# Patient Record
Sex: FEMALE | Race: WHITE | NOT HISPANIC OR LATINO | Employment: OTHER | URBAN - METROPOLITAN AREA
[De-identification: names, ages, dates, MRNs, and addresses within clinical notes are randomized per-mention and may not be internally consistent; named-entity substitution may affect disease eponyms.]

---

## 2017-01-10 ENCOUNTER — HOSPITAL ENCOUNTER (OUTPATIENT)
Dept: LAB | Facility: CLINIC | Age: 62
Discharge: HOME | End: 2017-01-10
Attending: INTERNAL MEDICINE | Admitting: INTERNAL MEDICINE
Payer: COMMERCIAL

## 2017-01-10 DIAGNOSIS — M25.559 PAIN IN JOINT, PELVIC REGION AND THIGH, UNSPECIFIED LATERALITY: ICD-10-CM

## 2017-01-10 DIAGNOSIS — L40.50 PSORIATIC ARTHRITIS (H): ICD-10-CM

## 2017-01-10 DIAGNOSIS — Z79.899 HIGH RISK MEDICATIONS (NOT ANTICOAGULANTS) LONG-TERM USE: ICD-10-CM

## 2017-01-10 DIAGNOSIS — Z79.899 HISTORY OF ONGOING TREATMENT WITH HIGH-RISK MEDICATION: ICD-10-CM

## 2017-01-10 DIAGNOSIS — M94.0 COSTOCHONDRITIS, ACUTE: ICD-10-CM

## 2017-01-10 LAB
ALBUMIN SERPL-MCNC: 3.6 G/DL (ref 3.4–5)
ALBUMIN UR-MCNC: NEGATIVE MG/DL
ALT SERPL W P-5'-P-CCNC: 28 U/L (ref 0–50)
APPEARANCE UR: CLEAR
AST SERPL W P-5'-P-CCNC: 27 U/L (ref 0–45)
BASOPHILS # BLD AUTO: 0 10E9/L (ref 0–0.2)
BASOPHILS NFR BLD AUTO: 0.7 %
BILIRUB UR QL STRIP: NEGATIVE
COLOR UR AUTO: ABNORMAL
CREAT SERPL-MCNC: 0.86 MG/DL (ref 0.52–1.04)
CRP SERPL-MCNC: 3.5 MG/L (ref 0–8)
DIFFERENTIAL METHOD BLD: NORMAL
EOSINOPHIL # BLD AUTO: 0.1 10E9/L (ref 0–0.7)
EOSINOPHIL NFR BLD AUTO: 1.3 %
ERYTHROCYTE [DISTWIDTH] IN BLOOD BY AUTOMATED COUNT: 14.5 % (ref 10–15)
ERYTHROCYTE [SEDIMENTATION RATE] IN BLOOD BY WESTERGREN METHOD: 8 MM/H (ref 0–30)
GFR SERPL CREATININE-BSD FRML MDRD: 68 ML/MIN/1.7M2
GLUCOSE UR STRIP-MCNC: NEGATIVE MG/DL
HCT VFR BLD AUTO: 40.4 % (ref 35–47)
HGB BLD-MCNC: 13.8 G/DL (ref 11.7–15.7)
HGB UR QL STRIP: NEGATIVE
IMM GRANULOCYTES # BLD: 0 10E9/L (ref 0–0.4)
IMM GRANULOCYTES NFR BLD: 0.2 %
KETONES UR STRIP-MCNC: NEGATIVE MG/DL
LEUKOCYTE ESTERASE UR QL STRIP: NEGATIVE
LYMPHOCYTES # BLD AUTO: 2.3 10E9/L (ref 0.8–5.3)
LYMPHOCYTES NFR BLD AUTO: 41 %
MCH RBC QN AUTO: 30.1 PG (ref 26.5–33)
MCHC RBC AUTO-ENTMCNC: 34.2 G/DL (ref 31.5–36.5)
MCV RBC AUTO: 88 FL (ref 78–100)
MONOCYTES # BLD AUTO: 0.5 10E9/L (ref 0–1.3)
MONOCYTES NFR BLD AUTO: 8.6 %
MUCOUS THREADS #/AREA URNS LPF: PRESENT /LPF
NEUTROPHILS # BLD AUTO: 2.7 10E9/L (ref 1.6–8.3)
NEUTROPHILS NFR BLD AUTO: 48.2 %
NITRATE UR QL: NEGATIVE
NRBC # BLD AUTO: 0 10*3/UL
NRBC BLD AUTO-RTO: 0 /100
PH UR STRIP: 6 PH (ref 5–7)
PLATELET # BLD AUTO: 221 10E9/L (ref 150–450)
RBC # BLD AUTO: 4.59 10E12/L (ref 3.8–5.2)
RBC #/AREA URNS AUTO: <1 /HPF (ref 0–2)
SP GR UR STRIP: 1.01 (ref 1–1.03)
URN SPEC COLLECT METH UR: ABNORMAL
UROBILINOGEN UR STRIP-MCNC: NORMAL MG/DL (ref 0–2)
WBC # BLD AUTO: 5.6 10E9/L (ref 4–11)
WBC #/AREA URNS AUTO: <1 /HPF (ref 0–2)

## 2017-01-10 PROCEDURE — 36415 COLL VENOUS BLD VENIPUNCTURE: CPT | Performed by: INTERNAL MEDICINE

## 2017-01-10 PROCEDURE — 85652 RBC SED RATE AUTOMATED: CPT | Performed by: INTERNAL MEDICINE

## 2017-01-10 PROCEDURE — 84450 TRANSFERASE (AST) (SGOT): CPT | Performed by: INTERNAL MEDICINE

## 2017-01-10 PROCEDURE — 86140 C-REACTIVE PROTEIN: CPT | Performed by: INTERNAL MEDICINE

## 2017-01-10 PROCEDURE — 82565 ASSAY OF CREATININE: CPT | Performed by: INTERNAL MEDICINE

## 2017-01-10 PROCEDURE — 81001 URINALYSIS AUTO W/SCOPE: CPT | Performed by: INTERNAL MEDICINE

## 2017-01-10 PROCEDURE — 84460 ALANINE AMINO (ALT) (SGPT): CPT | Performed by: INTERNAL MEDICINE

## 2017-01-10 PROCEDURE — 85025 COMPLETE CBC W/AUTO DIFF WBC: CPT | Performed by: INTERNAL MEDICINE

## 2017-01-10 PROCEDURE — 82040 ASSAY OF SERUM ALBUMIN: CPT | Performed by: INTERNAL MEDICINE

## 2017-01-11 ENCOUNTER — TELEPHONE (OUTPATIENT)
Dept: RHEUMATOLOGY | Facility: CLINIC | Age: 62
End: 2017-01-11

## 2017-01-11 NOTE — TELEPHONE ENCOUNTER
Prior Authorization Specialty Medication Request    Medication/Dose: certolizumab (Cimzia) - NEW INS  Frequency: Inject 200 mg Subcutaneous every 14 days    Route: sub-Q injection  Diagnosis and ICD: Pain in joint, pelvic region and thigh, unspecified laterality (M25.559); Psoriatic arthritis (H) (L40.50); Inflammatory polyarthropathy (H) (M06.4); Costochondritis, acute (M94.0); Inflammatory spondylopathy of multiple sites in spine (H) (M46.99)  New/Renewal/Insurance Change PA: Renewal - ins change    Important Lab Values: None    Previously Tried and Failed Therapies: Continuation of therapy    Rationale: Continuation of therapy    Pt MyChart Message:  Dear Dr Samayoa,   I changed health insurance to [employer]   I am sorry to say I need a prior authorization for the cimzia.    Your offic e will need to call [phone] to obtain this.    So sorry . Krissy Weldon    U care choice number is [phone]   RXPCN A4

## 2017-01-17 NOTE — TELEPHONE ENCOUNTER
Patient gave verbal consent to enroll in co-pay program for Cimzia and sharps mail back program through [employer] at tel [phone].    Co-pay card CIMZIA  ID: [id number]  PCN: [id number]  BIN: [id number]  GROUP: [id number]    Active 1-17-17 with $15,000

## 2017-01-17 NOTE — TELEPHONE ENCOUNTER
Prior Authorization Approval    Authorization Effective Date: 12/18/2016  Authorization Expiration Date: 1/17/2020  Medication: certolizumab (Cimzia) - APPROVED  Approved Dose/Quantity: 1 KIT PER 28 DAYS  Reference #: [id number]   Insurance Company:    Expected CoPay: N/A     CoPay Card Available: [email or web address]  Foundation Assistance Needed:    Which Pharmacy is filling the prescription (Not needed for infusion/clinic administered): [facility]  Pharmacy Notified: Yes   Patient Notified:  Yes, VM left on phone.

## 2017-01-17 NOTE — TELEPHONE ENCOUNTER
PA Initiation    Medication: certolizumab (Cimzia) - NEW INS  Insurance Company:  [employer] Pt id#[id number]  Pharmacy Filling the Rx: [facility] - [place], [place] - [facility] [address]  Filling Pharmacy Phone:    Filling Pharmacy Fax:    Start Date: 1/17/2017

## 2017-01-18 ENCOUNTER — TELEPHONE (OUTPATIENT)
Dept: RHEUMATOLOGY | Facility: CLINIC | Age: 62
End: 2017-01-18

## 2017-01-18 NOTE — TELEPHONE ENCOUNTER
Alejandra ([facility]) called re: Cimzia PA. Approved for 12/18/16 to 1/17/20. Documentation to be faxed to rheum clinic. Message sent to rheum [place].

## 2017-01-24 DIAGNOSIS — M25.559 PAIN IN JOINT, PELVIC REGION AND THIGH, UNSPECIFIED LATERALITY: Primary | ICD-10-CM

## 2017-01-25 DIAGNOSIS — M25.559 PAIN IN JOINT, PELVIC REGION AND THIGH, UNSPECIFIED LATERALITY: Primary | ICD-10-CM

## 2017-01-25 NOTE — TELEPHONE ENCOUNTER
Due to Medicare and [place] Board of Pharmacy Compliance guidelines Prescriptions must be E-Scribed we can not accept the Fax method from [facility].    [facility] Pharmacy Technician  [facility]  [phone] fax [phone]

## 2017-01-26 ENCOUNTER — TELEPHONE (OUTPATIENT)
Dept: RHEUMATOLOGY | Facility: CLINIC | Age: 62
End: 2017-01-26

## 2017-01-26 RX ORDER — NEEDLES, DISPOSABLE 25GX5/8"
NEEDLE, DISPOSABLE MISCELLANEOUS
Qty: 25 EACH | Refills: 0 | Status: SHIPPED
Start: 2017-01-26 | End: 2017-05-31

## 2017-01-26 RX ORDER — SYRINGE, DISPOSABLE, 1 ML
SYRINGE, EMPTY DISPOSABLE MISCELLANEOUS
Qty: 25 EACH | Refills: 0 | Status: SHIPPED
Start: 2017-01-26 | End: 2017-05-31

## 2017-01-26 NOTE — TELEPHONE ENCOUNTER
Please E-Prescribe, call in a verbal okay, or local print sign and manually fax the local print to us. Needles and Syringes - Due to Medicare and [place] Board of Pharmacy Compliance guidelines Prescriptions must be E-Scribed we can not accept the Fax method from [facility] Pharmacy Technician  [facility]  [phone] fax [phone]

## 2017-01-26 NOTE — TELEPHONE ENCOUNTER
Please E-Prescribe, call in a verbal okay, or local print sign and manually fax the local print to us - Due to [place] and [place] Board of Pharmacy Compliance guidelines Prescriptions must be E-Scribed we can not accept the Fax method from [facility] Pharmacy Technician  [facility]  [phone] fax [phone]

## 2017-01-26 NOTE — TELEPHONE ENCOUNTER
Please do not close this encounter until this has been addressed.  (prior auth approved/denied, prescriber refusal to complete prior auth or medication changed/discontinued)    Prior Authorization needed on: Methotrexate 50mg/2ml  Drug NDC: [address]-14     Insurance: [employer]  Member ID: [id number]   Insurance phone #: [phone]    Pharmacy NPI: [id number]  Pharmacy Phone #: [phone]  Pharmacy Fax #: [phone]    Please let us know if the PA gets approved or denied or if medication is changed  [facility] Pharmacy Technician  [facility]  [phone] fax [phone]

## 2017-01-31 NOTE — TELEPHONE ENCOUNTER
[facility] Prior Authorization Team   Phone: [phone]  Fax: [phone]    PA Initiation    Medication: Methotrexate 50mg/2ml  Insurance Company: [facility] - Phone [phone] Fax [phone]  Pharmacy Filling the Rx: [facility] [address]  Filling Pharmacy Phone: [phone]  Filling Pharmacy Fax:    Start Date: 1/31/2017

## 2017-02-05 ENCOUNTER — MYC MEDICAL ADVICE (OUTPATIENT)
Dept: RHEUMATOLOGY | Facility: CLINIC | Age: 62
End: 2017-02-05

## 2017-02-05 DIAGNOSIS — M46.99 INFLAMMATORY SPONDYLOPATHY OF MULTIPLE SITES IN SPINE (H): ICD-10-CM

## 2017-02-05 DIAGNOSIS — L40.50 PSORIATIC ARTHRITIS (H): ICD-10-CM

## 2017-02-05 DIAGNOSIS — M25.559 PAIN IN JOINT, PELVIC REGION AND THIGH, UNSPECIFIED LATERALITY: Primary | ICD-10-CM

## 2017-02-05 DIAGNOSIS — M94.0 COSTOCHONDRITIS, ACUTE: ICD-10-CM

## 2017-02-05 DIAGNOSIS — M06.4 INFLAMMATORY POLYARTHROPATHY (H): ICD-10-CM

## 2017-02-06 ENCOUNTER — TELEPHONE (OUTPATIENT)
Dept: RHEUMATOLOGY | Facility: CLINIC | Age: 62
End: 2017-02-06

## 2017-02-06 NOTE — TELEPHONE ENCOUNTER
Last seen: 11/1/17  Pending appt: 3/7/17  Medication: Cimzia   Last given: 9/2016  Qty: 3 kits  Lab:  WBC   Date Value Ref Range Status   01/10/2017 5.6 4.0 - 11.0 10e9/L Final     RBC COUNT   Date Value Ref Range Status   01/10/2017 4.59 3.8 - 5.2 10e12/L Final     HEMOGLOBIN   Date Value Ref Range Status   01/10/2017 13.8 11.7 - 15.7 g/dL Final     HEMATOCRIT   Date Value Ref Range Status   01/10/2017 40.4 35.0 - 47.0 % Final     MCV   Date Value Ref Range Status   01/10/2017 88 78 - 100 fl Final     MCH   Date Value Ref Range Status   01/10/2017 30.1 26.5 - 33.0 pg Final     PLATELET COUNT   Date Value Ref Range Status   01/10/2017 221 150 - 450 10e9/L Final     % LYMPHOCYTES   Date Value Ref Range Status   01/10/2017 41.0 % Final     AST   Date Value Ref Range Status   01/10/2017 27 0 - 45 U/L Final     ALT   Date Value Ref Range Status   01/10/2017 28 0 - 50 U/L Final     ALBUMIN   Date Value Ref Range Status   01/10/2017 3.6 3.4 - 5.0 g/dL Final     ALKALINE PHOSPHATASE   Date Value Ref Range Status   09/23/2015 91 40 - 150 U/L Final     CREATININE   Date Value Ref Range Status   01/10/2017 0.86 0.52 - 1.04 mg/dL Final     GFR ESTIMATE   Date Value Ref Range Status   01/10/2017 68 >60 mL/min/1.7m2 Final     Comment:     Non  GFR Calc     GFR ESTIMATE IF BLACK   Date Value Ref Range Status   01/10/2017 82 >60 mL/min/1.7m2 Final     Comment:      GFR Calc     CREATININE URINE   Date Value Ref Range Status   01/14/2013 117 mg/dL Final     SED RATE   Date Value Ref Range Status   01/10/2017 8 0 - 30 mm/h Final     CRP INFLAMMATION   Date Value Ref Range Status   01/10/2017 3.5 0.0 - 8.0 mg/L Final     Criteria met for refill renewal per Rheumatology Refill Protocol, approval faxed to pharmacy.

## 2017-02-06 NOTE — TELEPHONE ENCOUNTER
[facility] called [phone] with case number:  [id number] needing prior auth for injectable methotrexate.  Case expires on 2/8/17.

## 2017-02-06 NOTE — TELEPHONE ENCOUNTER
Shellie has been working with pt to clarify which pharmacy she needs to use. Per pt's insurance plan pt is not able to use [facility], but can use [facility].

## 2017-02-07 NOTE — TELEPHONE ENCOUNTER
Called Diaz miller is pending for return call from nurse. There was an outgoing call made today 2/6/2016 to [phone]. Per rep it is waiting for nurse to call back with criteria questions.

## 2017-02-07 NOTE — TELEPHONE ENCOUNTER
Prior Authorization Approval    Authorization Effective Date: 1/30/2017  Authorization Expiration Date: 2/8/2018  Medication: Methotrexate 50mg/2ml- APPROVED  Approved Dose/Quantity:   Reference #:   [id number]  Insurance Company: [employer] - Phone [phone] Fax [phone]  Expected CoPay: $16.00     CoPay Card Available:      Foundation Assistance Needed:    Which Pharmacy is filling the prescription (Not needed for infusion/clinic administered): [facility] [place] [address], [address]  Pharmacy Notified: Yes  Patient Notified: Yes      CALLED TO CHECK ON STATUS. PER REP THIS MEDICATION HAS BEEN APPROVED.

## 2017-02-07 NOTE — TELEPHONE ENCOUNTER
Insurance needing to know if pt is unable to try/fail Methotrexate tabs.  Pt is unable to tolerate.  That information and chart notes have been faxed to insurance.

## 2017-02-08 ENCOUNTER — TRANSFERRED RECORDS (OUTPATIENT)
Dept: HEALTH INFORMATION MANAGEMENT | Facility: CLINIC | Age: 62
End: 2017-02-08

## 2017-02-08 NOTE — TELEPHONE ENCOUNTER
Called pt with the approval update. She had spoke with Shellie also. Krissy is very appreciative.    CLEMENT HaasN RN  Rheumatology RN Coordinator  JIMMY Vanegas

## 2017-02-15 ENCOUNTER — OFFICE VISIT (OUTPATIENT)
Dept: RHEUMATOLOGY | Facility: CLINIC | Age: 62
End: 2017-02-15
Attending: NURSE PRACTITIONER
Payer: COMMERCIAL

## 2017-02-15 ENCOUNTER — TELEPHONE (OUTPATIENT)
Dept: RHEUMATOLOGY | Facility: CLINIC | Age: 62
End: 2017-02-15

## 2017-02-15 VITALS
WEIGHT: 150 LBS | SYSTOLIC BLOOD PRESSURE: 104 MMHG | DIASTOLIC BLOOD PRESSURE: 71 MMHG | BODY MASS INDEX: 24.58 KG/M2 | HEART RATE: 75 BPM

## 2017-02-15 DIAGNOSIS — Z79.899 HIGH RISK MEDICATIONS (NOT ANTICOAGULANTS) LONG-TERM USE: ICD-10-CM

## 2017-02-15 DIAGNOSIS — R06.02 SOB (SHORTNESS OF BREATH): ICD-10-CM

## 2017-02-15 DIAGNOSIS — M94.0 COSTOCHONDRITIS, ACUTE: ICD-10-CM

## 2017-02-15 DIAGNOSIS — M25.559 PAIN IN JOINT, PELVIC REGION AND THIGH, UNSPECIFIED LATERALITY: ICD-10-CM

## 2017-02-15 DIAGNOSIS — L40.50 PSORIATIC ARTHRITIS (H): ICD-10-CM

## 2017-02-15 DIAGNOSIS — L40.50 PSORIATIC ARTHRITIS (H): Primary | ICD-10-CM

## 2017-02-15 DIAGNOSIS — Z79.899 HISTORY OF ONGOING TREATMENT WITH HIGH-RISK MEDICATION: ICD-10-CM

## 2017-02-15 DIAGNOSIS — R63.4 LOSS OF WEIGHT: ICD-10-CM

## 2017-02-15 DIAGNOSIS — M94.0 COSTOCHONDRITIS: ICD-10-CM

## 2017-02-15 DIAGNOSIS — L40.9 PSORIASIS: ICD-10-CM

## 2017-02-15 DIAGNOSIS — R23.8 OTHER SKIN CHANGES: ICD-10-CM

## 2017-02-15 DIAGNOSIS — M06.4 INFLAMMATORY POLYARTHROPATHY (H): ICD-10-CM

## 2017-02-15 LAB
ALBUMIN SERPL-MCNC: 3.7 G/DL (ref 3.4–5)
ALBUMIN UR-MCNC: NEGATIVE MG/DL
ALT SERPL W P-5'-P-CCNC: 44 U/L (ref 0–50)
APPEARANCE UR: CLEAR
AST SERPL W P-5'-P-CCNC: 20 U/L (ref 0–45)
BASOPHILS # BLD AUTO: 0 10E9/L (ref 0–0.2)
BASOPHILS NFR BLD AUTO: 0.4 %
BILIRUB UR QL STRIP: NEGATIVE
COLOR UR AUTO: YELLOW
CREAT SERPL-MCNC: 0.82 MG/DL (ref 0.52–1.04)
CRP SERPL-MCNC: <2.9 MG/L (ref 0–8)
DIFFERENTIAL METHOD BLD: NORMAL
EOSINOPHIL # BLD AUTO: 0.1 10E9/L (ref 0–0.7)
EOSINOPHIL NFR BLD AUTO: 1.3 %
ERYTHROCYTE [DISTWIDTH] IN BLOOD BY AUTOMATED COUNT: 14.9 % (ref 10–15)
ERYTHROCYTE [SEDIMENTATION RATE] IN BLOOD BY WESTERGREN METHOD: 7 MM/H (ref 0–30)
GFR SERPL CREATININE-BSD FRML MDRD: 71 ML/MIN/1.7M2
GLUCOSE UR STRIP-MCNC: NEGATIVE MG/DL
HCT VFR BLD AUTO: 43.4 % (ref 35–47)
HGB BLD-MCNC: 14.4 G/DL (ref 11.7–15.7)
HGB UR QL STRIP: NEGATIVE
IMM GRANULOCYTES # BLD: 0 10E9/L (ref 0–0.4)
IMM GRANULOCYTES NFR BLD: 0.4 %
KETONES UR STRIP-MCNC: NEGATIVE MG/DL
LEUKOCYTE ESTERASE UR QL STRIP: NEGATIVE
LYMPHOCYTES # BLD AUTO: 3.2 10E9/L (ref 0.8–5.3)
LYMPHOCYTES NFR BLD AUTO: 35.1 %
MCH RBC QN AUTO: 30.1 PG (ref 26.5–33)
MCHC RBC AUTO-ENTMCNC: 33.2 G/DL (ref 31.5–36.5)
MCV RBC AUTO: 91 FL (ref 78–100)
MONOCYTES # BLD AUTO: 0.6 10E9/L (ref 0–1.3)
MONOCYTES NFR BLD AUTO: 6.4 %
MUCOUS THREADS #/AREA URNS LPF: PRESENT /LPF
NEUTROPHILS # BLD AUTO: 5.1 10E9/L (ref 1.6–8.3)
NEUTROPHILS NFR BLD AUTO: 56.4 %
NITRATE UR QL: NEGATIVE
NRBC # BLD AUTO: 0 10*3/UL
NRBC BLD AUTO-RTO: 0 /100
PH UR STRIP: 5 PH (ref 5–7)
PLATELET # BLD AUTO: 275 10E9/L (ref 150–450)
RBC # BLD AUTO: 4.78 10E12/L (ref 3.8–5.2)
RBC #/AREA URNS AUTO: 1 /HPF (ref 0–2)
SP GR UR STRIP: 1.01 (ref 1–1.03)
SQUAMOUS #/AREA URNS AUTO: <1 /HPF (ref 0–1)
URN SPEC COLLECT METH UR: ABNORMAL
UROBILINOGEN UR STRIP-MCNC: 0 MG/DL (ref 0–2)
WBC # BLD AUTO: 9.1 10E9/L (ref 4–11)
WBC #/AREA URNS AUTO: <1 /HPF (ref 0–2)

## 2017-02-15 PROCEDURE — 86140 C-REACTIVE PROTEIN: CPT | Performed by: INTERNAL MEDICINE

## 2017-02-15 PROCEDURE — 99212 OFFICE O/P EST SF 10 MIN: CPT | Mod: ZF

## 2017-02-15 PROCEDURE — 36415 COLL VENOUS BLD VENIPUNCTURE: CPT | Performed by: INTERNAL MEDICINE

## 2017-02-15 PROCEDURE — 82040 ASSAY OF SERUM ALBUMIN: CPT | Performed by: INTERNAL MEDICINE

## 2017-02-15 PROCEDURE — 81001 URINALYSIS AUTO W/SCOPE: CPT | Performed by: INTERNAL MEDICINE

## 2017-02-15 PROCEDURE — 84460 ALANINE AMINO (ALT) (SGPT): CPT | Performed by: INTERNAL MEDICINE

## 2017-02-15 PROCEDURE — 82565 ASSAY OF CREATININE: CPT | Performed by: INTERNAL MEDICINE

## 2017-02-15 PROCEDURE — 84450 TRANSFERASE (AST) (SGOT): CPT | Performed by: INTERNAL MEDICINE

## 2017-02-15 PROCEDURE — 85652 RBC SED RATE AUTOMATED: CPT | Performed by: INTERNAL MEDICINE

## 2017-02-15 PROCEDURE — 85025 COMPLETE CBC W/AUTO DIFF WBC: CPT | Performed by: INTERNAL MEDICINE

## 2017-02-15 RX ORDER — PREDNISONE 5 MG/1
5 TABLET ORAL DAILY
Qty: 1 TABLET | Refills: 0 | COMMUNITY
Start: 2017-02-15 | End: 2017-02-15

## 2017-02-15 RX ORDER — MORPHINE SULFATE 15 MG/1
15 TABLET, FILM COATED, EXTENDED RELEASE ORAL EVERY 12 HOURS
Qty: 1 TABLET | Refills: 0 | COMMUNITY
Start: 2017-02-15 | End: 2017-05-31

## 2017-02-15 RX ORDER — OMEPRAZOLE 40 MG/1
40 CAPSULE, DELAYED RELEASE ORAL DAILY
Qty: 1 CAPSULE | Refills: 0 | COMMUNITY
Start: 2017-02-15 | End: 2017-10-25

## 2017-02-15 RX ORDER — FAMOTIDINE 20 MG
1000 TABLET ORAL DAILY
Qty: 1 CAPSULE | Refills: 0 | COMMUNITY
Start: 2017-02-15 | End: 2018-03-01

## 2017-02-15 RX ORDER — PREDNISONE 5 MG/1
TABLET ORAL
Qty: 1 TABLET | Refills: 0 | COMMUNITY
Start: 2017-02-15 | End: 2017-08-22

## 2017-02-15 RX ORDER — FOLIC ACID 1 MG/1
3 TABLET ORAL DAILY
Qty: 270 TABLET | Refills: 3 | Status: SHIPPED | OUTPATIENT
Start: 2017-02-15 | End: 2017-10-25

## 2017-02-15 RX ORDER — SENNOSIDES 8.6 MG
1 TABLET ORAL DAILY
Qty: 1 TABLET | Refills: 0 | COMMUNITY
Start: 2017-02-15

## 2017-02-15 ASSESSMENT — PAIN SCALES - GENERAL: PAINLEVEL: MODERATE PAIN (5)

## 2017-02-15 NOTE — MR AVS SNAPSHOT
After Visit Summary   2/15/2017    Krissy Weldon    MRN: [id number]           Patient Information     Date Of Birth          1955        Visit Information        Provider Department      2/15/2017 1:00 PM Miguel Umana APRN [facility] Rheumatology        Today's Diagnoses     Psoriatic arthritis (HCC)    -  1    Pain in joint, pelvic region and thigh, unspecified laterality        Inflammatory polyarthropathy (H)        High risk medications (not anticoagulants) long-term use        Costochondritis        SOB (shortness of breath)        Loss of weight        Other skin changes        Psoriasis          Care Instructions    Cxr today  Annual physical to review cancer screenings, shortness of breath, heartburn         Follow-ups after your visit        Additional Services     DERMATOLOGY REFERRAL       Your provider has referred you to: [facility]: Dermatology [facility] - [place] [phone]   [email or web address]    Please be aware that coverage of these services is subject to the terms and limitations of your health insurance plan.  Call member services at your health plan with any benefit or coverage questions.      Please bring the following with you to your appointment:    (1) Any X-Rays, CTs or MRIs which have been performed.  Contact the facility where they were done to arrange for  prior to your scheduled appointment.    (2) List of current medications  (3) This referral request   (4) Any documents/labs given to you for this referral                  Follow-up notes from your care team     Return in about 3 months (around 5/15/2017) for Labs every 2 months, 3 Month Me & 6 Month Dr. Samayoa.      Your next 10 appointments already scheduled     Feb 15, 2017  3:45 PM CST   (Arrive by 3:30 PM)   XR CHEST 2 VIEWS with UCXR1   [facility] X[facility] ([facility])    [address]  [id number] [phone]           Please bring a list of your current medicines to your exam. (Include vitamins, minerals and over-thecounter medicines.) Leave your valuables at home.  Tell your doctor if there is a chance you may be pregnant.  You do not need to do anything special for this exam.            May 31, 2017  2:00 PM CDT   (Arrive by 1:45 PM)   Return Visit with MARIANNA Garcia CNP   [facility] Rheumatology ([facility])    [address] [place] [id number] [phone]            Aug 22, 2017  2:00 PM CDT   (Arrive by 1:45 PM)   Return Visit with Jensen Samayoa MD   [facility] Rheumatology ([facility])    [address] [place] [id number] [phone]              Future tests that were ordered for you today     Open Standing Orders        Priority Remaining Interval Expires Ordered    CBC with platelets differential Routine 6/6 Every 8 week 2/15/2018 2/15/2017    AST Routine 6/6 Every 8 week 2/15/2018 2/15/2017    ALT Routine 6/6 Every 8 week 2/15/2018 2/15/2017    Albumin level Routine 6/6 Every 8 week 2/15/2018 2/15/2017    Creatinine Routine 6/6 Every 8 week 2/15/2018 2/15/2017    CRP inflammation Routine 6/6 Every 8 week 2/15/2018 2/15/2017    Erythrocyte sedimentation rate auto Routine 6/6 Every 8 week 2/15/2018 2/15/2017            Who to contact     If you have questions or need follow up information about today's clinic visit or your schedule please contact [facility] RHEUMATOLOGY directly at [phone].  Normal or non-critical lab and imaging results will be communicated to you by MyChart, letter or phone within 4 business days after the clinic has received the results. If you do not hear from us within 7 days, please contact the clinic through MyChart or phone. If you have a critical or abnormal lab result, we will notify you by phone as soon as possible.  Submit refill requests  through [employer] or call your pharmacy and they will forward the refill request to us. Please allow 3 business days for your refill to be completed.          Additional Information About Your Visit        [employer] Information     [employer] gives you secure access to your electronic health record. If you see a primary care provider, you can also send messages to your care team and make appointments. If you have questions, please call your primary care clinic.  If you do not have a primary care provider, please call [phone] and they will assist you.        Care EveryWhere ID     This is your Care EveryWhere ID. This could be used by other organizations to access your [facility] medical records  [id number]        Your Vitals Were     Pulse Last Period BMI (Body Mass Index)             75 03/06/2012 24.58 kg/m2          Blood Pressure from Last 3 Encounters:   02/15/17 104/71   11/01/16 109/71   09/06/16 114/71    Weight from Last 3 Encounters:   02/15/17 68 kg (150 lb)   11/01/16 72.3 kg (159 lb 4.8 oz)   09/06/16 74.4 kg (164 lb)              We Performed the Following     DERMATOLOGY REFERRAL          Today's Medication Changes          These changes are accurate as of: 2/15/17  2:37 PM.  If you have any questions, ask your nurse or doctor.               These medicines have changed or have updated prescriptions.        Dose/Directions    certolizumab pegol 2 X 200 MG injection 2 vials/kit   Commonly known as:  CIMZIA   This may have changed:  Another medication with the same name was removed. Continue taking this medication, and follow the directions you see here.   Used for:  Pain in joint, pelvic region and thigh, unspecified laterality, High risk medications (not anticoagulants) long-term use   Changed by:  Miguel Umana APRN CNP        Inject 200 mg subcutaneously every other week (one syringe)   Quantity:  3 kit   Refills:  1       folic acid 1 MG tablet   Commonly known as:  FOLVITE   This may have  "changed:  Another medication with the same name was removed. Continue taking this medication, and follow the directions you see here.   Used for:  Pain in joint, pelvic region and thigh, unspecified laterality, High risk medications (not anticoagulants) long-term use   Changed by:  Miguel Umana APRN CNP        Dose:  3 mg   Take 3 tablets (3 mg) by mouth daily   Quantity:  270 tablet   Refills:  3       predniSONE 5 MG tablet   Commonly known as:  DELTASONE   This may have changed:    - how much to take  - how to take this  - when to take this  - additional instructions   Changed by:  Miguel Umana APRN CNP        Take 10 mg day before, day of and day after cimzia injection   Quantity:  1 tablet   Refills:  0            Where to get your medicines      These medications were sent to [facility] - [place], [place] - [address]   [address] [facility] [address]    Hours:  Mon-Fri 8:30am-5:00pm Toll Free [phone] Phone:  [phone]     certolizumab pegol 2 X 200 MG injection 2 vials/kit         These medications were sent to [facility] [address], [facility] [address]  [address], [facility] [address], [facility] [address]     Phone:  [phone]     folic acid 1 MG tablet    Needle (Disp) 25G X 5/8\" Misc    Syringe Luer Slip 3 ML Misc                Primary Care Provider Office Phone # Fax #    Deshawn Burns [phone]       [facility] [address]   [facility] [address]        Thank you!     Thank you for choosing [facility] RHEUMATOLOGY  for your care. Our goal is always to provide you with excellent care. Hearing back from our patients is one way we can continue to improve our services. Please take a few minutes to complete the written survey that you may receive in the mail after your visit with us. Thank you!             Your Updated Medication List - Protect others around you: Learn " "how to safely use, store and throw away your medicines at www.disposemymeds.org.          This list is accurate as of: 2/15/17  2:37 PM.  Always use your most recent med list.                   Brand Name Dispense Instructions for use    certolizumab pegol 2 X 200 MG injection 2 vials/kit    CIMZIA    3 kit    Inject 200 mg subcutaneously every other week (one syringe)       folic acid 1 MG tablet    FOLVITE    270 tablet    Take 3 tablets (3 mg) by mouth daily       methotrexate sodium (pres-free) 50 MG/2ML Soln injection CHEMO     4 mL    Inject 0.8 mLs (20 mg) Subcutaneous every 7 days       morphine 15 MG 12 hr tablet    MS CONTIN    1 tablet    Take 1 tablet (15 mg) by mouth every 12 hours       multivitamin, therapeutic Tabs tablet      Take 1 tablet by mouth daily       * Needle (Disp) 25G X 5/8\" Misc    BD DISP NEEDLES    25 each    USE WITH METHOTREXATE SUBCUTANEOUS EVERY 7 DAYS       * BD DISP NEEDLES 25G X 5/8\" Misc   Generic drug:  Needle (Disp)     25 each    USE WITH METHOTREXATE UNDER THE SKIN EVERY 7 DAYS       * Needle (Disp) 25G X 5/8\" Misc    BD DISP NEEDLES    25 each    USE WITH METHOTREXATE UNDER THE SKIN EVERY 7 DAYS       omeprazole 40 MG capsule    priLOSEC    1 capsule    Take 1 capsule (40 mg) by mouth daily Take 30-60 minutes before a meal.       oxyCODONE 5 MG IR tablet    ROXICODONE     Take 5-10 mg by mouth every 8 hours as needed for moderate to severe pain       predniSONE 5 MG tablet    DELTASONE    1 tablet    Take 10 mg day before, day of and day after cimzia injection       PROZAC 40 MG capsule   Generic drug:  FLUoxetine      Take 40 mg by mouth daily.       sennosides 8.6 MG tablet    SENOKOT    1 tablet    Take 1 tablet by mouth 2 times daily       SYNTHROID 100 MCG tablet   Generic drug:  levothyroxine      Take  by mouth daily.       * Syringe Luer Slip 3 ML Misc    B-D SYRINGE LUER-FILI    25 each    Inject 20 mg Subcutaneous every 7 days *USE FOR       * B-D SYRINGE " LUER-FILI 3 ML Misc   Generic drug:  Syringe Luer Slip     25 each    USE TO INJECT 20 MG OF METHOTREXATE EVERY 7 DAYS       * Syringe Luer Slip 3 ML Misc    B-D SYRINGE LUER-FILI    25 each    Inject 20 mg Subcutaneous every 7 days USE WITH METHOTREXATE       Vitamin D (Cholecalciferol) 1000 UNITS Caps     1 capsule    Take 1,000 Units by mouth daily       VOLTAREN 1 % Gel topical gel   Generic drug:  diclofenac     100 g    Apply 4 grams to knees or 2 grams to hands four times daily using enclosed dosing card.       * Notice:  This list has 6 medication(s) that are the same as other medications prescribed for you. Read the directions carefully, and ask your doctor or other care provider to review them with you.
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
## 2017-02-15 NOTE — TELEPHONE ENCOUNTER
Shellie and Matt    Krissy wishes me to say thank-you for much for all your care and helping her get her arthritis medications approved.    Her question is:   She goes on Medicare (with [employer] as secondary) starting April 1st and told me it would be $3000 month for her cimzia. She was told if she comes into the clinic to get the injection then Medicare would pay. Krissy is wishing to get a plan now before going on Medicare. I am not sure how to do this so copied Matt for her input     Krissy has severe psoriatic arthritis with inflammatory eye disease which had been very hard to control and Dr. Samayoa/I have her under control. She will get inflammatory eye disease flaring with missing arthritis medications needing prednisone or steroid eye drops.     Please help with this plan before April 1st.     Krissy asked for an update after.

## 2017-02-15 NOTE — TELEPHONE ENCOUNTER
We can write orders for the lyophilized Cimzia in a generic infusion orders. This form of the Cimzia can only be given in a medical facility as it needs to be reconstituted prior to giving. I'm not sure if Medicare covers it that way or not.  Matt Hogan, CLEMENTN RN  Rheumatology RN Coordinator  [facility]
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
## 2017-02-15 NOTE — NURSING NOTE
"Chief Complaint   Patient presents with     RECHECK     psoriatic arthritis       Initial /71 (BP Location: Left arm, Patient Position: Chair, Cuff Size: Adult Regular)  Pulse 75  Wt 68 kg (150 lb)  LMP 03/06/2012  BMI 24.58 kg/m2 Estimated body mass index is 24.58 kg/(m^2) as calculated from the following:    Height as of 11/1/16: 1.664 m (5' 5.5\").    Weight as of this encounter: 68 kg (150 lb).  Medication Reconciliation: complete  Susan Valle CMA    "

## 2017-02-16 NOTE — TELEPHONE ENCOUNTER
How often does she get cimzia refills and how many at a time? Will insurance pay for 3 months at a time now?     I called her and gave my best to give my interpretation of the responses. She gets her cimzia sent out now for 1 month. Shellie-is there a way to get get 3 months next refill to cover her until she sorts out the insurance? I asked her to call Medicare and [employer] herself but also check her "[employer] messages.

## 2017-02-16 NOTE — TELEPHONE ENCOUNTER
Liaison e-mailed infusion staff:    Per infusion staff who check benefits for infusions and injections given, Medicare would cover the Cimzia as long as Psoriatic Arthritis is the associated diagnosis on the orders.     Medicare covers 80% and her supplement would cover 20% but we ultimately don't know the exact cost but more than likely much cheaper than going through her pharmacy benefits.

## 2017-03-12 DIAGNOSIS — L40.50 PSORIATIC ARTHRITIS (H): ICD-10-CM

## 2017-03-12 DIAGNOSIS — M46.99 INFLAMMATORY SPONDYLOPATHY OF MULTIPLE SITES IN SPINE (H): ICD-10-CM

## 2017-03-12 DIAGNOSIS — M94.0 COSTOCHONDRITIS, ACUTE: ICD-10-CM

## 2017-03-12 DIAGNOSIS — M06.4 INFLAMMATORY POLYARTHROPATHY (H): ICD-10-CM

## 2017-03-12 DIAGNOSIS — M25.559 PAIN IN JOINT, PELVIC REGION AND THIGH, UNSPECIFIED LATERALITY: ICD-10-CM

## 2017-03-14 RX ORDER — METHOTREXATE 25 MG/ML
20 INJECTION INTRA-ARTERIAL; INTRAMUSCULAR; INTRATHECAL; INTRAVENOUS
Qty: 4 ML | Refills: 2 | Status: SHIPPED | OUTPATIENT
Start: 2017-03-14 | End: 2017-05-31

## 2017-03-14 NOTE — TELEPHONE ENCOUNTER
METHOTREXATE 50MG/2ML       Last Written Prescription Date:  9/24/16  Last Fill Quantity: 4ML,   # refills: 2  Last Office Visit: 2/7/17  Future Office visit:  NONE    CBC RESULTS:   Recent Labs   Lab Test  02/15/17   1223   WBC  9.1   RBC  4.78   HGB  14.4   HCT  43.4   MCV  91   MCH  30.1   MCHC  33.2   RDW  14.9   PLT  275       Creatinine   Date Value Ref Range Status   02/15/2017 0.82 0.52 - 1.04 mg/dL Final   ]    Liver Function Studies -   Recent Labs   Lab Test  02/15/17   1223   09/23/15   0510   PROTTOTAL   --    --   7.2   ALBUMIN  3.7   < >  3.3*   BILITOTAL   --    --   0.2   ALKPHOS   --    --   91   AST  20   < >  31   ALT  44   < >  37    < > = values in this interval not displayed.

## 2017-03-16 ENCOUNTER — TELEPHONE (OUTPATIENT)
Dept: OTHER | Facility: CLINIC | Age: 62
End: 2017-03-16

## 2017-04-13 NOTE — TELEPHONE ENCOUNTER
[facility] Prior Authorization Team   Phone: [phone]  Fax: [phone]    Prior Authorization Approval    Authorization Effective Date: 3/13/2017  Authorization Expiration Date: 4/11/2020  Medication: METHOTRATE  Approved Dose/Quantity:   Reference #:     Insurance Company: [employer] - Phone [phone] Fax [phone]  Expected CoPay:       CoPay Card Available:      Foundation Assistance Needed:    Which Pharmacy is filling the prescription (Not needed for infusion/clinic administered): [facility] [address] [facility]  Pharmacy Notified:  yes  Patient Notified:  no      Received phone call regarding another approval. I messaged a pharm tech. She stated this med is to be delete on 4/14.

## 2017-04-14 ENCOUNTER — MYC MEDICAL ADVICE (OUTPATIENT)
Dept: RHEUMATOLOGY | Facility: CLINIC | Age: 62
End: 2017-04-14

## 2017-04-14 ENCOUNTER — TELEPHONE (OUTPATIENT)
Dept: RHEUMATOLOGY | Facility: CLINIC | Age: 62
End: 2017-04-14

## 2017-04-14 NOTE — TELEPHONE ENCOUNTER
Prior Authorization Approval    Authorization Effective Date: 3/13/2017  Authorization Expiration Date: 4/11/2020  Medication: CIMZIA2 X 200 MG injection 2 vials/kit APPROVED  Approved Dose/Quantity: 2 per 28 daYS  Reference #: [id number]   Insurance Company: [employer] - Phone [phone] Fax [phone]  Expected CoPay: variable with Medicare     CoPay Card Available: No   Foundation Assistance Needed:    Which Pharmacy is filling the prescription (Not needed for infusion/clinic administered): [facility] - [place], [place] - [facility] [address]   Pharmacy Notified: Yes  Patient Notified: Yes    Patient alerted to approval and received a shipment of Cimzia on 4-1 4-17. Patient will check her out of pocket for her Medicare supplement and if less that the $6,000 for pharmacy, patient will my-chart our office to switch to having Cimzia completed at the infusion center.

## 2017-04-14 NOTE — TELEPHONE ENCOUNTER
Prior Authorization Specialty Medication Request    Medication/Dose: certolizumab pegol (CIMZIA) 2 X 200 MG injection 2 vials/kit  Diagnosis and ICD: Pain in joint, pelvic region and thigh, unspecified laterality (M25.559); High risk medications (not anticoagulants) long-term use (Z79.899)  New/Renewal/Insurance Change PA: Insurance Change    Important Lab Values: n/a    Previously Tried and Failed Therapies: Continuation of therapy.    Rationale: Patient stable on current medication.    Would you like to include any research articles?    If yes please include the hyperlink(s) below or fax @ [phone].    (Include Name and MRN)    If you received a fax notification from an outside Pharmacy;  Pharmacy Name:[facility]  Pharmacy #:  Pharmacy Fax:

## 2017-04-26 ENCOUNTER — APPOINTMENT (OUTPATIENT)
Dept: CT IMAGING | Facility: CLINIC | Age: 62
End: 2017-04-26
Attending: EMERGENCY MEDICINE
Payer: COMMERCIAL

## 2017-04-26 ENCOUNTER — HOSPITAL ENCOUNTER (EMERGENCY)
Facility: CLINIC | Age: 62
Discharge: HOME | End: 2017-04-26
Attending: EMERGENCY MEDICINE | Admitting: EMERGENCY MEDICINE
Payer: COMMERCIAL

## 2017-04-26 VITALS
BODY MASS INDEX: 24.91 KG/M2 | HEIGHT: 66 IN | DIASTOLIC BLOOD PRESSURE: 74 MMHG | WEIGHT: 155 LBS | TEMPERATURE: 97.8 F | SYSTOLIC BLOOD PRESSURE: 124 MMHG | HEART RATE: 59 BPM | RESPIRATION RATE: 16 BRPM | OXYGEN SATURATION: 99 %

## 2017-04-26 DIAGNOSIS — R10.84 ABDOMINAL PAIN, GENERALIZED: ICD-10-CM

## 2017-04-26 DIAGNOSIS — R11.2 NON-INTRACTABLE VOMITING WITH NAUSEA, UNSPECIFIED VOMITING TYPE: ICD-10-CM

## 2017-04-26 LAB
ALBUMIN SERPL-MCNC: 3.8 G/DL (ref 3.4–5)
ALP SERPL-CCNC: 72 U/L (ref 40–150)
ALT SERPL W P-5'-P-CCNC: 38 U/L (ref 0–50)
ANION GAP SERPL CALCULATED.3IONS-SCNC: 5 MMOL/L (ref 3–14)
AST SERPL W P-5'-P-CCNC: 24 U/L (ref 0–45)
BASOPHILS # BLD AUTO: 0 10E9/L (ref 0–0.2)
BASOPHILS NFR BLD AUTO: 0.6 %
BILIRUB SERPL-MCNC: 0.2 MG/DL (ref 0.2–1.3)
BUN SERPL-MCNC: 9 MG/DL (ref 7–30)
CALCIUM SERPL-MCNC: 8.3 MG/DL (ref 8.5–10.1)
CHLORIDE SERPL-SCNC: 104 MMOL/L (ref 94–109)
CO2 BLDCOV-SCNC: 31 MMOL/L (ref 21–28)
CO2 SERPL-SCNC: 31 MMOL/L (ref 20–32)
CREAT SERPL-MCNC: 0.71 MG/DL (ref 0.52–1.04)
DIFFERENTIAL METHOD BLD: NORMAL
EOSINOPHIL # BLD AUTO: 0 10E9/L (ref 0–0.7)
EOSINOPHIL NFR BLD AUTO: 0.6 %
ERYTHROCYTE [DISTWIDTH] IN BLOOD BY AUTOMATED COUNT: 14.3 % (ref 10–15)
GFR SERPL CREATININE-BSD FRML MDRD: 84 ML/MIN/1.7M2
GLUCOSE SERPL-MCNC: 88 MG/DL (ref 70–99)
HCT VFR BLD AUTO: 41.3 % (ref 35–47)
HGB BLD-MCNC: 14.3 G/DL (ref 11.7–15.7)
IMM GRANULOCYTES # BLD: 0 10E9/L (ref 0–0.4)
IMM GRANULOCYTES NFR BLD: 0.2 %
INTERPRETATION ECG - MUSE: NORMAL
LACTATE BLD-SCNC: 0.5 MMOL/L (ref 0.7–2.1)
LIPASE SERPL-CCNC: 134 U/L (ref 73–393)
LYMPHOCYTES # BLD AUTO: 1.8 10E9/L (ref 0.8–5.3)
LYMPHOCYTES NFR BLD AUTO: 36.5 %
MCH RBC QN AUTO: 30.8 PG (ref 26.5–33)
MCHC RBC AUTO-ENTMCNC: 34.6 G/DL (ref 31.5–36.5)
MCV RBC AUTO: 89 FL (ref 78–100)
MONOCYTES # BLD AUTO: 0.6 10E9/L (ref 0–1.3)
MONOCYTES NFR BLD AUTO: 12 %
NEUTROPHILS # BLD AUTO: 2.5 10E9/L (ref 1.6–8.3)
NEUTROPHILS NFR BLD AUTO: 50.1 %
NRBC # BLD AUTO: 0 10*3/UL
NRBC BLD AUTO-RTO: 0 /100
PCO2 BLDV: 58 MM HG (ref 40–50)
PH BLDV: 7.33 PH (ref 7.32–7.43)
PLATELET # BLD AUTO: 151 10E9/L (ref 150–450)
PO2 BLDV: 29 MM HG (ref 25–47)
POTASSIUM SERPL-SCNC: 3.6 MMOL/L (ref 3.4–5.3)
PROT SERPL-MCNC: 7.3 G/DL (ref 6.8–8.8)
RBC # BLD AUTO: 4.64 10E12/L (ref 3.8–5.2)
SAO2 % BLDV FROM PO2: 49 %
SODIUM SERPL-SCNC: 140 MMOL/L (ref 133–144)
WBC # BLD AUTO: 5 10E9/L (ref 4–11)

## 2017-04-26 PROCEDURE — 96376 TX/PRO/DX INJ SAME DRUG ADON: CPT

## 2017-04-26 PROCEDURE — 25000128 H RX IP 250 OP 636: Performed by: EMERGENCY MEDICINE

## 2017-04-26 PROCEDURE — 99285 EMERGENCY DEPT VISIT HI MDM: CPT | Mod: 25

## 2017-04-26 PROCEDURE — 85025 COMPLETE CBC W/AUTO DIFF WBC: CPT | Performed by: EMERGENCY MEDICINE

## 2017-04-26 PROCEDURE — 83605 ASSAY OF LACTIC ACID: CPT

## 2017-04-26 PROCEDURE — 25000125 ZZHC RX 250: Performed by: EMERGENCY MEDICINE

## 2017-04-26 PROCEDURE — 96375 TX/PRO/DX INJ NEW DRUG ADDON: CPT

## 2017-04-26 PROCEDURE — 83690 ASSAY OF LIPASE: CPT | Performed by: EMERGENCY MEDICINE

## 2017-04-26 PROCEDURE — 96374 THER/PROPH/DIAG INJ IV PUSH: CPT | Mod: 59

## 2017-04-26 PROCEDURE — 96361 HYDRATE IV INFUSION ADD-ON: CPT

## 2017-04-26 PROCEDURE — 82803 BLOOD GASES ANY COMBINATION: CPT

## 2017-04-26 PROCEDURE — 74177 CT ABD & PELVIS W/CONTRAST: CPT

## 2017-04-26 PROCEDURE — 25500064 ZZH RX 255 OP 636: Performed by: EMERGENCY MEDICINE

## 2017-04-26 PROCEDURE — 80053 COMPREHEN METABOLIC PANEL: CPT | Performed by: EMERGENCY MEDICINE

## 2017-04-26 RX ORDER — HYDROMORPHONE HYDROCHLORIDE 1 MG/ML
0.5 INJECTION, SOLUTION INTRAMUSCULAR; INTRAVENOUS; SUBCUTANEOUS
Status: DISCONTINUED | OUTPATIENT
Start: 2017-04-26 | End: 2017-04-26 | Stop reason: HOSPADM

## 2017-04-26 RX ORDER — METOCLOPRAMIDE 10 MG/1
10 TABLET ORAL 4 TIMES DAILY PRN
Qty: 20 TABLET | Refills: 0 | Status: SHIPPED | OUTPATIENT
Start: 2017-04-26 | End: 2017-05-31

## 2017-04-26 RX ORDER — ONDANSETRON 2 MG/ML
4 INJECTION INTRAMUSCULAR; INTRAVENOUS EVERY 30 MIN PRN
Status: DISCONTINUED | OUTPATIENT
Start: 2017-04-26 | End: 2017-04-26 | Stop reason: HOSPADM

## 2017-04-26 RX ORDER — SODIUM CHLORIDE 9 MG/ML
1000 INJECTION, SOLUTION INTRAVENOUS CONTINUOUS
Status: DISCONTINUED | OUTPATIENT
Start: 2017-04-26 | End: 2017-04-26 | Stop reason: HOSPADM

## 2017-04-26 RX ORDER — IOPAMIDOL 755 MG/ML
102 INJECTION, SOLUTION INTRAVASCULAR ONCE
Status: COMPLETED | OUTPATIENT
Start: 2017-04-26 | End: 2017-04-26

## 2017-04-26 RX ADMIN — SODIUM CHLORIDE 71 ML: 9 INJECTION, SOLUTION INTRAVENOUS at 19:46

## 2017-04-26 RX ADMIN — SODIUM CHLORIDE 1000 ML: 9 INJECTION, SOLUTION INTRAVENOUS at 18:32

## 2017-04-26 RX ADMIN — HYDROMORPHONE HYDROCHLORIDE 0.5 MG: 1 INJECTION, SOLUTION INTRAMUSCULAR; INTRAVENOUS; SUBCUTANEOUS at 18:33

## 2017-04-26 RX ADMIN — IOPAMIDOL 102 ML: 755 INJECTION, SOLUTION INTRAVENOUS at 19:46

## 2017-04-26 RX ADMIN — ONDANSETRON 4 MG: 2 SOLUTION INTRAMUSCULAR; INTRAVENOUS at 18:32

## 2017-04-26 RX ADMIN — SODIUM CHLORIDE 1000 ML: 9 INJECTION, SOLUTION INTRAVENOUS at 19:53

## 2017-04-26 RX ADMIN — HYDROMORPHONE HYDROCHLORIDE 0.5 MG: 1 INJECTION, SOLUTION INTRAMUSCULAR; INTRAVENOUS; SUBCUTANEOUS at 19:54

## 2017-04-26 RX ADMIN — ONDANSETRON 4 MG: 2 SOLUTION INTRAMUSCULAR; INTRAVENOUS at 19:53

## 2017-04-26 ASSESSMENT — ENCOUNTER SYMPTOMS
DIARRHEA: 1
CHILLS: 0
COUGH: 0
ABDOMINAL DISTENTION: 1
FEVER: 0
VOMITING: 1
APPETITE CHANGE: 1
ABDOMINAL PAIN: 1
NAUSEA: 1
BLOOD IN STOOL: 0
SHORTNESS OF BREATH: 0

## 2017-04-26 NOTE — ED AVS SNAPSHOT
Emergency Department    [address] [facility] [address]    Phone:  [phone]    Fax:  [phone]                                       Krissy Weldon   MRN: [id number]    Department:   Emergency Department   Date of Visit:  4/26/2017           Patient Information     Date Of Birth          1955        Your diagnoses for this visit were:     Non-intractable vomiting with nausea, unspecified vomiting type     Abdominal pain, generalized        You were seen by Suresh Back DO.      Follow-up Information     Follow up with Deshawn Burns MD In 2 days.    Specialty:  Family Practice    Contact information:    [facility]  [address] [address] [phone]          Follow up with  Emergency Department.    Specialty:  EMERGENCY MEDICINE    Why:  If symptoms worsen    Contact information:    [address] [facility] [address] [phone]        Discharge Instructions         *Abdominal Pain, Unknown Cause (Female)    The exact cause of your abdominal (stomach) pain is not certain. This does not mean that this is something to worry about, or the right tests were not done. Everyone likes to know the exact cause of the problem, but sometimes with abdominal pain, there is no clear-cut cause, and this could be a good thing. The good news is that your symptoms can be treated, and you will feel better.   Your condition does not seem serious now; however, sometimes the signs of a serious problem may take more time to appear. For this reason, it is important for you to watch for any new symptoms, problems, or worsening of your condition.  Over the next few days, the abdominal pain may come and go, or be continuous. Other common symptoms can include nausea and vomiting. Sometimes it can be difficult to tell if you feel nauseous, you may just feel bad and not associate that feeling with nausea. Constipation, diarrhea, and a  fever may go along with the pain.  The pain may continue even if treated correctly over the following days. Depending on how things go, sometimes the cause can become clear and may require further or different treatment. Additional evaluations, medications, or tests may be needed.  Home care  Your health care provider may prescribe medications for pain, symptoms, or an infection.  Follow the health care provider's instructions for taking these medications.  General care    Rest until your next exam. No strenuous activities.    Try to find positions that ease discomfort. A small pillow placed on the abdomen may help relieve pain.    Something warm on your abdomen (such as a heating pad) may help, but be careful not to burn yourself.  Diet    Do not force yourself to eat, especially if having cramps, vomiting, or diarrhea.    Water is important so you do not get dehydrated. Soup may also be good. Sports drinks may also help, especially if they are not too acidic. Make sure you don't drink sugary drinks as this can make things worse. Take liquids in small amounts. Do not guzzle them.    Caffeine sometimes makes the pain and cramping worse.    Avoid dairy products if you have vomiting or diarrhea.    Don't eat large amounts at a time. Wait a few minutes between bites.    Eat a diet low in fiber (called a low-residue diet). Foods allowed include refined breads, white rice, fruit and vegetable juices without pulp, tender meats. These foods will pass more easily through the intestine.    Avoid fried or fatty foods, dairy, alcohol and spicy foods until your symptoms go away.  Follow-up care  Follow up with your health care provider as instructed, or if your pain does not begin to improve in the next 24 hours.  When to seek medical care  Seek prompt medical care if any of the following occur:    Pain gets worse or moves to the right lower abdomen    New or worsening vomiting or diarrhea    Swelling of the abdomen    Unable  to pass stool for more than three days    New fever over 101  F (38.3 C), or rising fever    Blood in vomit or bowel movements (dark red or black color)    Jaundice (yellow color of eyes and skin)    Weakness, dizziness    Chest, arm, back, neck or jaw pain    Unexpected vaginal bleeding or missed period  Call 911  Call emergency services if any of the following occur:    Trouble breathing    Confusion    Fainting or loss of consciousness    Rapid heart rate    Seizure    [id number] [facility], [address] [place] [address], [place] [address]. All rights reserved. This information is not intended as a substitute for professional medical care. Always follow your healthcare professional's instructions.          Future Appointments        Provider Department Dept Phone Center    5/31/2017 2:00 PM MARIANNA Santos [facility] [phone] [facility]    8/22/2017 2:00 PM Jensen Samayoa MD [facility] [phone] [facility]      24 Hour Appointment Hotline       To make an appointment at any [facility] call [phone] ([phone]). If you don't have a family doctor or clinic, we will help you find one. [place] clinics are conveniently located to serve the needs of you and your family.             Review of your medicines      START taking        Dose / Directions Last dose taken    metoclopramide 10 MG tablet   Commonly known as:  REGLAN   Dose:  10 mg   Quantity:  20 tablet        Take 1 tablet (10 mg) by mouth 4 times daily as needed For nausea   Refills:  0          Our records show that you are taking the medicines listed below. If these are incorrect, please call your family doctor or clinic.        Dose / Directions Last dose taken    certolizumab pegol 2 X 200 MG injection 2 vials/kit   Commonly known as:  CIMZIA   Quantity:  3 kit        Inject 200 mg subcutaneously every other week (one syringe)   Refills:  1        folic acid 1 MG tablet   Commonly known as:  FOLVITE   Dose:   "3 mg   Quantity:  270 tablet        Take 3 tablets (3 mg) by mouth daily   Refills:  3        methotrexate sodium (pres-free) 50 MG/2ML Soln injection CHEMO   Dose:  20 mg   Quantity:  4 mL        Inject 0.8 mLs (20 mg) Subcutaneous every 7 days   Refills:  2        morphine 15 MG 12 hr tablet   Commonly known as:  MS CONTIN   Dose:  15 mg   Quantity:  1 tablet        Take 1 tablet (15 mg) by mouth every 12 hours   Refills:  0        multivitamin, therapeutic Tabs tablet   Dose:  1 tablet        Take 1 tablet by mouth daily   Refills:  0        * Needle (Disp) 25G X 5/8\" Misc   Commonly known as:  BD DISP NEEDLES   Quantity:  25 each        USE WITH METHOTREXATE SUBCUTANEOUS EVERY 7 DAYS   Refills:  0        * BD DISP NEEDLES 25G X 5/8\" Misc   Quantity:  25 each   Generic drug:  Needle (Disp)        USE WITH METHOTREXATE UNDER THE SKIN EVERY 7 DAYS   Refills:  0        * Needle (Disp) 25G X 5/8\" Misc   Commonly known as:  BD DISP NEEDLES   Quantity:  25 each        USE WITH METHOTREXATE UNDER THE SKIN EVERY 7 DAYS   Refills:  0        omeprazole 40 MG capsule   Commonly known as:  priLOSEC   Dose:  40 mg   Quantity:  1 capsule        Take 1 capsule (40 mg) by mouth daily Take 30-60 minutes before a meal.   Refills:  0        oxyCODONE 5 MG IR tablet   Commonly known as:  ROXICODONE   Dose:  5-10 mg        Take 5-10 mg by mouth every 8 hours as needed for moderate to severe pain   Refills:  0        predniSONE 5 MG tablet   Commonly known as:  DELTASONE   Quantity:  1 tablet        Take 10 mg day before, day of and day after cimzia injection   Refills:  0        PROZAC 40 MG capsule   Dose:  40 mg   Generic drug:  FLUoxetine        Take 40 mg by mouth daily.   Refills:  0        sennosides 8.6 MG tablet   Commonly known as:  SENOKOT   Dose:  1 tablet   Quantity:  1 tablet        Take 1 tablet by mouth 2 times daily   Refills:  0        SYNTHROID 100 MCG tablet   Generic drug:  levothyroxine        Take  by mouth " daily.   Refills:  0        * Syringe Luer Slip 3 ML Misc   Commonly known as:  B-D SYRINGE LUER-FILI   Dose:  20 mg   Quantity:  25 each        Inject 20 mg Subcutaneous every 7 days *USE FOR   Refills:  0        * B-D SYRINGE LUER-FILI 3 ML Misc   Quantity:  25 each   Generic drug:  Syringe Luer Slip        USE TO INJECT 20 MG OF METHOTREXATE EVERY 7 DAYS   Refills:  0        * Syringe Luer Slip 3 ML Misc   Commonly known as:  B-D SYRINGE LUER-FILI   Dose:  20 mg   Quantity:  25 each        Inject 20 mg Subcutaneous every 7 days USE WITH METHOTREXATE   Refills:  0        Vitamin D (Cholecalciferol) 1000 UNITS Caps   Dose:  1000 Units   Quantity:  1 capsule        Take 1,000 Units by mouth daily   Refills:  0        VOLTAREN 1 % Gel topical gel   Quantity:  100 g   Generic drug:  diclofenac        Apply 4 grams to knees or 2 grams to hands four times daily using enclosed dosing card.   Refills:  0        * Notice:  This list has 6 medication(s) that are the same as other medications prescribed for you. Read the directions carefully, and ask your doctor or other care provider to review them with you.            Prescriptions were sent or printed at these locations (1 Prescription)                   Other Prescriptions                Printed at Department/Unit printer (1 of 1)         metoclopramide (REGLAN) 10 MG tablet                Procedures and tests performed during your visit     CBC with platelets differential    CT Abdomen Pelvis w Contrast    Comprehensive metabolic panel    EKG 12-lead, tracing only    ISTAT gases lactate garrett POCT    Lipase      Orders Needing Specimen Collection     None      Pending Results     No orders found from 4/24/2017 to 4/27/2017.            Pending Culture Results     No orders found from 4/24/2017 to 4/27/2017.            Test Results From Your Hospital Stay        4/26/2017  6:54 PM      Component Results     Component Value Ref Range & Units Status    WBC 5.0 4.0 - 11.0 10e9/L  Final    RBC Count 4.64 3.8 - 5.2 10e12/L Final    Hemoglobin 14.3 11.7 - 15.7 g/dL Final    Hematocrit 41.3 35.0 - 47.0 % Final    MCV 89 78 - 100 fl Final    MCH 30.8 26.5 - 33.0 pg Final    MCHC 34.6 31.5 - 36.5 g/dL Final    RDW 14.3 10.0 - 15.0 % Final    Platelet Count 151 150 - 450 10e9/L Final    Diff Method Automated Method  Final    % Neutrophils 50.1 % Final    % Lymphocytes 36.5 % Final    % Monocytes 12.0 % Final    % Eosinophils 0.6 % Final    % Basophils 0.6 % Final    % Immature Granulocytes 0.2 % Final    Nucleated RBCs 0 0 /100 Final    Absolute Neutrophil 2.5 1.6 - 8.3 10e9/L Final    Absolute Lymphocytes 1.8 0.8 - 5.3 10e9/L Final    Absolute Monocytes 0.6 0.0 - 1.3 10e9/L Final    Absolute Eosinophils 0.0 0.0 - 0.7 10e9/L Final    Absolute Basophils 0.0 0.0 - 0.2 10e9/L Final    Abs Immature Granulocytes 0.0 0 - 0.4 10e9/L Final    Absolute Nucleated RBC 0.0  Final         4/26/2017  7:10 PM      Component Results     Component Value Ref Range & Units Status    Sodium 140 133 - 144 mmol/L Final    Potassium 3.6 3.4 - 5.3 mmol/L Final    Chloride 104 94 - 109 mmol/L Final    Carbon Dioxide 31 20 - 32 mmol/L Final    Anion Gap 5 3 - 14 mmol/L Final    Glucose 88 70 - 99 mg/dL Final    Urea Nitrogen 9 7 - 30 mg/dL Final    Creatinine 0.71 0.52 - 1.04 mg/dL Final    GFR Estimate 84 >60 mL/min/1.7m2 Final    Non  GFR Calc    GFR Estimate If Black >90   GFR Calc   >60 mL/min/1.7m2 Final    Calcium 8.3 (L) 8.5 - 10.1 mg/dL Final    Bilirubin Total 0.2 0.2 - 1.3 mg/dL Final    Albumin 3.8 3.4 - 5.0 g/dL Final    Protein Total 7.3 6.8 - 8.8 g/dL Final    Alkaline Phosphatase 72 40 - 150 U/L Final    ALT 38 0 - 50 U/L Final    AST 24 0 - 45 U/L Final         4/26/2017  7:08 PM      Component Results     Component Value Ref Range & Units Status    Lipase 134 73 - 393 U/L Final         4/26/2017  8:13 PM      Narrative     CT ABDOMEN AND PELVIS WITH CONTRAST 4/26/2017 7:53 PM      HISTORY: Vomiting/right-sided abdominal pain; rule out small bowel  obstruction.     CONTRAST DOSE: 102mL Isovue-370    Radiation dose for this scan was reduced using automated exposure  control, adjustment of the mA and/or kV according to patient size, or  iterative reconstruction technique.    COMPARISON: 9/23/2015    FINDINGS: Linear atelectasis or fibrosis is noted at the right lung  base. There is a large amount of fecal debris noted within the colon.  There is no evidence of bowel obstruction. No free peritoneal fluid or  air. The liver, spleen, kidneys, adrenal glands, and pancreas appear  within normal limits. Small renal cysts are noted. Cholecystectomy  surgical clips are present. Prominence of the common bile duct is  presumably related to the postcholecystectomy state. Pelvic contents  appear within normal limits.        Impression     IMPRESSION: No CT evidence of bowel obstruction or an acute  inflammatory process in the abdomen or pelvis. The common bile duct  appears prominent. However, this may be related to the  postcholecystectomy state.    MADDY ECHEVERRIA MD               4/26/2017  6:51 PM      Component Results     Component Value Ref Range & Units Status    Ph Venous 7.33 7.32 - 7.43 pH Final    PCO2 Venous 58 (H) 40 - 50 mm Hg Final    PO2 Venous 29 25 - 47 mm Hg Final    Bicarbonate Venous 31 (H) 21 - 28 mmol/L Final    O2 Sat Venous 49 % Final    Lactic Acid 0.5 (L) 0.7 - 2.1 mmol/L Final                Clinical Quality Measure: Blood Pressure Screening     Your blood pressure was checked while you were in the emergency department today. The last reading we obtained was  BP: 124/74 . Please read the guidelines below about what these numbers mean and what you should do about them.  If your systolic blood pressure (the top number) is less than 120 and your diastolic blood pressure (the bottom number) is less than 80, then your blood pressure is normal. There is nothing more that you need  to do about it.  If your systolic blood pressure (the top number) is 120-139 or your diastolic blood pressure (the bottom number) is 80-89, your blood pressure may be higher than it should be. You should have your blood pressure rechecked within a year by a primary care provider.  If your systolic blood pressure (the top number) is 140 or greater or your diastolic blood pressure (the bottom number) is 90 or greater, you may have high blood pressure. High blood pressure is treatable, but if left untreated over time it can put you at risk for heart attack, stroke, or kidney failure. You should have your blood pressure rechecked by a primary care provider within the next 4 weeks.  If your provider in the emergency department today gave you specific instructions to follow-up with your doctor or provider even sooner than that, you should follow that instruction and not wait for up to 4 weeks for your follow-up visit.        Thank you for choosing [facility]       Thank you for choosing [facility] for your care. Our goal is always to provide you with excellent care. Hearing back from our patients is one way we can continue to improve our services. Please take a few minutes to complete the written survey that you may receive in the mail after you visit with us. Thank you!        [employer] Information     [employer] gives you secure access to your electronic health record. If you see a primary care provider, you can also send messages to your care team and make appointments. If you have questions, please call your primary care clinic.  If you do not have a primary care provider, please call [phone] and they will assist you.        Care EveryWhere ID     This is your Care EveryWhere ID. This could be used by other organizations to access your [facility] medical records  [id number]        After Visit Summary       This is your record. Keep this with you and show to your community pharmacist(s) and doctor(s) at your next visit.

## 2017-04-26 NOTE — ED PROVIDER NOTES
History     Chief Complaint:  Abdominal Pain       HPI   Krissy Weldon is a 61 year old female with a history of psoriatic arthritis on MTX and Cimzia and SBO who presents to the emergency department for evaluation of abdominal pain.  The patient reports developing nausea and vomiting 6 days ago.  She was having a lot of epigastric pain and reflux from vomiting frequently although this resolved now that she is only is vomiting once or twice a day.  She saw her primary physician 2 days ago and was diagnosed with strep throat, for which she was placed on Azithromycin.  She never had a sore throat or any fevers.  For the past 2 days she has now had constant pain in her right lower quadrant that feels similar to her previous bowel obstruction.  Currently this pain is about 7/10.  She is still passing gas, although notes her abdomen is distended.  She has had small, hard balls of stool and in the past 2 days she has also had liquid bright yellow diarrhea although this seemed better today.  She denies any issues with chronic constipation with her narcotics as she takes Senna.  She has been attempting to keep well hydrated, although she is still vomiting some after eating or taking medication.  She does have some midsternal chest pain that is chronic from costochondritis and unchanged today.  She denies any cough or shortness of breath.      Allergies:  No Known Allergies     Medications:    Methotrexate   Cimzia  Prednisone  Folic Acid  Prilosec  Morphine  Oxycodone  Voltaren cream  Senokot    Synthroid  Prozac   Multivitamins    Past Medical History:    Depression  Degenerative joint disease   Episcleritis  Hypothyroidism  Psoriatic arthritis  Inflammatory polyarthropathy  Inflammatory spondylopathy  Small bowel obstruction    Past Surgical History:    appendectomy  Carpometacarpal arthroplasty x2  cholecystectomy  Cholangiopancreatogram endoscopy  Hysterectomy   Oophorectomy   Sacroiliac injections    Family History:     Arthritis  Lymphoma  Colon cancer  Crohn's disease  Endometrial cancer    Social History:  Presents with her wife.  Negative for tobacco use.  Negative for alcohol use.  Marital Status:       Review of Systems   Constitutional: Positive for appetite change. Negative for chills and fever.   Respiratory: Negative for cough and shortness of breath.    Cardiovascular: Positive for chest pain (chest wall).   Gastrointestinal: Positive for abdominal distention, abdominal pain, diarrhea, nausea and vomiting. Negative for blood in stool.   Genitourinary: Negative for decreased urine volume.   All other systems reviewed and are negative.    Physical Exam     Initial Vitals:  BP: 138/59 mmHg  Heart Rate: 80   Resp: 16  SpO2: 96% RA  Temp: 97.8  F (oral)     Physical Exam  General: Alert and cooperative with exam. Patient in mild distress. Normal mentation  Head:  Scalp is NC/AT  Eyes:  No scleral icterus, PERRL   ENT:  The external nose and ears are normal. The oropharynx is normal and without erythema; mucus membranes are mildly dry. Uvula midline, no evidence of deep space infection.  Neck:  Normal range of motion without rigidity.   CV:  Regular rate and rhythm    No pathologic murmur   Resp:  Breath sounds are clear bilaterally    Non-labored, no retractions or accessory muscle use  GI:  Abdomen is soft, mild distension with right sided tenderness to palpation, greatest in the right lower quadrant    MS:  No lower extremity edema   Skin:  Warm and dry, No rash or lesions noted.  Neuro: Oriented x 3. No gross motor deficits.    Emergency Department Course     Imaging:  Abdomen/Pelvis CT with IV contrast:  No CT evidence of bowel obstruction or an acute inflammatory process in the abdomen or pelvis. The common bile duct appears prominent. However, this may be related to the post cholecystectomy state.  Report per radiology.      Radiographic findings were communicated with the patient who voiced understanding of  the findings.    Laboratory:  CBC: WNL (WBC 5, HGB 14.3, )    CMP: Ca 8.3 (L), otherwise WNL (Creatinine 0.71)   Lipase: 134  (1844) ISTAT Venous Gases, Lactate POCT: pH 7.33, pCO2 58 (H), pO2 29, Bicarb 31 (H), SpO2 49, Lactic Acid 0.5 (L)     Interventions:  (1832) Normal Saline, 1 liter, IV bolus   (1832) Zofran, 4 mg, IV injection   (1833) Dilaudid, 0.5 mg, IV injection   (1953) Zofran, 4 mg, IV injection   (1953) Normal Saline, 125 mL/hr, IV infusion    (1954) Dilaudid, 0.5 mg, IV injection     Emergency Department Course:  Nursing notes and vitals reviewed.  I performed an exam of the patient as documented above.     A peripheral IV was established. Blood was drawn from the patient. This was sent for laboratory testing, findings above.       The patient was sent for a CT scan of the abdomen and pelvis while in the emergency department, findings above.      (2038) Patient feels improved and would like to go home.    Findings and plan explained to the patient. Patient discharged home with instructions regarding supportive care, medications, and reasons to return. The importance of close follow-up was reviewed. The patient was prescribed Reglan.     Impression & Plan      Medical Decision Making:  Patient is a 61 year old female who presents with abdominal pain and nausea/vomiting.  Patient's medical history and records were reviewed.  Initial consideration for, but not limited to, SBO, pancreatitis, gastritis, constipation, infectious process, biliary pathology, among others.  Labs and imaging were obtained.  Labs unremarkable as noted above.  CT of the abdomen/pelvis with contrast demonstrates no significant findings other than prominence of the common biliary duct which is likely related to cholecystectomy.  Patient with no significant right upper quadrant tenderness.  She remained afebrile and hemodynamically stable throughout ED encounter.  Provided Dilaudid and Zofran for pain and nausea with noted  improvement on reexamination.  Patient was able to tolerate oral challenge in the ED.  No emergent cause could be determined for patient's symptoms; likely viral etiology.  Recommended supportive care and close follow up with PCP.  Return precautions discussed.  Patient discharged to home.  Prescribed Reglan for breakthrough nausea.      Diagnosis:    ICD-10-CM    1. Non-intractable vomiting with nausea, unspecified vomiting type R11.2    2. Abdominal pain, generalized R10.84      Disposition:  Discharged to home.    Discharge Medications:  New Prescriptions    METOCLOPRAMIDE (REGLAN) 10 MG TABLET    Take 1 tablet (10 mg) by mouth 4 times daily as needed For nausea       Fiona VENTURA, am serving as a scribe on 4/26/2017 at 6:10 PM to personally document services performed by Dr. Suresh Back based on my observations and the provider's statements to me.     4/26/2017    EMERGENCY DEPARTMENT       Suresh Back,   04/27/17 0848

## 2017-04-26 NOTE — ED AVS SNAPSHOT
Emergency Department    [address] [place] [id number]    Phone:  [phone]    Fax:  [phone]                                       Krissy Weldon   MRN: [id number]    Department:   Emergency Department   Date of Visit:  4/26/2017           After Visit Summary Signature Page     I have received my discharge instructions, and my questions have been answered. I have discussed any challenges I see with this plan with the nurse or doctor.    ..........................................................................................................................................  Patient/Patient Representative Signature      ..........................................................................................................................................  Patient Representative Print Name and Relationship to Patient    ..................................................               ................................................  Date                                            Time    ..........................................................................................................................................  Reviewed by Signature/Title    ...................................................              ..............................................  Date                                                            Time

## 2017-04-27 NOTE — DISCHARGE INSTRUCTIONS
*Abdominal Pain, Unknown Cause (Female)    The exact cause of your abdominal (stomach) pain is not certain. This does not mean that this is something to worry about, or the right tests were not done. Everyone likes to know the exact cause of the problem, but sometimes with abdominal pain, there is no clear-cut cause, and this could be a good thing. The good news is that your symptoms can be treated, and you will feel better.   Your condition does not seem serious now; however, sometimes the signs of a serious problem may take more time to appear. For this reason, it is important for you to watch for any new symptoms, problems, or worsening of your condition.  Over the next few days, the abdominal pain may come and go, or be continuous. Other common symptoms can include nausea and vomiting. Sometimes it can be difficult to tell if you feel nauseous, you may just feel bad and not associate that feeling with nausea. Constipation, diarrhea, and a fever may go along with the pain.  The pain may continue even if treated correctly over the following days. Depending on how things go, sometimes the cause can become clear and may require further or different treatment. Additional evaluations, medications, or tests may be needed.  Home care  Your health care provider may prescribe medications for pain, symptoms, or an infection.  Follow the health care provider's instructions for taking these medications.  General care    Rest until your next exam. No strenuous activities.    Try to find positions that ease discomfort. A small pillow placed on the abdomen may help relieve pain.    Something warm on your abdomen (such as a heating pad) may help, but be careful not to burn yourself.  Diet    Do not force yourself to eat, especially if having cramps, vomiting, or diarrhea.    Water is important so you do not get dehydrated. Soup may also be good. Sports drinks may also help, especially if they are not too acidic. Make sure you  don't drink sugary drinks as this can make things worse. Take liquids in small amounts. Do not guzzle them.    Caffeine sometimes makes the pain and cramping worse.    Avoid dairy products if you have vomiting or diarrhea.    Don't eat large amounts at a time. Wait a few minutes between bites.    Eat a diet low in fiber (called a low-residue diet). Foods allowed include refined breads, white rice, fruit and vegetable juices without pulp, tender meats. These foods will pass more easily through the intestine.    Avoid fried or fatty foods, dairy, alcohol and spicy foods until your symptoms go away.  Follow-up care  Follow up with your health care provider as instructed, or if your pain does not begin to improve in the next 24 hours.  When to seek medical care  Seek prompt medical care if any of the following occur:    Pain gets worse or moves to the right lower abdomen    New or worsening vomiting or diarrhea    Swelling of the abdomen    Unable to pass stool for more than three days    New fever over 101  F (38.3 C), or rising fever    Blood in vomit or bowel movements (dark red or black color)    Jaundice (yellow color of eyes and skin)    Weakness, dizziness    Chest, arm, back, neck or jaw pain    Unexpected vaginal bleeding or missed period  Call 911  Call emergency services if any of the following occur:    Trouble breathing    Confusion    Fainting or loss of consciousness    Rapid heart rate    Seizure    [id number] [employer], [address] [place] [address], [place] [address]. All rights reserved. This information is not intended as a substitute for professional medical care. Always follow your healthcare professional's instructions.

## 2017-04-27 NOTE — ED NOTES
Pt has been able to keep water down but doesn't feel like she even wants to try eating crackers. Encouraged to keep taking in oral liquids and f/u if appetite doesn't improve soon.

## 2017-05-31 ENCOUNTER — TELEPHONE (OUTPATIENT)
Dept: RHEUMATOLOGY | Facility: CLINIC | Age: 62
End: 2017-05-31

## 2017-05-31 ENCOUNTER — OFFICE VISIT (OUTPATIENT)
Dept: RHEUMATOLOGY | Facility: CLINIC | Age: 62
End: 2017-05-31
Attending: NURSE PRACTITIONER
Payer: COMMERCIAL

## 2017-05-31 VITALS
BODY MASS INDEX: 25.13 KG/M2 | WEIGHT: 156.4 LBS | SYSTOLIC BLOOD PRESSURE: 105 MMHG | DIASTOLIC BLOOD PRESSURE: 65 MMHG | HEIGHT: 66 IN | OXYGEN SATURATION: 99 % | HEART RATE: 72 BPM

## 2017-05-31 DIAGNOSIS — M46.99 INFLAMMATORY SPONDYLOPATHY OF MULTIPLE SITES IN SPINE (H): ICD-10-CM

## 2017-05-31 DIAGNOSIS — M25.559 PAIN IN JOINT, PELVIC REGION AND THIGH, UNSPECIFIED LATERALITY: ICD-10-CM

## 2017-05-31 DIAGNOSIS — L40.50 PSORIATIC ARTHRITIS (H): ICD-10-CM

## 2017-05-31 DIAGNOSIS — M94.0 COSTOCHONDRITIS, ACUTE: ICD-10-CM

## 2017-05-31 DIAGNOSIS — Z79.899 HIGH RISK MEDICATIONS (NOT ANTICOAGULANTS) LONG-TERM USE: ICD-10-CM

## 2017-05-31 DIAGNOSIS — M06.4 INFLAMMATORY POLYARTHROPATHY (H): ICD-10-CM

## 2017-05-31 LAB
ALBUMIN SERPL-MCNC: 3.6 G/DL (ref 3.4–5)
ALT SERPL W P-5'-P-CCNC: 28 U/L (ref 0–50)
AST SERPL W P-5'-P-CCNC: 21 U/L (ref 0–45)
BASOPHILS # BLD AUTO: 0 10E9/L (ref 0–0.2)
BASOPHILS NFR BLD AUTO: 0.7 %
CREAT SERPL-MCNC: 0.72 MG/DL (ref 0.52–1.04)
CRP SERPL-MCNC: <2.9 MG/L (ref 0–8)
DIFFERENTIAL METHOD BLD: NORMAL
EOSINOPHIL # BLD AUTO: 0.1 10E9/L (ref 0–0.7)
EOSINOPHIL NFR BLD AUTO: 1.3 %
ERYTHROCYTE [DISTWIDTH] IN BLOOD BY AUTOMATED COUNT: 14.3 % (ref 10–15)
ERYTHROCYTE [SEDIMENTATION RATE] IN BLOOD BY WESTERGREN METHOD: 9 MM/H (ref 0–30)
GFR SERPL CREATININE-BSD FRML MDRD: 83 ML/MIN/1.7M2
HCT VFR BLD AUTO: 41 % (ref 35–47)
HGB BLD-MCNC: 13.5 G/DL (ref 11.7–15.7)
IMM GRANULOCYTES # BLD: 0 10E9/L (ref 0–0.4)
IMM GRANULOCYTES NFR BLD: 0.4 %
LYMPHOCYTES # BLD AUTO: 2.2 10E9/L (ref 0.8–5.3)
LYMPHOCYTES NFR BLD AUTO: 40.7 %
MCH RBC QN AUTO: 29.8 PG (ref 26.5–33)
MCHC RBC AUTO-ENTMCNC: 32.9 G/DL (ref 31.5–36.5)
MCV RBC AUTO: 91 FL (ref 78–100)
MONOCYTES # BLD AUTO: 0.4 10E9/L (ref 0–1.3)
MONOCYTES NFR BLD AUTO: 7 %
NEUTROPHILS # BLD AUTO: 2.7 10E9/L (ref 1.6–8.3)
NEUTROPHILS NFR BLD AUTO: 49.9 %
NRBC # BLD AUTO: 0 10*3/UL
NRBC BLD AUTO-RTO: 0 /100
PLATELET # BLD AUTO: 228 10E9/L (ref 150–450)
RBC # BLD AUTO: 4.53 10E12/L (ref 3.8–5.2)
WBC # BLD AUTO: 5.5 10E9/L (ref 4–11)

## 2017-05-31 PROCEDURE — 36415 COLL VENOUS BLD VENIPUNCTURE: CPT | Performed by: NURSE PRACTITIONER

## 2017-05-31 PROCEDURE — 85652 RBC SED RATE AUTOMATED: CPT | Performed by: NURSE PRACTITIONER

## 2017-05-31 PROCEDURE — 84460 ALANINE AMINO (ALT) (SGPT): CPT | Performed by: NURSE PRACTITIONER

## 2017-05-31 PROCEDURE — 85025 COMPLETE CBC W/AUTO DIFF WBC: CPT | Performed by: NURSE PRACTITIONER

## 2017-05-31 PROCEDURE — 86431 RHEUMATOID FACTOR QUANT: CPT | Performed by: NURSE PRACTITIONER

## 2017-05-31 PROCEDURE — 99212 OFFICE O/P EST SF 10 MIN: CPT | Mod: ZF

## 2017-05-31 PROCEDURE — 82040 ASSAY OF SERUM ALBUMIN: CPT | Performed by: NURSE PRACTITIONER

## 2017-05-31 PROCEDURE — 86480 TB TEST CELL IMMUN MEASURE: CPT | Performed by: NURSE PRACTITIONER

## 2017-05-31 PROCEDURE — 86140 C-REACTIVE PROTEIN: CPT | Performed by: NURSE PRACTITIONER

## 2017-05-31 PROCEDURE — 82565 ASSAY OF CREATININE: CPT | Performed by: NURSE PRACTITIONER

## 2017-05-31 PROCEDURE — 84450 TRANSFERASE (AST) (SGOT): CPT | Performed by: NURSE PRACTITIONER

## 2017-05-31 PROCEDURE — 86200 CCP ANTIBODY: CPT | Performed by: NURSE PRACTITIONER

## 2017-05-31 RX ORDER — METHOTREXATE 25 MG/ML
20 INJECTION INTRA-ARTERIAL; INTRAMUSCULAR; INTRATHECAL; INTRAVENOUS
Qty: 4 ML | Refills: 2 | Status: SHIPPED | OUTPATIENT
Start: 2017-05-31 | End: 2017-08-02

## 2017-05-31 ASSESSMENT — PAIN SCALES - GENERAL: PAINLEVEL: SEVERE PAIN (7)

## 2017-05-31 NOTE — LETTER
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     Miguel CABRAL, CNP, MSN  [facility]  Department of Rheumatology & Autoimmune Disorders

## 2017-05-31 NOTE — NURSING NOTE
"Chief Complaint   Patient presents with     RECHECK     Follow up for [facility] DJD/Psoriatic arthritis        Initial /65  Pulse 72  Ht 1.664 m (5' 5.5\")  Wt 70.9 kg (156 lb 6.4 oz)  LMP 03/06/2012  SpO2 99%  BMI 25.63 kg/m2 Estimated body mass index is 25.63 kg/(m^2) as calculated from the following:    Height as of this encounter: 1.664 m (5' 5.5\").    Weight as of this encounter: 70.9 kg (156 lb 6.4 oz).  Medication Reconciliation: complete   Elizabeth Armstrong CMA  "

## 2017-05-31 NOTE — MR AVS SNAPSHOT
After Visit Summary   5/31/2017    Krissy Weldon    MRN: [id number]           Patient Information     Date Of Birth          1955        Visit Information        Provider Department      5/31/2017 2:00 PM Miguel Umana APRN CNP [facility]        Today's Diagnoses     Pain in joint, pelvic region and thigh, unspecified laterality        Psoriatic arthritis (H)        Inflammatory polyarthropathy (H)        Costochondritis, acute        Inflammatory spondylopathy of multiple sites in spine (H)           Follow-ups after your visit        Follow-up notes from your care team     Return in about 3 months (around 8/31/2017).      Your next 10 appointments already scheduled     May 31, 2017  3:15 PM CDT   Lab with  LAB   [facility] ([facility])    [address] [place] [id number] [phone]            Aug 22, 2017  2:00 PM CDT   (Arrive by 1:45 PM)   Return Visit with Jensen Samayoa MD   [facility] ([facility])    [address] [place] [id number] [phone]              Who to contact     If you have questions or need follow up information about today's clinic visit or your schedule please contact [facility] R[facility] directly at [phone].  Normal or non-critical lab and imaging results will be communicated to you by "[employer]hart, letter or phone within 4 business days after the clinic has received the results. If you do not hear from us within 7 days, please contact the clinic through "[employer]hart or phone. If you have a critical or abnormal lab result, we will notify you by phone as soon as possible.  Submit refill requests through [employer] or call your pharmacy and they will forward the refill request to us. Please allow 3 business days for your refill to be completed.          Additional Information About Your Visit        "[employer] Information     [employer] gives you  "secure access to your electronic health record. If you see a primary care provider, you can also send messages to your care team and make appointments. If you have questions, please call your primary care clinic.  If you do not have a primary care provider, please call [phone] and they will assist you.        Care EveryWhere ID     This is your Care EveryWhere ID. This could be used by other organizations to access your [facility] medical records  [id number]        Your Vitals Were     Pulse Height Last Period Pulse Oximetry BMI (Body Mass Index)       72 1.664 m (5' 5.5\") 03/06/2012 99% 25.63 kg/m2        Blood Pressure from Last 3 Encounters:   05/31/17 105/65   04/26/17 124/74   02/15/17 104/71    Weight from Last 3 Encounters:   05/31/17 70.9 kg (156 lb 6.4 oz)   04/26/17 70.3 kg (155 lb)   02/15/17 68 kg (150 lb)              We Performed the Following     Cyclic Citrullinated Peptide Antibody IgG     M Tuberculosis by Quantiferon     Rheumatoid factor          Today's Medication Changes          These changes are accurate as of: 5/31/17  2:59 PM.  If you have any questions, ask your nurse or doctor.               Start taking these medicines.        Dose/Directions    adalimumab 40 MG/0.8ML prefilled syringe kit   Commonly known as:  HUMIRA   Used for:  Psoriatic arthritis (H)   Started by:  Miguel Umana APRN CNP        Hold for signs of infection, and then seek medical attention.   Quantity:  1 kit   Refills:  2         These medicines have changed or have updated prescriptions.        Dose/Directions    Needle (Disp) 25G X 5/8\" Misc   Commonly known as:  BD DISP NEEDLES   This may have changed:  See the new instructions.   Used for:  Pain in joint, pelvic region and thigh, unspecified laterality   Changed by:  Miguel Umana APRN CNP        USE WITH METHOTREXATE UNDER THE SKIN EVERY 7 DAYS   Quantity:  25 each   Refills:  0       Syringe Luer Slip 3 ML Misc   Commonly known as:  B-D SYRINGE " "LUER-FILI   This may have changed:  See the new instructions.   Used for:  Pain in joint, pelvic region and thigh, unspecified laterality   Changed by:  Miguel Umana APRN CNP        Dose:  1 Units   1 Units by Device route every 7 days   Quantity:  25 each   Refills:  0         Stop taking these medicines if you haven't already. Please contact your care team if you have questions.     certolizumab pegol 2 X 200 MG injection 2 vials/kit   Commonly known as:  CIMZIA   Stopped by:  Miguel Umana APRN CNP                Where to get your medicines      These medications were sent to [facility] - [place], [place] - [address] [facility]  [address] , [facility] [address]    Hours:  Mon-Fri 8:30am-5:00pm Toll Free [phone] Phone:  [phone]     adalimumab 40 MG/0.8ML prefilled syringe kit         These medications were sent to [facility] [address], [address]  [address], [address], [facility] [address]     Phone:  [phone]     methotrexate sodium (pres-free) 50 MG/2ML Soln injection CHEMO    Needle (Disp) 25G X 5/8\" Misc    Syringe Luer Slip 3 ML Misc                Primary Care Provider Office Phone # Fax #    Deshawn Burns [phone]       [facility] [address]   [facility] [address]        Thank you!     Thank you for choosing [facility] R[facility]UMAT[facility]  for your care. Our goal is always to provide you with excellent care. Hearing back from our patients is one way we can continue to improve our services. Please take a few minutes to complete the written survey that you may receive in the mail after your visit with us. Thank you!             Your Updated Medication List - Protect others around you: Learn how to safely use, store and throw away your medicines at www.disposemymeds.org.          This list is accurate as of: 5/31/17  2:59 PM.  Always use your most recent med list. " "                  Brand Name Dispense Instructions for use    adalimumab 40 MG/0.8ML prefilled syringe kit    HUMIRA    1 kit    Hold for signs of infection, and then seek medical attention.       folic acid 1 MG tablet    FOLVITE    270 tablet    Take 3 tablets (3 mg) by mouth daily       methotrexate sodium (pres-free) 50 MG/2ML Soln injection CHEMO     4 mL    Inject 0.8 mLs (20 mg) Subcutaneous every 7 days       multivitamin, therapeutic Tabs tablet      Take 1 tablet by mouth daily       Needle (Disp) 25G X 5/8\" Misc    BD DISP NEEDLES    25 each    USE WITH METHOTREXATE UNDER THE SKIN EVERY 7 DAYS       omeprazole 40 MG capsule    priLOSEC    1 capsule    Take 1 capsule (40 mg) by mouth daily Take 30-60 minutes before a meal.       oxyCODONE 5 MG IR tablet    ROXICODONE     Take 5-10 mg by mouth every 8 hours as needed for moderate to severe pain       predniSONE 5 MG tablet    DELTASONE    1 tablet    Take 10 mg day before, day of and day after cimzia injection       PROZAC 40 MG capsule   Generic drug:  FLUoxetine      Take 40 mg by mouth daily.       sennosides 8.6 MG tablet    SENOKOT    1 tablet    Take 1 tablet by mouth 2 times daily       SYNTHROID 100 MCG tablet   Generic drug:  levothyroxine      Take  by mouth daily.       Syringe Luer Slip 3 ML Mis    B-D SYRINGE LUER-FILI    25 each    1 Units by Device route every 7 days       Vitamin D (Cholecalciferol) 1000 UNITS Caps     1 capsule    Take 1,000 Units by mouth daily       VOLTAREN 1 % Gel topical gel   Generic drug:  diclofenac     100 g    Apply 4 grams to knees or 2 grams to hands four times daily using enclosed dosing card.         "

## 2017-05-31 NOTE — PROGRESS NOTES
The blood counts, liver, kidney and CRP inflammation labs are normal.     Miguel CABRAL, CNP, MSN  5/31/2017  1:50 PM

## 2017-05-31 NOTE — TELEPHONE ENCOUNTER
We are stopping the cimzia and switching to humira injections (syringes) due to cost and loss of effectiveness. RX sent in. She has 1 injection left of cimzia.     Risk, dose, possible s/e of humira discussed. She agrees to its use.

## 2017-05-31 NOTE — PROGRESS NOTES
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 Autoimmune Disorders

## 2017-06-01 LAB
CCP AB SER IA-ACNC: 1 U/ML
RHEUMATOID FACT SER NEPH-ACNC: <20 IU/ML (ref 0–20)

## 2017-06-01 NOTE — TELEPHONE ENCOUNTER
PA not needed for Humira - called patient and she is okay paying the copay - contacted [facility] to reach out to patient and set up delivery

## 2017-06-02 DIAGNOSIS — M25.559 PAIN IN JOINT, PELVIC REGION AND THIGH, UNSPECIFIED LATERALITY: ICD-10-CM

## 2017-06-02 LAB
M TB TUBERC IFN-G BLD QL: NEGATIVE
M TB TUBERC IFN-G/MITOGEN IGNF BLD: 0.03 IU/ML

## 2017-06-05 RX ORDER — SYRINGE, DISPOSABLE, 1 ML
SYRINGE, EMPTY DISPOSABLE MISCELLANEOUS
Qty: 25 EACH | Refills: 0 | OUTPATIENT
Start: 2017-06-05

## 2017-06-21 ENCOUNTER — HOSPITAL ENCOUNTER (OUTPATIENT)
Dept: MAMMOGRAPHY | Facility: CLINIC | Age: 62
Discharge: HOME | End: 2017-06-21
Attending: FAMILY MEDICINE | Admitting: FAMILY MEDICINE
Payer: COMMERCIAL

## 2017-06-21 DIAGNOSIS — Z12.31 VISIT FOR SCREENING MAMMOGRAM: ICD-10-CM

## 2017-06-21 PROCEDURE — 77063 BREAST TOMOSYNTHESIS BI: CPT

## 2017-06-21 PROCEDURE — G0202 SCR MAMMO BI INCL CAD: HCPCS

## 2017-07-01 ENCOUNTER — HEALTH MAINTENANCE LETTER (OUTPATIENT)
Age: 62
End: 2017-07-01

## 2017-07-31 ENCOUNTER — MYC MEDICAL ADVICE (OUTPATIENT)
Dept: RHEUMATOLOGY | Facility: CLINIC | Age: 62
End: 2017-07-31

## 2017-07-31 DIAGNOSIS — L40.50 PSORIATIC ARTHRITIS (H): ICD-10-CM

## 2017-08-01 DIAGNOSIS — L40.50 PSORIATIC ARTHRITIS (H): ICD-10-CM

## 2017-08-01 DIAGNOSIS — M25.559 PAIN IN JOINT, PELVIC REGION AND THIGH, UNSPECIFIED LATERALITY: ICD-10-CM

## 2017-08-01 DIAGNOSIS — M46.99 INFLAMMATORY SPONDYLOPATHY OF MULTIPLE SITES IN SPINE (H): ICD-10-CM

## 2017-08-01 DIAGNOSIS — M06.4 INFLAMMATORY POLYARTHROPATHY (H): ICD-10-CM

## 2017-08-01 DIAGNOSIS — M94.0 COSTOCHONDRITIS, ACUTE: ICD-10-CM

## 2017-08-02 RX ORDER — METHOTREXATE 25 MG/ML
20 INJECTION INTRA-ARTERIAL; INTRAMUSCULAR; INTRATHECAL; INTRAVENOUS
Qty: 4 ML | Refills: 2 | Status: SHIPPED | OUTPATIENT
Start: 2017-08-02 | End: 2017-10-25

## 2017-08-02 RX ORDER — METHOTREXATE SODIUM 25 MG/ML
20 INJECTION, SOLUTION INTRA-ARTERIAL; INTRAMUSCULAR; INTRAVENOUS
Qty: 4 ML | Refills: 2 | OUTPATIENT
Start: 2017-08-02

## 2017-08-02 NOTE — TELEPHONE ENCOUNTER
methotrexate 250 MG/10ML SOLN injection      Last Written Prescription Date:  5/31/2017  Last Fill Quantity: 4 mL,   # refills: 2  Last Office Visit: 5/31/2017   Future Office visit:  8/22/2017 with Dr. Samayoa    CBC RESULTS:   Recent Labs   Lab Test  05/31/17   1317   WBC  5.5   RBC  4.53   HGB  13.5   HCT  41.0   MCV  91   MCH  29.8   MCHC  32.9   RDW  14.3   PLT  228       Creatinine   Date Value Ref Range Status   05/31/2017 0.72 0.52 - 1.04 mg/dL Final   ]    Liver Function Studies -   Recent Labs   Lab Test  05/31/17   1317  04/26/17   1825   PROTTOTAL   --   7.3   ALBUMIN  3.6  3.8   BILITOTAL   --   0.2   ALKPHOS   --   72   AST  21  24   ALT  28  38       Routing refill request to provider for review/approval because:  Tier 3 medication: requires provider approval.    Lucinda Lewis RN, BSN.  8/2/2017 9:33 AM

## 2017-08-22 ENCOUNTER — OFFICE VISIT (OUTPATIENT)
Dept: RHEUMATOLOGY | Facility: CLINIC | Age: 62
End: 2017-08-22
Attending: INTERNAL MEDICINE
Payer: COMMERCIAL

## 2017-08-22 VITALS
WEIGHT: 154.6 LBS | HEART RATE: 70 BPM | OXYGEN SATURATION: 98 % | HEIGHT: 65 IN | DIASTOLIC BLOOD PRESSURE: 85 MMHG | BODY MASS INDEX: 25.76 KG/M2 | SYSTOLIC BLOOD PRESSURE: 119 MMHG

## 2017-08-22 DIAGNOSIS — M46.98 INFLAMMATORY SPONDYLOPATHY OF SACRAL REGION (H): ICD-10-CM

## 2017-08-22 DIAGNOSIS — Z79.899 HIGH RISK MEDICATIONS (NOT ANTICOAGULANTS) LONG-TERM USE: ICD-10-CM

## 2017-08-22 DIAGNOSIS — M06.4 INFLAMMATORY POLYARTHROPATHY (H): ICD-10-CM

## 2017-08-22 DIAGNOSIS — M94.0 COSTOCHONDRITIS, ACUTE: ICD-10-CM

## 2017-08-22 DIAGNOSIS — M19.049 CMC DJD(CARPOMETACARPAL DEGENERATIVE JOINT DISEASE), LOCALIZED PRIMARY, UNSPECIFIED LATERALITY: Primary | ICD-10-CM

## 2017-08-22 DIAGNOSIS — L40.50 PSORIATIC ARTHRITIS (H): ICD-10-CM

## 2017-08-22 DIAGNOSIS — G89.29 OTHER CHRONIC PAIN: ICD-10-CM

## 2017-08-22 LAB
ALBUMIN SERPL-MCNC: 3.9 G/DL (ref 3.4–5)
ALT SERPL W P-5'-P-CCNC: 27 U/L (ref 0–50)
AST SERPL W P-5'-P-CCNC: 22 U/L (ref 0–45)
BASOPHILS # BLD AUTO: 0.1 10E9/L (ref 0–0.2)
BASOPHILS NFR BLD AUTO: 1 %
CREAT SERPL-MCNC: 0.76 MG/DL (ref 0.52–1.04)
CRP SERPL-MCNC: <2.9 MG/L (ref 0–8)
DIFFERENTIAL METHOD BLD: NORMAL
EOSINOPHIL # BLD AUTO: 0.1 10E9/L (ref 0–0.7)
EOSINOPHIL NFR BLD AUTO: 1 %
ERYTHROCYTE [DISTWIDTH] IN BLOOD BY AUTOMATED COUNT: 14.8 % (ref 10–15)
ERYTHROCYTE [SEDIMENTATION RATE] IN BLOOD BY WESTERGREN METHOD: 9 MM/H (ref 0–30)
GFR SERPL CREATININE-BSD FRML MDRD: 78 ML/MIN/1.7M2
HCT VFR BLD AUTO: 41.4 % (ref 35–47)
HGB BLD-MCNC: 13.7 G/DL (ref 11.7–15.7)
IMM GRANULOCYTES # BLD: 0 10E9/L (ref 0–0.4)
IMM GRANULOCYTES NFR BLD: 0.2 %
LYMPHOCYTES # BLD AUTO: 1.6 10E9/L (ref 0.8–5.3)
LYMPHOCYTES NFR BLD AUTO: 27.3 %
MCH RBC QN AUTO: 30 PG (ref 26.5–33)
MCHC RBC AUTO-ENTMCNC: 33.1 G/DL (ref 31.5–36.5)
MCV RBC AUTO: 91 FL (ref 78–100)
MONOCYTES # BLD AUTO: 0.3 10E9/L (ref 0–1.3)
MONOCYTES NFR BLD AUTO: 5.7 %
NEUTROPHILS # BLD AUTO: 3.9 10E9/L (ref 1.6–8.3)
NEUTROPHILS NFR BLD AUTO: 64.8 %
NRBC # BLD AUTO: 0 10*3/UL
NRBC BLD AUTO-RTO: 0 /100
PLATELET # BLD AUTO: 233 10E9/L (ref 150–450)
RBC # BLD AUTO: 4.57 10E12/L (ref 3.8–5.2)
WBC # BLD AUTO: 6 10E9/L (ref 4–11)

## 2017-08-22 PROCEDURE — 99212 OFFICE O/P EST SF 10 MIN: CPT | Mod: ZF

## 2017-08-22 PROCEDURE — 82565 ASSAY OF CREATININE: CPT | Performed by: NURSE PRACTITIONER

## 2017-08-22 PROCEDURE — 82040 ASSAY OF SERUM ALBUMIN: CPT | Performed by: NURSE PRACTITIONER

## 2017-08-22 PROCEDURE — 36415 COLL VENOUS BLD VENIPUNCTURE: CPT | Performed by: NURSE PRACTITIONER

## 2017-08-22 PROCEDURE — 86140 C-REACTIVE PROTEIN: CPT | Performed by: NURSE PRACTITIONER

## 2017-08-22 PROCEDURE — 84460 ALANINE AMINO (ALT) (SGPT): CPT | Performed by: NURSE PRACTITIONER

## 2017-08-22 PROCEDURE — 85025 COMPLETE CBC W/AUTO DIFF WBC: CPT | Performed by: NURSE PRACTITIONER

## 2017-08-22 PROCEDURE — 85652 RBC SED RATE AUTOMATED: CPT | Performed by: NURSE PRACTITIONER

## 2017-08-22 PROCEDURE — 84450 TRANSFERASE (AST) (SGOT): CPT | Performed by: NURSE PRACTITIONER

## 2017-08-22 RX ORDER — PREDNISONE 5 MG/ML
SOLUTION ORAL DAILY
COMMUNITY
End: 2017-10-25

## 2017-08-22 RX ORDER — FENTANYL 25 UG/1
1 PATCH TRANSDERMAL
COMMUNITY
End: 2019-04-10

## 2017-08-22 ASSESSMENT — PAIN SCALES - GENERAL: PAINLEVEL: MODERATE PAIN (4)

## 2017-08-22 NOTE — NURSING NOTE
"Chief Complaint   Patient presents with     RECHECK     Follow up for psoriatic arthritis        Initial /85  Pulse 70  Ht 1.651 m (5' 5\")  Wt 70.1 kg (154 lb 9.6 oz)  LMP 03/06/2012  SpO2 98%  BMI 25.73 kg/m2 Estimated body mass index is 25.73 kg/(m^2) as calculated from the following:    Height as of this encounter: 1.651 m (5' 5\").    Weight as of this encounter: 70.1 kg (154 lb 9.6 oz).  Medication Reconciliation: complete   Elizabeth Armstrong CMA    "
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
## 2017-08-22 NOTE — MR AVS SNAPSHOT
After Visit Summary   8/22/2017    Krissy Weldon    MRN: [id number]           Patient Information     Date Of Birth          1955        Visit Information        Provider Department      8/22/2017 2:00 PM Jensen Samayoa MD [facility] Rheumatology        Today's Diagnoses     CMC DJD(carpometacarpal degenerative joint disease), localized primary, unspecified laterality    -  1    Inflammatory polyarthropathy (H)        Costochondritis, acute        Inflammatory spondylopathy of sacral region (H)        Psoriatic arthritis (HCC)        High risk medications (not anticoagulants) long-term use        Other chronic pain           Follow-ups after your visit        Your next 10 appointments already scheduled     Oct 25, 2017 11:30 AM CDT   (Arrive by 11:15 AM)   Return Visit with MARIANNA Garcia CNP   [facility] Rheumatology ([facility])    [address] [place] [id number] [phone]            Dec 19, 2017  1:30 PM CST   (Arrive by 1:15 PM)   Return Visit with Jensen Samayoa MD   [facility] Rheumatology ([facility])    [address] [place] [id number] [phone]              Who to contact     If you have questions or need follow up information about today's clinic visit or your schedule please contact [facility] RHEUMATOLOGY directly at [phone].  Normal or non-critical lab and imaging results will be communicated to you by MyChart, letter or phone within 4 business days after the clinic has received the results. If you do not hear from us within 7 days, please contact the clinic through MyChart or phone. If you have a critical or abnormal lab result, we will notify you by phone as soon as possible.  Submit refill requests through [employer] or call your pharmacy and they will forward the refill request to us. Please allow 3 business days for your refill to be completed.           "Additional Information About Your Visit        MyChart Information     [place] [employer] gives you secure access to your electronic health record. If you see a primary care provider, you can also send messages to your care team and make appointments. If you have questions, please call your primary care clinic.  If you do not have a primary care provider, please call [phone] and they will assist you.        Care EveryWhere ID     This is your Care EveryWhere ID. This could be used by other organizations to access your [facility] medical records  [id number]        Your Vitals Were     Pulse Height Last Period Pulse Oximetry BMI (Body Mass Index)       70 1.651 m (5' 5\") 03/06/2012 98% 25.73 kg/m2        Blood Pressure from Last 3 Encounters:   08/22/17 119/85   05/31/17 105/65   04/26/17 124/74    Weight from Last 3 Encounters:   08/22/17 70.1 kg (154 lb 9.6 oz)   05/31/17 70.9 kg (156 lb 6.4 oz)   04/26/17 70.3 kg (155 lb)              Today, you had the following     No orders found for display       Primary Care Provider Office Phone # Fax #    Deshawn Burns [phone] [address] [address]        Equal Access to Services     PRAVEEN LIU : Hadii aad ku hadasho Soomaali, waaxda luqadaha, qaybta kaalmada adeegyada, estrada prado [place] . So [facility] [phone].    ATENCIÓN: Si habla español, tiene a sharma disposición servicios gratuitos de asistencia lingüística. Llame al [phone].    We comply with applicable federal civil rights laws and [place] laws. We do not discriminate on the basis of race, color, national origin, age, disability sex, sexual orientation or gender identity.            Thank you!     Thank you for choosing [facility]  for your care. Our goal is always to provide you with excellent care. Hearing back from our patients is one way we can continue to improve our services. Please take a few minutes to complete the written " "survey that you may receive in the mail after your visit with us. Thank you!             Your Updated Medication List - Protect others around you: Learn how to safely use, store and throw away your medicines at www.disposemymeds.org.          This list is accurate as of: 8/22/17 11:59 PM.  Always use your most recent med list.                   Brand Name Dispense Instructions for use Diagnosis    adalimumab 40 MG/0.8ML prefilled syringe kit    HUMIRA    1 kit    Injection 40 mg every 10 days. Hold for signs of infection, and then seek medical attention.    Psoriatic arthritis (H)       fentaNYL 25 mcg/hr 72 hr patch    DURAGESIC     Place 1 patch onto the skin every 72 hours        folic acid 1 MG tablet    FOLVITE    270 tablet    Take 3 tablets (3 mg) by mouth daily    Pain in joint, pelvic region and thigh, unspecified laterality, High risk medications (not anticoagulants) long-term use       methotrexate sodium (pres-free) 50 MG/2ML Soln injection CHEMO     4 mL    Inject 0.8 mLs (20 mg) Subcutaneous every 7 days    Pain in joint, pelvic region and thigh, unspecified laterality, Psoriatic arthritis (H), Inflammatory polyarthropathy (H), Costochondritis, acute, Inflammatory spondylopathy of multiple sites in spine (H)       multivitamin, therapeutic Tabs tablet      Take 1 tablet by mouth daily        Needle (Disp) 25G X 5/8\" Misc    BD DISP NEEDLES    25 each    USE WITH METHOTREXATE UNDER THE SKIN EVERY 7 DAYS    Pain in joint, pelvic region and thigh, unspecified laterality       omeprazole 40 MG capsule    priLOSEC    1 capsule    Take 1 capsule (40 mg) by mouth daily Take 30-60 minutes before a meal.        predniSONE 5 MG/5ML solution      Take by mouth daily 2 drops in left eye daily        PROZAC 40 MG capsule   Generic drug:  FLUoxetine      Take 40 mg by mouth daily.        sennosides 8.6 MG tablet    SENOKOT    1 tablet    Take 1 tablet by mouth 2 times daily        SYNTHROID 100 MCG tablet   Generic " drug:  levothyroxine      Take  by mouth daily.        Syringe Luer Slip 3 ML Misc    B-D SYRINGE LUER-FILI    25 each    1 Units by Device route every 7 days    Pain in joint, pelvic region and thigh, unspecified laterality       Vitamin D (Cholecalciferol) 1000 UNITS Caps     1 capsule    Take 1,000 Units by mouth daily        VOLTAREN 1 % Gel topical gel   Generic drug:  diclofenac     100 g    Apply 4 grams to knees or 2 grams to hands four times daily using enclosed dosing card.    Inflammatory polyarthropathy (H), Psoriatic arthritis (H)

## 2017-08-23 NOTE — PROGRESS NOTES
The blood counts, liver, kidney and CRP inflammation labs are normal.     Miguel CABRAL, CNP, MSN  8/23/2017  2:39 PM

## 2017-09-10 PROBLEM — G89.29 OTHER CHRONIC PAIN: Status: ACTIVE | Noted: 2017-09-10
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
## 2017-10-05 DIAGNOSIS — L40.50 PSORIATIC ARTHRITIS (H): ICD-10-CM

## 2017-10-05 NOTE — TELEPHONE ENCOUNTER
humira  Last Written Prescription Date:  7/31/17  Last Fill Quantity: 1,   # refills: 2  Last Office Visit : 8/22/17  Future Office visit:  10/25/17

## 2017-10-25 ENCOUNTER — OFFICE VISIT (OUTPATIENT)
Dept: RHEUMATOLOGY | Facility: CLINIC | Age: 62
End: 2017-10-25
Attending: NURSE PRACTITIONER
Payer: COMMERCIAL

## 2017-10-25 VITALS
BODY MASS INDEX: 25.1 KG/M2 | DIASTOLIC BLOOD PRESSURE: 75 MMHG | SYSTOLIC BLOOD PRESSURE: 114 MMHG | WEIGHT: 156.2 LBS | HEIGHT: 66 IN | OXYGEN SATURATION: 96 % | HEART RATE: 68 BPM

## 2017-10-25 DIAGNOSIS — M25.559 PAIN IN JOINT, PELVIC REGION AND THIGH, UNSPECIFIED LATERALITY: ICD-10-CM

## 2017-10-25 DIAGNOSIS — L40.50 PSORIATIC ARTHRITIS (H): Primary | ICD-10-CM

## 2017-10-25 DIAGNOSIS — Z79.899 HIGH RISK MEDICATIONS (NOT ANTICOAGULANTS) LONG-TERM USE: ICD-10-CM

## 2017-10-25 DIAGNOSIS — M06.4 INFLAMMATORY POLYARTHROPATHY (H): ICD-10-CM

## 2017-10-25 DIAGNOSIS — M94.0 COSTOCHONDRITIS, ACUTE: ICD-10-CM

## 2017-10-25 DIAGNOSIS — M46.99 INFLAMMATORY SPONDYLOPATHY OF MULTIPLE SITES IN SPINE (H): ICD-10-CM

## 2017-10-25 DIAGNOSIS — L40.50 PSORIATIC ARTHRITIS (H): ICD-10-CM

## 2017-10-25 LAB
ALBUMIN SERPL-MCNC: 3.7 G/DL (ref 3.4–5)
ALT SERPL W P-5'-P-CCNC: 32 U/L (ref 0–50)
AST SERPL W P-5'-P-CCNC: 21 U/L (ref 0–45)
BASOPHILS # BLD AUTO: 0.1 10E9/L (ref 0–0.2)
BASOPHILS NFR BLD AUTO: 1 %
CREAT SERPL-MCNC: 0.75 MG/DL (ref 0.52–1.04)
CRP SERPL-MCNC: <2.9 MG/L (ref 0–8)
DIFFERENTIAL METHOD BLD: NORMAL
EOSINOPHIL # BLD AUTO: 0.1 10E9/L (ref 0–0.7)
EOSINOPHIL NFR BLD AUTO: 1.8 %
ERYTHROCYTE [DISTWIDTH] IN BLOOD BY AUTOMATED COUNT: 13.6 % (ref 10–15)
ERYTHROCYTE [SEDIMENTATION RATE] IN BLOOD BY WESTERGREN METHOD: 9 MM/H (ref 0–30)
GFR SERPL CREATININE-BSD FRML MDRD: 79 ML/MIN/1.7M2
HCT VFR BLD AUTO: 40.5 % (ref 35–47)
HGB BLD-MCNC: 13.3 G/DL (ref 11.7–15.7)
IMM GRANULOCYTES # BLD: 0 10E9/L (ref 0–0.4)
IMM GRANULOCYTES NFR BLD: 0.2 %
LYMPHOCYTES # BLD AUTO: 1.5 10E9/L (ref 0.8–5.3)
LYMPHOCYTES NFR BLD AUTO: 30.5 %
MCH RBC QN AUTO: 30.1 PG (ref 26.5–33)
MCHC RBC AUTO-ENTMCNC: 32.8 G/DL (ref 31.5–36.5)
MCV RBC AUTO: 92 FL (ref 78–100)
MONOCYTES # BLD AUTO: 0.4 10E9/L (ref 0–1.3)
MONOCYTES NFR BLD AUTO: 7.1 %
NEUTROPHILS # BLD AUTO: 2.9 10E9/L (ref 1.6–8.3)
NEUTROPHILS NFR BLD AUTO: 59.4 %
NRBC # BLD AUTO: 0 10*3/UL
NRBC BLD AUTO-RTO: 0 /100
PLATELET # BLD AUTO: 239 10E9/L (ref 150–450)
RBC # BLD AUTO: 4.42 10E12/L (ref 3.8–5.2)
WBC # BLD AUTO: 4.9 10E9/L (ref 4–11)

## 2017-10-25 PROCEDURE — 86140 C-REACTIVE PROTEIN: CPT | Performed by: NURSE PRACTITIONER

## 2017-10-25 PROCEDURE — 84450 TRANSFERASE (AST) (SGOT): CPT | Performed by: NURSE PRACTITIONER

## 2017-10-25 PROCEDURE — 82565 ASSAY OF CREATININE: CPT | Performed by: NURSE PRACTITIONER

## 2017-10-25 PROCEDURE — 84460 ALANINE AMINO (ALT) (SGPT): CPT | Performed by: NURSE PRACTITIONER

## 2017-10-25 PROCEDURE — 85025 COMPLETE CBC W/AUTO DIFF WBC: CPT | Performed by: NURSE PRACTITIONER

## 2017-10-25 PROCEDURE — 85652 RBC SED RATE AUTOMATED: CPT | Performed by: NURSE PRACTITIONER

## 2017-10-25 PROCEDURE — 99212 OFFICE O/P EST SF 10 MIN: CPT | Mod: ZF

## 2017-10-25 PROCEDURE — 36415 COLL VENOUS BLD VENIPUNCTURE: CPT | Performed by: NURSE PRACTITIONER

## 2017-10-25 PROCEDURE — 82040 ASSAY OF SERUM ALBUMIN: CPT | Performed by: NURSE PRACTITIONER

## 2017-10-25 RX ORDER — FOLIC ACID 1 MG/1
TABLET ORAL
Qty: 210 TABLET | Refills: 3 | Status: SHIPPED | OUTPATIENT
Start: 2017-10-25 | End: 2018-02-28

## 2017-10-25 RX ORDER — METHOTREXATE 25 MG/ML
25 INJECTION INTRA-ARTERIAL; INTRAMUSCULAR; INTRATHECAL; INTRAVENOUS
Qty: 4 ML | Refills: 2 | Status: SHIPPED | OUTPATIENT
Start: 2017-10-25 | End: 2018-02-28

## 2017-10-25 RX ORDER — FOLIC ACID 1 MG/1
2 TABLET ORAL DAILY
Qty: 180 TABLET | Refills: 3 | Status: SHIPPED | OUTPATIENT
Start: 2017-10-25 | End: 2017-10-25

## 2017-10-25 ASSESSMENT — PAIN SCALES - GENERAL: PAINLEVEL: MODERATE PAIN (5)

## 2017-10-25 NOTE — MR AVS SNAPSHOT
After Visit Summary   10/25/2017    Krissy Weldon    MRN: [id number]           Patient Information     Date Of Birth          1955        Visit Information        Provider Department      10/25/2017 11:30 AM Miguel Umana APRN CNP [facility] Rheumatology        Today's Diagnoses     Psoriatic arthritis (H)        Pain in joint, pelvic region and thigh, unspecified laterality        High risk medications (not anticoagulants) long-term use        Inflammatory polyarthropathy (H)        Costochondritis, acute        Inflammatory spondylopathy of multiple sites in spine (H)           Follow-ups after your visit        Follow-up notes from your care team     Return in about 4 months (around 2/25/2018) for Labs every 12 weeks.      Your next 10 appointments already scheduled     Feb 28, 2018 11:00 AM CST   (Arrive by 10:45 AM)   Return Visit with MARIANNA Garcia CNP   [facility] ([facility])    [address] [place] [id number] [phone]              Who to contact     If you have questions or need follow up information about today's clinic visit or your schedule please contact [facility] RHEUMATOLOGY directly at [phone].  Normal or non-critical lab and imaging results will be communicated to you by MyChart, letter or phone within 4 business days after the clinic has received the results. If you do not hear from us within 7 days, please contact the clinic through [employer] or phone. If you have a critical or abnormal lab result, we will notify you by phone as soon as possible.  Submit refill requests through [employer] or call your pharmacy and they will forward the refill request to us. Please allow 3 business days for your refill to be completed.          Additional Information About Your Visit        MyChart Information     [employer] gives you secure access to your electronic health record. If you see a primary care provider,  "you can also send messages to your care team and make appointments. If you have questions, please call your primary care clinic.  If you do not have a primary care provider, please call [phone] and they will assist you.        Care EveryWhere ID     This is your Care EveryWhere ID. This could be used by other organizations to access your [place] medical records  [id number]        Your Vitals Were     Pulse Height Last Period Pulse Oximetry BMI (Body Mass Index)       68 1.664 m (5' 5.5\") 03/06/2012 96% 25.6 kg/m2        Blood Pressure from Last 3 Encounters:   10/25/17 114/75   08/22/17 119/85   05/31/17 105/65    Weight from Last 3 Encounters:   10/25/17 70.9 kg (156 lb 3.2 oz)   08/22/17 70.1 kg (154 lb 9.6 oz)   05/31/17 70.9 kg (156 lb 6.4 oz)              Today, you had the following     No orders found for display         Today's Medication Changes          These changes are accurate as of: 10/25/17 12:04 PM.  If you have any questions, ask your nurse or doctor.               Start taking these medicines.        Dose/Directions    folic acid 1 MG tablet   Commonly known as:  FOLVITE   Used for:  Pain in joint, pelvic region and thigh, unspecified laterality, High risk medications (not anticoagulants) long-term use   Started by:  Miguel Umana APRN CNP        Take 3 tablet a day few days before and day after methotrexate then the other day 2 mg day   Quantity:  210 tablet   Refills:  3         These medicines have changed or have updated prescriptions.        Dose/Directions    methotrexate sodium (pres-free) 50 MG/2ML Soln injection CHEMO   This may have changed:  how much to take   Used for:  Pain in joint, pelvic region and thigh, unspecified laterality, Psoriatic arthritis (H), Inflammatory polyarthropathy (H), Costochondritis, acute, Inflammatory spondylopathy of multiple sites in spine (H)   Changed by:  Miguel Umana APRN CNP        Dose:  25 mg   Inject 1 mL (25 mg) Subcutaneous " every 7 days   Quantity:  4 mL   Refills:  2            Where to get your medicines      These medications were sent to [facility] - [place], [place] - [address]  [address], [facility] [address]    Hours:  Mon-Fri 8:30am-5:00pm Toll Free [phone] Phone:  [phone]     adalimumab 40 MG/0.8ML prefilled syringe kit         These medications were sent to [facility] - [place], [place] - [address]  [address], [address], [facility] [address]     Phone:  [phone]     folic acid 1 MG tablet    methotrexate sodium (pres-free) 50 MG/2ML Soln injection CHEMO                Primary Care Provider Office Phone # Fax #    Deshawn Burns [phone] [address]   [facility] [address]        Equal Access to Services     [facility]: Hadii bárbara ku hadasho Soomaali, waaxda luqadaha, qaybta kaalmada adeegyada, waxay venkateshin hayrianna bay . So [facility] [phone].    ATENCIÓN: Si habla español, tiene a sharma disposición servicios gratuitos de asistencia lingüística. Llame al [phone].    We comply with applicable federal civil rights laws and [place] laws. We do not discriminate on the basis of race, color, national origin, age, disability, sex, sexual orientation, or gender identity.            Thank you!     Thank you for choosing [facility] RHEUMATOLOGY  for your care. Our goal is always to provide you with excellent care. Hearing back from our patients is one way we can continue to improve our services. Please take a few minutes to complete the written survey that you may receive in the mail after your visit with us. Thank you!             Your Updated Medication List - Protect others around you: Learn how to safely use, store and throw away your medicines at www.disposemymeds.org.          This list is accurate as of: 10/25/17 12:04 PM.  Always use your most recent med list.              "      Brand Name Dispense Instructions for use Diagnosis    adalimumab 40 MG/0.8ML prefilled syringe kit    HUMIRA    1 kit    Injection 40 mg every 10 days. Hold for signs of infection, and then seek medical attention.    Psoriatic arthritis (H)       fentaNYL 25 mcg/hr 72 hr patch    DURAGESIC     Place 1 patch onto the skin every 72 hours        folic acid 1 MG tablet    FOLVITE    210 tablet    Take 3 tablet a day few days before and day after methotrexate then the other day 2 mg day    Pain in joint, pelvic region and thigh, unspecified laterality, High risk medications (not anticoagulants) long-term use       methotrexate sodium (pres-free) 50 MG/2ML Soln injection CHEMO     4 mL    Inject 1 mL (25 mg) Subcutaneous every 7 days    Pain in joint, pelvic region and thigh, unspecified laterality, Psoriatic arthritis (H), Inflammatory polyarthropathy (H), Costochondritis, acute, Inflammatory spondylopathy of multiple sites in spine (H)       multivitamin, therapeutic Tabs tablet      Take 1 tablet by mouth daily        Needle (Disp) 25G X 5/8\" Misc    BD DISP NEEDLES    25 each    USE WITH METHOTREXATE UNDER THE SKIN EVERY 7 DAYS    Pain in joint, pelvic region and thigh, unspecified laterality       PROZAC 40 MG capsule   Generic drug:  FLUoxetine      Take 40 mg by mouth daily.        sennosides 8.6 MG tablet    SENOKOT    1 tablet    Take 1 tablet by mouth 2 times daily        SYNTHROID 100 MCG tablet   Generic drug:  levothyroxine      Take  by mouth daily.        Syringe Luer Slip 3 ML Misc    B-D SYRINGE LUER-FILI    25 each    1 Units by Device route every 7 days    Pain in joint, pelvic region and thigh, unspecified laterality       Vitamin D (Cholecalciferol) 1000 UNITS Caps     1 capsule    Take 1,000 Units by mouth daily          "
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
## 2017-10-25 NOTE — NURSING NOTE
"Chief Complaint   Patient presents with     RECHECK     Follow up Inflammatory polyarthropathy.       Initial /75  Pulse 68  Ht 1.664 m (5' 5.5\")  Wt 70.9 kg (156 lb 3.2 oz)  LMP 03/06/2012  SpO2 96%  BMI 25.6 kg/m2 Estimated body mass index is 25.6 kg/(m^2) as calculated from the following:    Height as of this encounter: 1.664 m (5' 5.5\").    Weight as of this encounter: 70.9 kg (156 lb 3.2 oz).  Medication Reconciliation: complete   Marlin Kasper., CMA    "

## 2017-10-27 ENCOUNTER — TELEPHONE (OUTPATIENT)
Dept: PHARMACY | Facility: CLINIC | Age: 62
End: 2017-10-27

## 2017-10-27 DIAGNOSIS — M25.559 PAIN IN JOINT, PELVIC REGION AND THIGH, UNSPECIFIED LATERALITY: ICD-10-CM

## 2017-10-27 NOTE — TELEPHONE ENCOUNTER
Called pt in response to message received from Miguel Umana regarding questions about diarrhea from Humira + MTX. Krissy was driving and preferred to be called back to discuss. Will try again this afternoon per her preference.     Steph DarlingD   [facility] Pharmacist   Phone: [phone]

## 2017-10-27 NOTE — TELEPHONE ENCOUNTER
Called pt back. She is having some diarrhea when injecting Humira and MTX on the same day or consecutive days. This lasts for the day of and a couple days after. She said she didn't think it got any worse once starting MTX. She also takes senna BID to avoid constipation from Duragesic patch. She has been skipping this the day before and day of injections.     Reviewed the literature. Some post-marketing did show diarrhea on injection day for patients on Humira < 1month. Didn't appear to be reports demonstrating a worsening of diarrhea with MTX + Humira combination. Symptoms are most likely related to MTX, as the incidence if diarrhea increases on day of administration for roughly 1-2 days after.     Reviewed Miguel's recommendations with patient in regards to methotrexate and Imodium. Patient expresses understanding.    Steph DarlingD   [facility] Pharmacist   Phone: [phone]

## 2018-01-10 ENCOUNTER — MYC MEDICAL ADVICE (OUTPATIENT)
Dept: RHEUMATOLOGY | Facility: CLINIC | Age: 63
End: 2018-01-10

## 2018-01-15 NOTE — TELEPHONE ENCOUNTER
Although there is a lot of excitement about mesenchymal stem cell therapy for many conditions including degenerative arthritis, this therapy remains unproven and will not be paid for by the majority of insurance plans.  It can be quite expensive.    It is unlikely to be dangerous, assuming that the stem cells are isolated from one's own abdominal fat pad, but I do not think it can be recommended at this time.

## 2018-02-19 ENCOUNTER — TRANSFERRED RECORDS (OUTPATIENT)
Dept: HEALTH INFORMATION MANAGEMENT | Facility: CLINIC | Age: 63
End: 2018-02-19

## 2018-02-28 ENCOUNTER — OFFICE VISIT (OUTPATIENT)
Dept: RHEUMATOLOGY | Facility: CLINIC | Age: 63
End: 2018-02-28
Attending: NURSE PRACTITIONER
Payer: COMMERCIAL

## 2018-02-28 VITALS
HEIGHT: 66 IN | DIASTOLIC BLOOD PRESSURE: 68 MMHG | BODY MASS INDEX: 26.29 KG/M2 | SYSTOLIC BLOOD PRESSURE: 107 MMHG | WEIGHT: 163.6 LBS | OXYGEN SATURATION: 98 % | RESPIRATION RATE: 16 BRPM | HEART RATE: 71 BPM | TEMPERATURE: 98 F

## 2018-02-28 DIAGNOSIS — Z23 NEED FOR PNEUMOCOCCAL VACCINATION: ICD-10-CM

## 2018-02-28 DIAGNOSIS — L40.50 PSORIATIC ARTHRITIS (H): Primary | ICD-10-CM

## 2018-02-28 DIAGNOSIS — L40.50 PSORIATIC ARTHRITIS (H): ICD-10-CM

## 2018-02-28 DIAGNOSIS — Z79.899 HIGH RISK MEDICATIONS (NOT ANTICOAGULANTS) LONG-TERM USE: ICD-10-CM

## 2018-02-28 DIAGNOSIS — Z79.52 LONG TERM SYSTEMIC STEROID USER: ICD-10-CM

## 2018-02-28 DIAGNOSIS — M06.4 INFLAMMATORY POLYARTHROPATHY (H): ICD-10-CM

## 2018-02-28 DIAGNOSIS — M25.551 HIP PAIN, RIGHT: ICD-10-CM

## 2018-02-28 DIAGNOSIS — M51.369 DDD (DEGENERATIVE DISC DISEASE), LUMBAR: ICD-10-CM

## 2018-02-28 DIAGNOSIS — M46.99 INFLAMMATORY SPONDYLOPATHY OF MULTIPLE SITES IN SPINE (H): ICD-10-CM

## 2018-02-28 DIAGNOSIS — M25.559 PAIN IN JOINT, PELVIC REGION AND THIGH, UNSPECIFIED LATERALITY: ICD-10-CM

## 2018-02-28 DIAGNOSIS — M94.0 COSTOCHONDRITIS, ACUTE: ICD-10-CM

## 2018-02-28 LAB
ALBUMIN SERPL-MCNC: 3.8 G/DL (ref 3.4–5)
ALT SERPL W P-5'-P-CCNC: 22 U/L (ref 0–50)
AST SERPL W P-5'-P-CCNC: 17 U/L (ref 0–45)
BASOPHILS # BLD AUTO: 0 10E9/L (ref 0–0.2)
BASOPHILS NFR BLD AUTO: 0.8 %
CREAT SERPL-MCNC: 0.85 MG/DL (ref 0.52–1.04)
CRP SERPL-MCNC: <2.9 MG/L (ref 0–8)
DEPRECATED CALCIDIOL+CALCIFEROL SERPL-MC: 31 UG/L (ref 20–75)
DIFFERENTIAL METHOD BLD: ABNORMAL
EOSINOPHIL # BLD AUTO: 0.1 10E9/L (ref 0–0.7)
EOSINOPHIL NFR BLD AUTO: 0.9 %
ERYTHROCYTE [DISTWIDTH] IN BLOOD BY AUTOMATED COUNT: 15.1 % (ref 10–15)
ERYTHROCYTE [SEDIMENTATION RATE] IN BLOOD BY WESTERGREN METHOD: 8 MM/H (ref 0–30)
GFR SERPL CREATININE-BSD FRML MDRD: 68 ML/MIN/1.7M2
HCT VFR BLD AUTO: 41.4 % (ref 35–47)
HGB BLD-MCNC: 13.5 G/DL (ref 11.7–15.7)
IMM GRANULOCYTES # BLD: 0 10E9/L (ref 0–0.4)
IMM GRANULOCYTES NFR BLD: 0.4 %
LYMPHOCYTES # BLD AUTO: 1.2 10E9/L (ref 0.8–5.3)
LYMPHOCYTES NFR BLD AUTO: 21.8 %
MCH RBC QN AUTO: 29.8 PG (ref 26.5–33)
MCHC RBC AUTO-ENTMCNC: 32.6 G/DL (ref 31.5–36.5)
MCV RBC AUTO: 91 FL (ref 78–100)
MONOCYTES # BLD AUTO: 0.3 10E9/L (ref 0–1.3)
MONOCYTES NFR BLD AUTO: 5.3 %
NEUTROPHILS # BLD AUTO: 3.8 10E9/L (ref 1.6–8.3)
NEUTROPHILS NFR BLD AUTO: 70.8 %
NRBC # BLD AUTO: 0 10*3/UL
NRBC BLD AUTO-RTO: 0 /100
PLATELET # BLD AUTO: 202 10E9/L (ref 150–450)
RBC # BLD AUTO: 4.53 10E12/L (ref 3.8–5.2)
WBC # BLD AUTO: 5.3 10E9/L (ref 4–11)

## 2018-02-28 PROCEDURE — G0463 HOSPITAL OUTPT CLINIC VISIT: HCPCS | Mod: 25,ZF

## 2018-02-28 PROCEDURE — 90732 PPSV23 VACC 2 YRS+ SUBQ/IM: CPT | Mod: ZF | Performed by: NURSE PRACTITIONER

## 2018-02-28 PROCEDURE — 82565 ASSAY OF CREATININE: CPT | Performed by: NURSE PRACTITIONER

## 2018-02-28 PROCEDURE — 82040 ASSAY OF SERUM ALBUMIN: CPT | Performed by: NURSE PRACTITIONER

## 2018-02-28 PROCEDURE — 82306 VITAMIN D 25 HYDROXY: CPT | Performed by: NURSE PRACTITIONER

## 2018-02-28 PROCEDURE — 85652 RBC SED RATE AUTOMATED: CPT | Performed by: NURSE PRACTITIONER

## 2018-02-28 PROCEDURE — 85025 COMPLETE CBC W/AUTO DIFF WBC: CPT | Performed by: NURSE PRACTITIONER

## 2018-02-28 PROCEDURE — 84460 ALANINE AMINO (ALT) (SGPT): CPT | Performed by: NURSE PRACTITIONER

## 2018-02-28 PROCEDURE — 36415 COLL VENOUS BLD VENIPUNCTURE: CPT | Performed by: NURSE PRACTITIONER

## 2018-02-28 PROCEDURE — G0009 ADMIN PNEUMOCOCCAL VACCINE: HCPCS

## 2018-02-28 PROCEDURE — 25000128 H RX IP 250 OP 636: Mod: ZF | Performed by: NURSE PRACTITIONER

## 2018-02-28 PROCEDURE — 84450 TRANSFERASE (AST) (SGOT): CPT | Performed by: NURSE PRACTITIONER

## 2018-02-28 PROCEDURE — 86140 C-REACTIVE PROTEIN: CPT | Performed by: NURSE PRACTITIONER

## 2018-02-28 RX ORDER — FOLIC ACID 1 MG/1
TABLET ORAL
Qty: 210 TABLET | Refills: 3 | Status: SHIPPED | OUTPATIENT
Start: 2018-02-28 | End: 2019-01-14

## 2018-02-28 RX ORDER — PREDNISONE 5 MG/1
5 TABLET ORAL DAILY
Qty: 30 TABLET | Refills: 2 | Status: SHIPPED | OUTPATIENT
Start: 2018-02-28 | End: 2019-01-14

## 2018-02-28 RX ORDER — PREDNISONE 5 MG/1
5 TABLET ORAL DAILY
COMMUNITY
End: 2018-02-28

## 2018-02-28 RX ORDER — METHOTREXATE 25 MG/ML
25 INJECTION INTRA-ARTERIAL; INTRAMUSCULAR; INTRATHECAL; INTRAVENOUS
Qty: 4 ML | Refills: 2 | Status: SHIPPED | OUTPATIENT
Start: 2018-02-28 | End: 2018-06-07

## 2018-02-28 RX ADMIN — PNEUMOCOCCAL VACCINE POLYVALENT 0.5 ML
25; 25; 25; 25; 25; 25; 25; 25; 25; 25; 25; 25; 25; 25; 25; 25; 25; 25; 25; 25; 25; 25; 25 INJECTION, SOLUTION INTRAMUSCULAR; SUBCUTANEOUS at 11:57

## 2018-02-28 ASSESSMENT — PAIN SCALES - GENERAL: PAINLEVEL: MODERATE PAIN (5)

## 2018-02-28 NOTE — PROGRESS NOTES
The blood counts, liver, kidney and CRP inflammation labs are normal.     Miguel CABRAL, CNP, MSN  2/28/2018  11:15 AM
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
## 2018-02-28 NOTE — MR AVS SNAPSHOT
After Visit Summary   2/28/2018    Krissy Weldon    MRN: [id number]           Patient Information     Date Of Birth          1955        Visit Information        Provider Department      2/28/2018 11:00 AM Miguel Umana APRN CNP [facility]        Today's Diagnoses     Psoriatic arthritis (HCC)    -  1    Inflammatory spondylopathy of multiple sites in spine (H)        High risk medications (not anticoagulants) long-term use        DDD (degenerative disc disease), lumbar        Hip pain, right        Long term systemic steroid user        Pain in joint, pelvic region and thigh, unspecified laterality        Psoriatic arthritis (H)        Inflammatory polyarthropathy (H)        Costochondritis, acute        Need for pneumococcal vaccination          Care Instructions      For medication refills, please contact your pharmacy and have them fax a request.  [facility] fax [phone].  [facility] fax [phone].  For any surgeries or procedures, contact your rheumatologist provider for instruction.    For any signs or symptoms of infections, fever, chills, cough, chest pain, shortness of breath hold your biologic medication, then seek medical attention immediately then notify your rheumatology provider.  We ask that you are established with a primary care provider.  Schedule an appointment with your primary care provider for an annual physical for cardiac risk evaluation, cancer screening, immunization, bone health and primary care needs.  If you are a smoker or are exposed to second hand smoke, we advise you to quit smoking and avoid exposure to smoke whenever possible.          Follow-ups after your visit        Additional Services     PHYSIATRY REFERRAL       Your provider has referred you to: [facility]: Physical Medicine and Rehabilitation Clinic [facility] [phone]   [email or web address] DR. VANNA BRANCH ONLY PLEASE     Please be aware that coverage of these services is  subject to the terms and limitations of your health insurance plan.  Call member services at your health plan with any benefit or coverage questions.      Please bring the following to your appointment:  >>   Any x-rays, CTs or MRIs which have been performed.  Contact the facility where they were done to arrange for  prior to your scheduled appointment.  Any new CT, MRI or other procedures ordered by your specialist must be performed at a [place] facility or coordinated by your clinic's referral office.    >>   List of current medications   >>   This referral request   >>   Any documents/labs given to you for this referral                  Follow-up notes from your care team     Return for Labs every 8-12 weeks, 4 month Dr. Samayoa; 8 month Miguel.      Your next 10 appointments already scheduled     Mar 21, 2018 10:30 AM CDT   (Arrive by 10:15 AM)   New Patient Visit with Les Gutierrez DO   [facility] ([facility])    [address] [place] [address]  3rd Floor  [facility] [id number] [phone]            Jul 03, 2018  1:00 PM CDT   DX HIP/PELVIS/SPINE with UCDX1   [facility] ([facility])    [address] [place] [address]  [address] [facility] [id number] [phone]           Please do not take any of the following 24 hours prior to the day of your exam: vitamins, calcium tablets, antacids.  If possible, please wear clothes without metal (snaps, zippers). A sweatsuit works well.            Jul 03, 2018  1:30 PM CDT   Lab with  LAB   [facility] ([facility])    [address] [place] [address]  [address] [facility] [id number] [phone]            Jul 03, 2018  2:00 PM CDT   (Arrive by 1:45 PM)   Return Visit with Jensen Samayoa MD   [facility] ([facility])    [address] [place] [address]  [address]  [facility] [id number] [phone]            Oct 31,  "2018 11:00 AM CDT   Lab with  LAB   [facility] ([facility])    [address]  1st Floor  [facility] [address] [phone]            Oct 31, 2018 11:30 AM CDT   (Arrive by 11:15 AM)   Return Visit with MARIANNA Garcia CNP   [facility] Rheumatology ([facility])    [address]  [address]  [facility] [address] [phone]              Who to contact     If you have questions or need follow up information about today's clinic visit or your schedule please contact [facility] RHEUMATOLOGY directly at [phone].  Normal or non-critical lab and imaging results will be communicated to you by Udorsehart, letter or phone within 4 business days after the clinic has received the results. If you do not hear from us within 7 days, please contact the clinic through [employer] or phone. If you have a critical or abnormal lab result, we will notify you by phone as soon as possible.  Submit refill requests through Billetto or call your pharmacy and they will forward the refill request to us. Please allow 3 business days for your refill to be completed.          Additional Information About Your Visit        Billetto Information     [employer]to gives you secure access to your electronic health record. If you see a primary care provider, you can also send messages to your care team and make appointments. If you have questions, please call your primary care clinic.  If you do not have a primary care provider, please call [phone] and they will assist you.        Care EveryWhere ID     This is your Care EveryWhere ID. This could be used by other organizations to access your [facility] medical records  [id number]        Your Vitals Were     Pulse Temperature Respirations Height Last Period Pulse Oximetry    71 98  F (36.7  C) (Oral) 16 1.664 m (5' 5.5\") 03/06/2012 98%    BMI (Body Mass Index)                   26.81 kg/m2            Blood Pressure from " "Last 3 Encounters:   02/28/18 107/68   10/25/17 114/75   08/22/17 119/85    Weight from Last 3 Encounters:   02/28/18 74.2 kg (163 lb 9.6 oz)   10/25/17 70.9 kg (156 lb 3.2 oz)   08/22/17 70.1 kg (154 lb 9.6 oz)              We Performed the Following     PHYSIATRY REFERRAL          Today's Medication Changes          These changes are accurate as of 2/28/18 11:59 PM.  If you have any questions, ask your nurse or doctor.               These medicines have changed or have updated prescriptions.        Dose/Directions    folic acid 1 MG tablet   Commonly known as:  FOLVITE   This may have changed:    - how much to take  - how to take this  - when to take this  - additional instructions   Used for:  Pain in joint, pelvic region and thigh, unspecified laterality, High risk medications (not anticoagulants) long-term use        Take 3 tablet a day few days before and day after methotrexate then the other day 2 mg day   Quantity:  210 tablet   Refills:  3            Where to get your medicines      These medications were sent to [facility] - [place], [place] - [address]  [address] [facility] [address]    Hours:  Mon-Fri 8:30am-5:00pm Toll Free [phone] Phone:  [phone]     adalimumab 40 MG/0.8ML prefilled syringe kit         These medications were sent to [facility] [address]  [address], [address], [facility] [address]     Phone:  [phone]     folic acid 1 MG tablet    methotrexate sodium (pres-free) 50 MG/2ML Soln injection CHEMO    Needle (Disp) 25G X 5/8\" Misc    predniSONE 5 MG tablet    Syringe Luer Slip 3 ML Misc                Primary Care Provider Office Phone # Fax #    Deshawn Burns [phone]       [facility] [address]   [facility] [address]        Equal Access to Services     PRAVEEN LIU AH: Demario Gleason, amado harrington, janetteybjeremy " estrada slater hayrianna mooreblayne [place] ah. So [facility] [phone].    ATENCIÓN: Si randi paez, tiene a sharma disposición servicios gratuitos de asistencia lingüística. Joe al [phone].    We comply with applicable federal civil rights laws and [place] laws. We do not discriminate on the basis of race, color, national origin, age, disability, sex, sexual orientation, or gender identity.            Thank you!     Thank you for choosing [facility] RHEUMATOLOGY  for your care. Our goal is always to provide you with excellent care. Hearing back from our patients is one way we can continue to improve our services. Please take a few minutes to complete the written survey that you may receive in the mail after your visit with us. Thank you!             Your Updated Medication List - Protect others around you: Learn how to safely use, store and throw away your medicines at www.disposemymeds.org.          This list is accurate as of 2/28/18 11:59 PM.  Always use your most recent med list.                   Brand Name Dispense Instructions for use Diagnosis    adalimumab 40 MG/0.8ML prefilled syringe kit    HUMIRA    1 kit    Injection 40 mg every 10 days. Hold for signs of infection, and then seek medical attention.    Psoriatic arthritis (H)       fentaNYL 25 mcg/hr 72 hr patch    DURAGESIC     Place 1 patch onto the skin every 72 hours        folic acid 1 MG tablet    FOLVITE    210 tablet    Take 3 tablet a day few days before and day after methotrexate then the other day 2 mg day    Pain in joint, pelvic region and thigh, unspecified laterality, High risk medications (not anticoagulants) long-term use       methotrexate sodium (pres-free) 50 MG/2ML Soln injection CHEMO     4 mL    Inject 1 mL (25 mg) Subcutaneous every 7 days    Pain in joint, pelvic region and thigh, unspecified laterality, Psoriatic arthritis (H), Inflammatory polyarthropathy (H), Costochondritis, acute, Inflammatory  "spondylopathy of multiple sites in spine (H)       multivitamin, therapeutic Tabs tablet      Take 1 tablet by mouth daily        Needle (Disp) 25G X 5/8\" Misc    BD DISP NEEDLES    25 each    USE WITH METHOTREXATE UNDER THE SKIN EVERY 7 DAYS    Pain in joint, pelvic region and thigh, unspecified laterality       predniSONE 5 MG tablet    DELTASONE    30 tablet    Take 1 tablet (5 mg) by mouth daily    Psoriatic arthritis (H), Inflammatory spondylopathy of multiple sites in spine (H), Hip pain, right       PROZAC 40 MG capsule   Generic drug:  FLUoxetine      Take 40 mg by mouth daily.        sennosides 8.6 MG tablet    SENOKOT    1 tablet    Take 1 tablet by mouth 2 times daily        SYNTHROID 100 MCG tablet   Generic drug:  levothyroxine      Take  by mouth daily.        Syringe Luer Slip 3 ML Misc    B-D SYRINGE LUER-FILI    25 each    1 Units by Device route every 7 days    Pain in joint, pelvic region and thigh, unspecified laterality         "
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
## 2018-02-28 NOTE — NURSING NOTE
"Chief Complaint   Patient presents with     RECHECK     Psoriatic arthritis       Initial /68 (BP Location: Right arm, Patient Position: Sitting, Cuff Size: Adult Regular)  Pulse 71  Temp 98  F (36.7  C) (Oral)  Resp 16  Ht 1.664 m (5' 5.5\")  Wt 74.2 kg (163 lb 9.6 oz)  LMP 03/06/2012  SpO2 98%  BMI 26.81 kg/m2 Estimated body mass index is 26.81 kg/(m^2) as calculated from the following:    Height as of this encounter: 1.664 m (5' 5.5\").    Weight as of this encounter: 74.2 kg (163 lb 9.6 oz).  Medication Reconciliation: complete     Prudence Torres [facility]  2/28/2018 11:17 AM          "

## 2018-02-28 NOTE — PATIENT INSTRUCTIONS
For medication refills, please contact your pharmacy and have them fax a request.  [facility] fax [phone].  [facility] [phone].  For any surgeries or procedures, contact your rheumatologist provider for instruction.    For any signs or symptoms of infections, fever, chills, cough, chest pain, shortness of breath hold your biologic medication, then seek medical attention immediately then notify your rheumatology provider.  We ask that you are established with a primary care provider.  Schedule an appointment with your primary care provider for an annual physical for cardiac risk evaluation, cancer screening, immunization, bone health and primary care needs.  If you are a smoker or are exposed to second hand smoke, we advise you to quit smoking and avoid exposure to smoke whenever possible.

## 2018-03-01 DIAGNOSIS — E55.9 VITAMIN D DEFICIENCY: Primary | ICD-10-CM

## 2018-03-01 DIAGNOSIS — Z79.52 LONG TERM SYSTEMIC STEROID USER: ICD-10-CM

## 2018-03-01 RX ORDER — FAMOTIDINE 20 MG
2000 TABLET ORAL DAILY
Qty: 60 CAPSULE | Refills: 11 | Status: SHIPPED | OUTPATIENT
Start: 2018-03-01 | End: 2019-04-01

## 2018-03-01 NOTE — PROGRESS NOTES
The blood counts, liver, kidney and CRP  inflammation labs are normal. Vitamin D goal is 40. Will ask her to increase to 2000 IU day     Miguel CABRAL CNP, MSN  3/1/2018  11:58 AM

## 2018-03-30 ENCOUNTER — TRANSFERRED RECORDS (OUTPATIENT)
Dept: HEALTH INFORMATION MANAGEMENT | Facility: CLINIC | Age: 63
End: 2018-03-30

## 2018-06-07 DIAGNOSIS — M25.559 PAIN IN JOINT, PELVIC REGION AND THIGH, UNSPECIFIED LATERALITY: ICD-10-CM

## 2018-06-07 DIAGNOSIS — M06.4 INFLAMMATORY POLYARTHROPATHY (H): ICD-10-CM

## 2018-06-07 DIAGNOSIS — M94.0 COSTOCHONDRITIS, ACUTE: ICD-10-CM

## 2018-06-07 DIAGNOSIS — L40.50 PSORIATIC ARTHRITIS (H): ICD-10-CM

## 2018-06-07 DIAGNOSIS — M46.99 INFLAMMATORY SPONDYLOPATHY OF MULTIPLE SITES IN SPINE (H): ICD-10-CM

## 2018-06-09 NOTE — TELEPHONE ENCOUNTER
methotrexate sodium, pres-free, 50 MG/2ML SOLN injection CHEMO  Last Written Prescription Date:  2/28/18  Last Fill Quantity: 4ml,   # refills: 2  Last Office Visit: 2/28/18  Future Office visit: 7/3/18    CBC RESULTS:   Recent Labs   Lab Test  02/28/18   1038   WBC  5.3   RBC  4.53   HGB  13.5   HCT  41.4   MCV  91   MCH  29.8   MCHC  32.6   RDW  15.1*   PLT  202       Creatinine   Date Value Ref Range Status   02/28/2018 0.85 0.52 - 1.04 mg/dL Final   ]    Liver Function Studies -   Recent Labs   Lab Test  02/28/18   1038   04/26/17   1825   PROTTOTAL   --    --   7.3   ALBUMIN  3.8   < >  3.8   BILITOTAL   --    --   0.2   ALKPHOS   --    --   72   AST  17   < >  24   ALT  22   < >  38    < > = values in this interval not displayed.     Lab Results   Component Value Date    ALBUMIN 3.8 02/28/2018         Routing refill request to provider for review/approval because: labs past due

## 2018-06-11 RX ORDER — METHOTREXATE 25 MG/ML
25 INJECTION INTRA-ARTERIAL; INTRAMUSCULAR; INTRATHECAL; INTRAVENOUS
Qty: 4 ML | Refills: 2 | Status: SHIPPED | OUTPATIENT
Start: 2018-06-11 | End: 2018-09-10

## 2018-06-12 ENCOUNTER — HOSPITAL ENCOUNTER (OUTPATIENT)
Dept: LAB | Facility: CLINIC | Age: 63
Discharge: HOME | End: 2018-06-12
Attending: NURSE PRACTITIONER | Admitting: NURSE PRACTITIONER
Payer: COMMERCIAL

## 2018-06-12 DIAGNOSIS — M46.99 INFLAMMATORY SPONDYLOPATHY OF MULTIPLE SITES IN SPINE (H): ICD-10-CM

## 2018-06-12 DIAGNOSIS — L40.50 PSORIATIC ARTHRITIS (H): ICD-10-CM

## 2018-06-12 DIAGNOSIS — Z79.899 HIGH RISK MEDICATIONS (NOT ANTICOAGULANTS) LONG-TERM USE: ICD-10-CM

## 2018-06-12 LAB
ALBUMIN SERPL-MCNC: 3.5 G/DL (ref 3.4–5)
ALT SERPL W P-5'-P-CCNC: 28 U/L (ref 0–50)
AST SERPL W P-5'-P-CCNC: 23 U/L (ref 0–45)
CREAT SERPL-MCNC: 0.79 MG/DL (ref 0.52–1.04)
CRP SERPL-MCNC: 4.3 MG/L (ref 0–8)
ERYTHROCYTE [DISTWIDTH] IN BLOOD BY AUTOMATED COUNT: 14 % (ref 10–15)
ERYTHROCYTE [SEDIMENTATION RATE] IN BLOOD BY WESTERGREN METHOD: 11 MM/H (ref 0–30)
GFR SERPL CREATININE-BSD FRML MDRD: 74 ML/MIN/1.7M2
HCT VFR BLD AUTO: 39.8 % (ref 35–47)
HGB BLD-MCNC: 13.1 G/DL (ref 11.7–15.7)
MCH RBC QN AUTO: 29.2 PG (ref 26.5–33)
MCHC RBC AUTO-ENTMCNC: 32.9 G/DL (ref 31.5–36.5)
MCV RBC AUTO: 89 FL (ref 78–100)
PLATELET # BLD AUTO: 204 10E9/L (ref 150–450)
RBC # BLD AUTO: 4.49 10E12/L (ref 3.8–5.2)
WBC # BLD AUTO: 4 10E9/L (ref 4–11)

## 2018-06-12 PROCEDURE — 82040 ASSAY OF SERUM ALBUMIN: CPT | Performed by: NURSE PRACTITIONER

## 2018-06-12 PROCEDURE — 86140 C-REACTIVE PROTEIN: CPT | Performed by: NURSE PRACTITIONER

## 2018-06-12 PROCEDURE — 84450 TRANSFERASE (AST) (SGOT): CPT | Performed by: NURSE PRACTITIONER

## 2018-06-12 PROCEDURE — 84460 ALANINE AMINO (ALT) (SGPT): CPT | Performed by: NURSE PRACTITIONER

## 2018-06-12 PROCEDURE — 85027 COMPLETE CBC AUTOMATED: CPT | Performed by: NURSE PRACTITIONER

## 2018-06-12 PROCEDURE — 85652 RBC SED RATE AUTOMATED: CPT | Performed by: NURSE PRACTITIONER

## 2018-06-12 PROCEDURE — 82565 ASSAY OF CREATININE: CPT | Performed by: NURSE PRACTITIONER

## 2018-06-12 PROCEDURE — 36415 COLL VENOUS BLD VENIPUNCTURE: CPT | Performed by: NURSE PRACTITIONER

## 2018-06-13 NOTE — PROGRESS NOTES
The blood counts, liver, kidney and CRP inflammation labs are normal.     Miguel CABRAL, CNP, MSN  6/13/2018  7:35 AM

## 2018-06-23 ENCOUNTER — OFFICE VISIT (OUTPATIENT)
Dept: URGENT CARE | Facility: URGENT CARE | Age: 63
End: 2018-06-23
Payer: COMMERCIAL

## 2018-06-23 VITALS
TEMPERATURE: 99.1 F | SYSTOLIC BLOOD PRESSURE: 114 MMHG | DIASTOLIC BLOOD PRESSURE: 78 MMHG | BODY MASS INDEX: 26.55 KG/M2 | RESPIRATION RATE: 10 BRPM | HEART RATE: 85 BPM | WEIGHT: 162 LBS

## 2018-06-23 DIAGNOSIS — J02.9 SUPPURATIVE PHARYNGITIS: Primary | ICD-10-CM

## 2018-06-23 DIAGNOSIS — R07.0 THROAT PAIN: ICD-10-CM

## 2018-06-23 LAB
DEPRECATED S PYO AG THROAT QL EIA: NORMAL
SPECIMEN SOURCE: NORMAL

## 2018-06-23 PROCEDURE — 87081 CULTURE SCREEN ONLY: CPT | Performed by: NURSE PRACTITIONER

## 2018-06-23 PROCEDURE — 99213 OFFICE O/P EST LOW 20 MIN: CPT | Performed by: PHYSICIAN ASSISTANT

## 2018-06-23 PROCEDURE — 87880 STREP A ASSAY W/OPTIC: CPT | Performed by: NURSE PRACTITIONER

## 2018-06-23 RX ORDER — CEFDINIR 300 MG/1
300 CAPSULE ORAL 2 TIMES DAILY
Qty: 20 CAPSULE | Refills: 0 | Status: ON HOLD | OUTPATIENT
Start: 2018-06-23 | End: 2020-08-04

## 2018-06-23 NOTE — PATIENT INSTRUCTIONS
(J02.9) Suppurative pharyngitis  (primary encounter diagnosis)  Comment: \  Plan: cefdinir (OMNICEF) 300 MG capsule            (R07.0) Throat pain  Comment:   Plan: Strep, Rapid Screen, Beta strep group A culture          Salt water gargles.

## 2018-06-23 NOTE — PROGRESS NOTES
SUBJECTIVE:   Krissy Weldon is a 63 year old female presenting with a chief complaint of:  1) sore throat for 4 days  2) fevers, low grade  Onset of symptoms was 4 day(s) ago.  Course of illness is worsening.    Severity moderate  Current and Associated symptoms: as above  Treatment measures tried include Tylenol/Ibuprofen.  Predisposing factors include exposed to strep (grandchildren).    Past Medical History:   Diagnosis Date     Acute meniscal tear of knee 1/2014    with chrondromalacia per MRI      Depression      DJD (degenerative joint disease), lumbar 7/2012    L4-5, L5-S1 per MRI      Episcleritis 12/14/2011     Hamstring tendonitis at origin 7/2012    Bilaterally with partial tear right, sacroilitis per MRI     Hypothyroid 9/7/2011     Hypothyroidism      PONV (postoperative nausea and vomiting)      Psoriatic arthritis (H) 9/7/2011     Psoriatic arthritis (H) 2/9/2016     Strep throat 04/2017     Patient Active Problem List   Diagnosis     Hypothyroid     High risk medications (not anticoagulants) long-term use     Costochondritis, acute     CMC DJD(carpometacarpal degenerative joint disease), localized primary     RUQ abdominal pain     Elevated LFTs     RUQ pain     Nausea with vomiting     Small bowel obstruction     Psoriatic arthritis (HCC)     Other chronic pain     Inflammatory spondylopathy of multiple sites in spine (H)     Social History   Substance Use Topics     Smoking status: Never Smoker     Smokeless tobacco: Never Used     Alcohol use No       ROS:  CONSTITUTIONAL:as per HPI  INTEGUMENTARY/SKIN: NEGATIVE for worrisome rashes, moles or lesions  EYES: NEGATIVE for vision changes or irritation  ENT/MOUTH: as per HPI  RESP:NEGATIVE for significant cough or SOB  CV: NEGATIVE for chest pain, palpitations or peripheral edema  GI: NEGATIVE for nausea, abdominal pain, heartburn, or change in bowel habits  MUSCULOSKELETAL: NEGATIVE for significant arthralgias or myalgia  NEURO: NEGATIVE for  weakness, dizziness or paresthesias  Review of systems negative except as stated above.    OBJECTIVE  :/78  Pulse 85  Temp 99.1  F (37.3  C) (Oral)  Resp 10  Wt 162 lb (73.5 kg)  LMP 03/06/2012  BMI 26.55 kg/m2  GENERAL APPEARANCE: healthy, alert and no distress  EYES: EOMI,  PERRL, conjunctiva clear  HENT: ear canals and TM's normal.  Nose and mouth without ulcers, or lesions  HENT: erythematous OP with scant exudate  NECK: supple, with anterior lymphadenopathy  RESP: lungs clear to auscultation - no rales, rhonchi or wheezes  CV: regular rates and rhythm, normal S1 S2, no murmur noted  ABDOMEN:  soft, nontender, no HSM or masses and bowel sounds normal  NEURO: Normal strength and tone, sensory exam grossly normal,  normal speech and mentation  SKIN: no suspicious lesions or rashes    (J02.9) Suppurative pharyngitis  (primary encounter diagnosis)  Comment: \  Plan: cefdinir (OMNICEF) 300 MG capsule            (R07.0) Throat pain  Comment:   Plan: Strep, Rapid Screen, Beta strep group A culture          Salt water gargles.

## 2018-06-23 NOTE — MR AVS SNAPSHOT
After Visit Summary   6/23/2018    Krissy Weldon    MRN: [id number]           Patient Information     Date Of Birth          1955        Visit Information        Provider Department      6/23/2018 10:40 AM Meredith Zuniga PA-C [facility]        Today's Diagnoses     Suppurative pharyngitis    -  1    Throat pain          Care Instructions    (J02.9) Suppurative pharyngitis  (primary encounter diagnosis)  Comment: \  Plan: cefdinir (OMNICEF) 300 MG capsule            (R07.0) Throat pain  Comment:   Plan: Strep, Rapid Screen, Beta strep group A culture          Salt water gargles.                    Follow-ups after your visit        Your next 10 appointments already scheduled     Jul 03, 2018  1:00 PM CDT   DX HIP/PELVIS/SPINE with [facility]   [facility] ([facility])    [address] [place] [id number] [phone]           Please do not take any of the following 24 hours prior to the day of your exam: vitamins, calcium tablets, antacids.  If possible, please wear clothes without metal (snaps, zippers). A sweatsuit works well.            Jul 03, 2018  1:30 PM CDT   Lab with  LAB   [facility] ([facility])    [address] [place] [id number] [phone]            Jul 03, 2018  2:00 PM CDT   (Arrive by 1:45 PM)   Return Visit with Jensen Samayoa MD   [facility] ([facility])    [address] [place] [address]  [address]  [facility] [id number] [phone]            Oct 31, 2018 11:00 AM CDT   Lab with  LAB   [facility] ([facility])    [address] [place] [id number] [phone]            Oct 31, 2018 11:30 AM CDT   (Arrive by 11:15 AM)   Return Visit with MARIANNA Garcia [facility] ([facility])     [address] [facility]  [address]  [facility] [place] [address] [phone]              Who to contact     If you have questions or need follow up information about today's clinic visit or your schedule please contact [facility] directly at [phone].  Normal or non-critical lab and imaging results will be communicated to you by MyChart, letter or phone within 4 business days after the clinic has received the results. If you do not hear from us within 7 days, please contact the clinic through [employer] or phone. If you have a critical or abnormal lab result, we will notify you by phone as soon as possible.  Submit refill requests through [employer] or call your pharmacy and they will forward the refill request to us. Please allow 3 business days for your refill to be completed.          Additional Information About Your Visit        MyChart Information     [employer] gives you secure access to your electronic health record. If you see a primary care provider, you can also send messages to your care team and make appointments. If you have questions, please call your primary care clinic.  If you do not have a primary care provider, please call [phone] and they will assist you.        Care EveryWhere ID     This is your Care EveryWhere ID. This could be used by other organizations to access your [place] medical records  [id number]        Your Vitals Were     Pulse Temperature Respirations Last Period BMI (Body Mass Index)       85 99.1  F (37.3  C) (Oral) 10 03/06/2012 26.55 kg/m2        Blood Pressure from Last 3 Encounters:   06/23/18 114/78   02/28/18 107/68   10/25/17 114/75    Weight from Last 3 Encounters:   06/23/18 162 lb (73.5 kg)   02/28/18 163 lb 9.6 oz (74.2 kg)   10/25/17 156 lb 3.2 oz (70.9 kg)              We Performed the Following     Beta strep group A culture     Strep, Rapid Screen          Today's Medication Changes          These changes are accurate as of 6/23/18  11:30 AM.  If you have any questions, ask your nurse or doctor.               Start taking these medicines.        Dose/Directions    cefdinir 300 MG capsule   Commonly known as:  OMNICEF   Used for:  Suppurative pharyngitis   Started by:  Meredith Zuniga PA-C        Dose:  300 mg   Take 1 capsule (300 mg) by mouth 2 times daily   Quantity:  20 capsule   Refills:  0            Where to get your medicines      These medications were sent to [facility] [address] [address]     Phone:  [phone]     cefdinir 300 MG capsule                Primary Care Provider Office Phone # Fax #    Deshawn Burns [phone]       [facility] [address] [facility] [address]  [facility] [address]        Equal Access to Services     [facility]: Hadii bárbara ku hadasho Soomaali, waaxda luqadaha, qaybta kaalmada adeegyada, estrada prado hayrianna bay . So [facility] [phone].    ATENCIÓN: Si habla español, tiene a sharma disposición servicios gratuitos de asistencia lingüística. Joe al [phone].    We comply with applicable federal civil rights laws and [place] laws. We do not discriminate on the basis of race, color, national origin, age, disability, sex, sexual orientation, or gender identity.            Thank you!     Thank you for choosing [facility]  for your care. Our goal is always to provide you with excellent care. Hearing back from our patients is one way we can continue to improve our services. Please take a few minutes to complete the written survey that you may receive in the mail after your visit with us. Thank you!             Your Updated Medication List - Protect others around you: Learn how to safely use, store and throw away your medicines at www.disposemymeds.org.          This list is accurate as of 6/23/18 11:30 AM.  Always use your most recent med list.              "      Brand Name Dispense Instructions for use Diagnosis    adalimumab 40 MG/0.8ML prefilled syringe kit    HUMIRA    1 kit    Injection 40 mg every 10 days. Hold for signs of infection, and then seek medical attention.    Psoriatic arthritis (H)       cefdinir 300 MG capsule    OMNICEF    20 capsule    Take 1 capsule (300 mg) by mouth 2 times daily    Suppurative pharyngitis       fentaNYL 25 mcg/hr 72 hr patch    DURAGESIC     Place 1 patch onto the skin every 72 hours        folic acid 1 MG tablet    FOLVITE    210 tablet    Take 3 tablet a day few days before and day after methotrexate then the other day 2 mg day    Pain in joint, pelvic region and thigh, unspecified laterality, High risk medications (not anticoagulants) long-term use       methotrexate sodium (pres-free) 50 MG/2ML Soln injection CHEMO     4 mL    Inject 1 mL (25 mg) Subcutaneous every 7 days    Pain in joint, pelvic region and thigh, unspecified laterality, Psoriatic arthritis (H), Inflammatory polyarthropathy (H), Costochondritis, acute, Inflammatory spondylopathy of multiple sites in spine (H)       multivitamin, therapeutic Tabs tablet      Take 1 tablet by mouth daily        Needle (Disp) 25G X 5/8\" Misc    BD DISP NEEDLES    25 each    USE WITH METHOTREXATE UNDER THE SKIN EVERY 7 DAYS    Pain in joint, pelvic region and thigh, unspecified laterality       predniSONE 5 MG tablet    DELTASONE    30 tablet    Take 1 tablet (5 mg) by mouth daily    Psoriatic arthritis (H), Inflammatory spondylopathy of multiple sites in spine (H), Hip pain, right       PROZAC 40 MG capsule   Generic drug:  FLUoxetine      Take 40 mg by mouth daily.        sennosides 8.6 MG tablet    SENOKOT    1 tablet    Take 1 tablet by mouth 2 times daily        SYNTHROID 100 MCG tablet   Generic drug:  levothyroxine      Take  by mouth daily.        Syringe Luer Slip 3 ML Misc    B-D SYRINGE LUER-FILI    25 each    1 Units by Device route every 7 days    Pain in joint, " pelvic region and thigh, unspecified laterality       Vitamin D (Cholecalciferol) 1000 units Caps     60 capsule    Take 2,000 Units by mouth daily    Vitamin D deficiency, Long term systemic steroid user

## 2018-06-24 LAB
BACTERIA SPEC CULT: NORMAL
SPECIMEN SOURCE: NORMAL

## 2018-06-25 ENCOUNTER — MYC MEDICAL ADVICE (OUTPATIENT)
Dept: RHEUMATOLOGY | Facility: CLINIC | Age: 63
End: 2018-06-25

## 2018-06-26 NOTE — TELEPHONE ENCOUNTER
I spoke to pt and advised her that she is to hold methotrexate and Humira when she is on antibiotics.    Pt acknowledged an understanding.    Amber Evans LPN

## 2018-06-29 NOTE — PROGRESS NOTES
[facility]  Rheumatology Clinic  Jensen Samayoa MD  2018     Name: Krissy Weldon  MRN: [id number]  Age: 63 year old  : 1955  Referring provider: Jensen Samayoa     Assessment and Plan:  There are no diagnoses linked to this encounter.     Psoriatic arthritis w/axial involvement activity with hx episcleritis, tendonitis, left hamstring tear   No changes in medications; tolerating well with no toxicity   Did  her at her request about mesenchymal stem cell infusion into the right hip vs PRP, encouraged her to try PRP first, given lower cost and more extensive supporting data  Shingrix vaccine  Revisit with Miguel Umana un 4-6 months    Follow-up: Data Unavailable     HPI:   Krissy Weldon is a 63 year old female with a history of psoriatic arthritis and uveitis who presents for follow up. She has previously tried and failed these medications: humira, SSZ, simponi sq/iv (ineffective), remicade, otelza, methotrexate, and cimzia (lost effectiveness). She was last seen on 18 by Miguel Umana at which time the plan was to get humira 40mg injections every 10 days, methotrexate 25mg subcutaneously every week, folic acid 2-3g every day, and volteran cream as needed.    Today, the patient reports feeling pretty good and notes that she recently had left hand surgery for the base of her thumb. Her thumb surgery was in March and she had to stop her medications (humira and methotrexate) for 6 weeks for it. Due to that, her left eye iritis returned, she got a lot of neck pain, and her low back and hips started to hurt again. She notes that it took two cycles of humira (20 days) for her to recover. She denies any injection site reactions.     She mentions recently getting strep throat, which was treated with antibiotics for 10 days and caused her to miss a humira dose. However, she denied any resurfacing of symptoms as she was able to start treatment again quickly.     Her hip dysmotility and pain gives her  "significant difficulty with activities such as going up stairs, and she does not believe that her physical therapy has helped.     Review of Systems:   Pertinent items are noted in HPI, remainder of complete ROS is negative.    No recent problems with hearing or vision. No swallowing problems.   No breathing difficulty, shortness of breath, coughing, or wheezing  No chest pain or palpitations  No heart burn, indigestion, abdominal pain, nausea, vomiting, diarrhea  No urination problems, no bloody, cloudy urine, no dysuria  No numbing, tingling, weakness  No headaches or confusion  No rashes. No easy bleeding or bruising.     Active Medications:     Current Outpatient Prescriptions:      adalimumab (HUMIRA) 40 MG/0.8ML prefilled syringe kit, Injection 40 mg every 10 days. Hold for signs of infection, and then seek medical attention., Disp: 1 kit, Rfl: 5     fentaNYL (DURAGESIC) 25 mcg/hr 72 hr patch, Place 1 patch onto the skin every 72 hours, Disp: , Rfl:      FLUoxetine (PROZAC) 40 MG capsule, Take 40 mg by mouth daily., Disp: , Rfl:      folic acid (FOLVITE) 1 MG tablet, Take 3 tablet a day few days before and day after methotrexate then the other day 2 mg day, Disp: 210 tablet, Rfl: 3     levothyroxine (SYNTHROID) 100 MCG tablet, Take  by mouth daily., Disp: , Rfl:      methotrexate sodium, pres-free, 50 MG/2ML SOLN injection CHEMO, Inject 1 mL (25 mg) Subcutaneous every 7 days, Disp: 4 mL, Rfl: 2     multivitamin, therapeutic (THERA-VIT) TABS, Take 1 tablet by mouth daily, Disp: , Rfl:      Needle, Disp, (BD DISP NEEDLES) 25G X 5/8\" MISC, USE WITH METHOTREXATE UNDER THE SKIN EVERY 7 DAYS, Disp: 25 each, Rfl: 0     sennosides (SENOKOT) 8.6 MG tablet, Take 1 tablet by mouth 2 times daily, Disp: 1 tablet, Rfl: 0     Syringe Luer Slip (B-D SYRINGE LUER-FILI) 3 ML MISC, 1 Units by Device route every 7 days, Disp: 25 each, Rfl: 0     Vitamin D, Cholecalciferol, 1000 UNITS CAPS, Take 2,000 Units by mouth daily, Disp: 60 " "capsule, Rfl: 11     cefdinir (OMNICEF) 300 MG capsule, Take 1 capsule (300 mg) by mouth 2 times daily (Patient not taking: Reported on 7/3/2018), Disp: 20 capsule, Rfl: 0     predniSONE (DELTASONE) 5 MG tablet, Take 1 tablet (5 mg) by mouth daily (Patient not taking: Reported on 6/23/2018), Disp: 30 tablet, Rfl: 2      Allergies:   Review of patient's allergies indicates no known allergies.      Past Medical History:  Acute meniscal tear of knee w/ chondromalacia  Depression  Lumbar DJD L4-5, L5-S1  Episcleritis  Hamstring tendonitis at origin, bilaterally with partial tear right, sacroiliitis   Hypothyroidism  PONV  Psoriatic arthritis  Strep throat   High risk medications (not anticoagulants) long-term use  Acute costochondritis  CMC DJD  RUQ abdominal pain  Nausea with vomiting  Small bowel obstruction  Inflammatory spondylopathy of multiple sites in spine     Past Surgical History:  Appendectomy  CMC arthroplasty, bilateral  Cholecystectomy  Endoscopic retrograde cholangiopancreatogram  Gallbladder surgery  Hysterectomy and oophorectomy  Right thumb surgery  XR sacroiliac therapeutic injection bilateral    Family History:   Father - psoriatic arthritis, psoriasis, lymphoma, colon and prostate cancer  Sister - rheumatoid arthritis, osteoarthritis, Crohn's  Mother - rheumatoid arthritis, endometrial cancer  Maternal grandmother - rheumatoid arthritis     Social History:   No smoking or tobacco use  No alcohol use     Physical Exam:   /79  Pulse 63  Temp 98.7  F (37.1  C) (Oral)  Ht 1.664 m (5' 5.5\")  Wt 73.6 kg (162 lb 3.2 oz)  LMP 03/06/2012  SpO2 97%  BMI 26.58 kg/m2   Wt Readings from Last 4 Encounters:   07/03/18 73.6 kg (162 lb 3.2 oz)   06/23/18 73.5 kg (162 lb)   02/28/18 74.2 kg (163 lb 9.6 oz)   10/25/17 70.9 kg (156 lb 3.2 oz)   Constitutional: Well-developed, appearing stated age; cooperative  Eyes: Normal EOM, PERRLA, vision, conjunctiva, sclera  ENT: Normal external ears, nose, hearing, " lips, teeth, gums, throat. No mucous membrane lesions, normal saliva pool  Neck: No mass or thyroid enlargement  Resp: Lungs clear to auscultation, nl to palpation  CV: RRR, no murmurs, rubs or gallops, no edema  GI: No ABD mass or tenderness, no HSM  Lymph: No cervical, supraclavicular, inguinal or epitrochlear nodes  MS: Very slight swelling in left 2nd and 3rd MCPs. Tender right hip with ROM . The TMJ, neck, shoulder, elbow, wrist, PIP/DIP, spine, knee, ankle, and foot MTP/IP joints were examined and found normal. No active synovitis or altered joint anatomy. Full joint ROM. Normal  strength. No dactylitis,  tenosynovitis, enthesopathy.  Skin: No nail pitting, alopecia, rash, nodules or lesions  Neuro: Normal cranial nerves, strength, sensation, DTRs.   Psych: Normal judgement, orientation, memory, affect.     Laboratory:   Component      Latest Ref Rng & Units 6/12/2018   WBC      4.0 - 11.0 10e9/L 4.0   RBC Count      3.8 - 5.2 10e12/L 4.49   Hemoglobin      11.7 - 15.7 g/dL 13.1   Hematocrit      35.0 - 47.0 % 39.8   MCV      78 - 100 fl 89   MCH      26.5 - 33.0 pg 29.2   MCHC      31.5 - 36.5 g/dL 32.9   RDW      10.0 - 15.0 % 14.0   Platelet Count      150 - 450 10e9/L 204   Creatinine      0.52 - 1.04 mg/dL 0.79   GFR Estimate      >60 mL/min/1.7m2 74   GFR Estimate If Black      >60 mL/min/1.7m2 89   AST      0 - 45 U/L 23   ALT      0 - 50 U/L 28   Albumin      3.4 - 5.0 g/dL 3.5   CRP Inflammation      0.0 - 8.0 mg/L 4.3   Sed Rate      0 - 30 mm/h 11     Scribe Disclosure:   Stef VENTURA, am serving as a scribe to document services personally performed by Jensen Samayoa MD at this visit, based upon the provider's statements to me. All documentation has been reviewed by the aforementioned provider prior to being entered into the official medical record.     Portions of this medical record were completed by a scribe. UPON MY REVIEW AND AUTHENTICATION BY ELECTRONIC SIGNATURE, this confirms (a) I  performed the applicable clinical services, and (b) the record is accurate.    ZAKIA Samayoa MD, PhD    Rheumatology

## 2018-07-03 ENCOUNTER — RADIANT APPOINTMENT (OUTPATIENT)
Dept: BONE DENSITY | Facility: CLINIC | Age: 63
End: 2018-07-03
Attending: NURSE PRACTITIONER
Payer: COMMERCIAL

## 2018-07-03 ENCOUNTER — OFFICE VISIT (OUTPATIENT)
Dept: RHEUMATOLOGY | Facility: CLINIC | Age: 63
End: 2018-07-03
Attending: INTERNAL MEDICINE
Payer: COMMERCIAL

## 2018-07-03 ENCOUNTER — APPOINTMENT (OUTPATIENT)
Dept: LAB | Facility: CLINIC | Age: 63
End: 2018-07-03
Payer: COMMERCIAL

## 2018-07-03 VITALS
BODY MASS INDEX: 26.07 KG/M2 | SYSTOLIC BLOOD PRESSURE: 129 MMHG | OXYGEN SATURATION: 97 % | WEIGHT: 162.2 LBS | TEMPERATURE: 98.7 F | HEART RATE: 63 BPM | DIASTOLIC BLOOD PRESSURE: 79 MMHG | HEIGHT: 66 IN

## 2018-07-03 DIAGNOSIS — L40.50 PSORIATIC ARTHRITIS (H): ICD-10-CM

## 2018-07-03 DIAGNOSIS — M46.99 INFLAMMATORY SPONDYLOPATHY OF MULTIPLE SITES IN SPINE (H): ICD-10-CM

## 2018-07-03 DIAGNOSIS — Z79.52 LONG TERM SYSTEMIC STEROID USER: ICD-10-CM

## 2018-07-03 DIAGNOSIS — Z23 ENCOUNTER FOR IMMUNIZATION: Primary | ICD-10-CM

## 2018-07-03 DIAGNOSIS — M19.049 LOCALIZED PRIMARY CARPOMETACARPAL OSTEOARTHROSIS, UNSPECIFIED LATERALITY: ICD-10-CM

## 2018-07-03 DIAGNOSIS — Z79.899 HIGH RISK MEDICATIONS (NOT ANTICOAGULANTS) LONG-TERM USE: ICD-10-CM

## 2018-07-03 PROCEDURE — 25000125 ZZHC RX 250: Mod: ZF | Performed by: INTERNAL MEDICINE

## 2018-07-03 PROCEDURE — 90471 IMMUNIZATION ADMIN: CPT

## 2018-07-03 PROCEDURE — 90750 HZV VACC RECOMBINANT IM: CPT | Mod: ZF | Performed by: INTERNAL MEDICINE

## 2018-07-03 PROCEDURE — G0463 HOSPITAL OUTPT CLINIC VISIT: HCPCS | Mod: ZF

## 2018-07-03 PROCEDURE — 90472 IMMUNIZATION ADMIN EACH ADD: CPT

## 2018-07-03 RX ADMIN — ZOSTER VACCINE RECOMBINANT, ADJUVANTED 0.5 ML: KIT at 14:58

## 2018-07-03 ASSESSMENT — PAIN SCALES - GENERAL: PAINLEVEL: SEVERE PAIN (7)

## 2018-07-03 NOTE — NURSING NOTE
"Chief Complaint   Patient presents with     RECHECK     inflammatory spondylopathy      /79  Pulse 63  Temp 98.7  F (37.1  C) (Oral)  Ht 1.664 m (5' 5.5\")  Wt 73.6 kg (162 lb 3.2 oz)  LMP 03/06/2012  SpO2 97%  BMI 26.58 kg/m2  Alice Farr, CMA    "

## 2018-07-03 NOTE — MR AVS SNAPSHOT
After Visit Summary   7/3/2018    Krissy Weldon    MRN: [id number]           Patient Information     Date Of Birth          1955        Visit Information        Provider Department      7/3/2018 2:00 PM Jensen Samayoa MD [facility] Rheumatology        Today's Diagnoses     Encounter for immunization    -  1    Localized primary carpometacarpal osteoarthrosis, unspecified laterality        Psoriatic arthritis (HCC)        Inflammatory spondylopathy of multiple sites in spine (H)        High risk medications (not anticoagulants) long-term use           Follow-ups after your visit        Your next 10 appointments already scheduled     Oct 31, 2018 11:00 AM CDT   Lab with  LAB   [facility] Lab ([facility])    [address]  1st Floor  [facility] [address] [phone]            Oct 31, 2018 11:30 AM CDT   (Arrive by 11:15 AM)   Return Visit with MARIANNA Garcia CNP   [facility] Rheumatology ([facility])    [address]  [address]  [facility] [address] [phone]              Who to contact     If you have questions or need follow up information about today's clinic visit or your schedule please contact [facility] RHEUMATOLOGY directly at [phone].  Normal or non-critical lab and imaging results will be communicated to you by MyChart, letter or phone within 4 business days after the clinic has received the results. If you do not hear from us within 7 days, please contact the clinic through MyChart or phone. If you have a critical or abnormal lab result, we will notify you by phone as soon as possible.  Submit refill requests through [employer] or call your pharmacy and they will forward the refill request to us. Please allow 3 business days for your refill to be completed.          Additional Information About Your Visit        [email or web address] Information     [employer] gives you secure access to your electronic health  "record. If you see a primary care provider, you can also send messages to your care team and make appointments. If you have questions, please call your primary care clinic.  If you do not have a primary care provider, please call [phone] and they will assist you.        Care EveryWhere ID     This is your Care EveryWhere ID. This could be used by other organizations to access your [place] medical records  [id number]        Your Vitals Were     Pulse Temperature Height Last Period Pulse Oximetry BMI (Body Mass Index)    63 98.7  F (37.1  C) (Oral) 1.664 m (5' 5.5\") 03/06/2012 97% 26.58 kg/m2       Blood Pressure from Last 3 Encounters:   07/03/18 129/79   06/23/18 114/78   02/28/18 107/68    Weight from Last 3 Encounters:   07/03/18 73.6 kg (162 lb 3.2 oz)   06/23/18 73.5 kg (162 lb)   02/28/18 74.2 kg (163 lb 9.6 oz)              Today, you had the following     No orders found for display       Primary Care Provider Office Phone # Fax #    Deshawn Burns [phone]       [facility] [address]   [facility] [address]        Equal Access to Services     PRAVEEN LIU : Hadii aad ku hadasho Soomaali, waaxda luqadaha, qaybta kaalmada adeegyada, waxay idiin hayrianna bya . So [facility] [phone].    ATENCIÓN: Si habla español, tiene a sharma disposición servicios gratuitos de asistencia lingüística. Llame al [phone].    We comply with applicable federal civil rights laws and [place] laws. We do not discriminate on the basis of race, color, national origin, age, disability, sex, sexual orientation, or gender identity.            Thank you!     Thank you for choosing [facility]  for your care. Our goal is always to provide you with excellent care. Hearing back from our patients is one way we can continue to improve our services. Please take a few minutes to complete the written survey that you may receive in the mail after your visit with us. " "Thank you!             Your Updated Medication List - Protect others around you: Learn how to safely use, store and throw away your medicines at www.disposemymeds.org.          This list is accurate as of 7/3/18 11:59 PM.  Always use your most recent med list.                   Brand Name Dispense Instructions for use Diagnosis    adalimumab 40 MG/0.8ML prefilled syringe kit    HUMIRA    1 kit    Injection 40 mg every 10 days. Hold for signs of infection, and then seek medical attention.    Psoriatic arthritis (H)       cefdinir 300 MG capsule    OMNICEF    20 capsule    Take 1 capsule (300 mg) by mouth 2 times daily    Suppurative pharyngitis       fentaNYL 25 mcg/hr 72 hr patch    DURAGESIC     Place 1 patch onto the skin every 72 hours        folic acid 1 MG tablet    FOLVITE    210 tablet    Take 3 tablet a day few days before and day after methotrexate then the other day 2 mg day    Pain in joint, pelvic region and thigh, unspecified laterality, High risk medications (not anticoagulants) long-term use       methotrexate sodium (pres-free) 50 MG/2ML Soln injection CHEMO     4 mL    Inject 1 mL (25 mg) Subcutaneous every 7 days    Pain in joint, pelvic region and thigh, unspecified laterality, Psoriatic arthritis (H), Inflammatory polyarthropathy (H), Costochondritis, acute, Inflammatory spondylopathy of multiple sites in spine (H)       multivitamin, therapeutic Tabs tablet      Take 1 tablet by mouth daily        Needle (Disp) 25G X 5/8\" Misc    BD DISP NEEDLES    25 each    USE WITH METHOTREXATE UNDER THE SKIN EVERY 7 DAYS    Pain in joint, pelvic region and thigh, unspecified laterality       predniSONE 5 MG tablet    DELTASONE    30 tablet    Take 1 tablet (5 mg) by mouth daily    Psoriatic arthritis (H), Inflammatory spondylopathy of multiple sites in spine (H), Hip pain, right       PROZAC 40 MG capsule   Generic drug:  FLUoxetine      Take 40 mg by mouth daily.        sennosides 8.6 MG tablet    SENOKOT "    1 tablet    Take 1 tablet by mouth 2 times daily        SYNTHROID 100 MCG tablet   Generic drug:  levothyroxine      Take  by mouth daily.        Syringe Luer Slip 3 ML Misc    B-D SYRINGE LUER-FILI    25 each    1 Units by Device route every 7 days    Pain in joint, pelvic region and thigh, unspecified laterality       Vitamin D (Cholecalciferol) 1000 units Caps     60 capsule    Take 2,000 Units by mouth daily    Vitamin D deficiency, Long term systemic steroid user

## 2018-07-03 NOTE — LETTER
7/3/2018    RE: Krissy Weldon  [address] [address] [address]     [facility]  Rheumatology Clinic  Jensen Samayoa MD  2018     Name: Krissy Weldon  MRN: [id number]  Age: 63 year old  : 1955  Referring provider: Jensen Samayoa     Assessment and Plan:  There are no diagnoses linked to this encounter.     Psoriatic arthritis w/axial involvement activity with hx episcleritis, tendonitis, left hamstring tear   No changes in medications; tolerating well with no toxicity   Did  her at her request about mesenchymal stem cell infusion into the right hip vs PRP, encouraged her to try PRP first, given lower cost and more extensive supporting data  Shingrix vaccine  Revisit with Miguel Umana un 4-6 months    Follow-up: Data Unavailable     HPI:   Krissy Weldon is a 63 year old female with a history of psoriatic arthritis and uveitis who presents for follow up. She has previously tried and failed these medications: humira, SSZ, simponi sq/iv (ineffective), remicade, otelza, methotrexate, and cimzia (lost effectiveness). She was last seen on 18 by Miguel Umana at which time the plan was to get humira 40mg injections every 10 days, methotrexate 25mg subcutaneously every week, folic acid 2-3g every day, and volteran cream as needed.    Today, the patient reports feeling pretty good and notes that she recently had left hand surgery for the base of her thumb. Her thumb surgery was in March and she had to stop her medications (humira and methotrexate) for 6 weeks for it. Due to that, her left eye iritis returned, she got a lot of neck pain, and her low back and hips started to hurt again. She notes that it took two cycles of humira (20 days) for her to recover. She denies any injection site reactions.     She mentions recently getting strep throat, which was treated with antibiotics for 10 days and caused her to miss a humira dose. However, she denied any resurfacing of symptoms as she was able  "to start treatment again quickly.     Her hip dysmotility and pain gives her significant difficulty with activities such as going up stairs, and she does not believe that her physical therapy has helped.     Review of Systems:   Pertinent items are noted in HPI, remainder of complete ROS is negative.    No recent problems with hearing or vision. No swallowing problems.   No breathing difficulty, shortness of breath, coughing, or wheezing  No chest pain or palpitations  No heart burn, indigestion, abdominal pain, nausea, vomiting, diarrhea  No urination problems, no bloody, cloudy urine, no dysuria  No numbing, tingling, weakness  No headaches or confusion  No rashes. No easy bleeding or bruising.     Active Medications:     Current Outpatient Prescriptions:      adalimumab (HUMIRA) 40 MG/0.8ML prefilled syringe kit, Injection 40 mg every 10 days. Hold for signs of infection, and then seek medical attention., Disp: 1 kit, Rfl: 5     fentaNYL (DURAGESIC) 25 mcg/hr 72 hr patch, Place 1 patch onto the skin every 72 hours, Disp: , Rfl:      FLUoxetine (PROZAC) 40 MG capsule, Take 40 mg by mouth daily., Disp: , Rfl:      folic acid (FOLVITE) 1 MG tablet, Take 3 tablet a day few days before and day after methotrexate then the other day 2 mg day, Disp: 210 tablet, Rfl: 3     levothyroxine (SYNTHROID) 100 MCG tablet, Take  by mouth daily., Disp: , Rfl:      methotrexate sodium, pres-free, 50 MG/2ML SOLN injection CHEMO, Inject 1 mL (25 mg) Subcutaneous every 7 days, Disp: 4 mL, Rfl: 2     multivitamin, therapeutic (THERA-VIT) TABS, Take 1 tablet by mouth daily, Disp: , Rfl:      Needle, Disp, (BD DISP NEEDLES) 25G X 5/8\" MISC, USE WITH METHOTREXATE UNDER THE SKIN EVERY 7 DAYS, Disp: 25 each, Rfl: 0     sennosides (SENOKOT) 8.6 MG tablet, Take 1 tablet by mouth 2 times daily, Disp: 1 tablet, Rfl: 0     Syringe Luer Slip (B-D SYRINGE LUER-FILI) 3 ML MISC, 1 Units by Device route every 7 days, Disp: 25 each, Rfl: 0     Vitamin " "D, Cholecalciferol, 1000 UNITS CAPS, Take 2,000 Units by mouth daily, Disp: 60 capsule, Rfl: 11     cefdinir (OMNICEF) 300 MG capsule, Take 1 capsule (300 mg) by mouth 2 times daily (Patient not taking: Reported on 7/3/2018), Disp: 20 capsule, Rfl: 0     predniSONE (DELTASONE) 5 MG tablet, Take 1 tablet (5 mg) by mouth daily (Patient not taking: Reported on 6/23/2018), Disp: 30 tablet, Rfl: 2      Allergies:   Review of patient's allergies indicates no known allergies.      Past Medical History:  Acute meniscal tear of knee w/ chondromalacia  Depression  Lumbar DJD L4-5, L5-S1  Episcleritis  Hamstring tendonitis at origin, bilaterally with partial tear right, sacroiliitis   Hypothyroidism  PONV  Psoriatic arthritis  Strep throat   High risk medications (not anticoagulants) long-term use  Acute costochondritis  CMC DJD  RUQ abdominal pain  Nausea with vomiting  Small bowel obstruction  Inflammatory spondylopathy of multiple sites in spine     Past Surgical History:  Appendectomy  CMC arthroplasty, bilateral  Cholecystectomy  Endoscopic retrograde cholangiopancreatogram  Gallbladder surgery  Hysterectomy and oophorectomy  Right thumb surgery  XR sacroiliac therapeutic injection bilateral    Family History:   Father - psoriatic arthritis, psoriasis, lymphoma, colon and prostate cancer  Sister - rheumatoid arthritis, osteoarthritis, Crohn's  Mother - rheumatoid arthritis, endometrial cancer  Maternal grandmother - rheumatoid arthritis     Social History:   No smoking or tobacco use  No alcohol use     Physical Exam:   /79  Pulse 63  Temp 98.7  F (37.1  C) (Oral)  Ht 1.664 m (5' 5.5\")  Wt 73.6 kg (162 lb 3.2 oz)  LMP 03/06/2012  SpO2 97%  BMI 26.58 kg/m2   Wt Readings from Last 4 Encounters:   07/03/18 73.6 kg (162 lb 3.2 oz)   06/23/18 73.5 kg (162 lb)   02/28/18 74.2 kg (163 lb 9.6 oz)   10/25/17 70.9 kg (156 lb 3.2 oz)   Constitutional: Well-developed, appearing stated age; cooperative  Eyes: Normal EOM, " PERRLA, vision, conjunctiva, sclera  ENT: Normal external ears, nose, hearing, lips, teeth, gums, throat. No mucous membrane lesions, normal saliva pool  Neck: No mass or thyroid enlargement  Resp: Lungs clear to auscultation, nl to palpation  CV: RRR, no murmurs, rubs or gallops, no edema  GI: No ABD mass or tenderness, no HSM  Lymph: No cervical, supraclavicular, inguinal or epitrochlear nodes  MS: Very slight swelling in left 2nd and 3rd MCPs. Tender right hip with ROM . The TMJ, neck, shoulder, elbow, wrist, PIP/DIP, spine, knee, ankle, and foot MTP/IP joints were examined and found normal. No active synovitis or altered joint anatomy. Full joint ROM. Normal  strength. No dactylitis,  tenosynovitis, enthesopathy.  Skin: No nail pitting, alopecia, rash, nodules or lesions  Neuro: Normal cranial nerves, strength, sensation, DTRs.   Psych: Normal judgement, orientation, memory, affect.     Laboratory:   Component      Latest Ref Rng & Units 6/12/2018   WBC      4.0 - 11.0 10e9/L 4.0   RBC Count      3.8 - 5.2 10e12/L 4.49   Hemoglobin      11.7 - 15.7 g/dL 13.1   Hematocrit      35.0 - 47.0 % 39.8   MCV      78 - 100 fl 89   MCH      26.5 - 33.0 pg 29.2   MCHC      31.5 - 36.5 g/dL 32.9   RDW      10.0 - 15.0 % 14.0   Platelet Count      150 - 450 10e9/L 204   Creatinine      0.52 - 1.04 mg/dL 0.79   GFR Estimate      >60 mL/min/1.7m2 74   GFR Estimate If Black      >60 mL/min/1.7m2 89   AST      0 - 45 U/L 23   ALT      0 - 50 U/L 28   Albumin      3.4 - 5.0 g/dL 3.5   CRP Inflammation      0.0 - 8.0 mg/L 4.3   Sed Rate      0 - 30 mm/h 11     Scribe Disclosure:   Stef VENTURA, am serving as a scribe to document services personally performed by Jensen Samayoa MD at this visit, based upon the provider's statements to me. All documentation has been reviewed by the aforementioned provider prior to being entered into the official medical record.     Portions of this medical record were completed by a scribe.  UPON MY REVIEW AND AUTHENTICATION BY ELECTRONIC SIGNATURE, this confirms (a) I performed the applicable clinical services, and (b) the record is accurate.    ZAKIA Samayoa MD, PhD    Rheumatology

## 2018-09-10 DIAGNOSIS — M25.559 PAIN IN JOINT, PELVIC REGION AND THIGH, UNSPECIFIED LATERALITY: ICD-10-CM

## 2018-09-10 DIAGNOSIS — M06.4 INFLAMMATORY POLYARTHROPATHY (H): ICD-10-CM

## 2018-09-10 DIAGNOSIS — L40.50 PSORIATIC ARTHRITIS (H): ICD-10-CM

## 2018-09-10 DIAGNOSIS — M46.99 INFLAMMATORY SPONDYLOPATHY OF MULTIPLE SITES IN SPINE (H): ICD-10-CM

## 2018-09-10 DIAGNOSIS — M94.0 COSTOCHONDRITIS, ACUTE: ICD-10-CM

## 2018-09-10 RX ORDER — METHOTREXATE 25 MG/ML
INJECTION INTRA-ARTERIAL; INTRAMUSCULAR; INTRATHECAL; INTRAVENOUS
Qty: 8 ML | Refills: 2 | Status: SHIPPED | OUTPATIENT
Start: 2018-09-10 | End: 2018-12-06

## 2018-09-10 NOTE — TELEPHONE ENCOUNTER
methotrexate sodium, pres-free, 50 MG/2ML SOLN injection CHEMO  INJECT 1 ML (25MG) SUBCUTANEOUS EVERY 7 DAYS,   Last Written Prescription Date:  6-11-18  Last Fill Quantity: 4 ml vials,   # refills: 2  Last Office Visit: 7-3-18 (Dr Gant)  Future Office visit:  10-31-18, with labs.    CBC RESULTS:   Recent Labs   Lab Test  06/12/18   1056   WBC  4.0   RBC  4.49   HGB  13.1   HCT  39.8   MCV  89   MCH  29.2   MCHC  32.9   RDW  14.0   PLT  204       Creatinine   Date Value Ref Range Status   06/12/2018 0.79 0.52 - 1.04 mg/dL Final   ]    Liver Function Studies -   Recent Labs   Lab Test  06/12/18   1056   04/26/17   1825   PROTTOTAL   --    --   7.3   ALBUMIN  3.5   < >  3.8   BILITOTAL   --    --   0.2   ALKPHOS   --    --   72   AST  23   < >  24   ALT  28   < >  38    < > = values in this interval not displayed.       Routing refill request to provider for review/approval because:  Per protocol

## 2018-09-13 DIAGNOSIS — M25.559 PAIN IN JOINT, PELVIC REGION AND THIGH, UNSPECIFIED LATERALITY: ICD-10-CM

## 2018-09-14 RX ORDER — NEEDLES, DISPOSABLE 25GX5/8"
NEEDLE, DISPOSABLE MISCELLANEOUS
Qty: 25 EACH | Refills: 0 | Status: SHIPPED | OUTPATIENT
Start: 2018-09-14 | End: 2020-12-09

## 2018-09-14 RX ORDER — SYRINGE, DISPOSABLE, 1 ML
SYRINGE, EMPTY DISPOSABLE MISCELLANEOUS
Qty: 25 EACH | Refills: 0 | Status: SHIPPED | OUTPATIENT
Start: 2018-09-14 | End: 2020-12-09

## 2018-10-31 ENCOUNTER — OFFICE VISIT (OUTPATIENT)
Dept: RHEUMATOLOGY | Facility: CLINIC | Age: 63
End: 2018-10-31
Attending: NURSE PRACTITIONER
Payer: COMMERCIAL

## 2018-10-31 VITALS
WEIGHT: 164 LBS | DIASTOLIC BLOOD PRESSURE: 76 MMHG | HEART RATE: 68 BPM | SYSTOLIC BLOOD PRESSURE: 115 MMHG | TEMPERATURE: 98 F | OXYGEN SATURATION: 97 % | BODY MASS INDEX: 26.88 KG/M2

## 2018-10-31 DIAGNOSIS — Z79.899 HIGH RISK MEDICATIONS (NOT ANTICOAGULANTS) LONG-TERM USE: ICD-10-CM

## 2018-10-31 DIAGNOSIS — L40.50 PSORIATIC ARTHRITIS (H): ICD-10-CM

## 2018-10-31 DIAGNOSIS — M85.9 LOW BONE DENSITY: ICD-10-CM

## 2018-10-31 DIAGNOSIS — L40.50 PSORIATIC ARTHRITIS (H): Primary | ICD-10-CM

## 2018-10-31 DIAGNOSIS — M46.99 INFLAMMATORY SPONDYLOPATHY OF MULTIPLE SITES IN SPINE (H): ICD-10-CM

## 2018-10-31 LAB
ALBUMIN SERPL-MCNC: 3.7 G/DL (ref 3.4–5)
ALT SERPL W P-5'-P-CCNC: 42 U/L (ref 0–50)
AST SERPL W P-5'-P-CCNC: 31 U/L (ref 0–45)
CREAT SERPL-MCNC: 0.75 MG/DL (ref 0.52–1.04)
CRP SERPL-MCNC: <2.9 MG/L (ref 0–8)
ERYTHROCYTE [DISTWIDTH] IN BLOOD BY AUTOMATED COUNT: 14.4 % (ref 10–15)
ERYTHROCYTE [SEDIMENTATION RATE] IN BLOOD BY WESTERGREN METHOD: 9 MM/H (ref 0–30)
GFR SERPL CREATININE-BSD FRML MDRD: 78 ML/MIN/1.7M2
HCT VFR BLD AUTO: 42.3 % (ref 35–47)
HGB BLD-MCNC: 13.5 G/DL (ref 11.7–15.7)
MCH RBC QN AUTO: 28.8 PG (ref 26.5–33)
MCHC RBC AUTO-ENTMCNC: 31.9 G/DL (ref 31.5–36.5)
MCV RBC AUTO: 90 FL (ref 78–100)
PLATELET # BLD AUTO: 209 10E9/L (ref 150–450)
RBC # BLD AUTO: 4.69 10E12/L (ref 3.8–5.2)
WBC # BLD AUTO: 4.4 10E9/L (ref 4–11)

## 2018-10-31 PROCEDURE — G0463 HOSPITAL OUTPT CLINIC VISIT: HCPCS | Mod: 25,ZF

## 2018-10-31 PROCEDURE — 84460 ALANINE AMINO (ALT) (SGPT): CPT | Performed by: NURSE PRACTITIONER

## 2018-10-31 PROCEDURE — 90750 HZV VACC RECOMBINANT IM: CPT | Mod: ZF | Performed by: NURSE PRACTITIONER

## 2018-10-31 PROCEDURE — 86140 C-REACTIVE PROTEIN: CPT | Performed by: NURSE PRACTITIONER

## 2018-10-31 PROCEDURE — 82040 ASSAY OF SERUM ALBUMIN: CPT | Performed by: NURSE PRACTITIONER

## 2018-10-31 PROCEDURE — 85652 RBC SED RATE AUTOMATED: CPT | Performed by: NURSE PRACTITIONER

## 2018-10-31 PROCEDURE — 85027 COMPLETE CBC AUTOMATED: CPT | Performed by: NURSE PRACTITIONER

## 2018-10-31 PROCEDURE — 90471 IMMUNIZATION ADMIN: CPT | Mod: ZF

## 2018-10-31 PROCEDURE — 25000581 ZZH RX MED A9270 GY (STAT IND- M) 250: Mod: ZF | Performed by: NURSE PRACTITIONER

## 2018-10-31 PROCEDURE — 36415 COLL VENOUS BLD VENIPUNCTURE: CPT | Performed by: NURSE PRACTITIONER

## 2018-10-31 PROCEDURE — 82565 ASSAY OF CREATININE: CPT | Performed by: NURSE PRACTITIONER

## 2018-10-31 PROCEDURE — 84450 TRANSFERASE (AST) (SGOT): CPT | Performed by: NURSE PRACTITIONER

## 2018-10-31 RX ADMIN — ZOSTER VACCINE RECOMBINANT, ADJUVANTED 0.5 ML: KIT at 12:10

## 2018-10-31 ASSESSMENT — PAIN SCALES - GENERAL: PAINLEVEL: MODERATE PAIN (5)

## 2018-10-31 NOTE — NURSING NOTE
Chief Complaint   Patient presents with     RECHECK     Follow Up 4 Months HCC     /76 (BP Location: Right arm)  Pulse 68  Temp 98  F (36.7  C) (Oral)  Wt 74.4 kg (164 lb)  LMP 03/06/2012  SpO2 97%  BMI 26.88 kg/m2   Lianet Wallace

## 2018-10-31 NOTE — MR AVS SNAPSHOT
After Visit Summary   10/31/2018    Krissy Weldon    MRN: [id number]           Patient Information     Date Of Birth          1955        Visit Information        Provider Department      10/31/2018 11:30 AM Miguel Umana APRN CNP [facility] Rheumatology        Today's Diagnoses     Psoriatic arthritis (HCC)    -  1    Inflammatory spondylopathy of multiple sites in spine (H)        High risk medications (not anticoagulants) long-term use        Low bone density           Follow-ups after your visit        Follow-up notes from your care team     Return for 4 month Dr. Samayoa; 8 month Iban Rivera every 8-12 weeks.      Your next 10 appointments already scheduled     Feb 26, 2019  3:30 PM CST   Lab with  LAB   [facility] Lab ([facility])    [address] [place] [address]  1st Floor  [facility] [address]   [phone]            Feb 26, 2019  4:00 PM CST   (Arrive by 3:45 PM)   Return Visit with Jensen Samayoa MD   [facility] Rheumatology ([facility])    [address] [place] [address]  [address]  [facility] [address]   [phone]            Jun 26, 2019 11:00 AM CDT   Lab with  LAB   [facility] ([facility])    [address] [place] [address]  1st Floor  [facility] [address]   [phone]            Jun 26, 2019 11:30 AM CDT   (Arrive by 11:15 AM)   Return Visit with MARIANNA Garcia CNP   [facility] Rheumatology ([facility])    [address] [place] [address]  [address]  [facility] [address]   [phone]              Future tests that were ordered for you today     Open Standing Orders        Priority Remaining Interval Expires Ordered    CBC with platelets differential Routine 12/12 Every 8 Weeks 10/31/2019 10/31/2018    AST Routine 12/12 Every 8 Weeks 10/31/2019 10/31/2018    ALT Routine 12/12 Every 8 Weeks 10/31/2019 10/31/2018    Albumin level Routine 12/12 Every 8 Weeks 10/31/2019 10/31/2018     Creatinine Routine 12/12 Every 8 Weeks 10/31/2019 10/31/2018    CRP inflammation Routine 12/12 Every 8 Weeks 10/31/2019 10/31/2018    Erythrocyte sedimentation rate auto Routine 12/12 Every 8 Weeks 10/31/2019 10/31/2018            Who to contact     If you have questions or need follow up information about today's clinic visit or your schedule please contact [facility] RHEUMATOLOGY directly at [phone].  Normal or non-critical lab and imaging results will be communicated to you by [employer], letter or phone within 4 business days after the clinic has received the results. If you do not hear from us within 7 days, please contact the clinic through [employer] or phone. If you have a critical or abnormal lab result, we will notify you by phone as soon as possible.  Submit refill requests through [employer] or call your pharmacy and they will forward the refill request to us. Please allow 3 business days for your refill to be completed.          Additional Information About Your Visit        [employer] Information     [employer] gives you secure access to your electronic health record. If you see a primary care provider, you can also send messages to your care team and make appointments. If you have questions, please call your primary care clinic.  If you do not have a primary care provider, please call [phone] and they will assist you.        Care EveryWhere ID     This is your Care EveryWhere ID. This could be used by other organizations to access your [place] medical records  [id number]        Your Vitals Were     Pulse Temperature Last Period Pulse Oximetry BMI (Body Mass Index)       68 98  F (36.7  C) (Oral) 03/06/2012 97% 26.88 kg/m2        Blood Pressure from Last 3 Encounters:   10/31/18 115/76   07/03/18 129/79   06/23/18 114/78    Weight from Last 3 Encounters:   10/31/18 74.4 kg (164 lb)   07/03/18 73.6 kg (162 lb 3.2 oz)   06/23/18 73.5 kg (162 lb)               Primary Care Provider Office Phone # Fax #     "Deshawn Burns [phone]       [facility] [address]  [facility] [address]        Equal Access to Services     KAROLYNSIDDHARTH NOE : Hadii aad ku hadpatriciao Somarekali, waaxda luqadaha, qaybta kaalmada adeegyada, estrada riveralarissa fleming. So [facility] [phone].    ATENCIÓN: Si habla español, tiene a sharma disposición servicios gratuitos de asistencia lingüística. Llame al [phone].    We comply with applicable federal civil rights laws and [place] laws. We do not discriminate on the basis of race, color, national origin, age, disability, sex, sexual orientation, or gender identity.            Thank you!     Thank you for choosing [facility] RHEUMATOLOGY  for your care. Our goal is always to provide you with excellent care. Hearing back from our patients is one way we can continue to improve our services. Please take a few minutes to complete the written survey that you may receive in the mail after your visit with us. Thank you!             Your Updated Medication List - Protect others around you: Learn how to safely use, store and throw away your medicines at www.disposemymeds.org.          This list is accurate as of 10/31/18  2:24 PM.  Always use your most recent med list.                   Brand Name Dispense Instructions for use Diagnosis    BD DISP NEEDLES 25G X 5/8\" Misc   Generic drug:  Needle (Disp)     25 each    USE WITH METHOTREXATE UNDER THE SKIN EVERY 7 DAYS    Pain in joint, pelvic region and thigh, unspecified laterality       cefdinir 300 MG capsule    OMNICEF    20 capsule    Take 1 capsule (300 mg) by mouth 2 times daily    Suppurative pharyngitis       fentaNYL 25 mcg/hr 72 hr patch    DURAGESIC     Place 1 patch onto the skin every 72 hours        folic acid 1 MG tablet    FOLVITE    210 tablet    Take 3 tablet a day few days before and day after methotrexate then the other day 2 mg day    Pain in joint, pelvic region and thigh, unspecified " laterality, High risk medications (not anticoagulants) long-term use       HUMIRA 40 MG/0.8ML prefilled syringe kit   Generic drug:  adalimumab     9 each    INJECT THE CONTENTS OF 1 SYRINGE (40MG) SUBCUTANEOUSLY EVERY 10 DAYS. HOLD FOR SIGNS OF INFECTION, AND THEN SEEK MEDICAL ATTENTION.    Psoriatic arthritis (H)       methotrexate sodium (pres-free) 50 MG/2ML Soln injection CHEMO     8 mL    INJECT 1 ML (25MG) SUBCUTANEOUS EVERY 7 DAYS, DISCARD REMAINDER    Pain in joint, pelvic region and thigh, unspecified laterality, Psoriatic arthritis (H), Inflammatory polyarthropathy (H), Costochondritis, acute, Inflammatory spondylopathy of multiple sites in spine (H)       multivitamin, therapeutic Tabs tablet      Take 1 tablet by mouth daily        predniSONE 5 MG tablet    DELTASONE    30 tablet    Take 1 tablet (5 mg) by mouth daily    Psoriatic arthritis (H), Inflammatory spondylopathy of multiple sites in spine (H), Hip pain, right       PROZAC 40 MG capsule   Generic drug:  FLUoxetine      Take 40 mg by mouth daily.        sennosides 8.6 MG tablet    SENOKOT    1 tablet    Take 1 tablet by mouth 2 times daily        SYNTHROID 100 MCG tablet   Generic drug:  levothyroxine      Take  by mouth daily.        * Syringe Luer Slip 3 ML Misc    B-D SYRINGE LUER-FILI    25 each    1 Units by Device route every 7 days    Pain in joint, pelvic region and thigh, unspecified laterality       * B-D SYRINGE LUER-FILI 1 ML Misc   Generic drug:  syringe (disposable)     25 each    USE TO INJECT METHOTREXATE EVERY 7 DAYS    Pain in joint, pelvic region and thigh, unspecified laterality       Vitamin D (Cholecalciferol) 1000 units Caps     60 capsule    Take 2,000 Units by mouth daily    Vitamin D deficiency, Long term systemic steroid user       * Notice:  This list has 2 medication(s) that are the same as other medications prescribed for you. Read the directions carefully, and ask your doctor or other care provider to review them  with you.

## 2018-10-31 NOTE — PROGRESS NOTES
[facility] - Rheumatology Clinic Visit    Krissy Weldon  is a 63 year old Krissy Weldon is a 62 year old female who presents with follow-up for undifferentiated polyarthropathy, psoriatic arthritis. Complicated by eye inflammation, tendonitis, costrochondritis, tendon tears, synovitis, possible gout and OA/DJD jessica thumbs CMC. Failed or intolerance to multiple medications.     Copy forward: [-SSa, -SSb, -CCP,HLA-B27 negative on outside workup with previous SI injections] with hx- inflammatory eye left eye episcleritis requiring prednisone gtt or oral with bone scan unrevealing. Previous medrol or steroid responsiveness. Past tried humira, SSZ, simponi SQ/IV, remicade and otelza. Failed humira EoW recurrent episcleritis, right wrist, right SI, bilateral hamstrings tendonosis with partial tear bilaterally. Failed simponi sq/IV ineffective. SSZ and oral MTX. Past recurrent iritis (ER if eye pain, blurred vision, red eye). Remicade. Otezla. LLL pneumonia 3/2015. MRI L hip showed high grade tear gluteus minimus insertion site, effusion 4/2015. C/o neck issues with MRI cervical spine show DJD, EMG, paramedian osteophytes and disk protrusion at C3-C4 with some moderate central canal stenosis and moderate to severe right-sided foraminal stenosis as a consequence.  C5-C6 also showed some mild to moderate central canal stenosis and moderate bilateral central foraminal stenosis.  Seen Dr. Wheeler with significant improvement in GI issues pancreatic sphinctor dysfunction requiring surgery now on pancreatic enzymes after pancreatic duct is open. Failed cimzia Cimzia lost effectiveness (more uveitis, arthritis, synovitis,bursitis and tendonitis).     Copy forward hx:   Last seen  Dr. Samayoa. Continued plan. Methotrexate 0.8 ml week Q Monday (20 mg). Continues cimzia injections (due this Thurs).  No gout flares. Past in right great toe, but no aspirations.   Right thumb surgery July-Aug 2015. Pain and  motion much improved. Severe flare of your arthritis and eye inflammation when holding the cimzia and MTX for surgery requiring prednisone 5 mg x 2 week then stopped   Strept , pneumonia 3-2015. Recent bowel obstruction 9-2015, seen gastro 1-2016 given hx of pancreatic sphincter dysfunction.   Recent flare 2-4, requiring medrol dose pack--labs were NL except CRP 11 [Hands/ feet swelling with pain, back and neck along with an eye infection and scleritis].    Left eye inflammation flare with infection about 1 months ago, on 3 gtt. Slow clearing. Then 1 week after the gtt, developed neck pain hard to turn head to left. Then L achilles tendonitis, with costrochondritis this was last week, hands MCP 2-3 swelling. Started the medrol day 4--dramatic improvement [move her neck, eye improved, achilles much better now can walk on, the costrochondritis]. Left eye infection much better. Left hip still bothers, still much improved with prednisone. Energy much better. Cimzia lasting about 10 days. Left upper arm psoriasis. EAS none with medrol. This is the best she has felt overall. Rare use of prednisone of steroids. Last eye inflammation last summer. Left arm into her deltoid tingling with her neck, 1 week before flare. Having hard time moving her neck side to side, forward and back. The back of her neck tissues feel swollen on her left side. Prior to this no neck last flare was in . Does her neck exercise. 2 year of job specific, the end of March final decision.     Taking oxycodone 15 mg QID during this flare. Under [facility].     Copy forward February 15, 2017  I seen her last 2-2016. Dr. Samayoa  who continued the plan and using diclofenac for achilles pain. EHR reviewed. Was using medical marijuana.  Louise reported working well for her uveitis and constronchondritis; and the methotrexate injectable for her joints. Labs today normal     No medical marijauna as this didn't work  MS ER 15 mg BID  "for about 6 weeks. Oxycodone HS prn most nights. Off the pain patch due to insurance coverage. Goes to [facility] pain clinic.     C/o will get short of breath when swims or bends over for the past 6 weeks. No cardiac symptoms, CP or heaviness. Has had night sweats for many years which is not new. Losing weight but appetite is good, although her partner does feel she's not eating as well. Been on MS ER for about the time these symptoms started. No stomach pain. +costrochondritis as before. No blood in urine or stool. Taking omeprezole 40 mg every day for heartburn--will see PCP for possible UGI as feels some is near her esophagus. Due for annual physical and denies getting bone health done --does take 1000 IU day vitamin D. Due for mammogram. Hx hyst. No cough, fevers or ABD pain. No symptoms like had years ago with ERCP. \"no liver pain\". TSH normal 4 mths ago she reports. No blood in urine or stool.     Continues injection MTX 20 mg week Q Monday, FA 3 mg day, cimzia every 2 weeks or twice a month (takes prednisone 10 mg day before, day of and day after injection due to prior symptoms of N/V, fevers when did cimzia injections which she doesn't get now). Didn't take the prednisone this last injection as just had left hip injected per Dr. Maciel Dukes [facility]. Told she will eventually need a THR. \"butt muscles are sore\" but overall better symptoms on this plan. Seen eye doctor about twice this past year, as will have \"episcleritis\" about 4 days before injection cimzia but not every time due. FIngertips skin is cracking this past year, so interested in seeing derm. She's very happy with arthritis and uveitis control with this plan so wishes to continue. Mild psoriasis left ear only. Very happy with thumb surgeries. Denies any skin mole or lesion changes.     Changing to Medicare with [employer] April 1st.  [facility] reviewed and updated by me     Copy forward May 31, 2017  Strept infection 4-21 prior to ED then treated " with ABX. Exposed from [facility]. Missed one dose of MTX   ED 4-2017 for nausea with vomiting. CT of the abdomen/pelvis with contrast --no significant findings other than prominence of the common biliary duct which is likely related to cholecystectomy.  She was treated with Dilaudid and Zofran for pain and nausea. Given reglan for breakthrough nausea. Labs noted overall NL. 2 bags IVF    Ablation SI Right 4/25 and left Feb 26th 2017 --[facility] clinic   PT for her hip--told by therapy could feel the bursitis. Told needs THR and injection of left hip Feb 10th 2017 --this was [facility]. Who did x-rays and told would like to try to wait for at least a year. [facility] orth[facility] Dr. PUMA Dukes --her pain clinic provider would like to try platelet rich plasma injection     Cimzia injections high cost $3000 for 2 months. Deductible $4500 then cost catestrophic then goes down $300   Left eye uveitis about 5 days before the next injection then has to do the prednisone gtts for about 3 months. Hands are sore jessica the tips of then S/T and then MCPs. This is toward the end of the cycle and in bewtween too. Last injection last Tuesday. Some uveitis seen her about 2 months ago [facility]     MTX every week Mon or Tues 20 mg--tolerating well. Some mild fatigue but tolerating. NO infection now .   No psoriasis --dry   Fingertips not cracking now as using superglue  Trying to loss weight and exercise.  Seen PCP for hard to take a deep breath, SOB--treated with inhaler then didn't help.    SURGEON WANTS TO RE-DO HER LEFT THUMB SURGERY.    Copy forward October 25, 2017  Continues the humira 40 mg SQ every 2 weeks, some diarrhea day of   Adjusted her MTX 25 mg SW every 7 days early Sept, this is some diarrhea day of and after, tolerable. FA 2 mg day. Some fatigue after taking but feels the MTX is working and now the humira is really working on her tendontisis, enthesisits, and no longer had any uveitis flaring now not needing prednisone. No  infections. Her left 1st CMC is eroded she reports but does wish to hold on any surgeries as the humira is the only one that has really worked to control her the best. Getting cervival MRI and then may need injection --right upper back numb at times and I asked her to keep me updated. Feet are less sore and doing well. Takes her fentanyl patch now and only 1-2 oxycodone at night this controls her pain with the tylenol arthritis. No hariloss. Hips still painful, but some improved. No psoriasis. Denies fever, chils, cough, SOB, SANCHEZ. Other PMSH reviewed and updated by me      October 31, 2018  DEXA 7-2018 T-1.8 low bone density, worsening was referred to endo due to risk osteoporosis she will do this in the future. Adalimumab (humira) 40 mg injection every 2 weeks and methotrexate  25 mg every MON, folic acid  3 mg day tolerating and no side-effects. Throat illness in the interium was treated with antibiotics. No prednisone. No NSAID. Weaning off fentanyl patch 12.5 from 25 mcg/hr and down on oxycodone 1 tablet at bedtime since starting the medical marijuana about a month ago. Pain is dramatically improved, and her bowels. No inflammatory eye flares, does have mild eye pain a day before her adalimumab (humira) injection. Right hip improved since the STEM cell donor injection in Aug. Overall, she feels she is doing well with her chronic pain, and right hip pain down by 75% since the doner injection.     PROBLEM LIST: Past medical history is notable for her being presumptively treated for Lyme with doxycycline after developing a targetoid lesion, for a history of Hashimoto's thyroiditis with subsequent hypothyroidism, for the diagnosis now of psoriasis, and for a history of depression.Neck pain, cervical stenosis-- MRI  +moderate central stenosis right C3-4, C5-6 degenerative changes 2nd mild-mod central stenosis. Past referral to physiatry-wishes to return to Dr. Edmond Sawant [facility] Left hip pain s/p tear needs  THR she reports. B) Hx-Bilateral tendonitis hamstrings with right bursitis, partial tear per Dr. Gomez s/p injections. Seen Dr. Arvin X[place] [facility] [place]/p ablation SI joint, tendon insertion site. Sees Dr. Maciel Dukes. C). Transient elevation LFT 2/2013 [ NL after held MTX and tramadol 2wk]; transient 4- [ AST 89]. NL . Other hx --Panceatic sphinctor dysfunction. On pancreatic enzymes. SBO 9-2015, now no doing well. Right thumb DJD, s/p surgery . Healed.  1. Diffuse degenerative joint disease that is both clinically apparent and obvious on multiple imaging modalities including bone scan, MRI 2/2013 or cervical. DJD L4-L5, L5-S1 per MRI 7/2012; MRI [id number]   2. Diffuse inflammatory arthritis with marked morning stiffness, no systemic inflammation by blood tests, but greater than 80% clinical improvement with a Medrol Dosepak.   3. Onset of the inflammatory joint symptoms including sacroiliitis with the development of psoriasis, suggesting that this represents psoriatic arthritis.   4. Development of episcleritis in the left eye with improvement with steroid eyedrops 12/14/2011 with recurrent episodes when wean oral prednisone (9/2012)  5. History of Hashimoto's thyroiditis.   6. History of presumptive treatment for Lyme disease with doxycycline after development of a targetoid lesion.   7. Poor tolerance of sulfasalazine and oral methotrexate.   8. Bilateral hamstring tendonosis, right partial tear, sacroilitis 7/2012. See MRI, repeat 2/2013 hamstring and right SI inflammation seeing Dr. Arvin Xie at [facility] [place] in SI. , left hamstring   9. DJD L4-L5, L5-S1 per MRI 7/2012  10. Intermittent prednisone exposure  11. Transient elevation LFT ALT 84/AST 58 2/2013 (nl after held MTX and tramadol, then increased folic acid 2mg day)  12. Past LUE burn developed into cellulitis resolved from keflex. Bronchitis now on zithromax irritating her costrochondritis.  Improving after 1 day  13. 1-2014 Left knee meniscal tear with chrondomalacia per MRI (had Rt/Lt CMJ surgery)  14. 4- RUQ pain.   15.  Hx recurrent left episcleritis   16. Right thumb tendon surgery with Dr. San [facility]  17. Pneumonia 3-2015; strept  and 4-2017      Past Medical History:   Diagnosis Date     Acute meniscal tear of knee 1/2014    with chrondromalacia per MRI      Depression      DJD (degenerative joint disease), lumbar 7/2012    L4-5, L5-S1 per MRI      Episcleritis 12/14/2011     Hamstring tendonitis at origin 7/2012    Bilaterally with partial tear right, sacroilitis per MRI     Hypothyroid 9/7/2011     Hypothyroidism      PONV (postoperative nausea and vomiting)      Psoriatic arthritis (H) 9/7/2011     Psoriatic arthritis (H) 2/9/2016     Strep throat 04/2017       Surgical-She has a surgical history including appendectomy in 1980, cholecystectomy in approximately 1993, and hysterectomy without oophorectomy in 1996.  Ablation SI Right 4/25 and left Feb 26th 2017 -- pain clinic   PT for her hip--told by therapy could feel the bursitis. Told needs THR and injection of left hip Feb 10th 2017 -  Past Surgical History:   Procedure Laterality Date     APPENDECTOMY       ARTHROPLASTY CARPOMETACARPAL (THUMB JOINT)  11/8/2013    Procedure: ARTHROPLASTY CARPOMETACARPAL (THUMB JOINT);  Left First Carpometacarpal Trapezium Resection, Tendon Interposition  ;  Surgeon: Luiza Farrar MD;  [facility]: [facility]     ARTHROPLASTY CARPOMETACARPAL (THUMB JOINT)  12/20/2013    Procedure: ARTHROPLASTY CARPOMETACARPAL (THUMB JOINT);  Right Thumb Trapezium Resection With Flexor Carpi Radialis Tendon Reconstruction       CHOLECYSTECTOMY       COLONOSCOPY  5/6/2014    Procedure: COLONOSCOPY;  Surgeon: Adria San MD;  Location:  GI     ENDOSCOPIC RETROGRADE CHOLANGIOPANCREATOGRAM  6/13/2014    Procedure: ENDOSCOPIC RETROGRADE CHOLANGIOPANCREATOGRAM;  Surgeon: Lianna Wheeler MD;   Location: [facility] OR     GALLBLADDER SURGERY       GYN SURGERY      hysterectomy and oophorectomy     [facility] ENDOSCOPY W EUS Left 6/10/2014    Procedure: COMBINED ENDOSCOPIC ULTRASOUND, ESOPHAGOSCOPY, GASTROSCOPY, DUODENOSCOPY (EGD);  Surgeon: Lianna Wheeler MD;  Location: [facility] GI     HYSTERECTOMY       Right thumb surgery  7/2015     XR SACROILIAC THERAPEUTIC INJECTION BILATERAL  12/2011       FH:  No autoimmune disorders, psoriasis, UC, crohn's, SLE, RA, PsA, gout, autoimmune thyroid.  No MS, heart disease or cancer in family  Mother-endometrial cancer, RHEUMATOID ARTHRITIS (RA)   Father-psoriasis, lymphoma, colon and prostate cancer   Siblings-sister RHEUMATOID ARTHRITIS (RA), OSTEOARTHRITIS  And crohns   Children-  MGM RHEUMATOID ARTHRITIS (RA)       SH:  No Alcohol. No Smoking. No IVDU. Partner on list for lung transplant     PMSH personally reviewed and updated by me.    ROS:  CONSTITUTIONAL: No fevers, night sweats or unintentional weight change. No acute distress, swollen glands  EYES: No vision change, diplopia, pain in eyes or red eyes   EARS, NOSE, MOUTH, THROAT: No tinnitus or hearing change, no epistaxis or nasal discharge, no oral lesions, throat clear. Normal saliva pool.  No drymouth. No thyroid Enlargement.   CARDIOVASCULAR: No chest pain, palpitations, or pain with walking, no orthopnea or PND   RESPIRATORY: No dyspnea, cough, shortness of breath or wheezing. No pleurisy.   GI: No nausea, vomiting, diarrhea or constipation, no abdominal pain, or blood in stools.   : No change in urine, no dysuria or hematuria   MUSCKL: See above   INTEGUMENTARY: No concerning lesions or moles   NEURO: No loss of strength or sensation, no numbness or tingling, no tremor, no dizziness, no headache. No falls   ENDO: No polyuria or polydipsia, no temperature intolerance   HEME/LYMPH:No concerning bumps, bleeding problems, or swollen lymph nodes. No recent infections, hospitalizations or new illnesses.   Otherwise 14  point ROS obtained, reviewed and found negative.     Physical exam:  Vitals: Blood pressure 115/76, pulse 68, temperature 98  F (36.7  C), temperature source Oral, weight 74.4 kg (164 lb), last menstrual period 03/06/2012, SpO2 97 %, not currently breastfeeding.  Wt Readings from Last 4 Encounters:   10/31/18 74.4 kg (164 lb)   07/03/18 73.6 kg (162 lb 3.2 oz)   06/23/18 73.5 kg (162 lb)   02/28/18 74.2 kg (163 lb 9.6 oz)     Constitutional: WD-WN-WG cooperative  Eyes: nl EOM, PERRLA, vision, conjunctiva, sclera, No Unilateral or bilateral external ear inflammation   ENT: nl external ears, nose, hearing, lips, teeth, gums, throat. No saddle nose   Neck: no mass or thyroid enlargement  Resp: lungs clear to auscultation, nl to palpation, nl effort  CV: RRR, no murmurs, rubs or gallops, no edema  GI: no ABD mass or tenderness, no HSM  : not tested  Lymph: no cervical or epitrochlear nodes  MS: All TMJ, neck, shoulder, elbow, wrist, hand, spine, hip, knee, ankle, and foot joints were examined and otherwise found normal. Normal  strength. No active synovitis or deformity. Full ROM. Normal prayer sign. Negative Negative Lhermitte's sign. No periuncle erythema  Skin: no nail pitting, alopecia, rash, dry skin most notably on hands   Neuro: nl cranial nerves, strength, sensation, DTRs.   Psych: nl judgement, orientation, memory, affect.      Labs/Imaging:    Results for orders placed or performed in visit on 10/31/18   AST   Result Value Ref Range    AST 31 0 - 45 U/L   ALT   Result Value Ref Range    ALT 42 0 - 50 U/L   Albumin level   Result Value Ref Range    Albumin 3.7 3.4 - 5.0 g/dL   Creatinine   Result Value Ref Range    Creatinine 0.75 0.52 - 1.04 mg/dL    GFR Estimate 78 >60 mL/min/1.7m2    GFR Estimate If Black >90 >60 mL/min/1.7m2   CRP inflammation   Result Value Ref Range    CRP Inflammation <2.9 0.0 - 8.0 mg/L   CBC with platelets   Result Value Ref Range    WBC 4.4 4.0 - 11.0 10e9/L    RBC Count 4.69  3.8 - 5.2 10e12/L    Hemoglobin 13.5 11.7 - 15.7 g/dL    Hematocrit 42.3 35.0 - 47.0 %    MCV 90 78 - 100 fl    MCH 28.8 26.5 - 33.0 pg    MCHC 31.9 31.5 - 36.5 g/dL    RDW 14.4 10.0 - 15.0 %    Platelet Count 209 150 - 450 10e9/L       Results for orders placed or performed in visit on 07/03/18   Dexa hip/pelvis/spine    Narrative    [facility]   [address] [place] [id number]  Phone: [phone]   Fax: [phone]      Final    Patient name:   JASWINDER JAMES ([id number] )  Patient demographics:  [age] year old White Female of 65.5 in. height and   162.0 lbs. weight  Ordering provider:  KENA BEATTY [id number]  History:  family hx of back fractures, family hx of hip fractures, family   hx of osteoporosis, HYSTERECTOMY, LOW VITAMIN D, OVARIES REMOVED (1 OR 2),   PSORIATIC ARTHRITIS, THYROID CONDITION  Current treatments:  IMMUNOSUPPRESSANTS, THYROID MEDS  Scan:    DXA exam ([id number] ): SmartMove  Exam date:   07/03/2018     Dual energy x-ray absorptiometry results:     Region BMD T - score Z - score   L1-L4 1.227 g/cm  0.4 1.5                   Neck Left 0.792 g/cm  -1.8 -0.6   Total Left 0.853 g/cm  -1.2 -0.4         Neck Right 0.787 g/cm  -1.8 -0.7   Total Right 0.866 g/cm  -1.1 -0.3                                       Conclusions:  The most negative and valid T-score of -1.8 at the level of the left   femoral neck, corresponds with low bone density.         The risk of osteoporotic fracture increases approximately 2-fold for each   1.0 SD decrease in T-score.  Low bone density is not the only risk factor   for fracture;  consider factors such as patient's age, fall risk, injury   risk, previous osteoporotic fracture, family history of osteoporosis, etc.    People with elevated risk of fracture should be regularly evaluated for   low bone mineral density.  For patients eligible for Medicare, routine   testing is allowed once every 2  years.  Testing frequency can be increased   for patients on corticosteroids.  Clinical correlation is recommended.      Bone densitometry cannot rule out secondary causes of bone loss.   Therefore, further metabolic testing to look for secondary causes of low   BMD should be performed if indicated. Clinical correlation is recommended     Lateral spine imaging with standard radiography or densitometric Vertebral   Fracture Assessment (VFA) may be performed when T-score is < -1.0 AND   -historical height loss > 4 cm (>1.5 inches); or   -glucocorticoid therapy of prednisone ? 5 mg/day or equivalent for ? 3   months is present.  Clinical correlation is strongly recommended      Feel free to contact DXA services if you have any questions or comments.    Thank you for the opportunity to be of service to you and your patient.    Principal result :    Annetta Washington MD, CCD   of Medicine  Division of Endocrinology      References:  1.  ISCD position statements:  www.iscd.org  (includes the report of the   2015  Position Development Conference)    Template revised 5/24/2017    Technical comments:     L1 T-score:   -0.2  ,  L2 T-score:  0.5,  L3   T-score:  0.5,  L4 T-score:  0.6 ].  In the circumstances, the lumbar   spine is represented by L1-4.            Impression/Plan:    1.Psoriatic arthritis w/axial involvement activity with hx episcleritis, tendonitis, left hamstring tear. Hx multiple SI, costrochonditis and other injections affecting her tendons and needs THR. Humira 8-2017 but every 10 days and MTX 25 mg Once a week injections. No eye inflammation. Chronic pain under other provider and now on medical marijuana helping her to wean down and goal of off narcotics. If this is successful, she will notify us to wean her methotrexate. I feel she does have a component of fibromylagia (in past reports neurontin cause sleepiness, and cymbalta change in mood, but not sure of the dose of  neurontin--she can talk to her pain provider about this, work on sleep).  Past GI s/e with SSZ, but in future is willing to re-try this as well. She knows see opth stat or ED for any inflammatory eye symptoms.   2. Uveitis-see opth. See #1. ED any symptoms. No sx    3. Psoriasis.fingertip skin cracking. Resolved.    4. High risk medications monitoring, labs every 8-12 weeks. Normal today. Wishes for 2nd shingrix today, she tolerated the last one mild symptoms for 1-2 days.   5. Low bone density per DEXA 7-2018, worsening. Given high risk osteoporosis , I did refer her to endocrine for complete evaluation   6. Chronic pain under care of [facility] Dr. Xie.    Plan:   A) Humira 40 mg SQ injection every 10 days. Methotrexate 25 mg SQ once Monday week, folic acid 2-3 mg day. Volteran cream prn as directed.   --DMARD labs every 8 week-as hx elevated liver enzymes   B)  Calcium and vitamin D. Complete physical due she wishes to come here   C) RTC 4 mths Dr. Samayoa, 8 mth me   D) Annual and see PCP. F/U Dr. Wheeler for GI. F/U with pain clinic.      The patient understood the rationale for the diagnosis and treatment plan. Patient shared in the decision making. All questions were answered to best of my ability and the patient's satisfaction  Miguel CABRAL, CNP, MSN  [facility]  Department of Rheumatology & Autoimmune Disorders    1. Immunization: Reviewed CDC guidelines with patient updated, information provided to patient based on CDC guidelines for vaccination recommendations, patient will discuss with primary provider  2. Bone Health:Educated on adequate calcium and vitamin D intake, Educated on exercise, Glucocorticoid education discussed risks, benefits & potential long term side effects from use  3. Discussed routine annual physicals, cancer screening, cardiac risk monitoring, bone health and primary care with primary care provider.   4. Educated on diagnosis, prognosis, labs,  imaging, treatment options (including risks, benefits, risk of no treatment), medications (use, dose, side-effects, risks of medications including infection/cancer/bone marrow suppression, lab monitoring), infections what to do, plan of cares, goals of treatment.

## 2018-10-31 NOTE — LETTER
10/31/2018      RE: Krissy Weldon  [id number] [facility] [id number]         [facility] - Rheumatology Clinic Visit    Krissy Weldon  is a 63 year old Krissy Weldon is a 62 year old female who presents with follow-up for undifferentiated polyarthropathy, psoriatic arthritis. Complicated by eye inflammation, tendonitis, costrochondritis, tendon tears, synovitis, possible gout and OA/DJD jessica thumbs CMC. Failed or intolerance to multiple medications.     Copy forward: [-SSa, -SSb, -CCP,HLA-B27 negative on outside workup with previous SI injections] with hx- inflammatory eye left eye episcleritis requiring prednisone gtt or oral with bone scan unrevealing. Previous medrol or steroid responsiveness. Past tried humira, SSZ, simponi SQ/IV, remicade and otelza. Failed humira EoW recurrent episcleritis, right wrist, right SI, bilateral hamstrings tendonosis with partial tear bilaterally. Failed simponi sq/IV ineffective. SSZ and oral MTX. Past recurrent iritis (ER if eye pain, blurred vision, red eye). Remicade. Otezla. LLL pneumonia 3/2015. MRI L hip showed high grade tear gluteus minimus insertion site, effusion 4/2015. C/o neck issues with MRI cervical spine show DJD, EMG, paramedian osteophytes and disk protrusion at C3-C4 with some moderate central canal stenosis and moderate to severe right-sided foraminal stenosis as a consequence.  C5-C6 also showed some mild to moderate central canal stenosis and moderate bilateral central foraminal stenosis.  Seen Dr. Wheeler with significant improvement in GI issues pancreatic sphinctor dysfunction requiring surgery now on pancreatic enzymes after pancreatic duct is open. Failed cimzia Cimzia lost effectiveness (more uveitis, arthritis, synovitis,bursitis and tendonitis).     Copy forward hx:   Last seen  Dr. Samayoa. Continued plan. Methotrexate 0.8 ml week Q Monday (20 mg). Continues cimzia injections (due this Thurs).  No gout flares. Past in  right great toe, but no aspirations.   Right thumb surgery July-Aug 2015. Pain and motion much improved. Severe flare of your arthritis and eye inflammation when holding the cimzia and MTX for surgery requiring prednisone 5 mg x 2 week then stopped   Strept , pneumonia 3-2015. Recent bowel obstruction 9-2015, seen gastro 1-2016 given hx of pancreatic sphincter dysfunction.   Recent flare 2-4, requiring medrol dose pack--labs were NL except CRP 11 [Hands/ feet swelling with pain, back and neck along with an eye infection and scleritis].    Left eye inflammation flare with infection about 1 months ago, on 3 gtt. Slow clearing. Then 1 week after the gtt, developed neck pain hard to turn head to left. Then L achilles tendonitis, with costrochondritis this was last week, hands MCP 2-3 swelling. Started the medrol day 4--dramatic improvement [move her neck, eye improved, achilles much better now can walk on, the costrochondritis]. Left eye infection much better. Left hip still bothers, still much improved with prednisone. Energy much better. Cimzia lasting about 10 days. Left upper arm psoriasis. EAS none with medrol. This is the best she has felt overall. Rare use of prednisone of steroids. Last eye inflammation last summer. Left arm into her deltoid tingling with her neck, 1 week before flare. Having hard time moving her neck side to side, forward and back. The back of her neck tissues feel swollen on her left side. Prior to this no neck last flare was in . Does her neck exercise. 2 year of job specific, the end of March final decision.     Taking oxycodone 15 mg QID during this flare. Under [facility] pain clinic.     Copy forward February 15, 2017  I seen her last 2-2016. Dr. Samayoa  who continued the plan and using diclofenac for achilles pain. EHR reviewed. Was using medical marijuana.  Louise reported working well for her uveitis and constronchondritis; and the methotrexate injectable for her  "joints. Labs today normal     No medical marijauna as this didn't work  MS ER 15 mg BID for about 6 weeks. Oxycodone HS prn most nights. Off the pain patch due to insurance coverage. Goes to [facility] pain clinic.     C/o will get short of breath when swims or bends over for the past 6 weeks. No cardiac symptoms, CP or heaviness. Has had night sweats for many years which is not new. Losing weight but appetite is good, although her partner does feel she's not eating as well. Been on MS ER for about the time these symptoms started. No stomach pain. +costrochondritis as before. No blood in urine or stool. Taking omeprezole 40 mg every day for heartburn--will see PCP for possible UGI as feels some is near her esophagus. Due for annual physical and denies getting bone health done --does take 1000 IU day vitamin D. Due for mammogram. Hx hyst. No cough, fevers or ABD pain. No symptoms like had years ago with ERCP. \"no liver pain\". TSH normal 4 mths ago she reports. No blood in urine or stool.     Continues injection MTX 20 mg week Q Monday, FA 3 mg day, cimzia every 2 weeks or twice a month (takes prednisone 10 mg day before, day of and day after injection due to prior symptoms of N/V, fevers when did cimzia injections which she doesn't get now). Didn't take the prednisone this last injection as just had left hip injected per Dr. Maciel Dukes [facility]. Told she will eventually need a THR. \"butt muscles are sore\" but overall better symptoms on this plan. Seen eye doctor about twice this past year, as will have \"episcleritis\" about 4 days before injection cimzia but not every time due. FIngertips skin is cracking this past year, so interested in seeing derm. She's very happy with arthritis and uveitis control with this plan so wishes to continue. Mild psoriasis left ear only. Very happy with thumb surgeries. Denies any skin mole or lesion changes.     Changing to Medicare with [employer] April 1st.  [facility] reviewed and updated " by me     Copy forward May 31, 2017  Strept infection 4-21 prior to ED then treated with ABX. Exposed from [facility]. Missed one dose of MTX   ED 4-2017 for nausea with vomiting. CT of the abdomen/pelvis with contrast --no significant findings other than prominence of the common biliary duct which is likely related to cholecystectomy.  She was treated with Dilaudid and Zofran for pain and nausea. Given reglan for breakthrough nausea. Labs noted overall NL. 2 bags IVF    Ablation SI Right 4/25 and left Feb 26th 2017 --[facility] pain clinic   PT for her hip--told by therapy could feel the bursitis. Told needs THR and injection of left hip Feb 10th 2017 --this was [facility]. Who did x-rays and told would like to try to wait for at least a year. [facility] orth[facility] Dr. PUMA Dukes --her pain clinic provider would like to try platelet rich plasma injection     Cimzia injections high cost $3000 for 2 months. Deductible $4500 then cost catestrophic then goes down $300   Left eye uveitis about 5 days before the next injection then has to do the prednisone gtts for about 3 months. Hands are sore jessica the tips of then S/T and then MCPs. This is toward the end of the cycle and in bewtween too. Last injection last Tuesday. Some uveitis seen her about 2 months ago [facility]     MTX every week Mon or Tues 20 mg--tolerating well. Some mild fatigue but tolerating. NO infection now .   No psoriasis --dry   Fingertips not cracking now as using superglue  Trying to loss weight and exercise.  Seen PCP for hard to take a deep breath, SOB--treated with inhaler then didn't help.    SURGEON WANTS TO RE-DO HER LEFT THUMB SURGERY.    Copy forward October 25, 2017  Continues the humira 40 mg SQ every 2 weeks, some diarrhea day of   Adjusted her MTX 25 mg SW every 7 days early Sept, this is some diarrhea day of and after, tolerable. FA 2 mg day. Some fatigue after taking but feels the MTX is working and now the humira is really working on her tendontisis,  enthesisits, and no longer had any uveitis flaring now not needing prednisone. No infections. Her left 1st CMC is eroded she reports but does wish to hold on any surgeries as the humira is the only one that has really worked to control her the best. Getting cervival MRI and then may need injection --right upper back numb at times and I asked her to keep me updated. Feet are less sore and doing well. Takes her fentanyl patch now and only 1-2 oxycodone at night this controls her pain with the tylenol arthritis. No hariloss. Hips still painful, but some improved. No psoriasis. Denies fever, chils, cough, SOB, SANCHEZ. Other PMSH reviewed and updated by me      October 31, 2018  DEXA 7-2018 T-1.8 low bone density, worsening was referred to endo due to risk osteoporosis she will do this in the future. Adalimumab (humira) 40 mg injection every 2 weeks and methotrexate  25 mg every MON, folic acid  3 mg day tolerating and no side-effects. Throat illness in the interium was treated with antibiotics. No prednisone. No NSAID. Weaning off fentanyl patch 12.5 from 25 mcg/hr and down on oxycodone 1 tablet at bedtime since starting the medical marijuana about a month ago. Pain is dramatically improved, and her bowels. No inflammatory eye flares, does have mild eye pain a day before her adalimumab (humira) injection. Right hip improved since the STEM cell donor injection in Aug. Overall, she feels she is doing well with her chronic pain, and right hip pain down by 75% since the doner injection.     PROBLEM LIST: Past medical history is notable for her being presumptively treated for Lyme with doxycycline after developing a targetoid lesion, for a history of Hashimoto's thyroiditis with subsequent hypothyroidism, for the diagnosis now of psoriasis, and for a history of depression.Neck pain, cervical stenosis-- MRI  +moderate central stenosis right C3-4, C5-6 degenerative changes 2nd mild-mod central stenosis. Past referral to  physiatry-wishes to return to Dr. Edmond Sawant [facility] Left hip pain s/p tear needs THR she reports. B) Hx-Bilateral tendonitis hamstrings with right bursitis, partial tear per Dr. Gomez s/p injections. Seen Dr. Arvin Xie at [facility] s/p ablation SI joint, tendon insertion site. Sees Dr. Maciel Dukes. C). Transient elevation LFT 2/2013 [ NL after held MTX and tramadol 2wk]; transient 4- [ AST 89]. NL . Other hx --Panceatic sphinctor dysfunction. On pancreatic enzymes. SBO 9-2015, now no doing well. Right thumb DJD, s/p surgery . Healed.  1. Diffuse degenerative joint disease that is both clinically apparent and obvious on multiple imaging modalities including bone scan, MRI 2/2013 or cervical. DJD L4-L5, L5-S1 per MRI 7/2012; MRI [id number]   2. Diffuse inflammatory arthritis with marked morning stiffness, no systemic inflammation by blood tests, but greater than 80% clinical improvement with a Medrol Dosepak.   3. Onset of the inflammatory joint symptoms including sacroiliitis with the development of psoriasis, suggesting that this represents psoriatic arthritis.   4. Development of episcleritis in the left eye with improvement with steroid eyedrops 12/14/2011 with recurrent episodes when wean oral prednisone (9/2012)  5. History of Hashimoto's thyroiditis.   6. History of presumptive treatment for Lyme disease with doxycycline after development of a targetoid lesion.   7. Poor tolerance of sulfasalazine and oral methotrexate.   8. Bilateral hamstring tendonosis, right partial tear, sacroilitis 7/2012. See MRI, repeat 2/2013 hamstring and right SI inflammation seeing Dr. Arvin Xie at [facility] specialized in SI. , left hamstring   9. DJD L4-L5, L5-S1 per MRI 7/2012  10. Intermittent prednisone exposure  11. Transient elevation LFT ALT 84/AST 58 2/2013 (nl after held MTX and tramadol, then increased folic acid 2mg day)  12. Past LUE burn developed into cellulitis  resolved from keflex. Bronchitis now on zithromax irritating her costrochondritis. Improving after 1 day  13. 1-2014 Left knee meniscal tear with chrondomalacia per MRI (had Rt/Lt CMJ surgery)  14. 4- RUQ pain.   15.  Hx recurrent left episcleritis   16. Right thumb tendon surgery with Dr. San [facility]  17. Pneumonia 3-2015; strept  and 4-2017      Past Medical History:   Diagnosis Date     Acute meniscal tear of knee 1/2014    with chrondromalacia per MRI      Depression      DJD (degenerative joint disease), lumbar 7/2012    L4-5, L5-S1 per MRI      Episcleritis 12/14/2011     Hamstring tendonitis at origin 7/2012    Bilaterally with partial tear right, sacroilitis per MRI     Hypothyroid 9/7/2011     Hypothyroidism      PONV (postoperative nausea and vomiting)      Psoriatic arthritis (H) 9/7/2011     Psoriatic arthritis (H) 2/9/2016     Strep throat 04/2017       Surgical-She has a surgical history including appendectomy in 1980, cholecystectomy in approximately 1993, and hysterectomy without oophorectomy in 1996.  Ablation SI Right 4/25 and left Feb 26th 2017 -- pain clinic   PT for her hip--told by therapy could feel the bursitis. Told needs THR and injection of left hip Feb 10th 2017 -  Past Surgical History:   Procedure Laterality Date     APPENDECTOMY       ARTHROPLASTY CARPOMETACARPAL (THUMB JOINT)  11/8/2013    Procedure: ARTHROPLASTY CARPOMETACARPAL (THUMB JOINT);  Left First Carpometacarpal Trapezium Resection, Tendon Interposition  ;  Surgeon: Luiza Farrar MD;  [facility]: [facility]     ARTHROPLASTY CARPOMETACARPAL (THUMB JOINT)  12/20/2013    Procedure: ARTHROPLASTY CARPOMETACARPAL (THUMB JOINT);  Right Thumb Trapezium Resection With Flexor Carpi Radialis Tendon Reconstruction       CHOLECYSTECTOMY       COLONOSCOPY  5/6/2014    Procedure: COLONOSCOPY;  Surgeon: Adria San MD;  Location:  GI     ENDOSCOPIC RETROGRADE CHOLANGIOPANCREATOGRAM  6/13/2014     Procedure: ENDOSCOPIC RETROGRADE CHOLANGIOPANCREATOGRAM;  Surgeon: Lianna Wheeler MD;  Location: [facility] OR     GALLBLADDER SURGERY       GYN SURGERY      hysterectomy and oophorectomy     [facility] ENDOSCOPY W EUS Left 6/10/2014    Procedure: COMBINED ENDOSCOPIC ULTRASOUND, ESOPHAGOSCOPY, GASTROSCOPY, DUODENOSCOPY (EGD);  Surgeon: Lianna Wheeler MD;  Location: [facility] GI     HYSTERECTOMY       Right thumb surgery  7/2015     XR SACROILIAC THERAPEUTIC INJECTION BILATERAL  12/2011       FH:  No autoimmune disorders, psoriasis, UC, crohn's, SLE, RA, PsA, gout, autoimmune thyroid.  No MS, heart disease or cancer in family  Mother-endometrial cancer, RHEUMATOID ARTHRITIS (RA)   Father-psoriasis, lymphoma, colon and prostate cancer   Siblings-sister RHEUMATOID ARTHRITIS (RA), OSTEOARTHRITIS  And crohns   Children-  MGM RHEUMATOID ARTHRITIS (RA)       SH:  No Alcohol. No Smoking. No IVDU. Partner on list for lung transplant     PMSH personally reviewed and updated by me.    ROS:  CONSTITUTIONAL: No fevers, night sweats or unintentional weight change. No acute distress, swollen glands  EYES: No vision change, diplopia, pain in eyes or red eyes   EARS, NOSE, MOUTH, THROAT: No tinnitus or hearing change, no epistaxis or nasal discharge, no oral lesions, throat clear. Normal saliva pool.  No drymouth. No thyroid Enlargement.   CARDIOVASCULAR: No chest pain, palpitations, or pain with walking, no orthopnea or PND   RESPIRATORY: No dyspnea, cough, shortness of breath or wheezing. No pleurisy.   GI: No nausea, vomiting, diarrhea or constipation, no abdominal pain, or blood in stools.   : No change in urine, no dysuria or hematuria   MUSCKL: See above   INTEGUMENTARY: No concerning lesions or moles   NEURO: No loss of strength or sensation, no numbness or tingling, no tremor, no dizziness, no headache. No falls   ENDO: No polyuria or polydipsia, no temperature intolerance   HEME/LYMPH:No concerning bumps, bleeding problems, or  swollen lymph nodes. No recent infections, hospitalizations or new illnesses.   Otherwise 14 point ROS obtained, reviewed and found negative.     Physical exam:  Vitals: Blood pressure 115/76, pulse 68, temperature 98  F (36.7  C), temperature source Oral, weight 74.4 kg (164 lb), last menstrual period 03/06/2012, SpO2 97 %, not currently breastfeeding.  Wt Readings from Last 4 Encounters:   10/31/18 74.4 kg (164 lb)   07/03/18 73.6 kg (162 lb 3.2 oz)   06/23/18 73.5 kg (162 lb)   02/28/18 74.2 kg (163 lb 9.6 oz)     Constitutional: WD-WN-WG cooperative  Eyes: nl EOM, PERRLA, vision, conjunctiva, sclera, No Unilateral or bilateral external ear inflammation   ENT: nl external ears, nose, hearing, lips, teeth, gums, throat. No saddle nose   Neck: no mass or thyroid enlargement  Resp: lungs clear to auscultation, nl to palpation, nl effort  CV: RRR, no murmurs, rubs or gallops, no edema  GI: no ABD mass or tenderness, no HSM  : not tested  Lymph: no cervical or epitrochlear nodes  MS: All TMJ, neck, shoulder, elbow, wrist, hand, spine, hip, knee, ankle, and foot joints were examined and otherwise found normal. Normal  strength. No active synovitis or deformity. Full ROM. Normal prayer sign. Negative Negative Lhermitte's sign. No periuncle erythema  Skin: no nail pitting, alopecia, rash, dry skin most notably on hands   Neuro: nl cranial nerves, strength, sensation, DTRs.   Psych: nl judgement, orientation, memory, affect.      Labs/Imaging:    Results for orders placed or performed in visit on 10/31/18   AST   Result Value Ref Range    AST 31 0 - 45 U/L   ALT   Result Value Ref Range    ALT 42 0 - 50 U/L   Albumin level   Result Value Ref Range    Albumin 3.7 3.4 - 5.0 g/dL   Creatinine   Result Value Ref Range    Creatinine 0.75 0.52 - 1.04 mg/dL    GFR Estimate 78 >60 mL/min/1.7m2    GFR Estimate If Black >90 >60 mL/min/1.7m2   CRP inflammation   Result Value Ref Range    CRP Inflammation <2.9 0.0 - 8.0 mg/L    CBC with platelets   Result Value Ref Range    WBC 4.4 4.0 - 11.0 10e9/L    RBC Count 4.69 3.8 - 5.2 10e12/L    Hemoglobin 13.5 11.7 - 15.7 g/dL    Hematocrit 42.3 35.0 - 47.0 %    MCV 90 78 - 100 fl    MCH 28.8 26.5 - 33.0 pg    MCHC 31.9 31.5 - 36.5 g/dL    RDW 14.4 10.0 - 15.0 %    Platelet Count 209 150 - 450 10e9/L       Results for orders placed or performed in visit on 07/03/18   Dexa hip/pelvis/spine    Narrative    [facility]   [address] [place] [id number]  Phone: [phone]   Fax: [phone]      Final    Patient name:   JASWINDER JAMES ([id number] )  Patient demographics:  [age] year old White Female of 65.5 in. height and   162.0 lbs. weight  Ordering provider:  KENA AGUAYOAF  History:  family hx of back fractures, family hx of hip fractures, family   hx of osteoporosis, HYSTERECTOMY, LOW VITAMIN D, OVARIES REMOVED (1 OR 2),   PSORIATIC ARTHRITIS, THYROID CONDITION  Current treatments:  IMMUNOSUPPRESSANTS, THYROID MEDS  Scan:    DXA exam ([id number] ): [employer] [facility]  Exam date:   07/03/2018     Dual energy x-ray absorptiometry results:     Region BMD T - score Z - score   L1-L4 1.227 g/cm  0.4 1.5                   Neck Left 0.792 g/cm  -1.8 -0.6   Total Left 0.853 g/cm  -1.2 -0.4         Neck Right 0.787 g/cm  -1.8 -0.7   Total Right 0.866 g/cm  -1.1 -0.3                                       Conclusions:  The most negative and valid T-score of -1.8 at the level of the left   femoral neck, corresponds with low bone density.         The risk of osteoporotic fracture increases approximately 2-fold for each   1.0 SD decrease in T-score.  Low bone density is not the only risk factor   for fracture;  consider factors such as patient's age, fall risk, injury   risk, previous osteoporotic fracture, family history of osteoporosis, etc.    People with elevated risk of fracture should be regularly evaluated for   low bone  mineral density.  For patients eligible for Medicare, routine   testing is allowed once every 2 years.  Testing frequency can be increased   for patients on corticosteroids.  Clinical correlation is recommended.      Bone densitometry cannot rule out secondary causes of bone loss.   Therefore, further metabolic testing to look for secondary causes of low   BMD should be performed if indicated. Clinical correlation is recommended     Lateral spine imaging with standard radiography or densitometric Vertebral   Fracture Assessment (VFA) may be performed when T-score is < -1.0 AND   -historical height loss > 4 cm (>1.5 inches); or   -glucocorticoid therapy of prednisone ? 5 mg/day or equivalent for ? 3   months is present.  Clinical correlation is strongly recommended      Feel free to contact DXA services if you have any questions or comments.    Thank you for the opportunity to be of service to you and your patient.    Principal result :    Annetta Washington MD, CCD   of Medicine  Division of Endocrinology      References:  1.  ISCD position statements:  www.iscd.org  (includes the report of the   2015  Position Development Conference)    Template revised 5/24/2017    Technical comments:     L1 T-score:   -0.2  ,  L2 T-score:  0.5,  L3   T-score:  0.5,  L4 T-score:  0.6 ].  In the circumstances, the lumbar   spine is represented by L1-4.            Impression/Plan:    1.Psoriatic arthritis w/axial involvement activity with hx episcleritis, tendonitis, left hamstring tear. Hx multiple SI, costrochonditis and other injections affecting her tendons and needs THR. Humira 8-2017 but every 10 days and MTX 25 mg Once a week injections. No eye inflammation. Chronic pain under other provider and now on medical marijuana helping her to wean down and goal of off narcotics. If this is successful, she will notify us to wean her methotrexate. I feel she does have a component of fibromylagia (in past  reports neurontin cause sleepiness, and cymbalta change in mood, but not sure of the dose of neurontin--she can talk to her pain provider about this, work on sleep).  Past GI s/e with SSZ, but in future is willing to re-try this as well. She knows see opth stat or ED for any inflammatory eye symptoms.   2. Uveitis-see opth. See #1. ED any symptoms. No sx    3. Psoriasis.fingertip skin cracking. Resolved.    4. High risk medications monitoring, labs every 8-12 weeks. Normal today. Wishes for 2nd shingrix today, she tolerated the last one mild symptoms for 1-2 days.   5. Low bone density per DEXA 7-2018, worsening. Given high risk osteoporosis , I did refer her to endocrine for complete evaluation   6. Chronic pain under care of [facility] Dr. Xie.    Plan:   A) Humira 40 mg SQ injection every 10 days. Methotrexate 25 mg SQ once Monday week, folic acid 2-3 mg day. Volteran cream prn as directed.   --DMARD labs every 8 week-as hx elevated liver enzymes   B)  Calcium and vitamin D. Complete physical due she wishes to come here   C) RTC 4 mths Dr. Samayoa, 8 mth me   D) Annual and see PCP. F/U Dr. Wheeler for GI. F/U with pain clinic.      The patient understood the rationale for the diagnosis and treatment plan. Patient shared in the decision making. All questions were answered to best of my ability and the patient's satisfaction  Miguel CABRAL, CNP, MSN  [facility]  Department of Rheumatology & Autoimmune Disorders    1. Immunization: Reviewed CDC guidelines with patient updated, information provided to patient based on CDC guidelines for vaccination recommendations, patient will discuss with primary provider  2. Bone Health:Educated on adequate calcium and vitamin D intake, Educated on exercise, Glucocorticoid education discussed risks, benefits & potential long term side effects from use  3. Discussed routine annual physicals, cancer screening, cardiac risk monitoring, bone health  and primary care with primary care provider.   4. Educated on diagnosis, prognosis, labs, imaging, treatment options (including risks, benefits, risk of no treatment), medications (use, dose, side-effects, risks of medications including infection/cancer/bone marrow suppression, lab monitoring), infections what to do, plan of cares, goals of treatment.       Miguel Umana APRN CNP

## 2018-10-31 NOTE — NURSING NOTE
Second dose of Shingrix given per Tony Umana CNP orders, pt's first name, last name and  verified prior to administration, injection given without complications or questions, see immunizations for details.    Prudence Torres [facility]  10/31/2018 2:12 PM

## 2018-10-31 NOTE — LETTER
10/31/2018        RE: Krissy Weldon  [id number] [facility] [address]     Dear Colleague,    Thank you for referring your patient, Krissy Weldon, to the [facility] RHEUMATOLOGY at [facility]. Please see a copy of my visit note below.      [facility] - Rheumatology Clinic Visit    Krissy Weldon  is a 63 year old Krissy Weldon is a 62 year old female who presents with follow-up for undifferentiated polyarthropathy, psoriatic arthritis. Complicated by eye inflammation, tendonitis, costrochondritis, tendon tears, synovitis, possible gout and OA/DJD jessica thumbs CMC. Failed or intolerance to multiple medications.     Copy forward: [-SSa, -SSb, -CCP,HLA-B27 negative on outside workup with previous SI injections] with hx- inflammatory eye left eye episcleritis requiring prednisone gtt or oral with bone scan unrevealing. Previous medrol or steroid responsiveness. Past tried humira, SSZ, simponi SQ/IV, remicade and otelza. Failed humira EoW recurrent episcleritis, right wrist, right SI, bilateral hamstrings tendonosis with partial tear bilaterally. Failed simponi sq/IV ineffective. SSZ and oral MTX. Past recurrent iritis (ER if eye pain, blurred vision, red eye). Remicade. Otezla. LLL pneumonia 3/2015. MRI L hip showed high grade tear gluteus minimus insertion site, effusion 4/2015. C/o neck issues with MRI cervical spine show DJD, EMG, paramedian osteophytes and disk protrusion at C3-C4 with some moderate central canal stenosis and moderate to severe right-sided foraminal stenosis as a consequence.  C5-C6 also showed some mild to moderate central canal stenosis and moderate bilateral central foraminal stenosis.  Seen Dr. Wheeler with significant improvement in GI issues pancreatic sphinctor dysfunction requiring surgery now on pancreatic enzymes after pancreatic duct is open. Failed cimzia Cimzia lost effectiveness (more uveitis, arthritis, synovitis,bursitis and  tendonitis).     Copy forward hx:   Last seen  Dr. Samayoa. Continued plan. Methotrexate 0.8 ml week Q Monday (20 mg). Continues cimzia injections (due this Thurs).  No gout flares. Past in right great toe, but no aspirations.   Right thumb surgery July-Aug 2015. Pain and motion much improved. Severe flare of your arthritis and eye inflammation when holding the cimzia and MTX for surgery requiring prednisone 5 mg x 2 week then stopped   Strept , pneumonia 3-2015. Recent bowel obstruction 9-2015, seen gastro 1-2016 given hx of pancreatic sphincter dysfunction.   Recent flare 2-4, requiring medrol dose pack--labs were NL except CRP 11 [Hands/ feet swelling with pain, back and neck along with an eye infection and scleritis].    Left eye inflammation flare with infection about 1 months ago, on 3 gtt. Slow clearing. Then 1 week after the gtt, developed neck pain hard to turn head to left. Then L achilles tendonitis, with costrochondritis this was last week, hands MCP 2-3 swelling. Started the medrol day 4--dramatic improvement [move her neck, eye improved, achilles much better now can walk on, the costrochondritis]. Left eye infection much better. Left hip still bothers, still much improved with prednisone. Energy much better. Cimzia lasting about 10 days. Left upper arm psoriasis. EAS none with medrol. This is the best she has felt overall. Rare use of prednisone of steroids. Last eye inflammation last summer. Left arm into her deltoid tingling with her neck, 1 week before flare. Having hard time moving her neck side to side, forward and back. The back of her neck tissues feel swollen on her left side. Prior to this no neck last flare was in . Does her neck exercise. 2 year of job specific, the end of March final decision.     Taking oxycodone 15 mg QID during this flare. Under [facility].     Copy forward February 15, 2017  I seen her last 2-2016. Dr. Samayoa  who continued the plan  "and using diclofenac for achilles pain. EHR reviewed. Was using medical marijuana.  Cimzia reported working well for her uveitis and constronchondritis; and the methotrexate injectable for her joints. Labs today normal     No medical marijauna as this didn't work  MS ER 15 mg BID for about 6 weeks. Oxycodone HS prn most nights. Off the pain patch due to insurance coverage. Goes to [facility] pain clinic.     C/o will get short of breath when swims or bends over for the past 6 weeks. No cardiac symptoms, CP or heaviness. Has had night sweats for many years which is not new. Losing weight but appetite is good, although her partner does feel she's not eating as well. Been on MS ER for about the time these symptoms started. No stomach pain. +costrochondritis as before. No blood in urine or stool. Taking omeprezole 40 mg every day for heartburn--will see PCP for possible UGI as feels some is near her esophagus. Due for annual physical and denies getting bone health done --does take 1000 IU day vitamin D. Due for mammogram. Hx hyst. No cough, fevers or ABD pain. No symptoms like had years ago with ERCP. \"no liver pain\". TSH normal 4 mths ago she reports. No blood in urine or stool.     Continues injection MTX 20 mg week Q Monday, FA 3 mg day, cimzia every 2 weeks or twice a month (takes prednisone 10 mg day before, day of and day after injection due to prior symptoms of N/V, fevers when did cimzia injections which she doesn't get now). Didn't take the prednisone this last injection as just had left hip injected per Dr. Maciel Dukes [facility]. Told she will eventually need a THR. \"butt muscles are sore\" but overall better symptoms on this plan. Seen eye doctor about twice this past year, as will have \"episcleritis\" about 4 days before injection cimzia but not every time due. FIngertips skin is cracking this past year, so interested in seeing derm. She's very happy with arthritis and uveitis control with this plan so wishes to " continue. Mild psoriasis left ear only. Very happy with thumb surgeries. Denies any skin mole or lesion changes.     Changing to Medicare with [employer] April 1st.  [facility] reviewed and updated by me     Copy forward May 31, 2017  Strept infection 4-21 prior to ED then treated with ABX. Exposed from [facility]. Missed one dose of MTX   ED 4-2017 for nausea with vomiting. CT of the abdomen/pelvis with contrast --no significant findings other than prominence of the common biliary duct which is likely related to cholecystectomy.  She was treated with Dilaudid and Zofran for pain and nausea. Given reglan for breakthrough nausea. Labs noted overall NL. 2 bags IVF    Ablation SI Right 4/25 and left Feb 26th 2017 --[facility] pain clinic   PT for her hip--told by therapy could feel the bursitis. Told needs THR and injection of left hip Feb 10th 2017 --this was [facility]. Who did x-rays and told would like to try to wait for at least a year. [facility] orth[facility] Dr. PUMA Dukes --her pain clinic provider would like to try platelet rich plasma injection     Cimzia injections high cost $3000 for 2 months. Deductible $4500 then cost catestrophic then goes down $300   Left eye uveitis about 5 days before the next injection then has to do the prednisone gtts for about 3 months. Hands are sore jessica the tips of then S/T and then MCPs. This is toward the end of the cycle and in bewtween too. Last injection last Tuesday. Some uveitis seen her about 2 months ago [facility]     MTX every week Mon or Tues 20 mg--tolerating well. Some mild fatigue but tolerating. NO infection now .   No psoriasis --dry   Fingertips not cracking now as using superglue  Trying to loss weight and exercise.  Seen PCP for hard to take a deep breath, SOB--treated with inhaler then didn't help.    SURGEON WANTS TO RE-DO HER LEFT THUMB SURGERY.    Copy forward October 25, 2017  Continues the humira 40 mg SQ every 2 weeks, some diarrhea day of   Adjusted her MTX 25 mg SW every 7  days early Sept, this is some diarrhea day of and after, tolerable. FA 2 mg day. Some fatigue after taking but feels the MTX is working and now the humira is really working on her tendontisis, enthesisits, and no longer had any uveitis flaring now not needing prednisone. No infections. Her left 1st CMC is eroded she reports but does wish to hold on any surgeries as the humira is the only one that has really worked to control her the best. Getting cervival MRI and then may need injection --right upper back numb at times and I asked her to keep me updated. Feet are less sore and doing well. Takes her fentanyl patch now and only 1-2 oxycodone at night this controls her pain with the tylenol arthritis. No hariloss. Hips still painful, but some improved. No psoriasis. Denies fever, chils, cough, SOB, SANCHEZ. Other [facility] reviewed and updated by me      October 31, 2018  DEXA 7-2018 T-1.8 low bone density, worsening was referred to endo due to risk osteoporosis she will do this in the future. Adalimumab (humira) 40 mg injection every 2 weeks and methotrexate  25 mg every MON, folic acid  3 mg day tolerating and no side-effects. Throat illness in the interium was treated with antibiotics. No prednisone. No NSAID. Weaning off fentanyl patch 12.5 from 25 mcg/hr and down on oxycodone 1 tablet at bedtime since starting the medical marijuana about a month ago. Pain is dramatically improved, and her bowels. No inflammatory eye flares, does have mild eye pain a day before her adalimumab (humira) injection. Right hip improved since the STEM cell donor injection in Aug. Overall, she feels she is doing well with her chronic pain, and right hip pain down by 75% since the doner injection.     PROBLEM LIST: Past medical history is notable for her being presumptively treated for Lyme with doxycycline after developing a targetoid lesion, for a history of Hashimoto's thyroiditis with subsequent hypothyroidism, for the diagnosis now of  psoriasis, and for a history of depression.Neck pain, cervical stenosis-- MRI  +moderate central stenosis right C3-4, C5-6 degenerative changes 2nd mild-mod central stenosis. Past referral to physiatry-wishes to return to Dr. Edmond Sawant [facility] Left hip pain s/p tear needs THR she reports. B) Hx-Bilateral tendonitis hamstrings with right bursitis, partial tear per Dr. Gomez s/p injections. Seen Dr. Arvin Xie at [facility] s/p ablation SI joint, tendon insertion site. Sees Dr. Maciel Dukes. C). Transient elevation LFT 2/2013 [ NL after held MTX and tramadol 2wk]; transient 4- [ AST 89]. NL . Other hx --Panceatic sphinctor dysfunction. On pancreatic enzymes. SBO 9-2015, now no doing well. Right thumb DJD, s/p surgery . Healed.  1. Diffuse degenerative joint disease that is both clinically apparent and obvious on multiple imaging modalities including bone scan, MRI 2/2013 or cervical. DJD L4-L5, L5-S1 per MRI 7/2012; MRI [id number]   2. Diffuse inflammatory arthritis with marked morning stiffness, no systemic inflammation by blood tests, but greater than 80% clinical improvement with a Medrol Dosepak.   3. Onset of the inflammatory joint symptoms including sacroiliitis with the development of psoriasis, suggesting that this represents psoriatic arthritis.   4. Development of episcleritis in the left eye with improvement with steroid eyedrops 12/14/2011 with recurrent episodes when wean oral prednisone (9/2012)  5. History of Hashimoto's thyroiditis.   6. History of presumptive treatment for Lyme disease with doxycycline after development of a targetoid lesion.   7. Poor tolerance of sulfasalazine and oral methotrexate.   8. Bilateral hamstring tendonosis, right partial tear, sacroilitis 7/2012. See MRI, repeat 2/2013 hamstring and right SI inflammation seeing Dr. Arvin Xie at [facility] specialized in SI. , left hamstring   9. DJD L4-L5, L5-S1 per MRI  7/2012  10. Intermittent prednisone exposure  11. Transient elevation LFT ALT 84/AST 58 2/2013 (nl after held MTX and tramadol, then increased folic acid 2mg day)  12. Past LUE burn developed into cellulitis resolved from keflex. Bronchitis now on zithromax irritating her costrochondritis. Improving after 1 day  13. 1-2014 Left knee meniscal tear with chrondomalacia per MRI (had Rt/Lt CMJ surgery)  14. 4- RUQ pain.   15.  Hx recurrent left episcleritis   16. Right thumb tendon surgery with Dr. San [facility]  17. Pneumonia 3-2015; strept  and 4-2017      Past Medical History:   Diagnosis Date     Acute meniscal tear of knee 1/2014    with chrondromalacia per MRI      Depression      DJD (degenerative joint disease), lumbar 7/2012    L4-5, L5-S1 per MRI      Episcleritis 12/14/2011     Hamstring tendonitis at origin 7/2012    Bilaterally with partial tear right, sacroilitis per MRI     Hypothyroid 9/7/2011     Hypothyroidism      PONV (postoperative nausea and vomiting)      Psoriatic arthritis (H) 9/7/2011     Psoriatic arthritis (H) 2/9/2016     Strep throat 04/2017       Surgical-She has a surgical history including appendectomy in 1980, cholecystectomy in approximately 1993, and hysterectomy without oophorectomy in 1996.  Ablation SI Right 4/25 and left Feb 26th 2017 -- pain clinic   PT for her hip--told by therapy could feel the bursitis. Told needs THR and injection of left hip Feb 10th 2017 -  Past Surgical History:   Procedure Laterality Date     APPENDECTOMY       ARTHROPLASTY CARPOMETACARPAL (THUMB JOINT)  11/8/2013    Procedure: ARTHROPLASTY CARPOMETACARPAL (THUMB JOINT);  Left First Carpometacarpal Trapezium Resection, Tendon Interposition  ;  Surgeon: Luiza Farrar MD;  Location: [facility]     ARTHROPLASTY CARPOMETACARPAL (THUMB JOINT)  12/20/2013    Procedure: ARTHROPLASTY CARPOMETACARPAL (THUMB JOINT);  Right Thumb Trapezium Resection With Flexor Carpi Radialis Tendon  Reconstruction       CHOLECYSTECTOMY       COLONOSCOPY  5/6/2014    Procedure: COLONOSCOPY;  Surgeon: Adria San MD;  Location:  GI     ENDOSCOPIC RETROGRADE CHOLANGIOPANCREATOGRAM  6/13/2014    Procedure: ENDOSCOPIC RETROGRADE CHOLANGIOPANCREATOGRAM;  Surgeon: Lianan Wheeler MD;  Location: [facility] OR     GALLBLADDER SURGERY       GYN SURGERY      hysterectomy and oophorectomy     [facility] ENDOSCOPY W EUS Left 6/10/2014    Procedure: COMBINED ENDOSCOPIC ULTRASOUND, ESOPHAGOSCOPY, GASTROSCOPY, DUODENOSCOPY (EGD);  Surgeon: Lianna Wheeler MD;  Location: [facility] GI     HYSTERECTOMY       Right thumb surgery  7/2015     XR SACROILIAC THERAPEUTIC INJECTION BILATERAL  12/2011       FH:  No autoimmune disorders, psoriasis, UC, crohn's, SLE, RA, PsA, gout, autoimmune thyroid.  No MS, heart disease or cancer in family  Mother-endometrial cancer, RHEUMATOID ARTHRITIS (RA)   Father-psoriasis, lymphoma, colon and prostate cancer   Siblings-sister RHEUMATOID ARTHRITIS (RA), OSTEOARTHRITIS  And crohns   Children-  MGM RHEUMATOID ARTHRITIS (RA)       SH:  No Alcohol. No Smoking. No IVDU. Partner on list for lung transplant     [facility] personally reviewed and updated by me.    ROS:  CONSTITUTIONAL: No fevers, night sweats or unintentional weight change. No acute distress, swollen glands  EYES: No vision change, diplopia, pain in eyes or red eyes   EARS, NOSE, MOUTH, THROAT: No tinnitus or hearing change, no epistaxis or nasal discharge, no oral lesions, throat clear. Normal saliva pool.  No drymouth. No thyroid Enlargement.   CARDIOVASCULAR: No chest pain, palpitations, or pain with walking, no orthopnea or PND   RESPIRATORY: No dyspnea, cough, shortness of breath or wheezing. No pleurisy.   GI: No nausea, vomiting, diarrhea or constipation, no abdominal pain, or blood in stools.   : No change in urine, no dysuria or hematuria   MUSCKL: See above   INTEGUMENTARY: No concerning lesions or moles   NEURO: No loss of  strength or sensation, no numbness or tingling, no tremor, no dizziness, no headache. No falls   ENDO: No polyuria or polydipsia, no temperature intolerance   HEME/LYMPH:No concerning bumps, bleeding problems, or swollen lymph nodes. No recent infections, hospitalizations or new illnesses.   Otherwise 14 point ROS obtained, reviewed and found negative.     Physical exam:  Vitals: Blood pressure 115/76, pulse 68, temperature 98  F (36.7  C), temperature source Oral, weight 74.4 kg (164 lb), last menstrual period 03/06/2012, SpO2 97 %, not currently breastfeeding.  Wt Readings from Last 4 Encounters:   10/31/18 74.4 kg (164 lb)   07/03/18 73.6 kg (162 lb 3.2 oz)   06/23/18 73.5 kg (162 lb)   02/28/18 74.2 kg (163 lb 9.6 oz)     Constitutional: WD-WN-WG cooperative  Eyes: nl EOM, PERRLA, vision, conjunctiva, sclera, No Unilateral or bilateral external ear inflammation   ENT: nl external ears, nose, hearing, lips, teeth, gums, throat. No saddle nose   Neck: no mass or thyroid enlargement  Resp: lungs clear to auscultation, nl to palpation, nl effort  CV: RRR, no murmurs, rubs or gallops, no edema  GI: no ABD mass or tenderness, no HSM  : not tested  Lymph: no cervical or epitrochlear nodes  MS: All TMJ, neck, shoulder, elbow, wrist, hand, spine, hip, knee, ankle, and foot joints were examined and otherwise found normal. Normal  strength. No active synovitis or deformity. Full ROM. Normal prayer sign. Negative Negative Lhermitte's sign. No periuncle erythema  Skin: no nail pitting, alopecia, rash, dry skin most notably on hands   Neuro: nl cranial nerves, strength, sensation, DTRs.   Psych: nl judgement, orientation, memory, affect.      Labs/Imaging:    Results for orders placed or performed in visit on 10/31/18   AST   Result Value Ref Range    AST 31 0 - 45 U/L   ALT   Result Value Ref Range    ALT 42 0 - 50 U/L   Albumin level   Result Value Ref Range    Albumin 3.7 3.4 - 5.0 g/dL   Creatinine   Result Value  Ref Range    Creatinine 0.75 0.52 - 1.04 mg/dL    GFR Estimate 78 >60 mL/min/1.7m2    GFR Estimate If Black >90 >60 mL/min/1.7m2   CRP inflammation   Result Value Ref Range    CRP Inflammation <2.9 0.0 - 8.0 mg/L   CBC with platelets   Result Value Ref Range    WBC 4.4 4.0 - 11.0 10e9/L    RBC Count 4.69 3.8 - 5.2 10e12/L    Hemoglobin 13.5 11.7 - 15.7 g/dL    Hematocrit 42.3 35.0 - 47.0 %    MCV 90 78 - 100 fl    MCH 28.8 26.5 - 33.0 pg    MCHC 31.9 31.5 - 36.5 g/dL    RDW 14.4 10.0 - 15.0 %    Platelet Count 209 150 - 450 10e9/L       Results for orders placed or performed in visit on 07/03/18   Dexa hip/pelvis/spine    Narrative    [facility]   [address] [place] [id number]  Phone: [phone]   Fax: [phone]      Final    Patient name:   JASWINDER JAMES ([id number] )  Patient demographics:  [age] year old White Female of 65.5 in. height and   162.0 lbs. weight  Ordering provider:  KENA AGUAYOAF  History:  family hx of back fractures, family hx of hip fractures, family   hx of osteoporosis, HYSTERECTOMY, LOW VITAMIN D, OVARIES REMOVED (1 OR 2),   PSORIATIC ARTHRITIS, THYROID CONDITION  Current treatments:  IMMUNOSUPPRESSANTS, THYROID MEDS  Scan:    DXA exam ([id number] ): [employer] [facility]  Exam date:   07/03/2018     Dual energy x-ray absorptiometry results:     Region BMD T - score Z - score   L1-L4 1.227 g/cm  0.4 1.5                   Neck Left 0.792 g/cm  -1.8 -0.6   Total Left 0.853 g/cm  -1.2 -0.4         Neck Right 0.787 g/cm  -1.8 -0.7   Total Right 0.866 g/cm  -1.1 -0.3                                       Conclusions:  The most negative and valid T-score of -1.8 at the level of the left   femoral neck, corresponds with low bone density.         The risk of osteoporotic fracture increases approximately 2-fold for each   1.0 SD decrease in T-score.  Low bone density is not the only risk factor   for fracture;  consider  factors such as patient's age, fall risk, injury   risk, previous osteoporotic fracture, family history of osteoporosis, etc.    People with elevated risk of fracture should be regularly evaluated for   low bone mineral density.  For patients eligible for Medicare, routine   testing is allowed once every 2 years.  Testing frequency can be increased   for patients on corticosteroids.  Clinical correlation is recommended.      Bone densitometry cannot rule out secondary causes of bone loss.   Therefore, further metabolic testing to look for secondary causes of low   BMD should be performed if indicated. Clinical correlation is recommended     Lateral spine imaging with standard radiography or densitometric Vertebral   Fracture Assessment (VFA) may be performed when T-score is < -1.0 AND   -historical height loss > 4 cm (>1.5 inches); or   -glucocorticoid therapy of prednisone ? 5 mg/day or equivalent for ? 3   months is present.  Clinical correlation is strongly recommended      Feel free to contact DXA services if you have any questions or comments.    Thank you for the opportunity to be of service to you and your patient.    Principal result :    Annetta Washington MD, CCD   of Medicine  Division of Endocrinology      References:  1.  ISCD position statements:  www.iscd.org  (includes the report of the   2015  Position Development Conference)    Template revised 5/24/2017    Technical comments:     L1 T-score:   -0.2  ,  L2 T-score:  0.5,  L3   T-score:  0.5,  L4 T-score:  0.6 ].  In the circumstances, the lumbar   spine is represented by L1-4.            Impression/Plan:    1.Psoriatic arthritis w/axial involvement activity with hx episcleritis, tendonitis, left hamstring tear. Hx multiple SI, costrochonditis and other injections affecting her tendons and needs THR. Humira 8-2017 but every 10 days and MTX 25 mg Once a week injections. No eye inflammation. Chronic pain under other  provider and now on medical marijuana helping her to wean down and goal of off narcotics. If this is successful, she will notify us to wean her methotrexate. I feel she does have a component of fibromylagia (in past reports neurontin cause sleepiness, and cymbalta change in mood, but not sure of the dose of neurontin--she can talk to her pain provider about this, work on sleep).  Past GI s/e with SSZ, but in future is willing to re-try this as well. She knows see opth stat or ED for any inflammatory eye symptoms.   2. Uveitis-see opth. See #1. ED any symptoms. No sx    3. Psoriasis.fingertip skin cracking. Resolved.    4. High risk medications monitoring, labs every 8-12 weeks. Normal today. Wishes for 2nd shingrix today, she tolerated the last one mild symptoms for 1-2 days.   5. Low bone density per DEXA 7-2018, worsening. Given high risk osteoporosis , I did refer her to endocrine for complete evaluation   6. Chronic pain under care of [facility] Dr. Xie.    Plan:   A) Humira 40 mg SQ injection every 10 days. Methotrexate 25 mg SQ once Monday week, folic acid 2-3 mg day. Volteran cream prn as directed.   --DMARD labs every 8 week-as hx elevated liver enzymes   B)  Calcium and vitamin D. Complete physical due she wishes to come here   C) RTC 4 mths Dr. Samayoa, 8 mth me   D) Annual and see PCP. F/U Dr. Wheeler for GI. F/U with pain clinic.      The patient understood the rationale for the diagnosis and treatment plan. Patient shared in the decision making. All questions were answered to best of my ability and the patient's satisfaction  Miguel CABRAL, CNP, MSN  [facility]  Department of Rheumatology & Autoimmune Disorders    1. Immunization: Reviewed CDC guidelines with patient updated, information provided to patient based on CDC guidelines for vaccination recommendations, patient will discuss with primary provider  2. Bone Health:Educated on adequate calcium and vitamin D  intake, Educated on exercise, Glucocorticoid education discussed risks, benefits & potential long term side effects from use  3. Discussed routine annual physicals, cancer screening, cardiac risk monitoring, bone health and primary care with primary care provider.   4. Educated on diagnosis, prognosis, labs, imaging, treatment options (including risks, benefits, risk of no treatment), medications (use, dose, side-effects, risks of medications including infection/cancer/bone marrow suppression, lab monitoring), infections what to do, plan of cares, goals of treatment.     Again, thank you for allowing me to participate in the care of your patient.      Sincerely,    Miguel Umana, MARIANNA CNP

## 2018-10-31 NOTE — PROGRESS NOTES
The blood counts, liver, kidney and CRP inflammation labs are normal.     Miguel CABRAL, CNP, MSN  10/31/2018  11:51 AM

## 2018-12-06 DIAGNOSIS — M06.4 INFLAMMATORY POLYARTHROPATHY (H): ICD-10-CM

## 2018-12-06 DIAGNOSIS — M46.99 INFLAMMATORY SPONDYLOPATHY OF MULTIPLE SITES IN SPINE (H): ICD-10-CM

## 2018-12-06 DIAGNOSIS — M94.0 COSTOCHONDRITIS, ACUTE: ICD-10-CM

## 2018-12-06 DIAGNOSIS — M25.559 PAIN IN JOINT, PELVIC REGION AND THIGH, UNSPECIFIED LATERALITY: ICD-10-CM

## 2018-12-06 DIAGNOSIS — L40.50 PSORIATIC ARTHRITIS (H): ICD-10-CM

## 2018-12-10 RX ORDER — METHOTREXATE 25 MG/ML
25 INJECTION INTRA-ARTERIAL; INTRAMUSCULAR; INTRATHECAL; INTRAVENOUS
Qty: 8 ML | Refills: 2 | Status: SHIPPED | OUTPATIENT
Start: 2018-12-10 | End: 2019-04-09

## 2018-12-10 NOTE — TELEPHONE ENCOUNTER
methotrexate sodium, pres-free, 50 MG/2ML   Last Written Prescription Date:  9/10/18  Last Fill Quantity: 8ML,   # refills: 2  Last Office Visit: 10/31/18  JESSICA  Future Office visit:  2/26/19  ANNE     CBC RESULTS:   Recent Labs   Lab Test 10/31/18  1104   WBC 4.4   RBC 4.69   HGB 13.5   HCT 42.3   MCV 90   MCH 28.8   MCHC 31.9   RDW 14.4          Creatinine   Date Value Ref Range Status   10/31/2018 0.75 0.52 - 1.04 mg/dL Final   ]    Liver Function Studies -   Recent Labs   Lab Test 10/31/18  1104  04/26/17  1825   PROTTOTAL  --   --  7.3   ALBUMIN 3.7   < > 3.8   BILITOTAL  --   --  0.2   ALKPHOS  --   --  72   AST 31   < > 24   ALT 42   < > 38    < > = values in this interval not displayed.       Routing refill request to provider for review/approval because:DMARD  LABS  10/31/18

## 2019-01-07 ENCOUNTER — TELEPHONE (OUTPATIENT)
Dept: RHEUMATOLOGY | Facility: CLINIC | Age: 64
End: 2019-01-07

## 2019-01-07 ENCOUNTER — MYC MEDICAL ADVICE (OUTPATIENT)
Dept: RHEUMATOLOGY | Facility: CLINIC | Age: 64
End: 2019-01-07

## 2019-01-07 DIAGNOSIS — M46.99 INFLAMMATORY SPONDYLOPATHY OF MULTIPLE SITES IN SPINE (H): ICD-10-CM

## 2019-01-07 DIAGNOSIS — L40.50 PSORIATIC ARTHRITIS (H): ICD-10-CM

## 2019-01-07 DIAGNOSIS — Z79.899 HIGH RISK MEDICATIONS (NOT ANTICOAGULANTS) LONG-TERM USE: ICD-10-CM

## 2019-01-07 LAB
ALBUMIN SERPL-MCNC: 3.8 G/DL (ref 3.4–5)
ALT SERPL W P-5'-P-CCNC: 32 U/L (ref 0–50)
AST SERPL W P-5'-P-CCNC: 19 U/L (ref 0–45)
BASOPHILS # BLD AUTO: 0.1 10E9/L (ref 0–0.2)
BASOPHILS NFR BLD AUTO: 0.8 %
CREAT SERPL-MCNC: 0.78 MG/DL (ref 0.52–1.04)
CRP SERPL-MCNC: <2.9 MG/L (ref 0–8)
DIFFERENTIAL METHOD BLD: NORMAL
EOSINOPHIL # BLD AUTO: 0.1 10E9/L (ref 0–0.7)
EOSINOPHIL NFR BLD AUTO: 0.8 %
ERYTHROCYTE [DISTWIDTH] IN BLOOD BY AUTOMATED COUNT: 14.8 % (ref 10–15)
ERYTHROCYTE [SEDIMENTATION RATE] IN BLOOD BY WESTERGREN METHOD: 9 MM/H (ref 0–30)
GFR SERPL CREATININE-BSD FRML MDRD: 81 ML/MIN/{1.73_M2}
HCT VFR BLD AUTO: 42.6 % (ref 35–47)
HGB BLD-MCNC: 13.7 G/DL (ref 11.7–15.7)
IMM GRANULOCYTES # BLD: 0 10E9/L (ref 0–0.4)
IMM GRANULOCYTES NFR BLD: 0.2 %
LYMPHOCYTES # BLD AUTO: 1.9 10E9/L (ref 0.8–5.3)
LYMPHOCYTES NFR BLD AUTO: 31.4 %
MCH RBC QN AUTO: 28.4 PG (ref 26.5–33)
MCHC RBC AUTO-ENTMCNC: 32.2 G/DL (ref 31.5–36.5)
MCV RBC AUTO: 88 FL (ref 78–100)
MONOCYTES # BLD AUTO: 0.4 10E9/L (ref 0–1.3)
MONOCYTES NFR BLD AUTO: 6.3 %
NEUTROPHILS # BLD AUTO: 3.6 10E9/L (ref 1.6–8.3)
NEUTROPHILS NFR BLD AUTO: 60.5 %
NRBC # BLD AUTO: 0 10*3/UL
NRBC BLD AUTO-RTO: 0 /100
PLATELET # BLD AUTO: 263 10E9/L (ref 150–450)
RBC # BLD AUTO: 4.82 10E12/L (ref 3.8–5.2)
WBC # BLD AUTO: 6 10E9/L (ref 4–11)

## 2019-01-07 NOTE — RESULT ENCOUNTER NOTE
The blood counts, liver, kidney and CRP inflammation labs are normal.     Miguel CABRAL, CNP, MSN  1/7/2019  3:22 PM

## 2019-01-07 NOTE — TELEPHONE ENCOUNTER
[facility]    Phone Message    May a detailed message be left on voicemail: yes    Reason for Call: Other: Pt states she is having a Flare up, she needs to know what the best care at hand. She states that she has took pretnizone, please call and follow p      Action Taken: Message routed to:  [facility] ([facility]): Rheum

## 2019-01-08 NOTE — TELEPHONE ENCOUNTER
Returned call to pt. Pt's eye problems started about 3 weeks ago and has been working with her ophthalmologist. Initially they gave her prednisone gtts but the pain and symptoms progressively became worse.  Pt was then given prednisone 5 mg daily and it is starting to help the eye.     Last week pt developed pain and swelling in her feet. Describes it involving the ball of the feet, the heels, great toes, and long toes. She has had to get larger shoes but this is not helping. Pt did have her lab screenings on 1/7/19 and they were within normal range Krissy is also reporting that the recurrent sternal chest pain has come back.    CRP Inflammation   Date Value Ref Range Status   01/07/2019 <2.9 0.0 - 8.0 mg/L Final     Lab Results   Component Value Date    WBC 6.0 01/07/2019     Lab Results   Component Value Date    RBC 4.82 01/07/2019     Lab Results   Component Value Date    HGB 13.7 01/07/2019     Lab Results   Component Value Date    HCT 42.6 01/07/2019     No components found for: MCT  Lab Results   Component Value Date    MCV 88 01/07/2019     Lab Results   Component Value Date    MCH 28.4 01/07/2019     Lab Results   Component Value Date    MCHC 32.2 01/07/2019     Lab Results   Component Value Date    RDW 14.8 01/07/2019     Lab Results   Component Value Date     01/07/2019     Sed Rate   Date Value Ref Range Status   01/07/2019 9 0 - 30 mm/h Final     Lab Results   Component Value Date    AST 19 01/07/2019     Lab Results   Component Value Date    ALT 32 01/07/2019     Creatinine   Date Value Ref Range Status   01/07/2019 0.78 0.52 - 1.04 mg/dL Final     Pt has been under a lot of stress. 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 Things had been going ok until last night when pt had to bring her to the ER and was found to have a PE.  We did discuss the impact stress has on her condition.    Miguel is out of the clinic today and will forward this message on to  Dr Samayoa for recommendations.    Matt Hogan, BSN RN  Rheumatology RN Coordinator  [facility]

## 2019-01-08 NOTE — TELEPHONE ENCOUNTER
Please see MyChart message from earlier today.    Matt Hogan, CLEMENTN RN  Rheumatology RN Coordinator  [facility]

## 2019-01-08 NOTE — TELEPHONE ENCOUNTER
Please see earlier MyChart message from today.    Matt Hogan, CLEMENTN RN  Rheumatology RN Coordinator  [facility]

## 2019-01-10 NOTE — TELEPHONE ENCOUNTER
She should come in and Miguel can evaluate her.  If she is having obvious breakthrough of her disease globally we need to consider a new medicine.  Fortunately there are newer alternatives that may be appropriate in her case.

## 2019-01-14 ENCOUNTER — OFFICE VISIT (OUTPATIENT)
Dept: OPHTHALMOLOGY | Facility: CLINIC | Age: 64
End: 2019-01-14
Attending: OPHTHALMOLOGY
Payer: COMMERCIAL

## 2019-01-14 ENCOUNTER — OFFICE VISIT (OUTPATIENT)
Dept: RHEUMATOLOGY | Facility: CLINIC | Age: 64
End: 2019-01-14
Attending: NURSE PRACTITIONER
Payer: COMMERCIAL

## 2019-01-14 VITALS
HEIGHT: 66 IN | DIASTOLIC BLOOD PRESSURE: 73 MMHG | SYSTOLIC BLOOD PRESSURE: 113 MMHG | BODY MASS INDEX: 25.86 KG/M2 | OXYGEN SATURATION: 94 % | HEART RATE: 79 BPM | WEIGHT: 160.9 LBS | TEMPERATURE: 98.4 F

## 2019-01-14 DIAGNOSIS — H20.9 UVEITIS: ICD-10-CM

## 2019-01-14 DIAGNOSIS — H15.002 SCLERITIS OF LEFT EYE: ICD-10-CM

## 2019-01-14 DIAGNOSIS — M46.99 INFLAMMATORY SPONDYLOPATHY OF MULTIPLE SITES IN SPINE (H): ICD-10-CM

## 2019-01-14 DIAGNOSIS — M25.559 PAIN IN JOINT, PELVIC REGION AND THIGH, UNSPECIFIED LATERALITY: ICD-10-CM

## 2019-01-14 DIAGNOSIS — L40.50 PSORIATIC ARTHRITIS (H): Primary | ICD-10-CM

## 2019-01-14 DIAGNOSIS — Z11.59 ENCOUNTER FOR SCREENING FOR OTHER VIRAL DISEASES: ICD-10-CM

## 2019-01-14 DIAGNOSIS — L40.50 PSORIATIC ARTHRITIS (H): ICD-10-CM

## 2019-01-14 DIAGNOSIS — Z79.899 HIGH RISK MEDICATIONS (NOT ANTICOAGULANTS) LONG-TERM USE: ICD-10-CM

## 2019-01-14 DIAGNOSIS — H20.9 UVEITIS OF LEFT EYE: Primary | ICD-10-CM

## 2019-01-14 DIAGNOSIS — M25.551 HIP PAIN, RIGHT: ICD-10-CM

## 2019-01-14 PROCEDURE — G0463 HOSPITAL OUTPT CLINIC VISIT: HCPCS | Mod: 27

## 2019-01-14 PROCEDURE — G0463 HOSPITAL OUTPT CLINIC VISIT: HCPCS | Mod: ZF

## 2019-01-14 PROCEDURE — 86480 TB TEST CELL IMMUN MEASURE: CPT

## 2019-01-14 PROCEDURE — 87340 HEPATITIS B SURFACE AG IA: CPT

## 2019-01-14 PROCEDURE — 86704 HEP B CORE ANTIBODY TOTAL: CPT

## 2019-01-14 PROCEDURE — 86803 HEPATITIS C AB TEST: CPT

## 2019-01-14 RX ORDER — PREDNISONE 5 MG/1
TABLET ORAL
Qty: 60 TABLET | Refills: 2 | Status: ON HOLD | OUTPATIENT
Start: 2019-01-14 | End: 2020-08-04

## 2019-01-14 RX ORDER — FOLIC ACID 1 MG/1
TABLET ORAL
Qty: 210 TABLET | Refills: 3 | Status: SHIPPED | OUTPATIENT
Start: 2019-01-14 | End: 2019-06-26

## 2019-01-14 ASSESSMENT — REFRACTION_WEARINGRX
OS_ADD: +2.50
OD_ADD: +2.50
OS_SPHERE: -0.25
OD_AXIS: 106
OS_CYLINDER: +0.50
OS_AXIS: 077
OD_SPHERE: -0.50
SPECS_TYPE: PAL
OD_CYLINDER: +1.00

## 2019-01-14 ASSESSMENT — TONOMETRY
OD_IOP_MMHG: 14
IOP_METHOD: TONOPEN
OS_IOP_MMHG: 16

## 2019-01-14 ASSESSMENT — VISUAL ACUITY
CORRECTION_TYPE: GLASSES
METHOD: SNELLEN - LINEAR
OS_CC: 20/20
OD_CC: 20/20

## 2019-01-14 ASSESSMENT — CONF VISUAL FIELD
OD_NORMAL: 1
OS_NORMAL: 1

## 2019-01-14 ASSESSMENT — CUP TO DISC RATIO
OS_RATIO: 0.3
OD_RATIO: 0.3

## 2019-01-14 ASSESSMENT — EXTERNAL EXAM - LEFT EYE: OS_EXAM: NORMAL

## 2019-01-14 ASSESSMENT — ENCOUNTER SYMPTOMS: JOINT SWELLING: 1

## 2019-01-14 ASSESSMENT — PAIN SCALES - GENERAL: PAINLEVEL: SEVERE PAIN (6)

## 2019-01-14 ASSESSMENT — MIFFLIN-ST. JEOR: SCORE: 1298.65

## 2019-01-14 ASSESSMENT — EXTERNAL EXAM - RIGHT EYE: OD_EXAM: NORMAL

## 2019-01-14 NOTE — PROGRESS NOTES
[facility] - Rheumatology Clinic Visit    Krissy Weldon  is a 63 year old female who presents with follow-up for undifferentiated polyarthropathy, psoriatic arthritis. Complicated by eye inflammation (uveitis and scleritis--sees [facility]), tendonitis, costrochondritis, tendon tears, synovitis, dactylitis, OA/DJD jessica thumbs CMC. Failed or intolerance to multiple medications.     Copy forward: [-SSa, -SSb, -CCP,HLA-B27 negative on outside workup with previous SI injections] with hx- inflammatory eye left eye episcleritis requiring prednisone gtt or oral with bone scan unrevealing. Previous medrol or steroid responsiveness. Past tried humira, SSZ, simponi SQ/IV, remicade and otelza. Failed humira EoW recurrent episcleritis, right wrist, right SI, bilateral hamstrings tendonosis with partial tear bilaterally. Failed simponi sq/IV ineffective. SSZ and oral MTX. Past recurrent iritis (ER if eye pain, blurred vision, red eye). Remicade. Otezla. LLL pneumonia 3/2015. MRI L hip showed high grade tear gluteus minimus insertion site, effusion 4/2015. C/o neck issues with MRI cervical spine show DJD, EMG, paramedian osteophytes and disk protrusion at C3-C4 with some moderate central canal stenosis and moderate to severe right-sided foraminal stenosis as a consequence.  C5-C6 also showed some mild to moderate central canal stenosis and moderate bilateral central foraminal stenosis.  Seen Dr. Wheeler with significant improvement in GI issues pancreatic sphinctor dysfunction requiring surgery now on pancreatic enzymes after pancreatic duct is open. Failed cimzia Cimzia lost effectiveness (more uveitis, arthritis, synovitis,bursitis and tendonitis).     Copy forward hx:   Last seen  Dr. Samayoa. Continued plan. Methotrexate 0.8 ml week Q Monday (20 mg). Continues cimzia injections (due this Thurs).  No gout flares. Past in right great toe, but no aspirations.   Right thumb surgery July-Aug 2015. Pain  and motion much improved. Severe flare of your arthritis and eye inflammation when holding the cimzia and MTX for surgery requiring prednisone 5 mg x 2 week then stopped   Strept , pneumonia 3-2015. Recent bowel obstruction 9-2015, seen gastro 1-2016 given hx of pancreatic sphincter dysfunction.   Recent flare 2-4, requiring medrol dose pack--labs were NL except CRP 11 [Hands/ feet swelling with pain, back and neck along with an eye infection and scleritis].    Left eye inflammation flare with infection about 1 months ago, on 3 gtt. Slow clearing. Then 1 week after the gtt, developed neck pain hard to turn head to left. Then L achilles tendonitis, with costrochondritis this was last week, hands MCP 2-3 swelling. Started the medrol day 4--dramatic improvement [move her neck, eye improved, achilles much better now can walk on, the costrochondritis]. Left eye infection much better. Left hip still bothers, still much improved with prednisone. Energy much better. Cimzia lasting about 10 days. Left upper arm psoriasis. EAS none with medrol. This is the best she has felt overall. Rare use of prednisone of steroids. Last eye inflammation last summer. Left arm into her deltoid tingling with her neck, 1 week before flare. Having hard time moving her neck side to side, forward and back. The back of her neck tissues feel swollen on her left side. Prior to this no neck last flare was in . Does her neck exercise. 2 year of job specific, the end of March final decision.     Taking oxycodone 15 mg QID during this flare. Under [facility] pain clinic.     Copy forward February 15, 2017  I seen her last 2-2016. Dr. Samayoa  who continued the plan and using diclofenac for achilles pain. EHR reviewed. Was using medical marijuana.  Louise reported working well for her uveitis and constronchondritis; and the methotrexate injectable for her joints. Labs today normal     No medical marijauna as this didn't work  MS ER 15 mg  "BID for about 6 weeks. Oxycodone HS prn most nights. Off the pain patch due to insurance coverage. Goes to [facility] pain clinic.     C/o will get short of breath when swims or bends over for the past 6 weeks. No cardiac symptoms, CP or heaviness. Has had night sweats for many years which is not new. Losing weight but appetite is good, although her partner does feel she's not eating as well. Been on MS ER for about the time these symptoms started. No stomach pain. +costrochondritis as before. No blood in urine or stool. Taking omeprezole 40 mg every day for heartburn--will see PCP for possible UGI as feels some is near her esophagus. Due for annual physical and denies getting bone health done --does take 1000 IU day vitamin D. Due for mammogram. Hx hyst. No cough, fevers or ABD pain. No symptoms like had years ago with ERCP. \"no liver pain\". TSH normal 4 mths ago she reports. No blood in urine or stool.     Continues injection MTX 20 mg week Q Monday, FA 3 mg day, cimzia every 2 weeks or twice a month (takes prednisone 10 mg day before, day of and day after injection due to prior symptoms of N/V, fevers when did cimzia injections which she doesn't get now). Didn't take the prednisone this last injection as just had left hip injected per Dr. Maciel Dukes [facility]. Told she will eventually need a THR. \"butt muscles are sore\" but overall better symptoms on this plan. Seen eye doctor about twice this past year, as will have \"episcleritis\" about 4 days before injection cimzia but not every time due. FIngertips skin is cracking this past year, so interested in seeing derm. She's very happy with arthritis and uveitis control with this plan so wishes to continue. Mild psoriasis left ear only. Very happy with thumb surgeries. Denies any skin mole or lesion changes.     Changing to Medicare with [employer] April 1st.  [facility] reviewed and updated by me     Copy forward May 31, 2017  Strept infection 4-21 prior to ED then treated " with ABX. Exposed from [facility]. Missed one dose of MTX   ED 4-2017 for nausea with vomiting. CT of the abdomen/pelvis with contrast --no significant findings other than prominence of the common biliary duct which is likely related to cholecystectomy.  She was treated with Dilaudid and Zofran for pain and nausea. Given reglan for breakthrough nausea. Labs noted overall NL. 2 bags IVF    Ablation SI Right 4/25 and left Feb 26th 2017 --[facility] clinic   PT for her hip--told by therapy could feel the bursitis. Told needs THR and injection of left hip Feb 10th 2017 --this was [facility]. Who did x-rays and told would like to try to wait for at least a year. [facility] orth[facility] Dr. PUMA Dukes --her pain clinic provider would like to try platelet rich plasma injection     Cimzia injections high cost $3000 for 2 months. Deductible $4500 then cost catestrophic then goes down $300   Left eye uveitis about 5 days before the next injection then has to do the prednisone gtts for about 3 months. Hands are sore jessica the tips of then S/T and then MCPs. This is toward the end of the cycle and in bewtween too. Last injection last Tuesday. Some uveitis seen her about 2 months ago [facility]     MTX every week Mon or Tues 20 mg--tolerating well. Some mild fatigue but tolerating. NO infection now .   No psoriasis --dry   Fingertips not cracking now as using superglue  Trying to loss weight and exercise.  Seen PCP for hard to take a deep breath, SOB--treated with inhaler then didn't help.    SURGEON WANTS TO RE-DO HER LEFT THUMB SURGERY.    Copy forward October 25, 2017  Continues the humira 40 mg SQ every 2 weeks, some diarrhea day of   Adjusted her MTX 25 mg SW every 7 days early Sept, this is some diarrhea day of and after, tolerable. FA 2 mg day. Some fatigue after taking but feels the MTX is working and now the humira is really working on her tendontisis, enthesisits, and no longer had any uveitis flaring now not needing prednisone. No  infections. Her left 1st CMC is eroded she reports but does wish to hold on any surgeries as the humira is the only one that has really worked to control her the best. Getting cervival MRI and then may need injection --right upper back numb at times and I asked her to keep me updated. Feet are less sore and doing well. Takes her fentanyl patch now and only 1-2 oxycodone at night this controls her pain with the tylenol arthritis. No hariloss. Hips still painful, but some improved. No psoriasis. Denies fever, chils, cough, SOB, SANCHEZ. Other [facility] reviewed and updated by me      Copy forward  October 31, 2018  DEXA 7-2018 T-1.8 low bone density, worsening was referred to endo due to risk osteoporosis she will do this in the future. Adalimumab (humira) 40 mg injection every 2 weeks and methotrexate  25 mg every MON, folic acid  3 mg day tolerating and no side-effects. Throat illness in the interium was treated with antibiotics. No prednisone. No NSAID. Weaning off fentanyl patch 12.5 from 25 mcg/hr and down on oxycodone 1 tablet at bedtime since starting the medical marijuana about a month ago. Pain is dramatically improved, and her bowels. No inflammatory eye flares, does have mild eye pain a day before her adalimumab (humira) injection. Right hip improved since the STEM cell donor injection in Aug. Overall, she feels she is doing well with her chronic pain, and right hip pain down by 75% since the doner injection.     January 14, 2019  Adalimumab (humira) 40 mg injection every 10 days (had 2 days ago, paying $2200 month) and methotrexate  25 mg every MON, folic acid  3 mg day tolerating no side-effects. Psoriatic arthritis is not controlled, flaring started about early December with active dactylitis, could feel she tore her left gluteus again (harder to walk with pain and in the same area), cracking the ends of her fingers, left achilles tendon swelling with pain, dactylitis and pain in toes and her fingers. Left eye  inflammation started about a month ago (eye pain, red, change in vision, hurt to blink), she called her opthalmologist  [facility] Who told her to start her steroid gtts, then this did not improved so told to start prednisone 5 mg day about 10 days ago. The swelling is better, the eye is as well but still some eye pain and reports her vision change still. Sees pain clinic trying to reduce her narcotics, and is on CBD oil (some side-effects and not improving her pain). Partner inpt for PE after bilateral lung transplant. No infections. Taking vitamin D. Reports vitamin D and TSH levels were recently normal. Would like to talk about trial of other medications for her psoriatic arthritis. Feels her toe joints are now more deformed. Denies any fever, chills, SOB, SANCHEZ, night sweats, or chest pain, or cough. Reports healthy. Feels neck is stiffer and harder to move or turn. Noticing costrochondritis.       PROBLEM LIST: Past medical history is notable for her being presumptively treated for Lyme with doxycycline after developing a targetoid lesion, for a history of Hashimoto's thyroiditis with subsequent hypothyroidism, for the diagnosis now of psoriasis, and for a history of depression.Neck pain, cervical stenosis-- MRI  +moderate central stenosis right C3-4, C5-6 degenerative changes 2nd mild-mod central stenosis. Past referral to physiatry-wishes to return to Dr. Edmond [facility] Left hip pain s/p tear needs THR she reports. B) Hx-Bilateral tendonitis hamstrings with right bursitis, partial tear per Dr. Gomez s/p injections. [place] [facility] [place] SI joint, tendon insertion site. Sees Dr. Maciel Dukes. C). Transient elevation LFT 2/2013 [ NL after held MTX and tramadol 2wk]; transient 4- [ AST 89]. NL . Other hx --Panceatic sphinctor dysfunction. On pancreatic enzymes. SBO 9-2015, now no doing well. Right thumb DJD, s/p surgery . Healed.  1. Diffuse  degenerative joint disease that is both clinically apparent and obvious on multiple imaging modalities including bone scan, MRI 2/2013 or cervical. DJD L4-L5, L5-S1 per MRI 7/2012; MRI [id number]   2. Diffuse inflammatory arthritis with marked morning stiffness, no systemic inflammation by blood tests, but greater than 80% clinical improvement with a Medrol Dosepak.   3. Onset of the inflammatory joint symptoms including sacroiliitis with the development of psoriasis, suggesting that this represents psoriatic arthritis.   4. Development of episcleritis in the left eye with improvement with steroid eyedrops 12/14/2011 with recurrent episodes when wean oral prednisone (9/2012)  5. History of Hashimoto's thyroiditis.   6. History of presumptive treatment for Lyme disease with doxycycline after development of a targetoid lesion.   7. Poor tolerance of sulfasalazine and oral methotrexate.   8. Bilateral hamstring tendonosis, right partial tear, sacroilitis 7/2012. See MRI, repeat 2/2013 hamstring and right SI inflammation seeing Dr. Arvin Xie at [facility] specialized in SI. , left hamstring   9. DJD L4-L5, L5-S1 per MRI 7/2012  10. Intermittent prednisone exposure  11. Transient elevation LFT ALT 84/AST 58 2/2013 (nl after held MTX and tramadol, then increased folic acid 2mg day)  12. Past LUE burn developed into cellulitis resolved from keflex. Bronchitis now on zithromax irritating her costrochondritis. Improving after 1 day  13. 1-2014 Left knee meniscal tear with chrondomalacia per MRI (had Rt/Lt CMJ surgery)  14. 4- RUQ pain.   15.  Hx recurrent left episcleritis   16. Right thumb tendon surgery with Dr. [facility]  17. Pneumonia 3-2015; strept  and 4-2017      Past Medical History:   Diagnosis Date     Acute meniscal tear of knee 1/2014    with chrondromalacia per MRI      Depression      DJD (degenerative joint disease), lumbar 7/2012    L4-5, L5-S1 per MRI      Episcleritis  12/14/2011     Hamstring tendonitis at origin 7/2012    Bilaterally with partial tear right, sacroilitis per MRI     Hypothyroid 9/7/2011     Hypothyroidism      PONV (postoperative nausea and vomiting)      Psoriatic arthritis (H) 9/7/2011     Psoriatic arthritis (H) 2/9/2016     Strep throat 04/2017       Surgical-She has a surgical history including appendectomy in 1980, cholecystectomy in approximately 1993, and hysterectomy without oophorectomy in 1996.  Ablation SI Right 4/25 and left Feb 26th 2017 -- pain clinic   PT for her hip--told by therapy could feel the bursitis. Told needs THR and injection of left hip Feb 10th 2017 -  Past Surgical History:   Procedure Laterality Date     APPENDECTOMY       ARTHROPLASTY CARPOMETACARPAL (THUMB JOINT)  11/8/2013    Procedure: ARTHROPLASTY CARPOMETACARPAL (THUMB JOINT);  Left First Carpometacarpal Trapezium Resection, Tendon Interposition  ;  Surgeon: Luiza Farrar MD;  Location: [facility]     ARTHROPLASTY CARPOMETACARPAL (THUMB JOINT)  12/20/2013    Procedure: ARTHROPLASTY CARPOMETACARPAL (THUMB JOINT);  Right Thumb Trapezium Resection With Flexor Carpi Radialis Tendon Reconstruction       CHOLECYSTECTOMY       COLONOSCOPY  5/6/2014    Procedure: COLONOSCOPY;  Surgeon: Adria San MD;  Location:  GI     ENDOSCOPIC RETROGRADE CHOLANGIOPANCREATOGRAM  6/13/2014    Procedure: ENDOSCOPIC RETROGRADE CHOLANGIOPANCREATOGRAM;  Surgeon: Lianna Wheeler MD;  Location: [facility] OR     GALLBLADDER SURGERY       GYN SURGERY      hysterectomy and oophorectomy      [facility] ENDOSCOPY W EUS Left 6/10/2014    Procedure: COMBINED ENDOSCOPIC ULTRASOUND, ESOPHAGOSCOPY, GASTROSCOPY, DUODENOSCOPY (EGD);  Surgeon: Lianna Wheeler MD;  Location: [facility] GI     HYSTERECTOMY       Right thumb surgery  7/2015     XR SACROILIAC THERAPEUTIC INJECTION BILATERAL  12/2011       FH:  No autoimmune disorders, psoriasis, UC, crohn's, SLE, RA, PsA, gout, autoimmune thyroid.  No MS,  "heart disease or cancer in family  Mother-endometrial cancer, RHEUMATOID ARTHRITIS (RA)   Father-psoriasis, lymphoma, colon and prostate cancer   Siblings-sister RHEUMATOID ARTHRITIS (RA), OSTEOARTHRITIS  And crohns   Children-  MGM RHEUMATOID ARTHRITIS (RA)       SH:  No Alcohol. No Smoking. No IVDU. Partner on list for lung transplant     [facility] personally reviewed and updated by me.    ROS:  CONSTITUTIONAL: No fevers, night sweats or unintentional weight change. No acute distress, swollen glands  EYES: No vision change, diplopia, pain in eyes or red eyes   EARS, NOSE, MOUTH, THROAT: No tinnitus or hearing change, no epistaxis or nasal discharge, no oral lesions, throat clear. Normal saliva pool.  No drymouth. No thyroid Enlargement.   CARDIOVASCULAR: No chest pain, palpitations, or pain with walking, no orthopnea or PND   RESPIRATORY: No dyspnea, cough, shortness of breath or wheezing. No pleurisy.   GI: No nausea, vomiting, diarrhea or constipation, no abdominal pain, or blood in stools.   : No change in urine, no dysuria or hematuria   MUSCKL: See above   INTEGUMENTARY: No concerning lesions or moles   NEURO: No loss of strength or sensation, no numbness or tingling, no tremor, no dizziness, no headache. No falls   ENDO: No polyuria or polydipsia, no temperature intolerance   HEME/LYMPH:No concerning bumps, bleeding problems, or swollen lymph nodes. No recent infections, hospitalizations or new illnesses.   Otherwise 14 point ROS obtained, reviewed and found negative.     Physical exam:  Vitals: Blood pressure 113/73, pulse 79, temperature 98.4  F (36.9  C), temperature source Oral, height 1.664 m (5' 5.5\"), weight 73 kg (160 lb 14.4 oz), last menstrual period 03/06/2012, SpO2 94 %, not currently breastfeeding.  Wt Readings from Last 4 Encounters:   01/14/19 73 kg (160 lb 14.4 oz)   10/31/18 74.4 kg (164 lb)   07/03/18 73.6 kg (162 lb 3.2 oz)   06/23/18 73.5 kg (162 lb)     Constitutional: WD-WN-WG " cooperative  Eyes: nl EOM, PERRLA, vision, conjunctiva, sclera, No Unilateral or bilateral external ear inflammation   ENT: nl external ears, nose, hearing, lips, teeth, gums, throat. No saddle nose   Neck: no mass or thyroid enlargement  Resp: lungs clear to auscultation, nl to palpation, nl effort  CV: RRR, no murmurs, rubs or gallops, no edema  GI: no ABD mass or tenderness, no HSM  : not tested  Lymph: no cervical or epitrochlear nodes  MS: contractures in toes, dactylitis in toes and fingers, tender in PIPs with skin thickening in PIPs, tender in DIPS with swelling. Reduced ROM neck laterally and extension. Pain in hips. All TMJ, neck, shoulder, elbow, wrist, hand, spine, hip, knee, ankle, and foot joints were examined and otherwise found normal. Weak . Normal prayer sign. Negative Lhermitte's sign. No periuncle erythema  Skin: no nail pitting, alopecia, rash, dry skin most notably on hands and the ends of her fingers cracked   Neuro: nl cranial nerves, strength, sensation, DTRs.   Psych: nl judgement, orientation, memory, affect.      Labs/Imaging:    Results for orders placed or performed in visit on 01/07/19   Erythrocyte sedimentation rate auto   Result Value Ref Range    Sed Rate 9 0 - 30 mm/h   CRP inflammation   Result Value Ref Range    CRP Inflammation <2.9 0.0 - 8.0 mg/L   Creatinine   Result Value Ref Range    Creatinine 0.78 0.52 - 1.04 mg/dL    GFR Estimate 81 >60 mL/min/[1.73_m2]    GFR Estimate If Black >90 >60 mL/min/[1.73_m2]   Albumin level   Result Value Ref Range    Albumin 3.8 3.4 - 5.0 g/dL   ALT   Result Value Ref Range    ALT 32 0 - 50 U/L   AST   Result Value Ref Range    AST 19 0 - 45 U/L   CBC with platelets differential   Result Value Ref Range    WBC 6.0 4.0 - 11.0 10e9/L    RBC Count 4.82 3.8 - 5.2 10e12/L    Hemoglobin 13.7 11.7 - 15.7 g/dL    Hematocrit 42.6 35.0 - 47.0 %    MCV 88 78 - 100 fl    MCH 28.4 26.5 - 33.0 pg    MCHC 32.2 31.5 - 36.5 g/dL    RDW 14.8 10.0 - 15.0  %    Platelet Count 263 150 - 450 10e9/L    Diff Method Automated Method     % Neutrophils 60.5 %    % Lymphocytes 31.4 %    % Monocytes 6.3 %    % Eosinophils 0.8 %    % Basophils 0.8 %    % Immature Granulocytes 0.2 %    Nucleated RBCs 0 0 /100    Absolute Neutrophil 3.6 1.6 - 8.3 10e9/L    Absolute Lymphocytes 1.9 0.8 - 5.3 10e9/L    Absolute Monocytes 0.4 0.0 - 1.3 10e9/L    Absolute Eosinophils 0.1 0.0 - 0.7 10e9/L    Absolute Basophils 0.1 0.0 - 0.2 10e9/L    Abs Immature Granulocytes 0.0 0 - 0.4 10e9/L    Absolute Nucleated RBC 0.0        Impression/Plan:    1.Psoriatic arthritis w/axial involvement activity with hx episcleritis/uveities, dactylitis, tendonitis, left hamstring tear. Exam showing active axial, peripheral signs or symptoms and possible inflammatory eye, and I am noting more changes in her toes joints now deformities (she will get x-rays from [facility]). Needs THR. Humira 8-2017 but every 10 days and MTX 25 mg Once a week injections but not controlling her disease. We discussed options and secukinumab (cosentyx), she would like to do this. I informed her she will need close monitoring of eye disease while we are switching her biologics as secukinumab (cosentyx) is not studied for inflammation of the eye as adalimumab (humira) is indicated. Referral to [facility] for formal education. I will have her take prednisone 10mg day x 3 week then stay at 5 mg day until next visit. Aprimilast (otezla) contraindicated due to depression and does have axial disease so I would not favor this    -High risk medications, labs normal   -Chronic pain under other provider and now on medical marijuana helping her to wean down and goal of off narcotics. I feel she does have a component of fibromylagia (in past reports neurontin cause sleepiness, and cymbalta change in mood, but not sure of the dose of neurontin--she can talk to her pain provider about this, work on sleep).    2. Uveitis-see opthalmologist today as reports change  in vision with eye pain.    3. Psoriasis.fingertip skin cracking. Referral to derm for formal consult   4. High risk medications monitoring, labs every 8-12 weeks. Normal today. Wishes for 2nd shingrix today, she tolerated the last one mild symptoms for 1-2 days.   5. Low bone density per DEXA 7-2018, worsening. Given high risk osteoporosis , I did refer her to endocrine for complete evaluation   6. Chronic pain under care of [facility] Dr. Xie.    Plan:   A) Will stop adalimumab (humira) 40 mg SQ injection every 10 days and plan to switch to secukinumab (cosentyx). Referral to [facility] Ste[facility] Hodge and will ask her and Dr. Samayoa if needs loading dose and how to switch. Continue m thotrexate 25 mg SQ once Monday week, folic acid 2-3 mg day. Volteran cream prn as directed.   --DMARD labs every 8 week-as hx elevated liver enzymes. Due for hep b/c, [facility] QG.    B)  Calcium and vitamin D. Complete physical due she wishes to come here   C) RTC 1 mths Dr. Samayoa  D) Referral to opthalmologist  Here today for concern of inflammation eye, asked CMA to get records from [facility] and scan in before 130PM   E) Annual and see PCP. F/U Dr. Wheeler for GI. F/U with pain clinic.      The patient understood the rationale for the diagnosis and treatment plan. Patient shared in the decision making. All questions were answered to best of my ability and the patient's satisfaction  Miguel Umana APRN, CNP, MSN  [facility]  Department of Rheumatology & Autoimmune Disorders    1. Immunization: Reviewed CDC guidelines with patient updated, information provided to patient based on CDC guidelines for vaccination recommendations, patient will discuss with primary provider  2. Bone Health:Educated on adequate calcium and vitamin D intake, Educated on exercise, Glucocorticoid education discussed risks, benefits & potential long term side effects from use  3. Discussed routine annual physicals, cancer screening,  cardiac risk monitoring, bone health and primary care with primary care provider.   4. Educated on diagnosis, prognosis, labs, imaging, treatment options (including risks, benefits, risk of no treatment), medications (use, dose, side-effects, risks of medications including infection/cancer/bone marrow suppression, lab monitoring), infections what to do, plan of cares, goals of treatment.     Addendum: reviewed with Dr. Samayoa and [facility]:   Plan will be Given she is switching due to inefficacy and not safety concerns, could consider starting Cosentyx at time of next scheduled Humira dose.  Would recommend load given flare and history of inefficacy to multiple biologics. 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 Samayoa who if doing well will wean prednisone and methotrexate

## 2019-01-14 NOTE — PATIENT INSTRUCTIONS
Secukinumab (cosentyx)   Patient Education     Secukinumab Solution for injection  What is this medicine?  SECUKINUMAB (sek ue KIN ue mab) is used to treat psoriasis. It is also used to treat psoriatic arthritis and ankylosing spondylitis.  This medicine may be used for other purposes; ask your health care provider or pharmacist if you have questions.  What should I tell my health care provider before I take this medicine?  They need to know if you have any of these conditions:    Crohn's disease, ulcerative colitis, or other inflammatory bowel disease    infection or history of infection    other conditions affecting the immune system    recently received or are scheduled to receive a vaccine    tuberculosis, a positive skin test for tuberculosis, or have recently been in close contact with someone who has tuberculosis    an unusual or allergic reaction to secukinumab, other medicines, latex, rubber, foods, dyes, or preservatives    pregnant or trying to get pregnant    breast-feeding  How should I use this medicine?  This medicine is for injection under the skin. It may be administered by a healthcare professional in a hospital or clinic setting or at home. If you get this medicine at home, you will be taught how to prepare and give this medicine. Use exactly as directed. Take your medicine at regular intervals. Do not take your medicine more often than directed.  It is important that you put your used needles and syringes in a special sharps container. Do not put them in a trash can. If you do not have a sharps container, call your pharmacist or healthcare provider to get one.  A special MedGuide will be given to you by the pharmacist with each prescription and refill. Be sure to read this information carefully each time.  Talk to your pediatrician regarding the use of this medicine in children. Special care may be needed.  Overdosage: If you think you have taken too much of this medicine contact a poison control  center or emergency room at once.  NOTE: This medicine is only for you. Do not share this medicine with others.  What if I miss a dose?  It is important not to miss your dose. Call your doctor of health care professional if you are unable to keep an appointment. If you give yourself the medicine and you miss a dose, take it as soon as you can. If it is almost time for your next dose, take only that dose. Do not take double or extra doses.  What may interact with this medicine?  Do not take this medicine with any of the following medications:    live virus vaccines  This medicine may also interact with the following medications:    cyclosporine    inactivated vaccines    warfarin  This list may not describe all possible interactions. Give your health care provider a list of all the medicines, herbs, non-prescription drugs, or dietary supplements you use. Also tell them if you smoke, drink alcohol, or use illegal drugs. Some items may interact with your medicine.  What should I watch for while using this medicine?  Tell your doctor or healthcare professional if your symptoms do not start to get better or if they get worse.  You will be tested for tuberculosis (TB) before you start this medicine. If your doctor prescribes any medicine for TB, you should start taking the TB medicine before starting this medicine. Make sure to finish the full course of TB medicine.  Call your doctor or healthcare professional for advice if you get a fever, chills or sore throat, or other symptoms of a cold or flu. Do not treat yourself. This drug decreases your body's ability to fight infections. Try to avoid being around people who are sick.  This medicine can decrease the response to a vaccine. If you need to get vaccinated, tell your healthcare professional if you have received this medicine within the last 6 months. Extra booster doses may be needed. Talk to your doctor to see if a different vaccination schedule is needed.  What side  effects may I notice from receiving this medicine?  Side effects that you should report to your doctor or health care professional as soon as possible:    allergic reactions like skin rash, itching or hives, swelling of the face, lips, or tongue    signs and symptoms of infection like fever or chills; cough; sore throat; pain or trouble passing urine  Side effects that usually do not require medical attention (report these to your doctor or health care professional if they continue or are bothersome):    diarrhea  This list may not describe all possible side effects. Call your doctor for medical advice about side effects. You may report side effects to FDA at 2-286-FDA-9595.  Where should I keep my medicine?  Keep out of the reach of children.  Store the prefilled syringe or injection pen in a refrigerator between 2 to 8 degrees C (36 to 46 degrees F). Keep the syringe or the pen in the original carton until ready for use. Protect from light. Do not freeze. Do not shake. Prior to use, remove the syringe or pen from the refrigerator and use within 1 hour. Throw away any unused medicine after the expiration date on the label.  NOTE: This sheet is a summary. It may not cover all possible information. If you have questions about this medicine, talk to your doctor, pharmacist, or health care provider.  NOTE:This sheet is a summary. It may not cover all possible information. If you have questions about this medicine, talk to your doctor, pharmacist, or health care provider. Copyright  2016 [employer]

## 2019-01-14 NOTE — LETTER
1/14/2019       RE: Krissy Weldon  [id number] [facility] [address]     Dear Colleague,    Thank you for referring your patient, Krissy Weldon, to the [facility] RHEUMATOLOGY at [facility]. Please see a copy of my visit note below.        Again, thank you f  [facility] - Rheumatology Clinic Visit    Krissy Weldon  is a 63 year old female who presents with follow-up for undifferentiated polyarthropathy, psoriatic arthritis. Complicated by eye inflammation (uveitis and scleritis--sees [facility]), tendonitis, costrochondritis, tendon tears, synovitis, dactylitis, OA/DJD jessica thumbs CMC. Failed or intolerance to multiple medications.     Copy forward: [-SSa, -SSb, -CCP,HLA-B27 negative on outside workup with previous SI injections] with hx- inflammatory eye left eye episcleritis requiring prednisone gtt or oral with bone scan unrevealing. Previous medrol or steroid responsiveness. Past tried humira, SSZ, simponi SQ/IV, remicade and otelza. Failed humira EoW recurrent episcleritis, right wrist, right SI, bilateral hamstrings tendonosis with partial tear bilaterally. Failed simponi sq/IV ineffective. SSZ and oral MTX. Past recurrent iritis (ER if eye pain, blurred vision, red eye). Remicade. Otezla. LLL pneumonia 3/2015. MRI L hip showed high grade tear gluteus minimus insertion site, effusion 4/2015. C/o neck issues with MRI cervical spine show DJD, EMG, paramedian osteophytes and disk protrusion at C3-C4 with some moderate central canal stenosis and moderate to severe right-sided foraminal stenosis as a consequence.  C5-C6 also showed some mild to moderate central canal stenosis and moderate bilateral central foraminal stenosis.  Seen Dr. Wheeler with significant improvement in GI issues pancreatic sphinctor dysfunction requiring surgery now on pancreatic enzymes after pancreatic duct is open. Failed cimzia Cimzia lost effectiveness (more uveitis, arthritis,  synovitis,bursitis and tendonitis).     Copy forward hx:   Last seen  Dr. Samayoa. Continued plan. Methotrexate 0.8 ml week Q Monday (20 mg). Continues cimzia injections (due this Thurs).  No gout flares. Past in right great toe, but no aspirations.   Right thumb surgery July-Aug 2015. Pain and motion much improved. Severe flare of your arthritis and eye inflammation when holding the cimzia and MTX for surgery requiring prednisone 5 mg x 2 week then stopped   Strept , pneumonia 3-2015. Recent bowel obstruction 9-2015, seen gastro 1-2016 given hx of pancreatic sphincter dysfunction.   Recent flare 2-4, requiring medrol dose pack--labs were NL except CRP 11 [Hands/ feet swelling with pain, back and neck along with an eye infection and scleritis].    Left eye inflammation flare with infection about 1 months ago, on 3 gtt. Slow clearing. Then 1 week after the gtt, developed neck pain hard to turn head to left. Then L achilles tendonitis, with costrochondritis this was last week, hands MCP 2-3 swelling. Started the medrol day 4--dramatic improvement [move her neck, eye improved, achilles much better now can walk on, the costrochondritis]. Left eye infection much better. Left hip still bothers, still much improved with prednisone. Energy much better. Cimzia lasting about 10 days. Left upper arm psoriasis. EAS none with medrol. This is the best she has felt overall. Rare use of prednisone of steroids. Last eye inflammation last summer. Left arm into her deltoid tingling with her neck, 1 week before flare. Having hard time moving her neck side to side, forward and back. The back of her neck tissues feel swollen on her left side. Prior to this no neck last flare was in . Does her neck exercise. 2 year of job specific, the end of March final decision.     Taking oxycodone 15 mg QID during this flare. Under [facility] pain clinic.     Copy forward February 15, 2017  I seen her last 2-2016. Dr. Samayoa   "who continued the plan and using diclofenac for achilles pain. EHR reviewed. Was using medical marijuana.  Cimzia reported working well for her uveitis and constronchondritis; and the methotrexate injectable for her joints. Labs today normal     No medical marijauna as this didn't work  MS ER 15 mg BID for about 6 weeks. Oxycodone HS prn most nights. Off the pain patch due to insurance coverage. Goes to [facility] pain clinic.     C/o will get short of breath when swims or bends over for the past 6 weeks. No cardiac symptoms, CP or heaviness. Has had night sweats for many years which is not new. Losing weight but appetite is good, although her partner does feel she's not eating as well. Been on MS ER for about the time these symptoms started. No stomach pain. +costrochondritis as before. No blood in urine or stool. Taking omeprezole 40 mg every day for heartburn--will see PCP for possible UGI as feels some is near her esophagus. Due for annual physical and denies getting bone health done --does take 1000 IU day vitamin D. Due for mammogram. Hx hyst. No cough, fevers or ABD pain. No symptoms like had years ago with ERCP. \"no liver pain\". TSH normal 4 mths ago she reports. No blood in urine or stool.     Continues injection MTX 20 mg week Q Monday, FA 3 mg day, cimzia every 2 weeks or twice a month (takes prednisone 10 mg day before, day of and day after injection due to prior symptoms of N/V, fevers when did cimzia injections which she doesn't get now). Didn't take the prednisone this last injection as just had left hip injected per Dr. Maciel Dukes [facility]. Told she will eventually need a THR. \"butt muscles are sore\" but overall better symptoms on this plan. Seen eye doctor about twice this past year, as will have \"episcleritis\" about 4 days before injection cimzia but not every time due. FIngertips skin is cracking this past year, so interested in seeing derm. She's very happy with arthritis and uveitis control with " this plan so wishes to continue. Mild psoriasis left ear only. Very happy with thumb surgeries. Denies any skin mole or lesion changes.     Changing to Medicare with [employer] April 1st.  [facility] and [facility] by me     Copy forward May 31, 2017  Strept infection 4-21 prior to ED then treated with ABX. Exposed from [facility]. Missed one dose of MTX   ED 4-2017 for nausea with vomiting. CT of the abdomen/pelvis with contrast --no significant findings other than prominence of the common biliary duct which is likely related to cholecystectomy.  She was treated with Dilaudid and Zofran for pain and nausea. Given reglan for breakthrough nausea. Labs noted overall NL. 2 bags IVF    Ablation SI Right 4/25 and left Feb 26th 2017 --[facility] pain clinic   PT for her hip--told by therapy could feel the bursitis. Told needs THR and injection of left hip Feb 10th 2017 --this was [facility]. Who did x-rays and told would like to try to wait for at least a year. [facility] orth[facility] Dr. PUMA Dukes --her pain clinic provider would like to try platelet rich plasma injection     Cimzia injections high cost $3000 for 2 months. Deductible $4500 then cost catestrophic then goes down $300   Left eye uveitis about 5 days before the next injection then has to do the prednisone gtts for about 3 months. Hands are sore jessica the tips of then S/T and then MCPs. This is toward the end of the cycle and in bewtween too. Last injection last Tuesday. Some uveitis seen her about 2 months ago [facility]     MTX every week Mon or Tues 20 mg--tolerating well. Some mild fatigue but tolerating. NO infection now .   No psoriasis --dry   Fingertips not cracking now as using superglue  Trying to loss weight and exercise.  Seen PCP for hard to take a deep breath, SOB--treated with inhaler then didn't help.    SURGEON WANTS TO RE-DO HER LEFT THUMB SURGERY.    Copy forward October 25, 2017  Continues the humira 40 mg SQ every 2 weeks, some diarrhea day of   Adjusted her  MTX 25 mg SW every 7 days early Sept, this is some diarrhea day of and after, tolerable. FA 2 mg day. Some fatigue after taking but feels the MTX is working and now the humira is really working on her tendontisis, enthesisits, and no longer had any uveitis flaring now not needing prednisone. No infections. Her left 1st CMC is eroded she reports but does wish to hold on any surgeries as the humira is the only one that has really worked to control her the best. Getting cervival MRI and then may need injection --right upper back numb at times and I asked her to keep me updated. Feet are less sore and doing well. Takes her fentanyl patch now and only 1-2 oxycodone at night this controls her pain with the tylenol arthritis. No hariloss. Hips still painful, but some improved. No psoriasis. Denies fever, chils, cough, SOB, SANCHEZ. Other [facility]MS[facility] reviewed and updated by me      Copy forward  October 31, 2018  DEXA 7-2018 T-1.8 low bone density, worsening was referred to endo due to risk osteoporosis she will do this in the future. Adalimumab (humira) 40 mg injection every 2 weeks and methotrexate  25 mg every MON, folic acid  3 mg day tolerating and no side-effects. Throat illness in the interium was treated with antibiotics. No prednisone. No NSAID. Weaning off fentanyl patch 12.5 from 25 mcg/hr and down on oxycodone 1 tablet at bedtime since starting the medical marijuana about a month ago. Pain is dramatically improved, and her bowels. No inflammatory eye flares, does have mild eye pain a day before her adalimumab (humira) injection. Right hip improved since the STEM cell donor injection in Aug. Overall, she feels she is doing well with her chronic pain, and right hip pain down by 75% since the doner injection.     January 14, 2019  Adalimumab (humira) 40 mg injection every 10 days (had 2 days ago, paying $2200 month) and methotrexate  25 mg every MON, folic acid  3 mg day tolerating no side-effects. Psoriatic arthritis is not  controlled, flaring started about early December with active dactylitis, could feel she tore her left gluteus again (harder to walk with pain and in the same area), cracking the ends of her fingers, left achilles tendon swelling with pain, dactylitis and pain in toes and her fingers. Left eye inflammation started about a month ago (eye pain, red, change in vision, hurt to blink), she called her opthalmologist  [facility] mariee Who told her to start her steroid gtts, then this did not improved so told to start prednisone 5 mg day about 10 days ago. The swelling is better, the eye is as well but still some eye pain and reports her vision change still. Sees pain clinic trying to reduce her narcotics, and is on CBD oil (some side-effects and not improving her pain). Partner inpt for PE after bilateral lung transplant. No infections. Taking vitamin D. Reports vitamin D and TSH levels were recently normal. Would like to talk about trial of other medications for her psoriatic arthritis. Feels her toe joints are now more deformed. Denies any fever, chills, SOB, SANCHEZ, night sweats, or chest pain, or cough. Reports healthy. Feels neck is stiffer and harder to move or turn. Noticing costrochondritis.       PROBLEM LIST: Past medical history is notable for her being presumptively treated for Lyme with doxycycline after developing a targetoid lesion, for a history of Hashimoto's thyroiditis with subsequent hypothyroidism, for the diagnosis now of psoriasis, and for a history of depression.Neck pain, cervical stenosis-- MRI  +moderate central stenosis right C3-4, C5-6 degenerative changes 2nd mild-mod central stenosis. Past referral to physiatry-wishes to return to Dr. Edmond [facility]ant [facility] Left hip pain s/p tear needs THR she reports. B) Hx-Bilateral tendonitis hamstrings with right bursitis, partial tear per Dr. Gomez s/p injections. Seen Dr. Arvin Xie at [facility] s/p ablation SI joint, tendon insertion site. Sees   Maciel Dukes. C). Transient elevation LFT 2/2013 [ NL after held MTX and tramadol 2wk]; transient 4- [ AST 89]. NL . Other hx --Panceatic sphinctor dysfunction. On pancreatic enzymes. SBO 9-2015, now no doing well. Right thumb DJD, s/p surgery . Healed.  1. Diffuse degenerative joint disease that is both clinically apparent and obvious on multiple imaging modalities including bone scan, MRI 2/2013 or cervical. DJD L4-L5, L5-S1 per MRI 7/2012; MRI [id number]   2. Diffuse inflammatory arthritis with marked morning stiffness, no systemic inflammation by blood tests, but greater than 80% clinical improvement with a Medrol Dosepak.   3. Onset of the inflammatory joint symptoms including sacroiliitis with the development of psoriasis, suggesting that this represents psoriatic arthritis.   4. Development of episcleritis in the left eye with improvement with steroid eyedrops 12/14/2011 with recurrent episodes when wean oral prednisone (9/2012)  5. History of Hashimoto's thyroiditis.   6. History of presumptive treatment for Lyme disease with doxycycline after development of a targetoid lesion.   7. Poor tolerance of sulfasalazine and oral methotrexate.   8. Bilateral hamstring tendonosis, right partial tear, sacroilitis 7/2012. See MRI, repeat 2/2013 hamstring and right SI inflammation seeing Dr. Arvin Xie at [facility] in SI. , left hamstring   9. DJD L4-L5, L5-S1 per MRI 7/2012  10. Intermittent prednisone exposure  11. Transient elevation LFT ALT 84/AST 58 2/2013 (nl after held MTX and tramadol, then increased folic acid 2mg day)  12. Past LUE burn developed into cellulitis resolved from keflex. Bronchitis now on zithromax irritating her costrochondritis. Improving after 1 day  13. 1-2014 Left knee meniscal tear with chrondomalacia per MRI (had Rt/Lt CMJ surgery)  14. 4- RUQ pain.   15.  Hx recurrent left episcleritis   16. Right thumb tendon surgery with   [facility]  17. Pneumonia 3-2015; strept  and 4-2017      Past Medical History:   Diagnosis Date     Acute meniscal tear of knee 1/2014    with chrondromalacia per MRI      Depression      DJD (degenerative joint disease), lumbar 7/2012    L4-5, L5-S1 per MRI      Episcleritis 12/14/2011     Hamstring tendonitis at origin 7/2012    Bilaterally with partial tear right, sacroilitis per MRI     Hypothyroid 9/7/2011     Hypothyroidism      PONV (postoperative nausea and vomiting)      Psoriatic arthritis (H) 9/7/2011     Psoriatic arthritis (H) 2/9/2016     Strep throat 04/2017       Surgical-She has a surgical history including appendectomy in 1980, cholecystectomy in approximately 1993, and hysterectomy without oophorectomy in 1996.  Ablation SI Right 4/25 and left Feb 26th 2017 -- pain clinic   PT for her hip--told by therapy could feel the bursitis. Told needs THR and injection of left hip Feb 10th 2017 -  Past Surgical History:   Procedure Laterality Date     APPENDECTOMY       ARTHROPLASTY CARPOMETACARPAL (THUMB JOINT)  11/8/2013    Procedure: ARTHROPLASTY CARPOMETACARPAL (THUMB JOINT);  Left First Carpometacarpal Trapezium Resection, Tendon Interposition  ;  Surgeon: Luiza Farrar MD;  Location:  OR     ARTHROPLASTY CARPOMETACARPAL (THUMB JOINT)  12/20/2013    Procedure: ARTHROPLASTY CARPOMETACARPAL (THUMB JOINT);  Right Thumb Trapezium Resection With Flexor Carpi Radialis Tendon Reconstruction       CHOLECYSTECTOMY       COLONOSCOPY  5/6/2014    Procedure: COLONOSCOPY;  Surgeon: Adria San MD;  Location:  GI     ENDOSCOPIC RETROGRADE CHOLANGIOPANCREATOGRAM  6/13/2014    Procedure: ENDOSCOPIC RETROGRADE CHOLANGIOPANCREATOGRAM;  Surgeon: Lianna Wheeler MD;  Location: [facility] OR     GALLBLADDER SURGERY       GYN SURGERY      hysterectomy and oophorectomy      [facility] ENDOSCOPY W EUS Left 6/10/2014    Procedure: COMBINED ENDOSCOPIC ULTRASOUND, ESOPHAGOSCOPY, GASTROSCOPY,  "DUODENOSCOPY (EGD);  Surgeon: Lianna Wheeler MD;  Location:  GI     HYSTERECTOMY       Right thumb surgery  7/2015     XR SACROILIAC THERAPEUTIC INJECTION BILATERAL  12/2011       FH:  No autoimmune disorders, psoriasis, UC, crohn's, SLE, RA, PsA, gout, autoimmune thyroid.  No MS, heart disease or cancer in family  Mother-endometrial cancer, RHEUMATOID ARTHRITIS (RA)   Father-psoriasis, lymphoma, colon and prostate cancer   Siblings-sister RHEUMATOID ARTHRITIS (RA), OSTEOARTHRITIS  And crohns   Children-  MGM RHEUMATOID ARTHRITIS (RA)       SH:  No Alcohol. No Smoking. No IVDU. Partner on list for lung transplant     [facility] personally reviewed and updated by me.    ROS:  CONSTITUTIONAL: No fevers, night sweats or unintentional weight change. No acute distress, swollen glands  EYES: No vision change, diplopia, pain in eyes or red eyes   EARS, NOSE, MOUTH, THROAT: No tinnitus or hearing change, no epistaxis or nasal discharge, no oral lesions, throat clear. Normal saliva pool.  No drymouth. No thyroid Enlargement.   CARDIOVASCULAR: No chest pain, palpitations, or pain with walking, no orthopnea or PND   RESPIRATORY: No dyspnea, cough, shortness of breath or wheezing. No pleurisy.   GI: No nausea, vomiting, diarrhea or constipation, no abdominal pain, or blood in stools.   : No change in urine, no dysuria or hematuria   MUSCKL: See above   INTEGUMENTARY: No concerning lesions or moles   NEURO: No loss of strength or sensation, no numbness or tingling, no tremor, no dizziness, no headache. No falls   ENDO: No polyuria or polydipsia, no temperature intolerance   HEME/LYMPH:No concerning bumps, bleeding problems, or swollen lymph nodes. No recent infections, hospitalizations or new illnesses.   Otherwise 14 point ROS obtained, reviewed and found negative.     Physical exam:  Vitals: Blood pressure 113/73, pulse 79, temperature 98.4  F (36.9  C), temperature source Oral, height 1.664 m (5' 5.5\"), weight 73 kg (160 " lb 14.4 oz), last menstrual period 03/06/2012, SpO2 94 %, not currently breastfeeding.  Wt Readings from Last 4 Encounters:   01/14/19 73 kg (160 lb 14.4 oz)   10/31/18 74.4 kg (164 lb)   07/03/18 73.6 kg (162 lb 3.2 oz)   06/23/18 73.5 kg (162 lb)     Constitutional: WD-WN-WG cooperative  Eyes: nl EOM, PERRLA, vision, conjunctiva, sclera, No Unilateral or bilateral external ear inflammation   ENT: nl external ears, nose, hearing, lips, teeth, gums, throat. No saddle nose   Neck: no mass or thyroid enlargement  Resp: lungs clear to auscultation, nl to palpation, nl effort  CV: RRR, no murmurs, rubs or gallops, no edema  GI: no ABD mass or tenderness, no HSM  : not tested  Lymph: no cervical or epitrochlear nodes  MS: contractures in toes, dactylitis in toes and fingers, tender in PIPs with skin thickening in PIPs, tender in DIPS with swelling. Reduced ROM neck laterally and extension. Pain in hips. All TMJ, neck, shoulder, elbow, wrist, hand, spine, hip, knee, ankle, and foot joints were examined and otherwise found normal. Weak . Normal prayer sign. Negative Lhermitte's sign. No periuncle erythema  Skin: no nail pitting, alopecia, rash, dry skin most notably on hands and the ends of her fingers cracked   Neuro: nl cranial nerves, strength, sensation, DTRs.   Psych: nl judgement, orientation, memory, affect.      Labs/Imaging:    Results for orders placed or performed in visit on 01/07/19   Erythrocyte sedimentation rate auto   Result Value Ref Range    Sed Rate 9 0 - 30 mm/h   CRP inflammation   Result Value Ref Range    CRP Inflammation <2.9 0.0 - 8.0 mg/L   Creatinine   Result Value Ref Range    Creatinine 0.78 0.52 - 1.04 mg/dL    GFR Estimate 81 >60 mL/min/[1.73_m2]    GFR Estimate If Black >90 >60 mL/min/[1.73_m2]   Albumin level   Result Value Ref Range    Albumin 3.8 3.4 - 5.0 g/dL   ALT   Result Value Ref Range    ALT 32 0 - 50 U/L   AST   Result Value Ref Range    AST 19 0 - 45 U/L   CBC with  platelets differential   Result Value Ref Range    WBC 6.0 4.0 - 11.0 10e9/L    RBC Count 4.82 3.8 - 5.2 10e12/L    Hemoglobin 13.7 11.7 - 15.7 g/dL    Hematocrit 42.6 35.0 - 47.0 %    MCV 88 78 - 100 fl    MCH 28.4 26.5 - 33.0 pg    MCHC 32.2 31.5 - 36.5 g/dL    RDW 14.8 10.0 - 15.0 %    Platelet Count 263 150 - 450 10e9/L    Diff Method Automated Method     % Neutrophils 60.5 %    % Lymphocytes 31.4 %    % Monocytes 6.3 %    % Eosinophils 0.8 %    % Basophils 0.8 %    % Immature Granulocytes 0.2 %    Nucleated RBCs 0 0 /100    Absolute Neutrophil 3.6 1.6 - 8.3 10e9/L    Absolute Lymphocytes 1.9 0.8 - 5.3 10e9/L    Absolute Monocytes 0.4 0.0 - 1.3 10e9/L    Absolute Eosinophils 0.1 0.0 - 0.7 10e9/L    Absolute Basophils 0.1 0.0 - 0.2 10e9/L    Abs Immature Granulocytes 0.0 0 - 0.4 10e9/L    Absolute Nucleated RBC 0.0        Impression/Plan:    1.Psoriatic arthritis w/axial involvement activity with hx episcleritis/uveities, dactylitis, tendonitis, left hamstring tear. Exam showing active axial, peripheral signs or symptoms and possible inflammatory eye, and I am noting more changes in her toes joints now deformities (she will get x-rays from [facility]). Needs THR. Humira 8-2017 but every 10 days and MTX 25 mg Once a week injections but not controlling her disease. We discussed options and secukinumab (cosentyx), she would like to do this. I informed her she will need close monitoring of eye disease while we are switching her biologics as secukinumab (cosentyx) is not studied for inflammation of the eye as adalimumab (humira) is indicated. Referral to MTM for formal education. I will have her take prednisone 10mg day x 3 week then stay at 5 mg day until next visit. Aprimilast (otezla) contraindicated due to depression and does have axial disease so I would not favor this    -High risk medications, labs normal   -Chronic pain under other provider and now on medical marijuana helping her to wean down and goal of off  narcotics. I feel she does have a component of fibromylagia (in past reports neurontin cause sleepiness, and cymbalta change in mood, but not sure of the dose of neurontin--she can talk to her pain provider about this, work on sleep).    2. Uveitis-see opthalmologist today as reports change in vision with eye pain.    3. Psoriasis.fingertip skin cracking. Referral to derm for formal consult   4. High risk medications monitoring, labs every 8-12 weeks. Normal today. Wishes for 2nd shingrix today, she tolerated the last one mild symptoms for 1-2 days.   5. Low bone density per DEXA 7-2018, worsening. Given high risk osteoporosis , I did refer her to endocrine for complete evaluation   6. Chronic pain under care of [facility] Dr. Xie.    Plan:   A) Will stop adalimumab (humira) 40 mg SQ injection every 10 days and plan to switch to secukinumab (cosentyx). Referral to [facility] Steph Hodge and will ask her and Dr. Samayoa if needs loading dose and how to switch. Continue m thotrexate 25 mg SQ once Monday week, folic acid 2-3 mg day. Volteran cream prn as directed.   --DMARD labs every 8 week-as hx elevated liver enzymes. Due for hep b/c, MTB QG.    B)  Calcium and vitamin D. Complete physical due she wishes to come here   C) RTC 1 mths Dr. Samayoa  D) Referral to opthalmologist  Here today for concern of inflammation eye, asked CMA to get records from [facility] and scan in before 130PM   E) Annual and see PCP. F/U Dr. Wheeler for GI. F/U with pain clinic.      The patient understood the rationale for the diagnosis and treatment plan. Patient shared in the decision making. All questions were answered to best of my ability and the patient's satisfaction  Miguel Umana APRN, CNP, MSN  [facility]  Department of Rheumatology & Autoimmune Disorders    1. Immunization: Reviewed CDC guidelines with patient updated, information provided to patient based on CDC guidelines for vaccination  recommendations, patient will discuss with primary provider  2. Bone Health:Educated on adequate calcium and vitamin D intake, Educated on exercise, Glucocorticoid education discussed risks, benefits & potential long term side effects from use  3. Discussed routine annual physicals, cancer screening, cardiac risk monitoring, bone health and primary care with primary care provider.   4. Educated on diagnosis, prognosis, labs, imaging, treatment options (including risks, benefits, risk of no treatment), medications (use, dose, side-effects, risks of medications including infection/cancer/bone marrow suppression, lab monitoring), infections what to do, plan of cares, goals of treatment.     Addendum: reviewed with Dr. Samayoa and [facility]:   Plan will be Given she is switching due to inefficacy and not safety concerns, could consider starting Cosentyx at time of next scheduled Humira dose.  Would recommend load given flare and history of inefficacy to multiple biologics. 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 Samayoa who if doing well will wean prednisone and methotrexate    or allowing me to participate in the care of your patient.      Sincerely,    Miguel Umana, MARIANNA CNP

## 2019-01-14 NOTE — PROGRESS NOTES
HPI     Eye Pain       In left eye.  Characterized as aching.  Pain was noted as 5/10.  Occurring constantly.  It is worse throughout the day.  Associated symptoms include tearing.  Negative for photophobia, headaches, foreign body sensation and discharge.              Comments     Having eye pain x 3 weeks LE   deep ache has gotten worse each day   Referred by rheumatology   Myrna Duncan COA 2:01 PM January 14, 2019     Reports last eye exam one year ago.     Reports three weeks of left eye pain, deep ache, used old PF drops from two years ago, she also had joint pain and used old prednisone 5 mg daily, she does have some light sensitivity. Denies vision changes, headache, red eye.               Last edited by Myrna Duncan on 1/14/2019  2:01 PM. (History)          POH:   - Scleritis left eye, recurrent, first episode 2012 (when diagnosed with psoriatic arthritis), last 2017   - Uveitis left eye 2012   PMH:   - Hashimotos   - Psoriatic arthritis (methotrexate, humira - 5 years)   FHx: mother glaucoma   SHx: no smoking, wife with recent double lung transplant    Ocular Meds: none     ASSESSMENT & PLAN:    Krissy Weldon is a 63 year old female with the following diagnoses:     # History of Uveitis left eye   - One episode in 2012 in left eye treated with topical prednisolone   - No signs of uveitis on exam today, she likely self-treated with home PF and recent addition of oral steroids   - No further work up at this time  - Obtain outside records   - Discussed if she has recurrent pain to return to clinic for evaluation      # 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 Suarez)  - No signs of scleritis on exam today   - Continues on methotrexate and humira per Dr. Umana (Rheumatology)   - Monitor for now     # Dry Eye Disease  - Discussed importance of lid hygiene   - Start artificial tears twice a day both  eyes   - Start warm compress twice a day     Patient disposition: Return in about 6 months (around 7/14/2019) for Annual Visit.     Seen and discussed with MD Sergei Palacios MD  Ophthalmology Resident, PGY-3  [facility]     Teaching statement:  Complete documentation of historical and exam elements from today's encounter can be found in the full encounter summary report (not reduplicated in this progress note). I personally obtained the chief complaint(s) and history of present illness.  I confirmed and edited as necessary the review of systems, past medical/surgical history, family history, social history, and examination findings as documented by others; and I examined the patient myself. I personally reviewed the relevant tests, images, and reports as documented above.     I formulated and edited as necessary the assessment and plan and discussed the findings and management plan with the patient and family.    Prudence Palacios MD  Comprehensive Ophthalmology & Ocular Pathology  Department of Ophthalmology and Visual Neurosciences  [email or web address]  Pager [phone]

## 2019-01-14 NOTE — LETTER
1/14/2019      RE: Krissy Weldon  [id number] [facility] [id number]           Miguel Umana, MARK  [facility] - Rheumatology Clinic Visit    Krissy Weldon  is a 63 year old female who presents with follow-up for undifferentiated polyarthropathy, psoriatic arthritis. Complicated by eye inflammation (uveitis and scleritis--sees [facility]), tendonitis, costrochondritis, tendon tears, synovitis, dactylitis, OA/DJD jessica thumbs CMC. Failed or intolerance to multiple medications.     Copy forward: [-SSa, -SSb, -CCP,HLA-B27 negative on outside workup with previous SI injections] with hx- inflammatory eye left eye episcleritis requiring prednisone gtt or oral with bone scan unrevealing. Previous medrol or steroid responsiveness. Past tried humira, SSZ, simponi SQ/IV, remicade and otelza. Failed humira EoW recurrent episcleritis, right wrist, right SI, bilateral hamstrings tendonosis with partial tear bilaterally. Failed simponi sq/IV ineffective. SSZ and oral MTX. Past recurrent iritis (ER if eye pain, blurred vision, red eye). Remicade. Otezla. LLL pneumonia 3/2015. MRI L hip showed high grade tear gluteus minimus insertion site, effusion 4/2015. C/o neck issues with MRI cervical spine show DJD, EMG, paramedian osteophytes and disk protrusion at C3-C4 with some moderate central canal stenosis and moderate to severe right-sided foraminal stenosis as a consequence.  C5-C6 also showed some mild to moderate central canal stenosis and moderate bilateral central foraminal stenosis.  Seen Dr. Wheeler with significant improvement in GI issues pancreatic sphinctor dysfunction requiring surgery now on pancreatic enzymes after pancreatic duct is open. Failed cimzia Cimzia lost effectiveness (more uveitis, arthritis, synovitis,bursitis and tendonitis).     Copy forward hx:   Last seen  Dr. Samayoa. Continued plan. Methotrexate 0.8 ml week Q Monday (20 mg). Continues cimzia injections (due this Thurs).  No  gout flares. Past in right great toe, but no aspirations.   Right thumb surgery July-Aug 2015. Pain and motion much improved. Severe flare of your arthritis and eye inflammation when holding the cimzia and MTX for surgery requiring prednisone 5 mg x 2 week then stopped   Strept , pneumonia 3-2015. Recent bowel obstruction 9-2015, seen gastro 1-2016 given hx of pancreatic sphincter dysfunction.   Recent flare 2-4, requiring medrol dose pack--labs were NL except CRP 11 [Hands/ feet swelling with pain, back and neck along with an eye infection and scleritis].    Left eye inflammation flare with infection about 1 months ago, on 3 gtt. Slow clearing. Then 1 week after the gtt, developed neck pain hard to turn head to left. Then L achilles tendonitis, with costrochondritis this was last week, hands MCP 2-3 swelling. Started the medrol day 4--dramatic improvement [move her neck, eye improved, achilles much better now can walk on, the costrochondritis]. Left eye infection much better. Left hip still bothers, still much improved with prednisone. Energy much better. Cimzia lasting about 10 days. Left upper arm psoriasis. EAS none with medrol. This is the best she has felt overall. Rare use of prednisone of steroids. Last eye inflammation last summer. Left arm into her deltoid tingling with her neck, 1 week before flare. Having hard time moving her neck side to side, forward and back. The back of her neck tissues feel swollen on her left side. Prior to this no neck last flare was in . Does her neck exercise. 2 year of job specific, the end of March final decision.     Taking oxycodone 15 mg QID during this flare. Under [facility] pain clinic.     Copy forward February 15, 2017  I seen her last 2-2016. Dr. Samayoa  who continued the plan and using diclofenac for achilles pain. EHR reviewed. Was using medical marijuana.  Louise reported working well for her uveitis and constronchondritis; and the methotrexate  "injectable for her joints. Labs today normal     No medical marijauna as this didn't work  MS ER 15 mg BID for about 6 weeks. Oxycodone HS prn most nights. Off the pain patch due to insurance coverage. Goes to [facility] [place] [facility] pain clinic.     C/o will get short of breath when swims or bends over for the past 6 weeks. No cardiac symptoms, CP or heaviness. Has had night sweats for many years which is not new. Losing weight but appetite is good, although her partner does feel she's not eating as well. Been on MS ER for about the time these symptoms started. No stomach pain. +costrochondritis as before. No blood in urine or stool. Taking omeprezole 40 mg every day for heartburn--will see PCP for possible UGI as feels some is near her esophagus. Due for annual physical and denies getting bone health done --does take 1000 IU day vitamin D. Due for mammogram. Hx hyst. No cough, fevers or ABD pain. No symptoms like had years ago with ERCP. \"no liver pain\". TSH normal 4 mths ago she reports. No blood in urine or stool.     Continues injection MTX 20 mg week Q Monday, FA 3 mg day, cimzia every 2 weeks or twice a month (takes prednisone 10 mg day before, day of and day after injection due to prior symptoms of N/V, fevers when did cimzia injections which she doesn't get now). Didn't take the prednisone this last injection as just had left hip injected per Dr. Maciel Dukes [facility]. Told she will eventually need a THR. \"butt muscles are sore\" but overall better symptoms on this plan. Seen eye doctor about twice this past year, as will have \"episcleritis\" about 4 days before injection cimzia but not every time due. FIngertips skin is cracking this past year, so interested in seeing derm. She's very happy with arthritis and uveitis control with this plan so wishes to continue. Mild psoriasis left ear only. Very happy with thumb surgeries. Denies any skin mole or lesion changes.     Changing to Medicare with [employer] April 1st.  PMSH " reviewed and updated by me     Copy forward May 31, 2017  Strept infection 4-21 prior to ED then treated with ABX. Exposed from [facility]. Missed one dose of MTX   ED 4-2017 for nausea with vomiting. CT of the abdomen/pelvis with contrast --no significant findings other than prominence of the common biliary duct which is likely related to cholecystectomy.  She was treated with Dilaudid and Zofran for pain and nausea. Given reglan for breakthrough nausea. Labs noted overall NL. 2 bags IVF    Ablation SI Right 4/25 and left Feb 26th 2017 --[facility] clinic   PT for her hip--told by therapy could feel the bursitis. Told needs THR and injection of left hip Feb 10th 2017 --this was [facility]. Who did x-rays and told would like to try to wait for at least a year. [facility] orth[facility] Dr. PUMA Dukes --her pain clinic provider would like to try platelet rich plasma injection     Cimzia injections high cost $3000 for 2 months. Deductible $4500 then cost catestrophic then goes down $300   Left eye uveitis about 5 days before the next injection then has to do the prednisone gtts for about 3 months. Hands are sore jessica the tips of then S/T and then MCPs. This is toward the end of the cycle and in bewtween too. Last injection last Tuesday. Some uveitis seen her about 2 months ago [facility]     MTX every week Mon or Tues 20 mg--tolerating well. Some mild fatigue but tolerating. NO infection now .   No psoriasis --dry   Fingertips not cracking now as using superglue  Trying to loss weight and exercise.  Seen PCP for hard to take a deep breath, SOB--treated with inhaler then didn't help.    SURGEON WANTS TO RE-DO HER LEFT THUMB SURGERY.    Copy forward October 25, 2017  Continues the humira 40 mg SQ every 2 weeks, some diarrhea day of   Adjusted her MTX 25 mg SW every 7 days early Sept, this is some diarrhea day of and after, tolerable. FA 2 mg day. Some fatigue after taking but feels the MTX is working and now the humira is really working on her  tendontisis, enthesisits, and no longer had any uveitis flaring now not needing prednisone. No infections. Her left 1st CMC is eroded she reports but does wish to hold on any surgeries as the humira is the only one that has really worked to control her the best. Getting cervival MRI and then may need injection --right upper back numb at times and I asked her to keep me updated. Feet are less sore and doing well. Takes her fentanyl patch now and only 1-2 oxycodone at night this controls her pain with the tylenol arthritis. No hariloss. Hips still painful, but some improved. No psoriasis. Denies fever, chils, cough, SOB, SANCHEZ. Other PMSH reviewed and updated by me      Copy forward  October 31, 2018  DEXA 7-2018 T-1.8 low bone density, worsening was referred to endo due to risk osteoporosis she will do this in the future. Adalimumab (humira) 40 mg injection every 2 weeks and methotrexate  25 mg every MON, folic acid  3 mg day tolerating and no side-effects. Throat illness in the interium was treated with antibiotics. No prednisone. No NSAID. Weaning off fentanyl patch 12.5 from 25 mcg/hr and down on oxycodone 1 tablet at bedtime since starting the medical marijuana about a month ago. Pain is dramatically improved, and her bowels. No inflammatory eye flares, does have mild eye pain a day before her adalimumab (humira) injection. Right hip improved since the STEM cell donor injection in Aug. Overall, she feels she is doing well with her chronic pain, and right hip pain down by 75% since the doner injection.     January 14, 2019  Adalimumab (humira) 40 mg injection every 10 days (had 2 days ago, paying $2200 month) and methotrexate  25 mg every MON, folic acid  3 mg day tolerating no side-effects. Psoriatic arthritis is not controlled, flaring started about early December with active dactylitis, could feel she tore her left gluteus again (harder to walk with pain and in the same area), cracking the ends of her fingers,  left achilles tendon swelling with pain, dactylitis and pain in toes and her fingers. Left eye inflammation started about a month ago (eye pain, red, change in vision, hurt to blink), she called her opthalmologist  [facility] mariee Who told her to start her steroid gtts, then this did not improved so told to start prednisone 5 mg day about 10 days ago. The swelling is better, the eye is as well but still some eye pain and reports her vision change still. Sees pain clinic trying to reduce her narcotics, and is on CBD oil (some side-effects and not improving her pain). Partner inpt for PE after bilateral lung transplant. No infections. Taking vitamin D. Reports vitamin D and TSH levels were recently normal. Would like to talk about trial of other medications for her psoriatic arthritis. Feels her toe joints are now more deformed. Denies any fever, chills, SOB, SANCHEZ, night sweats, or chest pain, or cough. Reports healthy. Feels neck is stiffer and harder to move or turn. Noticing costrochondritis.       PROBLEM LIST: Past medical history is notable for her being presumptively treated for Lyme with doxycycline after developing a targetoid lesion, for a history of Hashimoto's thyroiditis with subsequent hypothyroidism, for the diagnosis now of psoriasis, and for a history of depression.Neck pain, cervical stenosis-- MRI  +moderate central stenosis right C3-4, C5-6 degenerative changes 2nd mild-mod central stenosis. Past referral to physiatry-wishes to return to Dr. Edmond Sawant [facility] Left hip pain s/p tear needs THR she reports. B) Hx-Bilateral tendonitis hamstrings with right bursitis, partial tear per Dr. Gomez s/p injections. Seen Dr. Arvin Xie at [facility] [place] [place] s/p ablation SI joint, tendon insertion site. Sees Dr. Maciel Dukes. C). Transient elevation LFT 2/2013 [ NL after held MTX and tramadol 2wk]; transient 4- [ AST 89]. NL . Other hx --Panceatic sphinctor dysfunction. On pancreatic  enzymes. SBO 9-2015, now no doing well. Right thumb DJD, s/p surgery . Healed.  1. Diffuse degenerative joint disease that is both clinically apparent and obvious on multiple imaging modalities including bone scan, MRI 2/2013 or cervical. DJD L4-L5, L5-S1 per MRI 7/2012; MRI 4057-6342   2. Diffuse inflammatory arthritis with marked morning stiffness, no systemic inflammation by blood tests, but greater than 80% clinical improvement with a Medrol Dosepak.   3. Onset of the inflammatory joint symptoms including sacroiliitis with the development of psoriasis, suggesting that this represents psoriatic arthritis.   4. Development of episcleritis in the left eye with improvement with steroid eyedrops 12/14/2011 with recurrent episodes when wean oral prednisone (9/2012)  5. History of Hashimoto's thyroiditis.   6. History of presumptive treatment for Lyme disease with doxycycline after development of a targetoid lesion.   7. Poor tolerance of sulfasalazine and oral methotrexate.   8. Bilateral hamstring tendonosis, right partial tear, sacroilitis 7/2012. See MRI, repeat 2/2013 hamstring and right SI inflammation seeing Dr. Arvin Xie at [facility] specialized in SI. , left hamstring   9. DJD L4-L5, L5-S1 per MRI 7/2012  10. Intermittent prednisone exposure  11. Transient elevation LFT ALT 84/AST 58 2/2013 (nl after held MTX and tramadol, then increased folic acid 2mg day)  12. Past LUE burn developed into cellulitis resolved from keflex. Bronchitis now on zithromax irritating her costrochondritis. Improving after 1 day  13. 1-2014 Left knee meniscal tear with chrondomalacia per MRI (had Rt/Lt CMJ surgery)  14. 4- RUQ pain.   15.  Hx recurrent left episcleritis   16. Right thumb tendon surgery with Dr. [facility]  17. Pneumonia 3-2015; strept  and 4-2017      Past Medical History:   Diagnosis Date     Acute meniscal tear of knee 1/2014    with chrondromalacia per MRI      Depression       DJD (degenerative joint disease), lumbar 7/2012    L4-5, L5-S1 per MRI      Episcleritis 12/14/2011     Hamstring tendonitis at origin 7/2012    Bilaterally with partial tear right, sacroilitis per MRI     Hypothyroid 9/7/2011     Hypothyroidism      PONV (postoperative nausea and vomiting)      Psoriatic arthritis (H) 9/7/2011     Psoriatic arthritis (H) 2/9/2016     Strep throat 04/2017       Surgical-She has a surgical history including appendectomy in 1980, cholecystectomy in approximately 1993, and hysterectomy without oophorectomy in 1996.  Ablation SI Right 4/25 and left Feb 26th 2017 -- pain clinic   PT for her hip--told by therapy could feel the bursitis. Told needs THR and injection of left hip Feb 10th 2017 -  Past Surgical History:   Procedure Laterality Date     APPENDECTOMY       ARTHROPLASTY CARPOMETACARPAL (THUMB JOINT)  11/8/2013    Procedure: ARTHROPLASTY CARPOMETACARPAL (THUMB JOINT);  Left First Carpometacarpal Trapezium Resection, Tendon Interposition  ;  Surgeon: Luiza Farrar MD;  Location: [facility]     ARTHROPLASTY CARPOMETACARPAL (THUMB JOINT)  12/20/2013    Procedure: ARTHROPLASTY CARPOMETACARPAL (THUMB JOINT);  Right Thumb Trapezium Resection With Flexor Carpi Radialis Tendon Reconstruction       CHOLECYSTECTOMY       COLONOSCOPY  5/6/2014    Procedure: COLONOSCOPY;  Surgeon: Adria San MD;  Location:  GI     ENDOSCOPIC RETROGRADE CHOLANGIOPANCREATOGRAM  6/13/2014    Procedure: ENDOSCOPIC RETROGRADE CHOLANGIOPANCREATOGRAM;  Surgeon: Lianna Wheeler MD;  Location: [facility] OR     GALLBLADDER SURGERY       GYN SURGERY      hysterectomy and oophorectomy      [facility] ENDOSCOPY W EUS Left 6/10/2014    Procedure: COMBINED ENDOSCOPIC ULTRASOUND, ESOPHAGOSCOPY, GASTROSCOPY, DUODENOSCOPY (EGD);  Surgeon: Lianna Wheeler MD;  Location: [facility] GI     HYSTERECTOMY       Right thumb surgery  7/2015     XR SACROILIAC THERAPEUTIC INJECTION BILATERAL  12/2011       FH:  No  "autoimmune disorders, psoriasis, UC, crohn's, SLE, RA, PsA, gout, autoimmune thyroid.  No MS, heart disease or cancer in family  Mother-endometrial cancer, RHEUMATOID ARTHRITIS (RA)   Father-psoriasis, lymphoma, colon and prostate cancer   Siblings-sister RHEUMATOID ARTHRITIS (RA), OSTEOARTHRITIS  And crohns   Children-  MGM RHEUMATOID ARTHRITIS (RA)       SH:  No Alcohol. No Smoking. No IVDU. Partner on list for lung transplant     [facility] personally reviewed and updated by me.    ROS:  CONSTITUTIONAL: No fevers, night sweats or unintentional weight change. No acute distress, swollen glands  EYES: No vision change, diplopia, pain in eyes or red eyes   EARS, NOSE, MOUTH, THROAT: No tinnitus or hearing change, no epistaxis or nasal discharge, no oral lesions, throat clear. Normal saliva pool.  No drymouth. No thyroid Enlargement.   CARDIOVASCULAR: No chest pain, palpitations, or pain with walking, no orthopnea or PND   RESPIRATORY: No dyspnea, cough, shortness of breath or wheezing. No pleurisy.   GI: No nausea, vomiting, diarrhea or constipation, no abdominal pain, or blood in stools.   : No change in urine, no dysuria or hematuria   MUSCKL: See above   INTEGUMENTARY: No concerning lesions or moles   NEURO: No loss of strength or sensation, no numbness or tingling, no tremor, no dizziness, no headache. No falls   ENDO: No polyuria or polydipsia, no temperature intolerance   HEME/LYMPH:No concerning bumps, bleeding problems, or swollen lymph nodes. No recent infections, hospitalizations or new illnesses.   Otherwise 14 point ROS obtained, reviewed and found negative.     Physical exam:  Vitals: Blood pressure 113/73, pulse 79, temperature 98.4  F (36.9  C), temperature source Oral, height 1.664 m (5' 5.5\"), weight 73 kg (160 lb 14.4 oz), last menstrual period 03/06/2012, SpO2 94 %, not currently breastfeeding.  Wt Readings from Last 4 Encounters:   01/14/19 73 kg (160 lb 14.4 oz)   10/31/18 74.4 kg (164 lb)   07/03/18 " 73.6 kg (162 lb 3.2 oz)   06/23/18 73.5 kg (162 lb)     Constitutional: WD-WN-WG cooperative  Eyes: nl EOM, PERRLA, vision, conjunctiva, sclera, No Unilateral or bilateral external ear inflammation   ENT: nl external ears, nose, hearing, lips, teeth, gums, throat. No saddle nose   Neck: no mass or thyroid enlargement  Resp: lungs clear to auscultation, nl to palpation, nl effort  CV: RRR, no murmurs, rubs or gallops, no edema  GI: no ABD mass or tenderness, no HSM  : not tested  Lymph: no cervical or epitrochlear nodes  MS: contractures in toes, dactylitis in toes and fingers, tender in PIPs with skin thickening in PIPs, tender in DIPS with swelling. Reduced ROM neck laterally and extension. Pain in hips. All TMJ, neck, shoulder, elbow, wrist, hand, spine, hip, knee, ankle, and foot joints were examined and otherwise found normal. Weak . Normal prayer sign. Negative Lhermitte's sign. No periuncle erythema  Skin: no nail pitting, alopecia, rash, dry skin most notably on hands and the ends of her fingers cracked   Neuro: nl cranial nerves, strength, sensation, DTRs.   Psych: nl judgement, orientation, memory, affect.      Labs/Imaging:    Results for orders placed or performed in visit on 01/07/19   Erythrocyte sedimentation rate auto   Result Value Ref Range    Sed Rate 9 0 - 30 mm/h   CRP inflammation   Result Value Ref Range    CRP Inflammation <2.9 0.0 - 8.0 mg/L   Creatinine   Result Value Ref Range    Creatinine 0.78 0.52 - 1.04 mg/dL    GFR Estimate 81 >60 mL/min/[1.73_m2]    GFR Estimate If Black >90 >60 mL/min/[1.73_m2]   Albumin level   Result Value Ref Range    Albumin 3.8 3.4 - 5.0 g/dL   ALT   Result Value Ref Range    ALT 32 0 - 50 U/L   AST   Result Value Ref Range    AST 19 0 - 45 U/L   CBC with platelets differential   Result Value Ref Range    WBC 6.0 4.0 - 11.0 10e9/L    RBC Count 4.82 3.8 - 5.2 10e12/L    Hemoglobin 13.7 11.7 - 15.7 g/dL    Hematocrit 42.6 35.0 - 47.0 %    MCV 88 78 - 100 fl     MCH 28.4 26.5 - 33.0 pg    MCHC 32.2 31.5 - 36.5 g/dL    RDW 14.8 10.0 - 15.0 %    Platelet Count 263 150 - 450 10e9/L    Diff Method Automated Method     % Neutrophils 60.5 %    % Lymphocytes 31.4 %    % Monocytes 6.3 %    % Eosinophils 0.8 %    % Basophils 0.8 %    % Immature Granulocytes 0.2 %    Nucleated RBCs 0 0 /100    Absolute Neutrophil 3.6 1.6 - 8.3 10e9/L    Absolute Lymphocytes 1.9 0.8 - 5.3 10e9/L    Absolute Monocytes 0.4 0.0 - 1.3 10e9/L    Absolute Eosinophils 0.1 0.0 - 0.7 10e9/L    Absolute Basophils 0.1 0.0 - 0.2 10e9/L    Abs Immature Granulocytes 0.0 0 - 0.4 10e9/L    Absolute Nucleated RBC 0.0        Impression/Plan:    1.Psoriatic arthritis w/axial involvement activity with hx episcleritis/uveities, dactylitis, tendonitis, left hamstring tear. Exam showing active axial, peripheral signs or symptoms and possible inflammatory eye, and I am noting more changes in her toes joints now deformities (she will get x-rays from [facility]). Needs THR. Humira 8-2017 but every 10 days and MTX 25 mg Once a week injections but not controlling her disease. We discussed options and secukinumab (cosentyx), she would like to do this. I informed her she will need close monitoring of eye disease while we are switching her biologics as secukinumab (cosentyx) is not studied for inflammation of the eye as adalimumab (humira) is indicated. Referral to [facility] for formal education. I will have her take prednisone 10mg day x 3 week then stay at 5 mg day until next visit. Aprimilast (otezla) contraindicated due to depression and does have axial disease so I would not favor this    -High risk medications, labs normal   -Chronic pain under other provider and now on medical marijuana helping her to wean down and goal of off narcotics. I feel she does have a component of fibromylagia (in past reports neurontin cause sleepiness, and cymbalta change in mood, but not sure of the dose of neurontin--she can talk to her pain provider  about this, work on sleep).    2. Uveitis-see opthalmologist today as reports change in vision with eye pain.    3. Psoriasis.fingertip skin cracking. Referral to derm for formal consult   4. High risk medications monitoring, labs every 8-12 weeks. Normal today. Wishes for 2nd shingrix today, she tolerated the last one mild symptoms for 1-2 days.   5. Low bone density per DEXA 7-2018, worsening. Given high risk osteoporosis , I did refer her to endocrine for complete evaluation   6. Chronic pain under care [facility] [place]. Xie.    Plan:   A) Will stop adalimumab (humira) 40 mg SQ injection every 10 days and plan to switch to secukinumab (cosentyx). Referral to [facility] Steph Hodge and will ask her and Dr. Samayoa if needs loading dose and how to switch. Continue m thotrexate 25 mg SQ once Monday week, folic acid 2-3 mg day. Volteran cream prn as directed.   --DMARD labs every 8 week-as hx elevated liver enzymes. Due for hep b/c, MT[facility] QG.    B)  Calcium and vitamin D. Complete physical due she wishes to come here   C) RTC 1 mths Dr. Samayoa  D) Referral to opthalmologist  Here today for concern of inflammation eye, asked CMA to get records from [facility] and scan in before 130PM   E) Annual and see PCP. F/U Dr. Wheeler for GI. F/U with pain clinic.      The patient understood the rationale for the diagnosis and treatment plan. Patient shared in the decision making. All questions were answered to best of my ability and the patient's satisfaction  Miguel CABRAL, CNP, MSN  [facility]  Department of Rheumatology & Autoimmune Disorders    1. Immunization: Reviewed CDC guidelines with patient updated, information provided to patient based on CDC guidelines for vaccination recommendations, patient will discuss with primary provider  2. Bone Health:Educated on adequate calcium and vitamin D intake, Educated on exercise, Glucocorticoid education discussed risks, benefits & potential long  term side effects from use  3. Discussed routine annual physicals, cancer screening, cardiac risk monitoring, bone health and primary care with primary care provider.   4. Educated on diagnosis, prognosis, labs, imaging, treatment options (including risks, benefits, risk of no treatment), medications (use, dose, side-effects, risks of medications including infection/cancer/bone marrow suppression, lab monitoring), infections what to do, plan of cares, goals of treatment.     Addendum: reviewed with Dr. Samayoa and [facility]:   Plan will be Given she is switching due to inefficacy and not safety concerns, could consider starting Cosentyx at time of next scheduled Humira dose.  Would recommend load given flare and history of inefficacy to multiple biologics. 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 Samayoa who if doing well will wean prednisone and methotrexate    RN CNP

## 2019-01-15 ENCOUNTER — TELEPHONE (OUTPATIENT)
Dept: RHEUMATOLOGY | Facility: CLINIC | Age: 64
End: 2019-01-15

## 2019-01-15 DIAGNOSIS — L40.9 PSORIASIS: ICD-10-CM

## 2019-01-15 DIAGNOSIS — M46.99 INFLAMMATORY SPONDYLOPATHY OF MULTIPLE SITES IN SPINE (H): ICD-10-CM

## 2019-01-15 DIAGNOSIS — L40.50 PSORIATIC ARTHRITIS (H): Primary | ICD-10-CM

## 2019-01-15 LAB
GAMMA INTERFERON BACKGROUND BLD IA-ACNC: 0.05 IU/ML
HBV CORE AB SERPL QL IA: NONREACTIVE
HBV SURFACE AG SERPL QL IA: NONREACTIVE
HCV AB SERPL QL IA: NONREACTIVE
M TB IFN-G BLD-IMP: NEGATIVE
M TB IFN-G CD4+ BCKGRND COR BLD-ACNC: >10 IU/ML
MITOGEN IGNF BCKGRD COR BLD-ACNC: 0 IU/ML
MITOGEN IGNF BCKGRD COR BLD-ACNC: 0 IU/ML

## 2019-01-15 NOTE — TELEPHONE ENCOUNTER
Prior Authorization Approval    Authorization Effective Date: 1/1/2019  Authorization Expiration Date: 5/1/2019  Medication: COSENTYX - APPROVED  Approved Dose/Quantity: [employer]   Reference #: [id number]   Insurance Company: [employer] - Phone [phone] Fax [phone]  Expected CoPay:     $[id number]  CoPay Card Available:      Foundation Assistance Needed: WILL DISCUSS FREE DRUG WITH PATIENT     Which Pharmacy is filling the prescription (Not needed for infusion/clinic administered): [facility] - [place], [place] - [address]  Pharmacy Notified:  No - have to discuss free drug with patient  Patient Notified:  Calling patient to discuss free drug option

## 2019-01-16 ENCOUNTER — ALLIED HEALTH/NURSE VISIT (OUTPATIENT)
Dept: PHARMACY | Facility: CLINIC | Age: 64
End: 2019-01-16
Payer: COMMERCIAL

## 2019-01-16 DIAGNOSIS — L40.50 PSORIATIC ARTHRITIS (H): Primary | ICD-10-CM

## 2019-01-16 PROCEDURE — 99207 ZZC NO CHARGE LOS: CPT | Performed by: PHARMACIST

## 2019-01-16 NOTE — PROGRESS NOTES
Therapy Management:                                                    Krissy Weldon is a 63 year old female called for a therapy management visit.  She was referred to me from [facility].    Not seen for full MTM today due to lack of coverage.      Reason for Consult: Switching from Humira to Cosentyx    Discussion: Krissy mostly wanted to know how long to wait to let the Humira wash out if needed, and the dosing plan. Reviewed Mychart note from Miguel/Dr. Samayoa. Discussed limited data surrounding this transition, overall given she is not discontinuing the Humira due to adverse effects, recommendation would be to consider Costenyx start at timing of next expected Humira dose. Additionally, Cosentyx can be dosed with or without loading dose. Given prior history of inefficacy with many biologics, would recommend loading dose, which Dr. Samayoa agreed with. Reviewed that this will be a once weekly injection for weeks 0,1,2,3,4 and then it will be once every 4 weeks thereafter. Discussed difference in dosing with concomitant plaque psoriasis. She says she is not really experiencing this at this time.    Last dose of Humira on Saturday the 12th, currently taking every 10 days. Notified [facility] that she is hoping to start Cosentyx around January 22nd.     Not interested in injection training as she is a retired nurse anesthetist and she has had a lot of experience with previous biologic injections. No real plaque psoriasis per her report.     No major drug-drug interactions with Cosentyx and medications listed in Epic.    She reviewed the information online and feels Cosentyx is similar to her previous biologic therapies, therefore, she is aware of precautions, risks, and side effects. She does not have any questions at this time.     Plan:  1. Krissy to start Cosentyx next week as directed, and reach out with questions

## 2019-02-07 DIAGNOSIS — L40.50 PSORIATIC ARTHRITIS (H): ICD-10-CM

## 2019-02-07 DIAGNOSIS — L40.9 PSORIASIS: ICD-10-CM

## 2019-02-07 DIAGNOSIS — M46.99 INFLAMMATORY SPONDYLOPATHY OF MULTIPLE SITES IN SPINE (H): ICD-10-CM

## 2019-02-11 RX ORDER — SECUKINUMAB 150 MG/ML
INJECTION SUBCUTANEOUS
Qty: 5 ML | Refills: 0 | OUTPATIENT
Start: 2019-02-11

## 2019-02-11 NOTE — TELEPHONE ENCOUNTER
"Confirmed dosing. Pt believes she has one dose of load left and then will switch to once monthly. Her last Cosentyx dose was yesterday.     I had calculated the same based on our last conversation. If first dose of Cosentyx was 1/20, she should have one more \"loading\" dose on 2/17, and then will switch to once every 28 days.     No other questions at this time. She is appreciative of call.   "

## 2019-02-11 NOTE — TELEPHONE ENCOUNTER
She should be understanding the switch from the loading to maintenance dose. Please make sure. I sent in the maintenance dose

## 2019-02-11 NOTE — TELEPHONE ENCOUNTER
COSENTYX [address]  [facility]      Last Written Prescription Date:  1-15-19  Last Fill Quantity: 5 each,   # refills: 0  Last Office Visit : 1-14-19  Future Office visit:  2-26-19    Routing refill request to provider for review/approval because:  Med not on protocol.

## 2019-02-26 ENCOUNTER — OFFICE VISIT (OUTPATIENT)
Dept: RHEUMATOLOGY | Facility: CLINIC | Age: 64
End: 2019-02-26
Attending: INTERNAL MEDICINE
Payer: COMMERCIAL

## 2019-02-26 VITALS
HEART RATE: 69 BPM | DIASTOLIC BLOOD PRESSURE: 67 MMHG | WEIGHT: 164.2 LBS | TEMPERATURE: 97.9 F | SYSTOLIC BLOOD PRESSURE: 107 MMHG | HEIGHT: 66 IN | OXYGEN SATURATION: 99 % | BODY MASS INDEX: 26.39 KG/M2

## 2019-02-26 DIAGNOSIS — Z79.899 HIGH RISK MEDICATIONS (NOT ANTICOAGULANTS) LONG-TERM USE: ICD-10-CM

## 2019-02-26 DIAGNOSIS — M54.2 NECK PAIN, CHRONIC: ICD-10-CM

## 2019-02-26 DIAGNOSIS — M94.0 COSTOCHONDRITIS, ACUTE: ICD-10-CM

## 2019-02-26 DIAGNOSIS — M46.99 INFLAMMATORY SPONDYLOPATHY OF MULTIPLE SITES IN SPINE (H): ICD-10-CM

## 2019-02-26 DIAGNOSIS — H20.9 UVEITIS: ICD-10-CM

## 2019-02-26 DIAGNOSIS — G89.29 NECK PAIN, CHRONIC: ICD-10-CM

## 2019-02-26 DIAGNOSIS — L40.50 PSORIATIC ARTHRITIS (H): ICD-10-CM

## 2019-02-26 DIAGNOSIS — L40.50 PSORIATIC ARTHRITIS (H): Primary | ICD-10-CM

## 2019-02-26 LAB
ALBUMIN SERPL-MCNC: 3.7 G/DL (ref 3.4–5)
ALT SERPL W P-5'-P-CCNC: 33 U/L (ref 0–50)
AST SERPL W P-5'-P-CCNC: 25 U/L (ref 0–45)
BASOPHILS # BLD AUTO: 0 10E9/L (ref 0–0.2)
BASOPHILS NFR BLD AUTO: 0.6 %
CREAT SERPL-MCNC: 0.86 MG/DL (ref 0.52–1.04)
CRP SERPL-MCNC: <2.9 MG/L (ref 0–8)
DIFFERENTIAL METHOD BLD: ABNORMAL
EOSINOPHIL # BLD AUTO: 0.1 10E9/L (ref 0–0.7)
EOSINOPHIL NFR BLD AUTO: 1.3 %
ERYTHROCYTE [DISTWIDTH] IN BLOOD BY AUTOMATED COUNT: 15.3 % (ref 10–15)
ERYTHROCYTE [SEDIMENTATION RATE] IN BLOOD BY WESTERGREN METHOD: 8 MM/H (ref 0–30)
GFR SERPL CREATININE-BSD FRML MDRD: 72 ML/MIN/{1.73_M2}
HCT VFR BLD AUTO: 40.1 % (ref 35–47)
HGB BLD-MCNC: 12.7 G/DL (ref 11.7–15.7)
IMM GRANULOCYTES # BLD: 0 10E9/L (ref 0–0.4)
IMM GRANULOCYTES NFR BLD: 0.4 %
LYMPHOCYTES # BLD AUTO: 2 10E9/L (ref 0.8–5.3)
LYMPHOCYTES NFR BLD AUTO: 41.5 %
MCH RBC QN AUTO: 29.1 PG (ref 26.5–33)
MCHC RBC AUTO-ENTMCNC: 31.7 G/DL (ref 31.5–36.5)
MCV RBC AUTO: 92 FL (ref 78–100)
MONOCYTES # BLD AUTO: 0.5 10E9/L (ref 0–1.3)
MONOCYTES NFR BLD AUTO: 9.6 %
NEUTROPHILS # BLD AUTO: 2.2 10E9/L (ref 1.6–8.3)
NEUTROPHILS NFR BLD AUTO: 46.6 %
NRBC # BLD AUTO: 0 10*3/UL
NRBC BLD AUTO-RTO: 0 /100
PLATELET # BLD AUTO: 205 10E9/L (ref 150–450)
RBC # BLD AUTO: 4.36 10E12/L (ref 3.8–5.2)
WBC # BLD AUTO: 4.8 10E9/L (ref 4–11)

## 2019-02-26 PROCEDURE — 82565 ASSAY OF CREATININE: CPT | Performed by: NURSE PRACTITIONER

## 2019-02-26 PROCEDURE — 84450 TRANSFERASE (AST) (SGOT): CPT | Performed by: NURSE PRACTITIONER

## 2019-02-26 PROCEDURE — 86140 C-REACTIVE PROTEIN: CPT | Performed by: NURSE PRACTITIONER

## 2019-02-26 PROCEDURE — 85652 RBC SED RATE AUTOMATED: CPT | Performed by: NURSE PRACTITIONER

## 2019-02-26 PROCEDURE — 36415 COLL VENOUS BLD VENIPUNCTURE: CPT | Performed by: NURSE PRACTITIONER

## 2019-02-26 PROCEDURE — 85025 COMPLETE CBC W/AUTO DIFF WBC: CPT | Performed by: NURSE PRACTITIONER

## 2019-02-26 PROCEDURE — G0463 HOSPITAL OUTPT CLINIC VISIT: HCPCS | Mod: ZF

## 2019-02-26 PROCEDURE — 82040 ASSAY OF SERUM ALBUMIN: CPT | Performed by: NURSE PRACTITIONER

## 2019-02-26 PROCEDURE — 84460 ALANINE AMINO (ALT) (SGPT): CPT | Performed by: NURSE PRACTITIONER

## 2019-02-26 ASSESSMENT — PAIN SCALES - GENERAL: PAINLEVEL: MODERATE PAIN (4)

## 2019-02-26 ASSESSMENT — MIFFLIN-ST. JEOR: SCORE: 1313.62

## 2019-02-26 NOTE — PROGRESS NOTES
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 Weldon is doing much better since starting Cosentyx for her psoriatic arthritis and polyarthropathy. She reports a 70% decrease in general joint pains and stiffness, SI pain, hand and eye symptoms. She is tolerating Cosentyx well with no signs of toxicity after completing loading doses. She remains on 0.8 ml(20 mg) subcutaneous  of methotrexate. Her last dose of prednisone which was prescribed for her uveitis was about a week ago and has been feeling well off of it. We discussed that if she would like to decrease her dose of methotrexate, we would only decrease it by 2.5 mg at a time, at maximum every two months. We had an at length discussion regarding this taper and she is feeling so well at this time that she does not wish to taper. I agree with her that the benefit of continuing methrotrexate at this time outweighs the risk. We will reevaluate this at her next follow up. She will continue on her current medication regimen of Cosentyx at maintenance dosing, methotrexate at 0.8, folic acid and voltaren gel PRN foot pain.    In regards to her complaints of neck pain, based on the description of neuropathic symptoms and pain with decreased active ROM secondary to pain I am more suspicious of this being a process of DJD (most likely foraminal stenosis) rather than true inflammatory etiology. We had an at length discussion regarding the possibility of DJD as a consequence of her underlying inflammatory disease, psoriatic arthritis. She has not had  cervical spine imaging in quite some time but does not wish to repeat this as she is overall much improved and has been working on exercises at home which also seem to help. I recommended considering a traction pillow for her symptoms. I am happy to refer her to physical therapy and repeat imaging shall her symptoms become more bothersome in the future. She will continue to monitor any changes in symptoms.     She also had complaints of bilateral foot pain localized to the heel and ball of the foot. This pain may be nerve related v.s plantar fascitis although she is not exquisitely tender to the plantar fascia. I explained to her that with her transient nature for symptoms, proving a neuropathic etiology of this would be quite difficult. She will work on stretches and continue conservative therapies for this. She will monitor symptoms and report any changes and revisit with Miguel Umana in 6 months. Ms. Weldon agrees and is happy with this plan of care.     Follow-up:  Follow up in clinic with Miguel Umana 6 months.     This visit was 40 mins in duration, the majority spent in counseling.     ZAKIA Samayoa MD, PhD    Rheumatology      HPI:   Krissy Weldon is a 63 year old female with undifferentiated polyarthropathy and psoriatic arthritis who presents for follow up. The patient last saw Miguel Umana on 1/14/2019. This patient's condition is complicated by eye inflammation (uveitis and scleritis--sees [facility]), tendonitis, costrochondritis, tendon tears, synovitis, dactylitis, OA/DJD jessica thumbs CMC. Failed or intolerance to multiple medications.    History per Miguel Umana:   [-SSa, -SSb, -CCP,HLA-B27 negative on outside workup with previous SI injections] with hx- inflammatory eye left eye episcleritis requiring prednisone gtt or oral with bone scan unrevealing. Previous medrol or steroid responsiveness. Past tried humira, SSZ, simponi SQ/IV, remicade and otelza. Failed humira EoW recurrent  episcleritis, right wrist, right SI, bilateral hamstrings tendonosis with partial tear bilaterally. Failed simponi sq/IV ineffective. SSZ and oral MTX. Past recurrent iritis (ER if eye pain, blurred vision, red eye). Remicade. Otezla. LLL pneumonia 3/2015. MRI L hip showed high grade tear gluteus minimus insertion site, effusion 4/2015. C/o neck issues with MRI cervical spine show DJD, EMG, paramedian osteophytes and disk protrusion at C3-C4 with some moderate central canal stenosis and moderate to severe right-sided foraminal stenosis as a consequence.  C5-C6 also showed some mild to moderate central canal stenosis and moderate bilateral central foraminal stenosis.  Seen Dr. Wheeler with significant improvement in GI issues pancreatic sphinctor dysfunction requiring surgery now on pancreatic enzymes after pancreatic duct is open. Failed cimzia Cimzia lost effectiveness (more uveitis, arthritis, synovitis,bursitis and tendonitis).    On 1/14/2019, she still had symptoms of psoriatic arthritis w/axial involvement activity with hx episcleritis/uveities, dactylitis, tendonitis, left hamstring tear. Exam showed active axial, peripheral signs or symptoms and possible inflammatory eye, and more changes in her toes joints were now deformities (she will get x-rays from [facility]). Needs THR. Humira 8-2017 but every 10 days and MTX 25 mg. Once a week injections werenot controlling her disease. The plan was for her to stop adalimumab (humira) 40 mg SQ injection every 10 days and plan to switch to secukinumab (cosentyx). The patient was continued on  methotrexate 25 mg SQ once Monday week, folic acid 2-3 mg day and volteran cream prn as directed. She was advised to continue taking calcium and vitamin D. The patient was referred to an opthalmologist for her concern of eye inflammation. She was referred her to endocrine for complete evaluation regarding her low bone density per DEXA 7-2018. The patient had some psoriasis fingertip skin  cracking. She was referred to derm for formal consult      Today, she is doing very well. She has started taking Cosentyx. Her last injection was last week. She would say she is 70% better than she was on Humira. On Humira she would get up and have stiffness for up to two hours. She now has about 30 minutes of morning stiffness. Her sacroiliac pain is much better. Her hands are much better. She now has no eye symptoms. Her uveitis episode resolved after her steroid taper. This stopped last week and she has been fine off of it.     She has tapered off of her prednisone. She is down to 20 mg of methotrexate. She was previously on 25 mg. This dosage decrease was not associated with an increase in her symptoms.     However, while her neck stiffness is better, she is still having some neck pain and stiffness. She has tried physical therapy in the past, which was helpful for her. She is doing neck exercises everyday.  She is on oxycodone 15 mg for this currently, which is helpful. This is managed by a pain clinic. She has some morning stiffness, which she thinks is generally improved from before. Looking over the right shoulder especially aggravates her pain and causes shooting pains down her arm.     The bottoms of her feet are also sore in the morning. She notes the stiffness is much better, but the pain is still there.This is mostly over the balls of her foot. This pain is worse on hard floors. She wears slippers to avoid some of this pain. Her foot pain wakes her up at night.     Review of Systems:   Pertinent items are noted in HPI or as below, remainder of complete ROS is negative.      No recent problems with hearing or vision. No swallowing problems.   No breathing difficulty, shortness of breath, coughing, or wheezing  No chest pain or palpitations  No heart burn, indigestion, abdominal pain, nausea, vomiting, diarrhea  No urination problems, no bloody, cloudy urine, no dysuria  No numbing, tingling, weakness  No  "headaches or confusion  No rashes. No easy bleeding or bruising.     Active Medications:   Current Outpatient Medications:      B-D SYRINGE LUER-FILI 1 ML MISC, USE TO INJECT METHOTREXATE EVERY 7 DAYS, Disp: 25 each, Rfl: 0     BD DISP NEEDLES 25G X 5/8\" MISC, USE WITH METHOTREXATE UNDER THE SKIN EVERY 7 DAYS, Disp: 25 each, Rfl: 0     cefdinir (OMNICEF) 300 MG capsule, Take 1 capsule (300 mg) by mouth 2 times daily, Disp: 20 capsule, Rfl: 0     FLUoxetine (PROZAC) 40 MG capsule, Take 40 mg by mouth daily., Disp: , Rfl:      folic acid (FOLVITE) 1 MG tablet, Take 3 tablet a day few days before and day after methotrexate then the other day 2 mg day, Disp: 210 tablet, Rfl: 3     levothyroxine (SYNTHROID) 100 MCG tablet, Take  by mouth daily., Disp: , Rfl:      methotrexate sodium, pres-free, 50 MG/2ML SOLN injection CHEMO, Inject 1 mL (25 mg) Subcutaneous every 7 days *DISCARD REMAINDER** MONITORING LABS DUE EVERY 8 WEEKS, Disp: 8 mL, Rfl: 2     multivitamin, therapeutic (THERA-VIT) TABS, Take 1 tablet by mouth daily, Disp: , Rfl:      secukinumab (COSENTYX SENSOREADY PEN) 150 MG/ML Sensoready pen, Inject 1 mL (150 mg) Subcutaneous every 28 days Hold for signs of infection, and seek medical attention., Disp: 1 each, Rfl: 5     sennosides (SENOKOT) 8.6 MG tablet, Take 1 tablet by mouth 2 times daily, Disp: 1 tablet, Rfl: 0     Syringe Luer Slip (B-D SYRINGE LUER-FILI) 3 ML MISC, 1 Units by Device route every 7 days, Disp: 25 each, Rfl: 0     Vitamin D, Cholecalciferol, 1000 UNITS CAPS, Take 2,000 Units by mouth daily, Disp: 60 capsule, Rfl: 11     fentaNYL (DURAGESIC) 25 mcg/hr 72 hr patch, Place 1 patch onto the skin every 72 hours, Disp: , Rfl:      predniSONE (DELTASONE) 5 MG tablet, Take 10 mg once a day for 3 weeks then go to 5 mg day until seen. (Patient not taking: Reported on 2/26/2019.), Disp: 60 tablet, Rfl: 2      Allergies:   Patient has no known allergies.      Past Medical History:  Past medical history is " "notable for her being presumptively treated for Lyme with doxycycline after developing a targetoid lesion, for a history of Hashimoto's thyroiditis with subsequent hypothyroidism, for the diagnosis now of psoriasis, and for a history of depression.Neck pain, cervical stenosis-- MRI  +moderate central stenosis right C3-4, C5-6 degenerative changes 2nd mild-mod central stenosis. Past referral to physiatry-wishes to return to Dr. Edmond Sawant [facility] Left hip pain s/p tear needs THR she reports. B) Hx-Bilateral tendonitis hamstrings with right bursitis, partial tear per Dr. Gomez s/p injections. Seen Dr. Arvin Xie at [facility] s/p ablation SI joint, tendon insertion site. Sees Dr. Maciel Dukes. C). Transient elevation LFT 2/2013 [ NL after held MTX and tramadol 2wk]; transient 4- [ AST 89]. NL . Other hx --Panceatic sphinctor dysfunction. On pancreatic enzymes. SBO 9-2015, now no doing well. Right thumb DJD, s/p surgery . Healed.     Past Surgical History:  She has a surgical history including appendectomy in 1980, cholecystectomy in approximately 1993, and hysterectomy without oophorectomy in 1996.  Ablation SI Right 4/25 and left Feb 26th 2017 -- pain clinic   PT for her hip--told by therapy could feel the bursitis. Told needs THR and injection of left hip Feb 10th 2017 -    Family History:   No autoimmune disorders, psoriasis, UC, crohn's, SLE, RA, PsA, gout, autoimmune thyroid.  No MS, heart disease or cancer in family  Mother-endometrial cancer, RHEUMATOID ARTHRITIS (RA)   Father-psoriasis, lymphoma, colon and prostate cancer   Siblings-sister RHEUMATOID ARTHRITIS (RA), OSTEOARTHRITIS  And crohns   MATERNAL GRANDMOTHER-rheumatoid arthritis      Social History:   No Alcohol. No Smoking. No IVDU. Partner on list for lung transplant      Physical Exam:   /67   Pulse 69   Temp 97.9  F (36.6  C) (Oral)   Ht 1.664 m (5' 5.5\")   Wt 74.5 kg (164 lb 3.2 oz)   LMP " 03/06/2012   SpO2 99%   BMI 26.91 kg/m     Wt Readings from Last 4 Encounters:   02/26/19 74.5 kg (164 lb 3.2 oz)   01/14/19 73 kg (160 lb 14.4 oz)   10/31/18 74.4 kg (164 lb)   07/03/18 73.6 kg (162 lb 3.2 oz)     Constitutional: Well-developed, appearing stated age; cooperative  Eyes: Normal EOM, PERRLA, vision, conjunctiva, sclera  ENT: Normal external ears, nose, hearing, lips, teeth, gums, throat. No mucous membrane lesions, normal saliva pool  Neck: No mass or thyroid enlargement  Resp: Lungs clear to auscultation, nl to palpation  CV: RRR, no murmurs, rubs or gallops, no edema  GI: No ABD mass or tenderness, no HSM  : Not tested  Lymph: No cervical, supraclavicular, inguinal or epitrochlear nodes  MS: The TMJ, neck, shoulder, elbow, wrist, PIP/DIP, spine, hip, knee, ankle, and foot MTP/IP joints were examined and found normal. Full joint ROM. Normal  strength. No dactylitis,  tenosynovitis.  Tenderness at the plantar enthesis  No active synovitis in her MTP arcades. She has a very high arch and a somewhat prominent metatarsal head on the 1st metatarsal bilaterally. She is not more tender there than anywhere else.  Slight synovitis on left 2nd and 3rd MCPs  Bilateral sacroiliac joint tenderness to direct palpation  Neck range of motion limited with lateral flexion to the right. No limitation in neck flexion or extension  Skin: No nail pitting, alopecia, rash, nodules or lesions  Neuro: Normal cranial nerves, strength, sensation, DTRs.   Psych: Normal judgement, orientation, memory, affect.     Laboratory:   RHEUM RESULTS Latest Ref Rng & Units 1/7/2019 1/14/2019 2/26/2019   SED RATE 0 - 30 mm/h 9 - 8   CRP, INFLAMMATION 0.0 - 8.0 mg/L <2.9 - <2.9   CK TOTAL 30 - 225 U/L - - -   RHEUMATOID FACTOR <20 IU/mL - - -   DESEAN SCREEN BY EIA <1.0 - - -   AST 0 - 45 U/L 19 - 25   ALT 0 - 50 U/L 32 - 33   ALBUMIN 3.4 - 5.0 g/dL 3.8 - 3.7   WBC 4.0 - 11.0 10e9/L 6.0 - 4.8   RBC 3.8 - 5.2 10e12/L 4.82 - 4.36   HGB  11.7 - 15.7 g/dL 13.7 - 12.7   HCT 35.0 - 47.0 % 42.6 - 40.1   MCV 78 - 100 fl 88 - 92   MCHC 31.5 - 36.5 g/dL 32.2 - 31.7   RDW 10.0 - 15.0 % 14.8 - 15.3(H)    - 450 10e9/L 263 - 205   CREATININE 0.52 - 1.04 mg/dL 0.78 - 0.86   GFR ESTIMATE, IF BLACK >60 mL/min/[1.73:m2] >90 - 84   GFR ESTIMATE >60 mL/min/[1.73:m2] 81 - 72    - 1,620 mg/dL - - -   IGA 70 - 380 mg/dL - - -   IGM 60 - 265 mg/dL - - -   HEPATITIS C ANTIBODY NR:Nonreactive - Nonreactive -       Rheumatoid Factor   Date Value Ref Range Status   05/31/2017 <20 <20 IU/mL Final   ,   Cyclic Citrullinated Peptide Antibody, IgG   Date Value Ref Range Status   05/31/2017 1 <7 U/mL Final     Comment:     Negative   ,   Cyclic Cit Pept IgG/IgA   Date Value Ref Range Status   06/30/2011 <20  Interpretation:  Negative <20 UNITS Final   ,  ,   SSA (RO) Antibody IgG   Date Value Ref Range Status   06/30/2011 2  Final     Comment:     Reference range: 0 to 40  Unit: AU/mL  (Note)  REFERENCE INTERVALS: SSA (Ro) (BERTA) Ab, IgG   29 AU/mL or Less ............. Negative   30 - 40 AU/mL ................ Equivocal   41 AU/mL or Greater .......... Positive  SSA (Ro) antibody is seen in 70-75% of Sjogren syndrome  cases, 30-40% of systemic lupus erythematosus (SLE) and  5-10% of progressive systemic sclerosis (PSS).  Performed by [facility],  [address] [place] [address] [phone]  www.[employer], Estella Huertas MD, Lab. Director     SSB (LA) Antibody IgG   Date Value Ref Range Status   06/30/2011 0  Final     Comment:     Reference range: 0 to 40  Unit: AU/mL  (Note)  REFERENCE INTERVALS: SSB (La) (BERTA) Ab, IgG   29 AU/mL or Less ............. Negative   30 - 40 AU/mL ................ Equivocal   41 AU/mL or Greater .......... Positive  SSB (La) antibody is seen in 50-60% of Sjogren syndrome  cases and is specific if it is the only BERTA antibody  present. 15-25% of patients with systemic lupus  erythematosus (SLE) and 5-10% of patients with  progressive  systemic sclerosis (PSS) also have this antibody.  Performed by [facility],  [address], [facility] [place] [address] [phone]  www.[employer], Estella Huertas MD, Lab. Director   ,  ,  ,   DESEAN Screen by EIA   Date Value Ref Range Status   05/15/2014 <1.0  Interpretation:  Negative   <1.0 Final   ,  ,  ,  ,  ,  ,  ,   Hepatitis B Core Emily   Date Value Ref Range Status   01/14/2019 Nonreactive NR^Nonreactive Final   ,   Hep B Surface Agn   Date Value Ref Range Status   01/14/2019 Nonreactive NR^Nonreactive Final   ,  ,  ,  ,   Quantiferon-TB Gold Plus Result   Date Value Ref Range Status   01/14/2019 Negative NEG^Negative Final     Comment:     No interferon gamma response to M.tuberculosis antigens was detected.   Infection with M.tuberculosis is unlikely, however a single negative result   does not exclude infection. In patients at high risk for infection, a second   test should be considered  in accordance with the 2017 ATS/IDSA/CDC Clinical Practice Guidelines for   Diagnosis of Tuberculosis in Adults and Children [Lewinsohn DM et   al.Clin.Infect.Dis. 2017 64(2):111-115].       TB1 Ag minus Nil Value   Date Value Ref Range Status   01/14/2019 0.00 IU/mL Final     TB2 Ag minus Nil Value   Date Value Ref Range Status   01/14/2019 0.00 IU/mL Final     Mitogen minus Nil Result   Date Value Ref Range Status   01/14/2019 >10.00 IU/mL Final     Nil Result   Date Value Ref Range Status   01/14/2019 0.05 IU/mL Final   ,  ,  ,  ,  ,  ,  ,  ,  ,  ,  ,  ,  ,  ,  ,  ,   Albumin Fraction   Date Value Ref Range Status   05/07/2014 4.4 3.7 - 5.1 g/dL Final     Alpha 2 Fraction   Date Value Ref Range Status   05/07/2014 0.6 0.5 - 0.9 g/dL Final     Beta Fraction   Date Value Ref Range Status   05/07/2014 0.8 0.6 - 1.0 g/dL Final     Gamma Fraction   Date Value Ref Range Status   05/07/2014 1.1 0.7 - 1.6 g/dL Final     Monoclonal Peak   Date Value Ref Range Status   05/07/2014 0.0 0.0 g/dL Final     ELP  Interpretation:   Date Value Ref Range Status   05/07/2014   Final    No monoclonal protein seen by capillarys electrophoresis, however a monoclonla   protein was seen in this sample by immunofixation which is a more sensitive   method for monoclonal dectection.  See immunofixation report on same sample.   Pathologic significance requires clinical correlation.  NORI Stauffer M.D.,   Ph.D., Pathologist       ,   Immunofixation ELP   Date Value Ref Range Status   05/07/2014   Final    (Note)  Monoclonal IgG immunoglobulin of kappa light chain type.  Pathological significance requires clinical correlation.  JUAN PABLO Stauffer M.D., Ph.D., Pathologist         IGG   Date Value Ref Range Status   05/07/2014 1,060 695 - 1,620 mg/dL Final     IGA   Date Value Ref Range Status   05/07/2014 138 70 - 380 mg/dL Final     IGM   Date Value Ref Range Status   05/07/2014 123 60 - 265 mg/dL Final   ,  ,  ,  ,  ,  ,   Rheumatoid Factor   Date Value Ref Range Status   05/31/2017 <20 <20 IU/mL Final   ,  ,  ,  ,   Cyclic Citrullinated Peptide Antibody, IgG   Date Value Ref Range Status   05/31/2017 1 <7 U/mL Final     Comment:     Negative   ,  ,  ,  ,  ,  ,  ,  ,  ,  ,  ,  ,   Immunofixation ELP   Date Value Ref Range Status   05/07/2014   Final    (Note)  Monoclonal IgG immunoglobulin of kappa light chain type.  Pathological significance requires clinical correlation.  JUAN PABLO Stauffer M.D., Ph.D., Pathologist         IGG   Date Value Ref Range Status   05/07/2014 1,060 695 - 1,620 mg/dL Final     IGA   Date Value Ref Range Status   05/07/2014 138 70 - 380 mg/dL Final     IGM   Date Value Ref Range Status   05/07/2014 123 60 - 265 mg/dL Final   ,   Albumin Fraction   Date Value Ref Range Status   05/07/2014 4.4 3.7 - 5.1 g/dL Final     Alpha 2 Fraction   Date Value Ref Range Status   05/07/2014 0.6 0.5 - 0.9 g/dL Final     Beta Fraction   Date Value Ref Range Status   05/07/2014 0.8 0.6 - 1.0 g/dL Final     Gamma Fraction   Date Value Ref  Range Status   05/07/2014 1.1 0.7 - 1.6 g/dL Final     Monoclonal Peak   Date Value Ref Range Status   05/07/2014 0.0 0.0 g/dL Final     ELP Interpretation:   Date Value Ref Range Status   05/07/2014   Final    No monoclonal protein seen by capillarys electrophoresis, however a monoclonla   protein was seen in this sample by immunofixation which is a more sensitive   method for monoclonal dectection.  See immunofixation report on same sample.   Pathologic significance requires clinical correlation.  NORI Stauffer M.D.,   Ph.D., Pathologist       ,  ,  ,  ,  ,   Rheumatoid Factor   Date Value Ref Range Status   05/31/2017 <20 <20 IU/mL Final   ,   Cyclic Citrullinated Peptide Antibody, IgG   Date Value Ref Range Status   05/31/2017 1 <7 U/mL Final     Comment:     Negative   ,   Cyclic Cit Pept IgG/IgA   Date Value Ref Range Status   06/30/2011 <20  Interpretation:  Negative <20 UNITS Final   ,  ,  ,   SSA (RO) Antibody IgG   Date Value Ref Range Status   06/30/2011 2  Final     Comment:     Reference range: 0 to 40  Unit: AU/mL  (Note)  REFERENCE INTERVALS: SSA (Ro) (BERTA) Ab, IgG   29 AU/mL or Less ............. Negative   30 - 40 AU/mL ................ Equivocal   41 AU/mL or Greater .......... Positive  SSA (Ro) antibody is seen in 70-75% of Sjogren syndrome  cases, 30-40% of systemic lupus erythematosus (SLE) and  5-10% of progressive systemic sclerosis (PSS).  Performed by [facility],  [address] [place] [id number] [phone]  [email or web address], Estella Huertas MD, Lab. Director     SSB (LA) Antibody IgG   Date Value Ref Range Status   06/30/2011 0  Final     Comment:     Reference range: 0 to 40  Unit: AU/mL  (Note)  REFERENCE INTERVALS: SSB (La) (BERTA) Ab, IgG   29 AU/mL or Less ............. Negative   30 - 40 AU/mL ................ Equivocal   41 AU/mL or Greater .......... Positive  SSB (La) antibody is seen in 50-60% of Sjogren syndrome  cases and is specific if it is the only BERTA  antibody  present. 15-25% of patients with systemic lupus  erythematosus (SLE) and 5-10% of patients with progressive  systemic sclerosis (PSS) also have this antibody.  Performed by [facility],  [facility] [place] [address] [phone]  www.[facility], Estella Huertas MD, Lab. Director   ,  ,   DESEAN Screen by EIA   Date Value Ref Range Status   05/15/2014 <1.0  Interpretation:  Negative   <1.0 Final   ,  ,  ,  ,  ,  ,  ,   Hepatitis B Core Emily   Date Value Ref Range Status   01/14/2019 Nonreactive NR^Nonreactive Final   ,   Hep B Surface Agn   Date Value Ref Range Status   01/14/2019 Nonreactive NR^Nonreactive Final   ,  ,  ,  ,   Quantiferon-TB Gold Plus Result   Date Value Ref Range Status   01/14/2019 Negative NEG^Negative Final     Comment:     No interferon gamma response to M.tuberculosis antigens was detected.   Infection with M.tuberculosis is unlikely, however a single negative result   does not exclude infection. In patients at high risk for infection, a second   test should be considered  in accordance with the 2017 ATS/IDSA/CDC Clinical Practice Guidelines for   Diagnosis of Tuberculosis in Adults and Children [Lewinsohn DM et   al.Clin.Infect.Dis. 2017 64(2):111-115].       TB1 Ag minus Nil Value   Date Value Ref Range Status   01/14/2019 0.00 IU/mL Final     TB2 Ag minus Nil Value   Date Value Ref Range Status   01/14/2019 0.00 IU/mL Final     Mitogen minus Nil Result   Date Value Ref Range Status   01/14/2019 >10.00 IU/mL Final     Nil Result   Date Value Ref Range Status   01/14/2019 0.05 IU/mL Final   ,  ,  ,  ,  ,  ,  ,  ,  ,  ,  ,  ,  ,  ,  ,  ,   Albumin Fraction   Date Value Ref Range Status   05/07/2014 4.4 3.7 - 5.1 g/dL Final     Alpha 2 Fraction   Date Value Ref Range Status   05/07/2014 0.6 0.5 - 0.9 g/dL Final     Beta Fraction   Date Value Ref Range Status   05/07/2014 0.8 0.6 - 1.0 g/dL Final     Gamma Fraction   Date Value Ref Range Status   05/07/2014 1.1 0.7 - 1.6 g/dL  Final     Monoclonal Peak   Date Value Ref Range Status   05/07/2014 0.0 0.0 g/dL Final     ELP Interpretation:   Date Value Ref Range Status   05/07/2014   Final    No monoclonal protein seen by capillarys electrophoresis, however a monoclonla   protein was seen in this sample by immunofixation which is a more sensitive   method for monoclonal dectection.  See immunofixation report on same sample.   Pathologic significance requires clinical correlation.  NORI Stauffer M.D.,   Ph.D., Pathologist       ,  ,   [facility] ELP   Date Value Ref Range Status   05/07/2014   Final    (Note)  Monoclonal IgG immunoglobulin of kappa light chain type.  Pathological significance requires clinical correlation.  JUAN PABLO Stauffer M.D., Ph.D., Pathologist         IGG   Date Value Ref Range Status   05/07/2014 1,060 695 - 1,620 mg/dL Final     IGA   Date Value Ref Range Status   05/07/2014 138 70 - 380 mg/dL Final     IGM   Date Value Ref Range Status   05/07/2014 123 60 - 265 mg/dL Final     Scribe Disclosure:   I, Deandre Baker and Heena Wade MS4, am serving as a scribe to document services personally performed by Jensen Samayoa MD at this visit, based upon the provider's statements to me. All documentation has been reviewed by the aforementioned provider prior to being entered into the official medical record.     Portions of this medical record were completed by a scribe. UPON MY REVIEW AND AUTHENTICATION BY ELECTRONIC SIGNATURE, this confirms (a) I performed the applicable clinical services, and (b) the record is accurate.

## 2019-02-26 NOTE — LETTER
2019       RE: Krissy Weldon  [id number] [facility] [address]     Dear Colleague,    Thank you for referring your patient, Krissy Weldon, to the [facility] RHEUMATOLOGY at [facility]. Please see a copy of my visit note below.    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 Weldon is doing much better since starting Cosentyx for her psoriatic arthritis and polyarthropathy. She reports a 70% decrease in general joint pains and stiffness, SI pain, hand and eye symptoms. She is tolerating Cosentyx well with no signs of toxicity after completing loading doses. She remains on 0.8 ml(20 mg) subcutaneous  of methotrexate. Her last dose of prednisone which was prescribed for her uveitis was about a week ago and has been feeling well off of it. We discussed that if she would like to decrease her dose of methotrexate, we would only decrease it by 2.5 mg at a time, at maximum every two months. We had an at length discussion regarding this taper and she is feeling so well at this time that she does not wish to taper. I agree with her that the benefit of continuing methrotrexate at this time outweighs the risk. We will reevaluate this at her next follow up. She will continue on her current medication regimen of Cosentyx at maintenance dosing, methotrexate at 0.8, folic acid and voltaren gel PRN foot pain.    In regards to her complaints of neck pain, based on the description of neuropathic symptoms and pain with decreased active ROM secondary to pain I am  more suspicious of this being a process of DJD (most likely foraminal stenosis) rather than true inflammatory etiology. We had an at length discussion regarding the possibility of DJD as a consequence of her underlying inflammatory disease, psoriatic arthritis. She has not had cervical spine imaging in quite some time but does not wish to repeat this as she is overall much improved and has been working on exercises at home which also seem to help. I recommended considering a traction pillow for her symptoms. I am happy to refer her to physical therapy and repeat imaging shall her symptoms become more bothersome in the future. She will continue to monitor any changes in symptoms.     She also had complaints of bilateral foot pain localized to the heel and ball of the foot. This pain may be nerve related v.s plantar fascitis although she is not exquisitely tender to the plantar fascia. I explained to her that with her transient nature for symptoms, proving a neuropathic etiology of this would be quite difficult. She will work on stretches and continue conservative therapies for this. She will monitor symptoms and report any changes and revisit with Miguel Umana in 6 months. Ms. Weldon agrees and is happy with this plan of care.     Follow-up:  Follow up in clinic with Miguel Umana 6 months.     This visit was 40 mins in duration, the majority spent in counseling.     ZAKIA Samayoa MD, PhD    Rheumatology      HPI:   Krissy Weldon is a 63 year old female with undifferentiated polyarthropathy and psoriatic arthritis who presents for follow up. The patient last saw Miguel Umana on 1/14/2019. This patient's condition is complicated by eye inflammation (uveitis and scleritis--sees [facility]), tendonitis, costrochondritis, tendon tears, synovitis, dactylitis, OA/DJD jessica thumbs CMC. Failed or intolerance to multiple medications.    History per Miguel Umana:   [-SSa, -SSb, -CCP,HLA-B27 negative on outside  workup with previous SI injections] with hx- inflammatory eye left eye episcleritis requiring prednisone gtt or oral with bone scan unrevealing. Previous medrol or steroid responsiveness. Past tried humira, SSZ, simponi SQ/IV, remicade and otelza. Failed humira EoW recurrent episcleritis, right wrist, right SI, bilateral hamstrings tendonosis with partial tear bilaterally. Failed simponi sq/IV ineffective. SSZ and oral MTX. Past recurrent iritis (ER if eye pain, blurred vision, red eye). Remicade. Otezla. LLL pneumonia 3/2015. MRI L hip showed high grade tear gluteus minimus insertion site, effusion 4/2015. C/o neck issues with MRI cervical spine show DJD, EMG, paramedian osteophytes and disk protrusion at C3-C4 with some moderate central canal stenosis and moderate to severe right-sided foraminal stenosis as a consequence.  C5-C6 also showed some mild to moderate central canal stenosis and moderate bilateral central foraminal stenosis.  Seen Dr. Wheeler with significant improvement in GI issues pancreatic sphinctor dysfunction requiring surgery now on pancreatic enzymes after pancreatic duct is open. Failed cimzia Cimzia lost effectiveness (more uveitis, arthritis, synovitis,bursitis and tendonitis).    On 1/14/2019, she still had symptoms of psoriatic arthritis w/axial involvement activity with hx episcleritis/uveities, dactylitis, tendonitis, left hamstring tear. Exam showed active axial, peripheral signs or symptoms and possible inflammatory eye, and more changes in her toes joints were now deformities (she will get x-rays from [facility]). Needs THR. Humira 8-2017 but every 10 days and MTX 25 mg. Once a week injections  werenot controlling her disease. The plan was for her to stop adalimumab (humira) 40 mg SQ injection every 10 days and plan to switch to secukinumab (cosentyx). The patient was continued on  me thotrexate 25 mg SQ once Monday week, folic acid 2-3 mg day and volteran cream prn as directed. She was advised  to continue taking c alcium and vitamin D. The patient was referred to an opthalmologist for her concern of eye inflammation. She was referred her to endocrine for complete evaluation regarding her low bone density per DEXA 7-2018. The patient had some psoriasis fingertip skin cracking.  She was referred to derm for formal consult      Today, she is doing very well. She has started taking Cosentyx. Her last injection was last week. She would say she is 70% better than she was on Humira. On Humira she would get up and have stiffness for up to two hours. She now has about 30 minutes of morning stiffness. Her sacroiliac pain is much better. Her hands are much better. She now has no eye symptoms. Her uveitis episode resolved after her steroid taper. This stopped last week and she has been fine off of it.     She has tapered off of her prednisone. She is down to 20 mg of methotrexate. She was previously on 25 mg. This dosage decrease was not associated with an increase in her symptoms.     However, while her neck stiffness is better, she is still having some neck pain and stiffness. She has tried physical therapy in the past, which was helpful for her. She is doing neck exercises everyday.  She is on oxycodone 15 mg for this currently, which is helpful. This is managed by a pain clinic. She has some morning stiffness, which she thinks is generally improved from before. Looking over the right shoulder especially aggravates her pain and causes shooting pains down her arm.     The bottoms of her feet are also sore in the morning. She notes the stiffness is much better, but the pain is still there.This is mostly over the balls of her foot. This pain is worse on hard floors. She wears slippers to avoid some of this pain. Her foot pain wakes her up at night.     Review of Systems:   Pertinent items are noted in HPI or as below, remainder of complete ROS is negative.      No recent problems with hearing or vision. No swallowing  "problems.   No breathing difficulty, shortness of breath, coughing, or wheezing  No chest pain or palpitations  No heart burn, indigestion, abdominal pain, nausea, vomiting, diarrhea  No urination problems, no bloody, cloudy urine, no dysuria  No numbing, tingling, weakness  No headaches or confusion  No rashes. No easy bleeding or bruising.     Active Medications:   Current Outpatient Medications:      B-D SYRINGE LUER-FILI 1 ML MISC, USE TO INJECT METHOTREXATE EVERY 7 DAYS, Disp: 25 each, Rfl: 0     BD DISP NEEDLES 25G X 5/8\" MISC, USE WITH METHOTREXATE UNDER THE SKIN EVERY 7 DAYS, Disp: 25 each, Rfl: 0     cefdinir (OMNICEF) 300 MG capsule, Take 1 capsule (300 mg) by mouth 2 times daily, Disp: 20 capsule, Rfl: 0     FLUoxetine (PROZAC) 40 MG capsule, Take 40 mg by mouth daily., Disp: , Rfl:      folic acid (FOLVITE) 1 MG tablet, Take 3 tablet a day few days before and day after methotrexate then the other day 2 mg day, Disp: 210 tablet, Rfl: 3     levothyroxine (SYNTHROID) 100 MCG tablet, Take  by mouth daily., Disp: , Rfl:      methotrexate sodium, pres-free, 50 MG/2ML SOLN injection CHEMO, Inject 1 mL (25 mg) Subcutaneous every 7 days *DISCARD REMAINDER** MONITORING LABS DUE EVERY 8 WEEKS, Disp: 8 mL, Rfl: 2     multivitamin, therapeutic (THERA-VIT) TABS, Take 1 tablet by mouth daily, Disp: , Rfl:      secukinumab (COSENTYX SENSOREADY PEN) 150 MG/ML Sensoready pen, Inject 1 mL (150 mg) Subcutaneous every 28 days Hold for signs of infection, and seek medical attention., Disp: 1 each, Rfl: 5     sennosides (SENOKOT) 8.6 MG tablet, Take 1 tablet by mouth 2 times daily, Disp: 1 tablet, Rfl: 0     Syringe Luer Slip (B-D SYRINGE LUER-FILI) 3 ML MISC, 1 Units by Device route every 7 days, Disp: 25 each, Rfl: 0     Vitamin D, Cholecalciferol, 1000 UNITS CAPS, Take 2,000 Units by mouth daily, Disp: 60 capsule, Rfl: 11     fentaNYL (DURAGESIC) 25 mcg/hr 72 hr patch, Place 1 patch onto the skin every 72 hours, Disp: , Rfl: "      predniSONE (DELTASONE) 5 MG tablet, Take 10 mg once a day for 3 weeks then go to 5 mg day until seen. (Patient not taking: Reported on 2/26/2019.), Disp: 60 tablet, Rfl: 2      Allergies:   Patient has no known allergies.      Past Medical History:  Past medical history is notable for her being presumptively treated for Lyme with doxycycline after developing a targetoid lesion, for a history of Hashimoto's thyroiditis with subsequent hypothyroidism, for the diagnosis now of psoriasis, and for a history of depression.Neck pain, cervical stenosis-- MRI  +moderate central stenosis right C3-4, C5-6 degenerative changes 2nd mild-mod central stenosis. Past referral to physiatry-wishes to return to Dr. Edmond Sawant [facility] Left hip pain s/p tear needs THR she reports. B) Hx-Bilateral tendonitis hamstrings with right bursitis, partial tear per Dr. Gomze s/p injections. Seen Dr. Arvin Xie at [facility] s/p ablation SI joint, tendon insertion site. Sees Dr. Maciel Dukes. C). Transient elevation LFT 2/2013 [ NL after held MTX and tramadol 2wk]; transient 4- [ AST 89]. NL . Other hx --Panceatic sphinctor dysfunction. On pancreatic enzymes. SBO 9-2015, now no doing well. Right thumb DJD, s/p surgery . Healed.     Past Surgical History:  She has a surgical history including appendectomy in 1980, cholecystectomy in approximately 1993, and hysterectomy without oophorectomy in 1996.  Ablation SI Right 4/25 and left Feb 26th 2017 -- pain clinic   PT for her hip--told by therapy could feel the bursitis. Told needs THR and injection of left hip Feb 10th 2017 -    Family History:   No autoimmune disorders, psoriasis, UC, crohn's, SLE, RA, PsA, gout, autoimmune thyroid.  No MS, heart disease or cancer in family  Mother-endometrial cancer, RHEUMATOID ARTHRITIS (RA)   Father-psoriasis, lymphoma, colon and prostate cancer   Siblings-sister RHEUMATOID ARTHRITIS (RA), OSTEOARTHRITIS  And crohns  "  MATERNAL GRANDMOTHER-rheumatoid arthritis      Social History:   No Alcohol. No Smoking. No IVDU. Partner on list for lung transplant      Physical Exam:   /67   Pulse 69   Temp 97.9  F (36.6  C) (Oral)   Ht 1.664 m (5' 5.5\")   Wt 74.5 kg (164 lb 3.2 oz)   LMP 03/06/2012   SpO2 99%   BMI 26.91 kg/m      Wt Readings from Last 4 Encounters:   02/26/19 74.5 kg (164 lb 3.2 oz)   01/14/19 73 kg (160 lb 14.4 oz)   10/31/18 74.4 kg (164 lb)   07/03/18 73.6 kg (162 lb 3.2 oz)     Constitutional: Well-developed, appearing stated age; cooperative  Eyes: Normal EOM, PERRLA, vision, conjunctiva, sclera  ENT: Normal external ears, nose, hearing, lips, teeth, gums, throat. No mucous membrane lesions, normal saliva pool  Neck: No mass or thyroid enlargement  Resp: Lungs clear to auscultation, nl to palpation  CV: RRR, no murmurs, rubs or gallops, no edema  GI: No ABD mass or tenderness, no HSM  : Not tested  Lymph: No cervical, supraclavicular, inguinal or epitrochlear nodes  MS: The TMJ, neck, shoulder, elbow, wrist, PIP/DIP, spine, hip, knee, ankle, and foot MTP/IP joints were examined and found normal. Full joint ROM. Normal  strength. No dactylitis,  tenosynovitis.  Tenderness at the plantar enthesis  No active synovitis in her MTP arcades. She has a very high arch and a somewhat prominent metatarsal head on the 1st metatarsal bilaterally. She is not more tender there than anywhere else.  Slight synovitis on left 2nd and 3rd MCPs  Bilateral sacroiliac joint tenderness to direct palpation  Neck range of motion limited with lateral flexion to the right. No limitation in neck flexion or extension  Skin: No nail pitting, alopecia, rash, nodules or lesions  Neuro: Normal cranial nerves, strength, sensation, DTRs.   Psych: Normal judgement, orientation, memory, affect.     Laboratory:   RHEUM RESULTS Latest Ref Rng & Units 1/7/2019 1/14/2019 2/26/2019   SED RATE 0 - 30 mm/h 9 - 8   CRP, INFLAMMATION 0.0 - 8.0 " mg/L <2.9 - <2.9   CK TOTAL 30 - 225 U/L - - -   RHEUMATOID FACTOR <20 IU/mL - - -   DESEAN SCREEN BY EIA <1.0 - - -   AST 0 - 45 U/L 19 - 25   ALT 0 - 50 U/L 32 - 33   ALBUMIN 3.4 - 5.0 g/dL 3.8 - 3.7   WBC 4.0 - 11.0 10e9/L 6.0 - 4.8   RBC 3.8 - 5.2 10e12/L 4.82 - 4.36   HGB 11.7 - 15.7 g/dL 13.7 - 12.7   HCT 35.0 - 47.0 % 42.6 - 40.1   MCV 78 - 100 fl 88 - 92   MCHC 31.5 - 36.5 g/dL 32.2 - 31.7   RDW 10.0 - 15.0 % 14.8 - 15.3(H)    - 450 10e9/L 263 - 205   CREATININE 0.52 - 1.04 mg/dL 0.78 - 0.86   GFR ESTIMATE, IF BLACK >60 mL/min/[1.73:m2] >90 - 84   GFR ESTIMATE >60 mL/min/[1.73:m2] 81 - 72    - 1,620 mg/dL - - -   IGA 70 - 380 mg/dL - - -   IGM 60 - 265 mg/dL - - -   HEPATITIS C ANTIBODY NR:Nonreactive - Nonreactive -       Rheumatoid Factor   Date Value Ref Range Status   05/31/2017 <20 <20 IU/mL Final   ,   Cyclic Citrullinated Peptide Antibody, IgG   Date Value Ref Range Status   05/31/2017 1 <7 U/mL Final     Comment:     Negative   ,   Cyclic Cit Pept IgG/IgA   Date Value Ref Range Status   06/30/2011 <20  Interpretation:  Negative <20 UNITS Final   ,  ,   SSA (RO) Antibody IgG   Date Value Ref Range Status   06/30/2011 2  Final     Comment:     Reference range: 0 to 40  Unit: AU/mL  (Note)  REFERENCE INTERVALS: SSA (Ro) (BERTA) Ab, IgG   29 AU/mL or Less ............. Negative   30 - 40 AU/mL ................ Equivocal   41 AU/mL or Greater .......... Positive  SSA (Ro) antibody is seen in 70-75% of Sjogren syndrome  cases, 30-40% of systemic lupus erythematosus (SLE) and  5-10% of progressive systemic sclerosis (PSS).  Performed by [facility],  [address] [place] [address],[place] [address] [phone]  [email or web address], Estella Huertas MD, Lab. Director     SSB (LA) Antibody IgG   Date Value Ref Range Status   06/30/2011 0  Final     Comment:     Reference range: 0 to 40  Unit: AU/mL  (Note)  REFERENCE INTERVALS: SSB (La) (BERTA) Ab, IgG   29 AU/mL or Less ............. Negative   30 - 40 AU/mL  ................ Equivocal   41 AU/mL or Greater .......... Positive  SSB (La) antibody is seen in 50-60% of Sjogren syndrome  cases and is specific if it is the only BERTA antibody  present. 15-25% of patients with systemic lupus  erythematosus (SLE) and 5-10% of patients with progressive  systemic sclerosis (PSS) also have this antibody.  Performed by [email or web address],  [address] [place] [id number] [phone]  [email or web address], Estella Huertas MD, Lab. Director   ,  ,  ,   DESEAN Screen by EIA   Date Value Ref Range Status   05/15/2014 <1.0  Interpretation:  Negative   <1.0 Final   ,  ,  ,  ,  ,  ,  ,   Hepatitis B Core Emily   Date Value Ref Range Status   01/14/2019 Nonreactive NR^Nonreactive Final   ,   Hep B Surface Agn   Date Value Ref Range Status   01/14/2019 Nonreactive NR^Nonreactive Final   ,  ,  ,  ,   Quantiferon-TB Gold Plus Result   Date Value Ref Range Status   01/14/2019 Negative NEG^Negative Final     Comment:     No interferon gamma response to M.tuberculosis antigens was detected.   Infection with M.tuberculosis is unlikely, however a single negative result   does not exclude infection. In patients at high risk for infection, a second   test should be considered  in accordance with the 2017 ATS/IDSA/CDC Clinical Practice Guidelines for   Diagnosis of Tuberculosis in Adults and Children [Lewinsohn DM et   al.Clin.Infect.Dis. 2017 64(2):111-115].       TB1 Ag minus Nil Value   Date Value Ref Range Status   01/14/2019 0.00 IU/mL Final     TB2 Ag minus Nil Value   Date Value Ref Range Status   01/14/2019 0.00 IU/mL Final     Mitogen minus Nil Result   Date Value Ref Range Status   01/14/2019 >10.00 IU/mL Final     Nil Result   Date Value Ref Range Status   01/14/2019 0.05 IU/mL Final   ,  ,  ,  ,  ,  ,  ,  ,  ,  ,  ,  ,  ,  ,  ,  ,   Albumin Fraction   Date Value Ref Range Status   05/07/2014 4.4 3.7 - 5.1 g/dL Final     Alpha 2 Fraction   Date Value Ref Range Status   05/07/2014 0.6 0.5 - 0.9 g/dL  Final     Beta Fraction   Date Value Ref Range Status   05/07/2014 0.8 0.6 - 1.0 g/dL Final     Gamma Fraction   Date Value Ref Range Status   05/07/2014 1.1 0.7 - 1.6 g/dL Final     Monoclonal Peak   Date Value Ref Range Status   05/07/2014 0.0 0.0 g/dL Final     ELP Interpretation:   Date Value Ref Range Status   05/07/2014   Final    No monoclonal protein seen by capillarys electrophoresis, however a monoclonla   protein was seen in this sample by immunofixation which is a more sensitive   method for monoclonal dectection.  See immunofixation report on same sample.   Pathologic significance requires clinical correlation.  NORI Stauffer M.D.,   Ph.D., Pathologist       ,   Immunofixation ELP   Date Value Ref Range Status   05/07/2014   Final    (Note)  Monoclonal IgG immunoglobulin of kappa light chain type.  Pathological significance requires clinical correlation.  JUAN PABLO Stauffer M.D., Ph.D., Pathologist         IGG   Date Value Ref Range Status   05/07/2014 1,060 695 - 1,620 mg/dL Final     IGA   Date Value Ref Range Status   05/07/2014 138 70 - 380 mg/dL Final     IGM   Date Value Ref Range Status   05/07/2014 123 60 - 265 mg/dL Final   ,  ,  ,  ,  ,  ,   Rheumatoid Factor   Date Value Ref Range Status   05/31/2017 <20 <20 IU/mL Final   ,  ,  ,  ,   Cyclic Citrullinated Peptide Antibody, IgG   Date Value Ref Range Status   05/31/2017 1 <7 U/mL Final     Comment:     Negative   ,  ,  ,  ,  ,  ,  ,  ,  ,  ,  ,  ,   Immunofixation ELP   Date Value Ref Range Status   05/07/2014   Final    (Note)  Monoclonal IgG immunoglobulin of kappa light chain type.  Pathological significance requires clinical correlation.  JUAN PABLO Stauffer M.D., Ph.D., Pathologist         IGG   Date Value Ref Range Status   05/07/2014 1,060 695 - 1,620 mg/dL Final     IGA   Date Value Ref Range Status   05/07/2014 138 70 - 380 mg/dL Final     IGM   Date Value Ref Range Status   05/07/2014 123 60 - 265 mg/dL Final   ,   Albumin Fraction   Date  Value Ref Range Status   05/07/2014 4.4 3.7 - 5.1 g/dL Final     Alpha 2 Fraction   Date Value Ref Range Status   05/07/2014 0.6 0.5 - 0.9 g/dL Final     Beta Fraction   Date Value Ref Range Status   05/07/2014 0.8 0.6 - 1.0 g/dL Final     Gamma Fraction   Date Value Ref Range Status   05/07/2014 1.1 0.7 - 1.6 g/dL Final     Monoclonal Peak   Date Value Ref Range Status   05/07/2014 0.0 0.0 g/dL Final     ELP Interpretation:   Date Value Ref Range Status   05/07/2014   Final    No monoclonal protein seen by capillarys electrophoresis, however a monoclonla   protein was seen in this sample by immunofixation which is a more sensitive   method for monoclonal dectection.  See immunofixation report on same sample.   Pathologic significance requires clinical correlation.  NORI Stauffer M.D.,   Ph.D., Pathologist       ,  ,  ,  ,  ,   Rheumatoid Factor   Date Value Ref Range Status   05/31/2017 <20 <20 IU/mL Final   ,   Cyclic Citrullinated Peptide Antibody, IgG   Date Value Ref Range Status   05/31/2017 1 <7 U/mL Final     Comment:     Negative   ,   Cyclic Cit Pept IgG/IgA   Date Value Ref Range Status   06/30/2011 <20  Interpretation:  Negative <20 UNITS Final   ,  ,  ,   SSA (RO) Antibody IgG   Date Value Ref Range Status   06/30/2011 2  Final     Comment:     Reference range: 0 to 40  Unit: AU/mL  (Note)  REFERENCE INTERVALS: SSA (Ro) (BERTA) Ab, IgG   29 AU/mL or Less ............. Negative   30 - 40 AU/mL ................ Equivocal   41 AU/mL or Greater .......... Positive  SSA (Ro) antibody is seen in 70-75% of Sjogren syndrome  cases, 30-40% of systemic lupus erythematosus (SLE) and  5-10% of progressive systemic sclerosis (PSS).  Performed by [facility],  [address] [place] [address] [phone]  [email or web address], Estella Huertas MD, Lab. Director     SSB (LA) Antibody IgG   Date Value Ref Range Status   06/30/2011 0  Final     Comment:     Reference range: 0 to 40  Unit: AU/mL  (Note)  REFERENCE  INTERVALS: SSB (La) (BERTA) Ab, IgG   29 AU/mL or Less ............. Negative   30 - 40 AU/mL ................ Equivocal   41 AU/mL or Greater .......... Positive  SSB (La) antibody is seen in 50-60% of Sjogren syndrome  cases and is specific if it is the only BERTA antibody  present. 15-25% of patients with systemic lupus  erythematosus (SLE) and 5-10% of patients with progressive  systemic sclerosis (PSS) also have this antibody.  Performed by [facility],  [address] [place] [id number] [phone]  [email or web address]), Estella Huertas MD, Lab. Director   ,  ,   DESEAN Screen by EIA   Date Value Ref Range Status   05/15/2014 <1.0  Interpretation:  Negative   <1.0 Final   ,  ,  ,  ,  ,  ,  ,   Hepatitis B Core Emily   Date Value Ref Range Status   01/14/2019 Nonreactive NR^Nonreactive Final   ,   Hep B Surface Agn   Date Value Ref Range Status   01/14/2019 Nonreactive NR^Nonreactive Final   ,  ,  ,  ,   Quantiferon-TB Gold Plus Result   Date Value Ref Range Status   01/14/2019 Negative NEG^Negative Final     Comment:     No interferon gamma response to M.tuberculosis antigens was detected.   Infection with M.tuberculosis is unlikely, however a single negative result   does not exclude infection. In patients at high risk for infection, a second   test should be considered  in accordance with the 2017 ATS/IDSA/CDC Clinical Practice Guidelines for   Diagnosis of Tuberculosis in Adults and Children [Wanda JOHNSON et   al.Clin.Infect.Dis. 2017 64(2):111-115].       TB1 Ag minus Nil Value   Date Value Ref Range Status   01/14/2019 0.00 IU/mL Final     TB2 Ag minus Nil Value   Date Value Ref Range Status   01/14/2019 0.00 IU/mL Final     Mitogen minus Nil Result   Date Value Ref Range Status   01/14/2019 >10.00 IU/mL Final     Nil Result   Date Value Ref Range Status   01/14/2019 0.05 IU/mL Final   ,  ,  ,  ,  ,  ,  ,  ,  ,  ,  ,  ,  ,  ,  ,  ,   Albumin Fraction   Date Value Ref Range Status   05/07/2014 4.4 3.7 - 5.1 g/dL  Final     Alpha 2 Fraction   Date Value Ref Range Status   05/07/2014 0.6 0.5 - 0.9 g/dL Final     Beta Fraction   Date Value Ref Range Status   05/07/2014 0.8 0.6 - 1.0 g/dL Final     Gamma Fraction   Date Value Ref Range Status   05/07/2014 1.1 0.7 - 1.6 g/dL Final     Monoclonal Peak   Date Value Ref Range Status   05/07/2014 0.0 0.0 g/dL Final     ELP Interpretation:   Date Value Ref Range Status   05/07/2014   Final    No monoclonal protein seen by capillarys electrophoresis, however a monoclonla   protein was seen in this sample by immunofixation which is a more sensitive   method for monoclonal dectection.  See immunofixation report on same sample.   Pathologic significance requires clinical correlation.  NORI Stauffer M.D.,   Ph.D., Pathologist       ,  ,   Immunofixation ELP   Date Value Ref Range Status   05/07/2014   Final    (Note)  Monoclonal IgG immunoglobulin of kappa light chain type.  Pathological significance requires clinical correlation.  JUAN PABLO Stauffer M.D., Ph.D., Pathologist         IGG   Date Value Ref Range Status   05/07/2014 1,060 695 - 1,620 mg/dL Final     IGA   Date Value Ref Range Status   05/07/2014 138 70 - 380 mg/dL Final     IGM   Date Value Ref Range Status   05/07/2014 123 60 - 265 mg/dL Final     Scribe Disclosure:   I, Deandre Baker and Heena Wade MS4, am serving as a scribe to document services personally performed by Jensen Samayoa MD at this visit, based upon the provider's statements to me. All documentation has been reviewed by the aforementioned provider prior to being entered into the official medical record.     Portions of this medical record were completed by a scribe. UPON MY REVIEW AND AUTHENTICATION BY ELECTRONIC SIGNATURE, this confirms (a) I performed the applicable clinical services, and (b) the record is accurate.        Again, thank you for allowing me to participate in the care of your patient.      Sincerely,    Jnesen Samayoa MD

## 2019-02-26 NOTE — LETTER
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 Weldon is doing much better since starting Cosentyx for her psoriatic arthritis and polyarthropathy. She reports a 70% decrease in general joint pains and stiffness, SI pain, hand and eye symptoms. She is tolerating Cosentyx well with no signs of toxicity after completing loading doses. She remains on 0.8 ml(20 mg) subcutaneous  of methotrexate. Her last dose of prednisone which was prescribed for her uveitis was about a week ago and has been feeling well off of it. We discussed that if she would like to decrease her dose of methotrexate, we would only decrease it by 2.5 mg at a time, at maximum every two months. We had an at length discussion regarding this taper and she is feeling so well at this time that she does not wish to taper. I agree with her that the benefit of continuing methrotrexate at this time outweighs the risk. We will reevaluate this at her next follow up. She will continue on her current medication regimen of Cosentyx at maintenance dosing, methotrexate at 0.8, folic acid and voltaren gel PRN foot pain.    In regards to her complaints of neck pain, based on the description of neuropathic symptoms and pain with decreased active ROM secondary to pain I am more suspicious of this being a process of DJD (most likely foraminal stenosis) rather than true inflammatory etiology. We had an at length discussion regarding the possibility of DJD as a  consequence of her underlying inflammatory disease, psoriatic arthritis. She has not had cervical spine imaging in quite some time but does not wish to repeat this as she is overall much improved and has been working on exercises at home which also seem to help. I recommended considering a traction pillow for her symptoms. I am happy to refer her to physical therapy and repeat imaging shall her symptoms become more bothersome in the future. She will continue to monitor any changes in symptoms.     She also had complaints of bilateral foot pain localized to the heel and ball of the foot. This pain may be nerve related v.s plantar fascitis although she is not exquisitely tender to the plantar fascia. I explained to her that with her transient nature for symptoms, proving a neuropathic etiology of this would be quite difficult. She will work on stretches and continue conservative therapies for this. She will monitor symptoms and report any changes and revisit with Miguel Umana in 6 months. Ms. Weldon agrees and is happy with this plan of care.     Follow-up:  Follow up in clinic with Miguel Umana 6 months.     This visit was 40 mins in duration, the majority spent in counseling.     ZAKIA Samayoa MD, PhD    Rheumatology      HPI:   Krissy Weldno is a 63 year old female with undifferentiated polyarthropathy and psoriatic arthritis who presents for follow up. The patient last saw Miguel Umana on 1/14/2019. This patient's condition is complicated by eye inflammation (uveitis and scleritis--sees [facility]), tendonitis, costrochondritis, tendon tears, synovitis, dactylitis, OA/DJD jessica thumbs C[facility]. Failed or intolerance to multiple medications.    History per Miguel Umana:   [-SSa, -SSb, -CCP,HLA-B27 negative on outside workup with previous SI injections] with hx- inflammatory eye left eye episcleritis requiring prednisone gtt or oral with bone scan unrevealing. Previous medrol or steroid responsiveness. Past  tried humira, SSZ, simponi SQ/IV, remicade and otelza. Failed humira EoW recurrent episcleritis, right wrist, right SI, bilateral hamstrings tendonosis with partial tear bilaterally. Failed simponi sq/IV ineffective. SSZ and oral MTX. Past recurrent iritis (ER if eye pain, blurred vision, red eye). Remicade. Otezla. LLL pneumonia 3/2015. MRI L hip showed high grade tear gluteus minimus insertion site, effusion 4/2015. C/o neck issues with MRI cervical spine show DJD, EMG, paramedian osteophytes and disk protrusion at C3-C4 with some moderate central canal stenosis and moderate to severe right-sided foraminal stenosis as a consequence.  C5-C6 also showed some mild to moderate central canal stenosis and moderate bilateral central foraminal stenosis.  Seen Dr. Wheeler with significant improvement in GI issues pancreatic sphinctor dysfunction requiring surgery now on pancreatic enzymes after pancreatic duct is open. Failed cimzia Cimzia lost effectiveness (more uveitis, arthritis, synovitis,bursitis and tendonitis).    On 1/14/2019, she still had symptoms of psoriatic arthritis w/axial involvement activity with hx episcleritis/uveities, dactylitis, tendonitis, left hamstring tear. Exam showed active axial, peripheral signs or symptoms and possible inflammatory eye, and more changes in her toes joints were now deformities (she will get x-rays from [facility]). Needs THR. Humira 8-2017 but every 10 days and MTX 25 mg. Once a week injections  werenot controlling her disease. The plan was for her to stop adalimumab (humira) 40 mg SQ injection every 10 days and plan to switch to secukinumab (cosentyx). The patient was continued on  me thotrexate 25 mg SQ once Monday week, folic acid 2-3 mg day and volteran cream prn as directed. She was advised to continue taking c alcium and vitamin D. The patient was referred to an opthalmologist for her concern of eye inflammation. She was referred her to endocrine for complete evaluation  regarding her low bone density per DEXA 7-2018. The patient had some psoriasis fingertip skin cracking.  She was referred to derm for formal consult      Today, she is doing very well. She has started taking Cosentyx. Her last injection was last week. She would say she is 70% better than she was on Humira. On Humira she would get up and have stiffness for up to two hours. She now has about 30 minutes of morning stiffness. Her sacroiliac pain is much better. Her hands are much better. She now has no eye symptoms. Her uveitis episode resolved after her steroid taper. This stopped last week and she has been fine off of it.     She has tapered off of her prednisone. She is down to 20 mg of methotrexate. She was previously on 25 mg. This dosage decrease was not associated with an increase in her symptoms.     However, while her neck stiffness is better, she is still having some neck pain and stiffness. She has tried physical therapy in the past, which was helpful for her. She is doing neck exercises everyday.  She is on oxycodone 15 mg for this currently, which is helpful. This is managed by a pain clinic. She has some morning stiffness, which she thinks is generally improved from before. Looking over the right shoulder especially aggravates her pain and causes shooting pains down her arm.     The bottoms of her feet are also sore in the morning. She notes the stiffness is much better, but the pain is still there.This is mostly over the balls of her foot. This pain is worse on hard floors. She wears slippers to avoid some of this pain. Her foot pain wakes her up at night.     Review of Systems:   Pertinent items are noted in HPI or as below, remainder of complete ROS is negative.      No recent problems with hearing or vision. No swallowing problems.   No breathing difficulty, shortness of breath, coughing, or wheezing  No chest pain or palpitations  No heart burn, indigestion, abdominal pain, nausea, vomiting,  "diarrhea  No urination problems, no bloody, cloudy urine, no dysuria  No numbing, tingling, weakness  No headaches or confusion  No rashes. No easy bleeding or bruising.     Active Medications:   Current Outpatient Medications:      B-D SYRINGE LUER-FILI 1 ML MISC, USE TO INJECT METHOTREXATE EVERY 7 DAYS, Disp: 25 each, Rfl: 0     BD DISP NEEDLES 25G X 5/8\" MISC, USE WITH METHOTREXATE UNDER THE SKIN EVERY 7 DAYS, Disp: 25 each, Rfl: 0     cefdinir (OMNICEF) 300 MG capsule, Take 1 capsule (300 mg) by mouth 2 times daily, Disp: 20 capsule, Rfl: 0     FLUoxetine (PROZAC) 40 MG capsule, Take 40 mg by mouth daily., Disp: , Rfl:      folic acid (FOLVITE) 1 MG tablet, Take 3 tablet a day few days before and day after methotrexate then the other day 2 mg day, Disp: 210 tablet, Rfl: 3     levothyroxine (SYNTHROID) 100 MCG tablet, Take  by mouth daily., Disp: , Rfl:      methotrexate sodium, pres-free, 50 MG/2ML SOLN injection CHEMO, Inject 1 mL (25 mg) Subcutaneous every 7 days *DISCARD REMAINDER** MONITORING LABS DUE EVERY 8 WEEKS, Disp: 8 mL, Rfl: 2     multivitamin, therapeutic (THERA-VIT) TABS, Take 1 tablet by mouth daily, Disp: , Rfl:      secukinumab (COSENTYX SENSOREADY PEN) 150 MG/ML Sensoready pen, Inject 1 mL (150 mg) Subcutaneous every 28 days Hold for signs of infection, and seek medical attention., Disp: 1 each, Rfl: 5     sennosides (SENOKOT) 8.6 MG tablet, Take 1 tablet by mouth 2 times daily, Disp: 1 tablet, Rfl: 0     Syringe Luer Slip (B-D SYRINGE LUER-FILI) 3 ML MISC, 1 Units by Device route every 7 days, Disp: 25 each, Rfl: 0     Vitamin D, Cholecalciferol, 1000 UNITS CAPS, Take 2,000 Units by mouth daily, Disp: 60 capsule, Rfl: 11     fentaNYL (DURAGESIC) 25 mcg/hr 72 hr patch, Place 1 patch onto the skin every 72 hours, Disp: , Rfl:      predniSONE (DELTASONE) 5 MG tablet, Take 10 mg once a day for 3 weeks then go to 5 mg day until seen. (Patient not taking: Reported on 2/26/2019.), Disp: 60 tablet, Rfl: " 2      Allergies:   Patient has no known allergies.      Past Medical History:  Past medical history is notable for her being presumptively treated for Lyme with doxycycline after developing a targetoid lesion, for a history of Hashimoto's thyroiditis with subsequent hypothyroidism, for the diagnosis now of psoriasis, and for a history of depression.Neck pain, cervical stenosis-- MRI  +moderate central stenosis right C3-4, C5-6 degenerative changes 2nd mild-mod central stenosis. Past referral to physiatry-wishes to return to Dr. Edmond Sawant [facility] Left hip pain s/p tear needs THR she reports. B) Hx-Bilateral tendonitis hamstrings with right bursitis, partial tear per Dr. Gomez s/p injections. Seen Dr. Arvin Xie at [facility] s/p ablation SI joint, tendon insertion site. Sees Dr. Maciel Dukes. C). Transient elevation LFT 2/2013 [ NL after held MTX and tramadol 2wk]; transient 4- [ AST 89]. NL . Other hx --Panceatic sphinctor dysfunction. On pancreatic enzymes. SBO 9-2015, now no doing well. Right thumb DJD, s/p surgery . Healed.     Past Surgical History:  She has a surgical history including appendectomy in 1980, cholecystectomy in approximately 1993, and hysterectomy without oophorectomy in 1996.  Ablation SI Right 4/25 and left Feb 26th 2017 -- pain clinic   PT for her hip--told by therapy could feel the bursitis. Told needs THR and injection of left hip Feb 10th 2017 -    Family History:   No autoimmune disorders, psoriasis, UC, crohn's, SLE, RA, PsA, gout, autoimmune thyroid.  No MS, heart disease or cancer in family  Mother-endometrial cancer, RHEUMATOID ARTHRITIS (RA)   Father-psoriasis, lymphoma, colon and prostate cancer   Siblings-sister RHEUMATOID ARTHRITIS (RA), OSTEOARTHRITIS  And crohns   MATERNAL GRANDMOTHER-rheumatoid arthritis      Social History:   No Alcohol. No Smoking. No IVDU. Partner on list for lung transplant      Physical Exam:   /67    "Pulse 69   Temp 97.9  F (36.6  C) (Oral)   Ht 1.664 m (5' 5.5\")   Wt 74.5 kg (164 lb 3.2 oz)   LMP 03/06/2012   SpO2 99%   BMI 26.91 kg/m      Wt Readings from Last 4 Encounters:   02/26/19 74.5 kg (164 lb 3.2 oz)   01/14/19 73 kg (160 lb 14.4 oz)   10/31/18 74.4 kg (164 lb)   07/03/18 73.6 kg (162 lb 3.2 oz)     Constitutional: Well-developed, appearing stated age; cooperative  Eyes: Normal EOM, PERRLA, vision, conjunctiva, sclera  ENT: Normal external ears, nose, hearing, lips, teeth, gums, throat. No mucous membrane lesions, normal saliva pool  Neck: No mass or thyroid enlargement  Resp: Lungs clear to auscultation, nl to palpation  CV: RRR, no murmurs, rubs or gallops, no edema  GI: No ABD mass or tenderness, no HSM  : Not tested  Lymph: No cervical, supraclavicular, inguinal or epitrochlear nodes  MS: The TMJ, neck, shoulder, elbow, wrist, PIP/DIP, spine, hip, knee, ankle, and foot MTP/IP joints were examined and found normal. Full joint ROM. Normal  strength. No dactylitis,  tenosynovitis.  Tenderness at the plantar enthesis  No active synovitis in her MTP arcades. She has a very high arch and a somewhat prominent metatarsal head on the 1st metatarsal bilaterally. She is not more tender there than anywhere else.  Slight synovitis on left 2nd and 3rd MCPs  Bilateral sacroiliac joint tenderness to direct palpation  Neck range of motion limited with lateral flexion to the right. No limitation in neck flexion or extension  Skin: No nail pitting, alopecia, rash, nodules or lesions  Neuro: Normal cranial nerves, strength, sensation, DTRs.   Psych: Normal judgement, orientation, memory, affect.     Laboratory:   RHEUM RESULTS Latest Ref Rng & Units 1/7/2019 1/14/2019 2/26/2019   SED RATE 0 - 30 mm/h 9 - 8   CRP, INFLAMMATION 0.0 - 8.0 mg/L <2.9 - <2.9   CK TOTAL 30 - 225 U/L - - -   RHEUMATOID FACTOR <20 IU/mL - - -   DESEAN SCREEN BY EIA <1.0 - - -   AST 0 - 45 U/L 19 - 25   ALT 0 - 50 U/L 32 - 33   ALBUMIN " 3.4 - 5.0 g/dL 3.8 - 3.7   WBC 4.0 - 11.0 10e9/L 6.0 - 4.8   RBC 3.8 - 5.2 10e12/L 4.82 - 4.36   HGB 11.7 - 15.7 g/dL 13.7 - 12.7   HCT 35.0 - 47.0 % 42.6 - 40.1   MCV 78 - 100 fl 88 - 92   MCHC 31.5 - 36.5 g/dL 32.2 - 31.7   RDW 10.0 - 15.0 % 14.8 - 15.3(H)    - 450 10e9/L 263 - 205   CREATININE 0.52 - 1.04 mg/dL 0.78 - 0.86   GFR ESTIMATE, IF BLACK >60 mL/min/[1.73:m2] >90 - 84   GFR ESTIMATE >60 mL/min/[1.73:m2] 81 - 72    - 1,620 mg/dL - - -   IGA 70 - 380 mg/dL - - -   IGM 60 - 265 mg/dL - - -   HEPATITIS C ANTIBODY NR:Nonreactive - Nonreactive -       Rheumatoid Factor   Date Value Ref Range Status   05/31/2017 <20 <20 IU/mL Final   ,   Cyclic Citrullinated Peptide Antibody, IgG   Date Value Ref Range Status   05/31/2017 1 <7 U/mL Final     Comment:     Negative   ,   Cyclic Cit Pept IgG/IgA   Date Value Ref Range Status   06/30/2011 <20  Interpretation:  Negative <20 UNITS Final   ,  ,   SSA (RO) Antibody IgG   Date Value Ref Range Status   06/30/2011 2  Final     Comment:     Reference range: 0 to 40  Unit: AU/mL  (Note)  REFERENCE INTERVALS: SSA (Ro) (BERTA) Ab, IgG   29 AU/mL or Less ............. Negative   30 - 40 AU/mL ................ Equivocal   41 AU/mL or Greater .......... Positive  SSA (Ro) antibody is seen in 70-75% of Sjogren syndrome  cases, 30-40% of systemic lupus erythematosus (SLE) and  5-10% of progressive systemic sclerosis (PSS).  Performed by [employer],  [address] [place] [address],[place] [address] [phone]  [email or web address], Estella Huertas MD, Lab. Director     SSB (LA) Antibody IgG   Date Value Ref Range Status   06/30/2011 0  Final     Comment:     Reference range: 0 to 40  Unit: AU/mL  (Note)  REFERENCE INTERVALS: SSB (La) (BERTA) Ab, IgG   29 AU/mL or Less ............. Negative   30 - 40 AU/mL ................ Equivocal   41 AU/mL or Greater .......... Positive  SSB (La) antibody is seen in 50-60% of Sjogren syndrome  cases and is specific if it is the only BERTA  antibody  present. 15-25% of patients with systemic lupus  erythematosus (SLE) and 5-10% of patients with progressive  systemic sclerosis (PSS) also have this antibody.  Performed by [employer],  [facility] [place] [address] [phone]  www."[email or web address]", Estella Huertas MD, Lab. Director   ,  ,  ,   DESEAN Screen by EIA   Date Value Ref Range Status   05/15/2014 <1.0  Interpretation:  Negative   <1.0 Final   ,  ,  ,  ,  ,  ,  ,   Hepatitis B Core Emily   Date Value Ref Range Status   01/14/2019 Nonreactive NR^Nonreactive Final   ,   Hep B Surface Agn   Date Value Ref Range Status   01/14/2019 Nonreactive NR^Nonreactive Final   ,  ,  ,  ,   Quantiferon-TB Gold Plus Result   Date Value Ref Range Status   01/14/2019 Negative NEG^Negative Final     Comment:     No interferon gamma response to M.tuberculosis antigens was detected.   Infection with M.tuberculosis is unlikely, however a single negative result   does not exclude infection. In patients at high risk for infection, a second   test should be considered  in accordance with the 2017 ATS/IDSA/CDC Clinical Practice Guidelines for   Diagnosis of Tuberculosis in Adults and Children [Lewinsohn DM et   al.Clin.Infect.Dis. 2017 64(2):111-115].       TB1 Ag minus Nil Value   Date Value Ref Range Status   01/14/2019 0.00 IU/mL Final     TB2 Ag minus Nil Value   Date Value Ref Range Status   01/14/2019 0.00 IU/mL Final     Mitogen minus Nil Result   Date Value Ref Range Status   01/14/2019 >10.00 IU/mL Final     Nil Result   Date Value Ref Range Status   01/14/2019 0.05 IU/mL Final   ,  ,  ,  ,  ,  ,  ,  ,  ,  ,  ,  ,  ,  ,  ,  ,   Albumin Fraction   Date Value Ref Range Status   05/07/2014 4.4 3.7 - 5.1 g/dL Final     Alpha 2 Fraction   Date Value Ref Range Status   05/07/2014 0.6 0.5 - 0.9 g/dL Final     Beta Fraction   Date Value Ref Range Status   05/07/2014 0.8 0.6 - 1.0 g/dL Final     Gamma Fraction   Date Value Ref Range Status   05/07/2014 1.1 0.7 - 1.6  g/dL Final     Monoclonal Peak   Date Value Ref Range Status   05/07/2014 0.0 0.0 g/dL Final     ELP Interpretation:   Date Value Ref Range Status   05/07/2014   Final    No monoclonal protein seen by capillarys electrophoresis, however a monoclonla   protein was seen in this sample by immunofixation which is a more sensitive   method for monoclonal dectection.  See immunofixation report on same sample.   Pathologic significance requires clinical correlation.  NORI Stauffer M.D.,   Ph.D., Pathologist       ,   Immunofixation ELP   Date Value Ref Range Status   05/07/2014   Final    (Note)  Monoclonal IgG immunoglobulin of kappa light chain type.  Pathological significance requires clinical correlation.  JUAN PABLO Stauffer M.D., Ph.D., Pathologist         IGG   Date Value Ref Range Status   05/07/2014 1,060 695 - 1,620 mg/dL Final     IGA   Date Value Ref Range Status   05/07/2014 138 70 - 380 mg/dL Final     IGM   Date Value Ref Range Status   05/07/2014 123 60 - 265 mg/dL Final   ,  ,  ,  ,  ,  ,   Rheumatoid Factor   Date Value Ref Range Status   05/31/2017 <20 <20 IU/mL Final   ,  ,  ,  ,   Cyclic Citrullinated Peptide Antibody, IgG   Date Value Ref Range Status   05/31/2017 1 <7 U/mL Final     Comment:     Negative   ,  ,  ,  ,  ,  ,  ,  ,  ,  ,  ,  ,   Immunofixation ELP   Date Value Ref Range Status   05/07/2014   Final    (Note)  Monoclonal IgG immunoglobulin of kappa light chain type.  Pathological significance requires clinical correlation.  JUAN PABLO Stauffer M.D., Ph.D., Pathologist         IGG   Date Value Ref Range Status   05/07/2014 1,060 695 - 1,620 mg/dL Final     IGA   Date Value Ref Range Status   05/07/2014 138 70 - 380 mg/dL Final     IGM   Date Value Ref Range Status   05/07/2014 123 60 - 265 mg/dL Final   ,   Albumin Fraction   Date Value Ref Range Status   05/07/2014 4.4 3.7 - 5.1 g/dL Final     Alpha 2 Fraction   Date Value Ref Range Status   05/07/2014 0.6 0.5 - 0.9 g/dL Final     Beta Fraction    Date Value Ref Range Status   05/07/2014 0.8 0.6 - 1.0 g/dL Final     Gamma Fraction   Date Value Ref Range Status   05/07/2014 1.1 0.7 - 1.6 g/dL Final     Monoclonal Peak   Date Value Ref Range Status   05/07/2014 0.0 0.0 g/dL Final     ELP Interpretation:   Date Value Ref Range Status   05/07/2014   Final    No monoclonal protein seen by capillarys electrophoresis, however a monoclonla   protein was seen in this sample by immunofixation which is a more sensitive   method for monoclonal dectection.  See immunofixation report on same sample.   Pathologic significance requires clinical correlation.  NORI Stauffer M.D.,   Ph.D., Pathologist       ,  ,  ,  ,  ,   Rheumatoid Factor   Date Value Ref Range Status   05/31/2017 <20 <20 IU/mL Final   ,   Cyclic Citrullinated Peptide Antibody, IgG   Date Value Ref Range Status   05/31/2017 1 <7 U/mL Final     Comment:     Negative   ,   Cyclic Cit Pept IgG/IgA   Date Value Ref Range Status   06/30/2011 <20  Interpretation:  Negative <20 UNITS Final   ,  ,  ,   SSA (RO) Antibody IgG   Date Value Ref Range Status   06/30/2011 2  Final     Comment:     Reference range: 0 to 40  Unit: AU/mL  (Note)  REFERENCE INTERVALS: SSA (Ro) (BERTA) Ab, IgG   29 AU/mL or Less ............. Negative   30 - 40 AU/mL ................ Equivocal   41 AU/mL or Greater .......... Positive  SSA (Ro) antibody is seen in 70-75% of Sjogren syndrome  cases, 30-40% of systemic lupus erythematosus (SLE) and  5-10% of progressive systemic sclerosis (PSS).  Performed by [employer],  [address] [place] [address] [phone]  [email or web address], Estella Huertas MD, Lab. Director     SSB (LA) Antibody IgG   Date Value Ref Range Status   06/30/2011 0  Final     Comment:     Reference range: 0 to 40  Unit: AU/mL  (Note)  REFERENCE INTERVALS: SSB (La) (BERTA) Ab, IgG   29 AU/mL or Less ............. Negative   30 - 40 AU/mL ................ Equivocal   41 AU/mL or Greater .......... Positive  SSB (La)  antibody is seen in 50-60% of Sjogren syndrome  cases and is specific if it is the only BERTA antibody  present. 15-25% of patients with systemic lupus  erythematosus (SLE) and 5-10% of patients with progressive  systemic sclerosis (PSS) also have this antibody.  Performed by [email or web address],  [facility] [place] [address] [phone]  [email or web address], Estella Huertas MD, Lab. Director   ,  ,   DESEAN Screen by EIA   Date Value Ref Range Status   05/15/2014 <1.0  Interpretation:  Negative   <1.0 Final   ,  ,  ,  ,  ,  ,  ,   Hepatitis B Core Emily   Date Value Ref Range Status   01/14/2019 Nonreactive NR^Nonreactive Final   ,   Hep B Surface Agn   Date Value Ref Range Status   01/14/2019 Nonreactive NR^Nonreactive Final   ,  ,  ,  ,   Quantiferon-TB Gold Plus Result   Date Value Ref Range Status   01/14/2019 Negative NEG^Negative Final     Comment:     No interferon gamma response to M.tuberculosis antigens was detected.   Infection with M.tuberculosis is unlikely, however a single negative result   does not exclude infection. In patients at high risk for infection, a second   test should be considered  in accordance with the 2017 ATS/IDSA/CDC Clinical Practice Guidelines for   Diagnosis of Tuberculosis in Adults and Children [Lewinsohn DM et   al.Clin.Infect.Dis. 2017 64(2):111-115].       TB1 Ag minus Nil Value   Date Value Ref Range Status   01/14/2019 0.00 IU/mL Final     TB2 Ag minus Nil Value   Date Value Ref Range Status   01/14/2019 0.00 IU/mL Final     Mitogen minus Nil Result   Date Value Ref Range Status   01/14/2019 >10.00 IU/mL Final     Nil Result   Date Value Ref Range Status   01/14/2019 0.05 IU/mL Final   ,  ,  ,  ,  ,  ,  ,  ,  ,  ,  ,  ,  ,  ,  ,  ,   Albumin Fraction   Date Value Ref Range Status   05/07/2014 4.4 3.7 - 5.1 g/dL Final     Alpha 2 Fraction   Date Value Ref Range Status   05/07/2014 0.6 0.5 - 0.9 g/dL Final     Beta Fraction   Date Value Ref Range Status   05/07/2014 0.8 0.6 - 1.0  g/dL Final     Gamma Fraction   Date Value Ref Range Status   05/07/2014 1.1 0.7 - 1.6 g/dL Final     Monoclonal Peak   Date Value Ref Range Status   05/07/2014 0.0 0.0 g/dL Final     ELP Interpretation:   Date Value Ref Range Status   05/07/2014   Final    No monoclonal protein seen by capillarys electrophoresis, however a monoclonla   protein was seen in this sample by immunofixation which is a more sensitive   method for monoclonal dectection.  See immunofixation report on same sample.   Pathologic significance requires clinical correlation.  NORI Stauffer M.D.,   Ph.D., Pathologist       ,  ,   [employer] ELP   Date Value Ref Range Status   05/07/2014   Final    (Note)  Monoclonal IgG immunoglobulin of kappa light chain type.  Pathological significance requires clinical correlation.  JUAN PABLO Stauffer M.D., Ph.D., Pathologist         IGG   Date Value Ref Range Status   05/07/2014 1,060 695 - 1,620 mg/dL Final     IGA   Date Value Ref Range Status   05/07/2014 138 70 - 380 mg/dL Final     IGM   Date Value Ref Range Status   05/07/2014 123 60 - 265 mg/dL Final     Scribe Disclosure:   I, Deandre Baker and Heena Wade MS4, am serving as a scribe to document services personally performed by Jensen Samayoa MD at this visit, based upon the provider's statements to me. All documentation has been reviewed by the aforementioned provider prior to being entered into the official medical record.     Portions of this medical record were completed by a scribe. UPON MY REVIEW AND AUTHENTICATION BY ELECTRONIC SIGNATURE, this confirms (a) I performed the applicable clinical services, and (b) the record is accurate.        Jensen Samayoa MD

## 2019-02-26 NOTE — NURSING NOTE
"Chief Complaint   Patient presents with     RECHECK     Psoriatic arthritis      /67   Pulse 69   Temp 97.9  F (36.6  C) (Oral)   Ht 1.664 m (5' 5.5\")   Wt 74.5 kg (164 lb 3.2 oz)   LMP 03/06/2012   SpO2 99%   BMI 26.91 kg/m    Deisy Field MA    "

## 2019-03-04 NOTE — RESULT ENCOUNTER NOTE
The blood counts, liver, kidney and CRP inflammation labs are normal.     Miguel CABRAL, MILAD, MSN  3/4/2019  8:11 AM

## 2019-03-25 PROBLEM — M54.2 NECK PAIN, CHRONIC: Status: ACTIVE | Noted: 2019-03-25

## 2019-03-25 PROBLEM — G89.29 NECK PAIN, CHRONIC: Status: ACTIVE | Noted: 2019-03-25

## 2019-04-01 DIAGNOSIS — Z79.52 LONG TERM SYSTEMIC STEROID USER: ICD-10-CM

## 2019-04-01 DIAGNOSIS — E55.9 VITAMIN D DEFICIENCY: ICD-10-CM

## 2019-04-08 DIAGNOSIS — L40.50 PSORIATIC ARTHRITIS (H): ICD-10-CM

## 2019-04-08 DIAGNOSIS — M25.559 PAIN IN JOINT, PELVIC REGION AND THIGH, UNSPECIFIED LATERALITY: ICD-10-CM

## 2019-04-08 DIAGNOSIS — M46.99 INFLAMMATORY SPONDYLOPATHY OF MULTIPLE SITES IN SPINE (H): ICD-10-CM

## 2019-04-08 DIAGNOSIS — M94.0 COSTOCHONDRITIS, ACUTE: ICD-10-CM

## 2019-04-08 DIAGNOSIS — M06.4 INFLAMMATORY POLYARTHROPATHY (H): ICD-10-CM

## 2019-04-09 NOTE — TELEPHONE ENCOUNTER
methotrexate sodium, pres-free, 50 MG/2ML SOLN injection CHEMO      Last Written Prescription Date:  12-10-18  Last Fill Quantity: 8 ml,   # refills: 2  Last Office Visit: 2-26-19  Future Office visit:  4-10-19    CBC RESULTS:   Recent Labs   Lab Test 02/26/19  1343   WBC 4.8   RBC 4.36   HGB 12.7   HCT 40.1   MCV 92   MCH 29.1   MCHC 31.7   RDW 15.3*          Creatinine   Date Value Ref Range Status   02/26/2019 0.86 0.52 - 1.04 mg/dL Final   ]    Liver Function Studies -   Recent Labs   Lab Test 02/26/19  1343  04/26/17  1825   PROTTOTAL  --   --  7.3   ALBUMIN 3.7   < > 3.8   BILITOTAL  --   --  0.2   ALKPHOS  --   --  72   AST 25   < > 24   ALT 33   < > 38    < > = values in this interval not displayed.       Routing refill request to provider for review/approval because:  Per protocol

## 2019-04-10 ENCOUNTER — TRANSFERRED RECORDS (OUTPATIENT)
Dept: HEALTH INFORMATION MANAGEMENT | Facility: CLINIC | Age: 64
End: 2019-04-10

## 2019-04-10 ENCOUNTER — OFFICE VISIT (OUTPATIENT)
Dept: RHEUMATOLOGY | Facility: CLINIC | Age: 64
End: 2019-04-10
Attending: NURSE PRACTITIONER
Payer: COMMERCIAL

## 2019-04-10 VITALS
WEIGHT: 160 LBS | BODY MASS INDEX: 25.71 KG/M2 | HEART RATE: 72 BPM | HEIGHT: 66 IN | SYSTOLIC BLOOD PRESSURE: 117 MMHG | OXYGEN SATURATION: 97 % | TEMPERATURE: 98.3 F | DIASTOLIC BLOOD PRESSURE: 74 MMHG

## 2019-04-10 DIAGNOSIS — Z79.899 HIGH RISK MEDICATIONS (NOT ANTICOAGULANTS) LONG-TERM USE: ICD-10-CM

## 2019-04-10 DIAGNOSIS — L40.50 PSORIATIC ARTHRITIS (H): Primary | ICD-10-CM

## 2019-04-10 DIAGNOSIS — M06.4 INFLAMMATORY POLYARTHROPATHY (H): ICD-10-CM

## 2019-04-10 DIAGNOSIS — L40.50 PSORIATIC ARTHRITIS (H): ICD-10-CM

## 2019-04-10 DIAGNOSIS — M46.99 INFLAMMATORY SPONDYLOPATHY OF MULTIPLE SITES IN SPINE (H): ICD-10-CM

## 2019-04-10 LAB
ALBUMIN SERPL-MCNC: 3.8 G/DL (ref 3.4–5)
ALT SERPL W P-5'-P-CCNC: 25 U/L (ref 0–50)
AST SERPL W P-5'-P-CCNC: 19 U/L (ref 0–45)
BASOPHILS # BLD AUTO: 0 10E9/L (ref 0–0.2)
BASOPHILS NFR BLD AUTO: 0.7 %
CREAT SERPL-MCNC: 0.73 MG/DL (ref 0.52–1.04)
CRP SERPL-MCNC: <2.9 MG/L (ref 0–8)
DIFFERENTIAL METHOD BLD: NORMAL
EOSINOPHIL # BLD AUTO: 0 10E9/L (ref 0–0.7)
EOSINOPHIL NFR BLD AUTO: 0.3 %
ERYTHROCYTE [DISTWIDTH] IN BLOOD BY AUTOMATED COUNT: 14.7 % (ref 10–15)
ERYTHROCYTE [SEDIMENTATION RATE] IN BLOOD BY WESTERGREN METHOD: 7 MM/H (ref 0–30)
GFR SERPL CREATININE-BSD FRML MDRD: 88 ML/MIN/{1.73_M2}
HCT VFR BLD AUTO: 42.1 % (ref 35–47)
HGB BLD-MCNC: 13.7 G/DL (ref 11.7–15.7)
IMM GRANULOCYTES # BLD: 0 10E9/L (ref 0–0.4)
IMM GRANULOCYTES NFR BLD: 0.3 %
LYMPHOCYTES # BLD AUTO: 1.2 10E9/L (ref 0.8–5.3)
LYMPHOCYTES NFR BLD AUTO: 19.3 %
MCH RBC QN AUTO: 29.7 PG (ref 26.5–33)
MCHC RBC AUTO-ENTMCNC: 32.5 G/DL (ref 31.5–36.5)
MCV RBC AUTO: 91 FL (ref 78–100)
MONOCYTES # BLD AUTO: 0.2 10E9/L (ref 0–1.3)
MONOCYTES NFR BLD AUTO: 3.6 %
NEUTROPHILS # BLD AUTO: 4.7 10E9/L (ref 1.6–8.3)
NEUTROPHILS NFR BLD AUTO: 75.8 %
NRBC # BLD AUTO: 0 10*3/UL
NRBC BLD AUTO-RTO: 0 /100
PLATELET # BLD AUTO: 247 10E9/L (ref 150–450)
RBC # BLD AUTO: 4.61 10E12/L (ref 3.8–5.2)
WBC # BLD AUTO: 6.2 10E9/L (ref 4–11)

## 2019-04-10 PROCEDURE — 84450 TRANSFERASE (AST) (SGOT): CPT

## 2019-04-10 PROCEDURE — 86140 C-REACTIVE PROTEIN: CPT

## 2019-04-10 PROCEDURE — G0463 HOSPITAL OUTPT CLINIC VISIT: HCPCS | Mod: ZF

## 2019-04-10 PROCEDURE — 85652 RBC SED RATE AUTOMATED: CPT

## 2019-04-10 PROCEDURE — 82040 ASSAY OF SERUM ALBUMIN: CPT

## 2019-04-10 PROCEDURE — 85025 COMPLETE CBC W/AUTO DIFF WBC: CPT

## 2019-04-10 PROCEDURE — 84460 ALANINE AMINO (ALT) (SGPT): CPT

## 2019-04-10 PROCEDURE — 82565 ASSAY OF CREATININE: CPT

## 2019-04-10 RX ORDER — OXYCODONE HYDROCHLORIDE 15 MG/1
15 TABLET ORAL
COMMUNITY
Start: 2019-03-25 | End: 2019-04-22

## 2019-04-10 RX ORDER — FENTANYL 12.5 UG/1
1 PATCH TRANSDERMAL
COMMUNITY
Start: 2019-03-20 | End: 2019-04-19

## 2019-04-10 RX ORDER — METHOTREXATE 25 MG/ML
25 INJECTION INTRA-ARTERIAL; INTRAMUSCULAR; INTRATHECAL; INTRAVENOUS
Qty: 8 ML | Refills: 2 | Status: SHIPPED | OUTPATIENT
Start: 2019-04-10 | End: 2019-06-26

## 2019-04-10 ASSESSMENT — PAIN SCALES - GENERAL: PAINLEVEL: MODERATE PAIN (5)

## 2019-04-10 ASSESSMENT — MIFFLIN-ST. JEOR: SCORE: 1294.57

## 2019-04-10 NOTE — RESULT ENCOUNTER NOTE
The blood counts, liver, kidney and CRP inflammation labs are normal.     Miguel CABRAL, CNP, MSN  4/10/2019  2:43 PM

## 2019-04-10 NOTE — LETTER
4/10/2019      RE: Krissy Weldon  [id number] [facility] [id number]         [facility] - Rheumatology Clinic Visit    Krissy Weldon  is a 63 year old female who presents with follow-up for undifferentiated polyarthropathy, psoriatic arthritis. Complicated by eye inflammation (uveitis and scleritis--sees [facility]), tendonitis, costrochondritis, tendon tears, synovitis, dactylitis, OA/DJD jessica thumbs CMC. Failed or intolerance to multiple medications.     Copy forward: [-SSa, -SSb, -CCP,HLA-B27 negative on outside workup with previous SI injections] with hx- inflammatory eye left eye episcleritis requiring prednisone gtt or oral with bone scan unrevealing. Previous medrol or steroid responsiveness. Past tried humira, SSZ, simponi SQ/IV, remicade and otelza. Failed humira EoW recurrent episcleritis, right wrist, right SI, bilateral hamstrings tendonosis with partial tear bilaterally. Failed simponi sq/IV ineffective. SSZ and oral MTX. Past recurrent iritis (ER if eye pain, blurred vision, red eye). Remicade. Otezla. LLL pneumonia 3/2015. MRI L hip showed high grade tear gluteus minimus insertion site, effusion 4/2015. C/o neck issues with MRI cervical spine show DJD, EMG, paramedian osteophytes and disk protrusion at C3-C4 with some moderate central canal stenosis and moderate to severe right-sided foraminal stenosis as a consequence.  C5-C6 also showed some mild to moderate central canal stenosis and moderate bilateral central foraminal stenosis.  Seen Dr. Wheeler with significant improvement in GI issues pancreatic sphinctor dysfunction requiring surgery now on pancreatic enzymes after pancreatic duct is open. Failed cimzia Cimzia lost effectiveness (more uveitis, arthritis, synovitis,bursitis and tendonitis).     Copy forward hx:   Last seen  Dr. Samayoa. Continued plan. Methotrexate 0.8 ml week Q Monday (20 mg). Continues cimzia injections (due this Thurs).  No gout flares. Past in  right great toe, but no aspirations.   Right thumb surgery July-Aug 2015. Pain and motion much improved. Severe flare of your arthritis and eye inflammation when holding the cimzia and MTX for surgery requiring prednisone 5 mg x 2 week then stopped   Strept , pneumonia 3-2015. Recent bowel obstruction 9-2015, seen gastro 1-2016 given hx of pancreatic sphincter dysfunction.   Recent flare 2-4, requiring medrol dose pack--labs were NL except CRP 11 [Hands/ feet swelling with pain, back and neck along with an eye infection and scleritis].    Left eye inflammation flare with infection about 1 months ago, on 3 gtt. Slow clearing. Then 1 week after the gtt, developed neck pain hard to turn head to left. Then L achilles tendonitis, with costrochondritis this was last week, hands MCP 2-3 swelling. Started the medrol day 4--dramatic improvement [move her neck, eye improved, achilles much better now can walk on, the costrochondritis]. Left eye infection much better. Left hip still bothers, still much improved with prednisone. Energy much better. Cimzia lasting about 10 days. Left upper arm psoriasis. EAS none with medrol. This is the best she has felt overall. Rare use of prednisone of steroids. Last eye inflammation last summer. Left arm into her deltoid tingling with her neck, 1 week before flare. Having hard time moving her neck side to side, forward and back. The back of her neck tissues feel swollen on her left side. Prior to this no neck last flare was in . Does her neck exercise. 2 year of job specific, the end of March final decision.     Taking oxycodone 15 mg QID during this flare. Under [facility].     Copy forward February 15, 2017  I seen her last 2-2016. Dr. Samayoa  who continued the plan and using diclofenac for achilles pain. EHR reviewed. Was using medical marijuana.  Louise reported working well for her uveitis and constronchondritis; and the methotrexate injectable for her  "joints. Labs today normal     No medical marijauna as this didn't work  MS ER 15 mg BID for about 6 weeks. Oxycodone HS prn most nights. Off the pain patch due to insurance coverage. Goes [facility].     C/o will get short of breath when swims or bends over for the past 6 weeks. No cardiac symptoms, CP or heaviness. Has had night sweats for many years which is not new. Losing weight but appetite is good, although her partner does feel she's not eating as well. Been on MS ER for about the time these symptoms started. No stomach pain. +costrochondritis as before. No blood in urine or stool. Taking omeprezole 40 mg every day for heartburn--will see PCP for possible UGI as feels some is near her esophagus. Due for annual physical and denies getting bone health done --does take 1000 IU day vitamin D. Due for mammogram. Hx hyst. No cough, fevers or ABD pain. No symptoms like had years ago with ERCP. \"no liver pain\". TSH normal 4 mths ago she reports. No blood in urine or stool.     Continues injection MTX 20 mg week Q Monday, FA 3 mg day, cimzia every 2 weeks or twice a month (takes prednisone 10 mg day before, day of and day after injection due to prior symptoms of N/V, fevers when did cimzia injections which she doesn't get now). Didn't take the prednisone this last injection as just had left hip injected per Dr. Maciel Dukes [facility]. Told she will eventually need a THR. \"butt muscles are sore\" but overall better symptoms on this plan. Seen eye doctor about twice this past year, as will have \"episcleritis\" about 4 days before injection cimzia but not every time due. FIngertips skin is cracking this past year, so interested in seeing derm. She's very happy with arthritis and uveitis control with this plan so wishes to continue. Mild psoriasis left ear only. Very happy with thumb surgeries. Denies any skin mole or lesion changes.     Changing to Medicare with [employer] April 1st.  [facility] reviewed and updated " by me     Copy forward May 31, 2017  Strept infection 4-21 prior to ED then treated with ABX. Exposed from [facility]. Missed one dose of MTX   ED 4-2017 for nausea with vomiting. CT of the abdomen/pelvis with contrast --no significant findings other than prominence of the common biliary duct which is likely related to cholecystectomy.  She was treated with Dilaudid and Zofran for pain and nausea. Given reglan for breakthrough nausea. Labs noted overall NL. 2 bags IVF    Ablation SI Right 4/25 and left Feb 26th 2017 --[facility]   PT for her hip--told by therapy could feel the bursitis. Told needs THR and injection of left hip Feb 10th 2017 --this was [facility]. Who did x-rays and told would like to try to wait for at least a year. [facility] Dr. PUMA Dukes --her pain clinic provider would like to try platelet rich plasma injection     Cimzia injections high cost $3000 for 2 months. Deductible $4500 then cost catestrophic then goes down $300   Left eye uveitis about 5 days before the next injection then has to do the prednisone gtts for about 3 months. Hands are sore jessica the tips of then S/T and then MCPs. This is toward the end of the cycle and in bewtween too. Last injection last Tuesday. Some uveitis seen her about 2 months ago [facility]     MTX every week Mon or Tues 20 mg--tolerating well. Some mild fatigue but tolerating. NO infection now .   No psoriasis --dry   Fingertips not cracking now as using superglue  Trying to loss weight and exercise.  Seen PCP for hard to take a deep breath, SOB--treated with inhaler then didn't help.    SURGEON WANTS TO RE-DO HER LEFT THUMB SURGERY.    April 10, 2019    Taking secukinumab (cosentyx) 150 mg every month, next due in 4 days. Tolerating. Dramatic improvements with the loading dose, noticing more feet pain before the next injections. Taking methotrexate  20 mg every 7 days, tolerating, folic acid  1 mg once a day, tolerating. Denies any fever, chills, SOB, SANCHEZ, night  sweats, or chest pain, or cough. Reports healthy. Under the MTPS get very sore, does have orthotics about 5 yr old and has not seen [facility] podiatry for over 2 years. Trying accupuncture on the feet. No swelling. Self started prednisone x 2 weeks, thinks this is helping. Low back pain with right leg radiculopathy seeing her pain provider who ordered MRI and told her she has facet arthropathy so will be having MBB in a few weeks. Does not work. Hard to use her hands, hard to walk, do fine motor, weak in the hands, hard to stand or sit long periods of time. Eyes seen her local opthalmologist  Who did not see evidence of eye inflammation,. Psoriasis in scalp, hands, left upper back and ears. Feels this is better then the adalimumab (humira). No dactylitis. EAS. On fentyl patch 12.5 mcg and oxycodone 15 mg at bedtime per pain provider. Does not wish to see dermatology at this time. DEXA 7-2018 T-1.8 low bone density, worsening was referred to endo due to risk osteoporosis she will do this in the future.      PROBLEM LIST: Past medical history is notable for her being presumptively treated for Lyme with doxycycline after developing a targetoid lesion, for a history of Hashimoto's thyroiditis with subsequent hypothyroidism, for the diagnosis now of psoriasis, and for a history of depression.Neck pain, cervical stenosis-- MRI  +moderate central stenosis right C3-4, C5-6 degenerative changes 2nd mild-mod central stenosis. Past referral to physiatry-wishes to return to Dr. Edmond Sawant [facility] Left hip pain s/p tear needs THR she reports. B) Hx-Bilateral tendonitis hamstrings with right bursitis, partial tear per Dr. Gomez s/p injections. [place] [facility] [place] SI joint, tendon insertion site. Sees Dr. Maciel Dukes. C). Transient elevation LFT 2/2013 [ NL after held MTX and tramadol 2wk]; transient 4- [ AST 89]. NL . Other hx --Panceatic sphinctor dysfunction. On  pancreatic enzymes. SBO 9-2015, now no doing well. Right thumb DJD, s/p surgery . Healed.  1. Diffuse degenerative joint disease that is both clinically apparent and obvious on multiple imaging modalities including bone scan, MRI 2/2013 or cervical. DJD L4-L5, L5-S1 per MRI 7/2012; MRI [id number]   2. Diffuse inflammatory arthritis with marked morning stiffness, no systemic inflammation by blood tests, but greater than 80% clinical improvement with a Medrol Dosepak.   3. Onset of the inflammatory joint symptoms including sacroiliitis with the development of psoriasis, suggesting that this represents psoriatic arthritis.   4. Development of episcleritis in the left eye with improvement with steroid eyedrops 12/14/2011 with recurrent episodes when wean oral prednisone (9/2012)  5. History of Hashimoto's thyroiditis.   6. History of presumptive treatment for Lyme disease with doxycycline after development of a targetoid lesion.   7. Poor tolerance of sulfasalazine and oral methotrexate.   8. Bilateral hamstring tendonosis, right partial tear, sacroilitis 7/2012. See MRI, repeat 2/2013 hamstring and right SI inflammation seeing Dr. Arvin Xie at [facility] specialized in SI. , left hamstring   9. DJD L4-L5, L5-S1 per MRI 7/2012  10. Intermittent prednisone exposure  11. Transient elevation LFT ALT 84/AST 58 2/2013 (nl after held MTX and tramadol, then increased folic acid 2mg day)  12. Past LUE burn developed into cellulitis resolved from keflex. Bronchitis now on zithromax irritating her costrochondritis. Improving after 1 day  13. 1-2014 Left knee meniscal tear with chrondomalacia per MRI (had Rt/Lt CMJ surgery)  14. 4- RUQ pain.   15.  Hx recurrent left episcleritis   16. Right thumb tendon surgery with Dr. San [facility]  17. Pneumonia 3-2015; strept  and 4-2017      Past Medical History:   Diagnosis Date     Acute meniscal tear of knee 1/2014    with chrondromalacia per MRI       Depression      DJD (degenerative joint disease), lumbar 7/2012    L4-5, L5-S1 per MRI      Episcleritis 12/14/2011     Hamstring tendonitis at origin 7/2012    Bilaterally with partial tear right, sacroilitis per MRI     Hypothyroid 9/7/2011     Hypothyroidism      PONV (postoperative nausea and vomiting)      Psoriatic arthritis (H) 9/7/2011     Psoriatic arthritis (H) 2/9/2016     Strep throat 04/2017       Surgical-She has a surgical history including appendectomy in 1980, cholecystectomy in approximately 1993, and hysterectomy without oophorectomy in 1996.  Ablation SI Right 4/25 and left Feb 26th 2017 -- pain clinic   PT for her hip--told by therapy could feel the bursitis. Told needs THR and injection of left hip Feb 10th 2017 -  Past Surgical History:   Procedure Laterality Date     APPENDECTOMY       ARTHROPLASTY CARPOMETACARPAL (THUMB JOINT)  11/8/2013    Procedure: ARTHROPLASTY CARPOMETACARPAL (THUMB JOINT);  Left First Carpometacarpal Trapezium Resection, Tendon Interposition  ;  Surgeon: Luiza Farrar MD;  Location: [facility]     ARTHROPLASTY CARPOMETACARPAL (THUMB JOINT)  12/20/2013    Procedure: ARTHROPLASTY CARPOMETACARPAL (THUMB JOINT);  Right Thumb Trapezium Resection With Flexor Carpi Radialis Tendon Reconstruction       CHOLECYSTECTOMY       COLONOSCOPY  5/6/2014    Procedure: COLONOSCOPY;  Surgeon: Adria San MD;  Location:  GI     ENDOSCOPIC RETROGRADE CHOLANGIOPANCREATOGRAM  6/13/2014    Procedure: ENDOSCOPIC RETROGRADE CHOLANGIOPANCREATOGRAM;  Surgeon: Lianna Wheeler MD;  Location: [facility] OR     GALLBLADDER SURGERY       GYN SURGERY      hysterectomy and oophorectomy      [facility] ENDOSCOPY W EUS Left 6/10/2014    Procedure: COMBINED ENDOSCOPIC ULTRASOUND, ESOPHAGOSCOPY, GASTROSCOPY, DUODENOSCOPY (EGD);  Surgeon: Lianna Wheeler MD;  Location: [facility] GI     HYSTERECTOMY       Right thumb surgery  7/2015     XR SACROILIAC THERAPEUTIC INJECTION BILATERAL  12/2011  "      FH:  No autoimmune disorders, psoriasis, UC, crohn's, SLE, RA, PsA, gout, autoimmune thyroid.  No MS, heart disease or cancer in family  Mother-endometrial cancer, RHEUMATOID ARTHRITIS (RA)   Father-psoriasis, lymphoma, colon and prostate cancer   Siblings-sister RHEUMATOID ARTHRITIS (RA), OSTEOARTHRITIS  And crohns   Children-  MGM RHEUMATOID ARTHRITIS (RA)       SH:  No Alcohol. No Smoking. No IVDU. Partner on list for lung transplant     [facility] personally reviewed and updated by me.    ROS:  CONSTITUTIONAL: No fevers, night sweats or unintentional weight change. No acute distress, swollen glands  EYES: No vision change, diplopia, pain in eyes or red eyes   EARS, NOSE, MOUTH, THROAT: No tinnitus or hearing change, no epistaxis or nasal discharge, no oral lesions, throat clear. Normal saliva pool.  No drymouth. No thyroid Enlargement.   CARDIOVASCULAR: No chest pain, palpitations, or pain with walking, no orthopnea or PND   RESPIRATORY: No dyspnea, cough, shortness of breath or wheezing. No pleurisy.   GI: No nausea, vomiting, diarrhea or constipation, no abdominal pain, or blood in stools.   : No change in urine, no dysuria or hematuria   MUSCKL: See above   INTEGUMENTARY: No concerning lesions or moles   NEURO: No loss of strength or sensation, no numbness or tingling, no tremor, no dizziness, no headache. No falls   ENDO: No polyuria or polydipsia, no temperature intolerance   HEME/LYMPH:No concerning bumps, bleeding problems, or swollen lymph nodes. No recent infections, hospitalizations or new illnesses.   Otherwise 14 point ROS obtained, reviewed and found negative.     Physical exam:  Vitals: Blood pressure 117/74, pulse 72, temperature 98.3  F (36.8  C), temperature source Oral, height 1.664 m (5' 5.5\"), weight 72.6 kg (160 lb), last menstrual period 03/06/2012, SpO2 97 %, not currently breastfeeding.  Wt Readings from Last 4 Encounters:   04/10/19 72.6 kg (160 lb)   02/26/19 74.5 kg (164 lb 3.2 oz) "   01/14/19 73 kg (160 lb 14.4 oz)   10/31/18 74.4 kg (164 lb)     Constitutional: WD-WN-WG cooperative  Eyes: nl EOM, PERRLA, vision, conjunctiva, sclera, No Unilateral or bilateral external ear inflammation   ENT: nl external ears, nose, hearing, lips, teeth, gums, throat. No saddle nose   Neck: no mass or thyroid enlargement  Resp: lungs clear to auscultation, nl to palpation, nl effort  CV: RRR, no murmurs, rubs or gallops, no edema  GI: no ABD mass or tenderness, no HSM  : not tested  Lymph: no cervical or epitrochlear nodes  MS: contractures in toes, palpable MTPs posteriorly with high arches. Reduced ROM neck laterally and extension. All TMJ, neck, shoulder, elbow, wrist, hand, spine, hip, knee, ankle, and foot joints were examined and otherwise found normal. Weak . Normal prayer sign. Negative Lhermitte's sign. No periuncle erythema  Skin: no nail pitting, alopecia, rash, dry skin most notably on hands and the ends of her fingers cracked, psoriasis in scalp.   Neuro: nl cranial nerves, see above   Psych: nl judgement, orientation, memory, affect.      Labs/Imaging:    Results for orders placed or performed in visit on 04/10/19   Erythrocyte sedimentation rate auto   Result Value Ref Range    Sed Rate 7 0 - 30 mm/h   CRP inflammation   Result Value Ref Range    CRP Inflammation <2.9 0.0 - 8.0 mg/L   Creatinine   Result Value Ref Range    Creatinine 0.73 0.52 - 1.04 mg/dL    GFR Estimate 88 >60 mL/min/[1.73_m2]    GFR Estimate If Black >90 >60 mL/min/[1.73_m2]   Albumin level   Result Value Ref Range    Albumin 3.8 3.4 - 5.0 g/dL   ALT   Result Value Ref Range    ALT 25 0 - 50 U/L   AST   Result Value Ref Range    AST 19 0 - 45 U/L   CBC with platelets differential   Result Value Ref Range    WBC 6.2 4.0 - 11.0 10e9/L    RBC Count 4.61 3.8 - 5.2 10e12/L    Hemoglobin 13.7 11.7 - 15.7 g/dL    Hematocrit 42.1 35.0 - 47.0 %    MCV 91 78 - 100 fl    MCH 29.7 26.5 - 33.0 pg    MCHC 32.5 31.5 - 36.5 g/dL    RDW  14.7 10.0 - 15.0 %    Platelet Count 247 150 - 450 10e9/L    Diff Method Automated Method     % Neutrophils 75.8 %    % Lymphocytes 19.3 %    % Monocytes 3.6 %    % Eosinophils 0.3 %    % Basophils 0.7 %    % Immature Granulocytes 0.3 %    Nucleated RBCs 0 0 /100    Absolute Neutrophil 4.7 1.6 - 8.3 10e9/L    Absolute Lymphocytes 1.2 0.8 - 5.3 10e9/L    Absolute Monocytes 0.2 0.0 - 1.3 10e9/L    Absolute Eosinophils 0.0 0.0 - 0.7 10e9/L    Absolute Basophils 0.0 0.0 - 0.2 10e9/L    Abs Immature Granulocytes 0.0 0 - 0.4 10e9/L    Absolute Nucleated RBC 0.0        Impression/Plan:    1.Psoriatic arthritis w/axial involvement activity with hx episcleritis/uveities, dactylitis, tendonitis, left hamstring tear. Exam improved on secukinumab (cosentyx), and now on lower methotrexate. Improved active axial and peripheral symptoms. New toe deformities (she will get x-rays from [facility]). Needs THR. We will continue secukinumab (cosentyx). I think she her feet pain is not inflammation and she should follow-up  With her podiatry for new orthotics and other options, and her pain provider about her back. She should wean off prednisone.  Aprimilast (otezla) contraindicated due to depression and does have axial disease so I would not favor this. She is not working and nor can she return to work due to her chronic pain, impairments.     -High risk medications, labs normal   -Chronic pain under other provider and goal of off narcotics. I feel she does have a component of fibromylagia (in past reports neurontin cause sleepiness, and cymbalta change in mood, but not sure of the dose of neurontin--she can talk to her pain provider about this, work on sleep).    2. Bajbefw-cdzylk-ac ophthalmologist.   No sx today   3. Psoriasis.fingertip skin cracking. Referral to derm for formal consult -she wishes to hold   4. High risk medications monitoring, labs every 12 weeks. Normal today.   5. Low bone density per DEXA 7-2018, worsening. Given high  risk osteoporosis , I did refer her to endocrine for complete evaluation   6. Chronic pain under care [facility] [place]. Xie.    -Calcium and vitamin D. Complete physical due she wishes to come here   -RTC 4 mth me and 8 mths Dr. Samayoa    The patient understood the rationale for the diagnosis and treatment plan. Patient shared in the decision making. All questions were answered to best of my ability and the patient's satisfaction  Miguel CABRAL, CNP, MSN  [facility]  Department of Rheumatology & Autoimmune Disorders    1. Immunization: Reviewed CDC guidelines with patient updated, information provided to patient based on CDC guidelines for vaccination recommendations, patient will discuss with primary provider  2. Bone Health:Educated on adequate calcium and vitamin D intake, Educated on exercise, Glucocorticoid education discussed risks, benefits & potential long term side effects from use  3. Discussed routine annual physicals, cancer screening, cardiac risk monitoring, bone health and primary care with primary care provider.   4. Educated on diagnosis, prognosis, labs, imaging, treatment options (including risks, benefits, risk of no treatment), medications (use, dose, side-effects, risks of medications including infection/cancer/bone marrow suppression, lab monitoring), infections what to do, plan of cares, goals of treatment.         MARIANNA Santos CNP

## 2019-04-10 NOTE — PROGRESS NOTES
[facility] - Rheumatology Clinic Visit    Krissy Weldon  is a 63 year old female who presents with follow-up for undifferentiated polyarthropathy, psoriatic arthritis. Complicated by eye inflammation (uveitis and scleritis--sees [facility]), tendonitis, costrochondritis, tendon tears, synovitis, dactylitis, OA/DJD jessica thumbs CMC. Failed or intolerance to multiple medications.     Copy forward: [-SSa, -SSb, -CCP,HLA-B27 negative on outside workup with previous SI injections] with hx- inflammatory eye left eye episcleritis requiring prednisone gtt or oral with bone scan unrevealing. Previous medrol or steroid responsiveness. Past tried humira, SSZ, simponi SQ/IV, remicade and otelza. Failed humira EoW recurrent episcleritis, right wrist, right SI, bilateral hamstrings tendonosis with partial tear bilaterally. Failed simponi sq/IV ineffective. SSZ and oral MTX. Past recurrent iritis (ER if eye pain, blurred vision, red eye). Remicade. Otezla. LLL pneumonia 3/2015. MRI L hip showed high grade tear gluteus minimus insertion site, effusion 4/2015. C/o neck issues with MRI cervical spine show DJD, EMG, paramedian osteophytes and disk protrusion at C3-C4 with some moderate central canal stenosis and moderate to severe right-sided foraminal stenosis as a consequence.  C5-C6 also showed some mild to moderate central canal stenosis and moderate bilateral central foraminal stenosis.  Seen Dr. Wheeler with significant improvement in GI issues pancreatic sphinctor dysfunction requiring surgery now on pancreatic enzymes after pancreatic duct is open. Failed cimzia Cimzia lost effectiveness (more uveitis, arthritis, synovitis,bursitis and tendonitis).     Copy forward hx:   Last seen  Dr. Samayoa. Continued plan. Methotrexate 0.8 ml week Q Monday (20 mg). Continues cimzia injections (due this Thurs).  No gout flares. Past in right great toe, but no aspirations.   Right thumb surgery July-Aug 2015. Pain  and motion much improved. Severe flare of your arthritis and eye inflammation when holding the cimzia and MTX for surgery requiring prednisone 5 mg x 2 week then stopped   Strept , pneumonia 3-2015. Recent bowel obstruction 9-2015, seen gastro 1-2016 given hx of pancreatic sphincter dysfunction.   Recent flare 2-4, requiring medrol dose pack--labs were NL except CRP 11 [Hands/ feet swelling with pain, back and neck along with an eye infection and scleritis].    Left eye inflammation flare with infection about 1 months ago, on 3 gtt. Slow clearing. Then 1 week after the gtt, developed neck pain hard to turn head to left. Then L achilles tendonitis, with costrochondritis this was last week, hands MCP 2-3 swelling. Started the medrol day 4--dramatic improvement [move her neck, eye improved, achilles much better now can walk on, the costrochondritis]. Left eye infection much better. Left hip still bothers, still much improved with prednisone. Energy much better. Cimzia lasting about 10 days. Left upper arm psoriasis. EAS none with medrol. This is the best she has felt overall. Rare use of prednisone of steroids. Last eye inflammation last summer. Left arm into her deltoid tingling with her neck, 1 week before flare. Having hard time moving her neck side to side, forward and back. The back of her neck tissues feel swollen on her left side. Prior to this no neck last flare was in . Does her neck exercise. 2 year of job specific, the end of March final decision.     Taking oxycodone 15 mg QID during this flare. Under [facility] pain clinic.     Copy forward February 15, 2017  I seen her last 2-2016. Dr. Samayoa  who continued the plan and using diclofenac for achilles pain. EHR reviewed. Was using medical marijuana.  Louise reported working well for her uveitis and constronchondritis; and the methotrexate injectable for her joints. Labs today normal     No medical marijauna as this didn't work  MS ER 15 mg  "BID for about 6 weeks. Oxycodone HS prn most nights. Off the pain patch due to insurance coverage. Goes to [facility] pain clinic.     C/o will get short of breath when swims or bends over for the past 6 weeks. No cardiac symptoms, CP or heaviness. Has had night sweats for many years which is not new. Losing weight but appetite is good, although her partner does feel she's not eating as well. Been on MS ER for about the time these symptoms started. No stomach pain. +costrochondritis as before. No blood in urine or stool. Taking omeprezole 40 mg every day for heartburn--will see PCP for possible UGI as feels some is near her esophagus. Due for annual physical and denies getting bone health done --does take 1000 IU day vitamin D. Due for mammogram. Hx hyst. No cough, fevers or ABD pain. No symptoms like had years ago with ERCP. \"no liver pain\". TSH normal 4 mths ago she reports. No blood in urine or stool.     Continues injection MTX 20 mg week Q Monday, FA 3 mg day, cimzia every 2 weeks or twice a month (takes prednisone 10 mg day before, day of and day after injection due to prior symptoms of N/V, fevers when did cimzia injections which she doesn't get now). Didn't take the prednisone this last injection as just had left hip injected per Dr. Maciel Dukes [facility]. Told she will eventually need a THR. \"butt muscles are sore\" but overall better symptoms on this plan. Seen eye doctor about twice this past year, as will have \"episcleritis\" about 4 days before injection cimzia but not every time due. FIngertips skin is cracking this past year, so interested in seeing derm. She's very happy with arthritis and uveitis control with this plan so wishes to continue. Mild psoriasis left ear only. Very happy with thumb surgeries. Denies any skin mole or lesion changes.     Changing to Medicare with [employer] April 1st.  [facility] reviewed and updated by me     Copy forward May 31, 2017  Strept infection 4-21 prior to ED then treated " with ABX. Exposed from [facility]. Missed one dose of MTX   ED 4-2017 for nausea with vomiting. CT of the abdomen/pelvis with contrast --no significant findings other than prominence of the common biliary duct which is likely related to cholecystectomy.  She was treated with Dilaudid and Zofran for pain and nausea. Given reglan for breakthrough nausea. Labs noted overall NL. 2 bags IVF    Ablation SI Right 4/25 and left Feb 26th 2017 --[facility] clinic   PT for her hip--told by therapy could feel the bursitis. Told needs THR and injection of left hip Feb 10th 2017 --this was [facility]. Who did x-rays and told would like to try to wait for at least a year. [facility] Dr. PUMA Dukes --her pain clinic provider would like to try platelet rich plasma injection     Cimzia injections high cost $3000 for 2 months. Deductible $4500 then cost catestrophic then goes down $300   Left eye uveitis about 5 days before the next injection then has to do the prednisone gtts for about 3 months. Hands are sore jessica the tips of then S/T and then MCPs. This is toward the end of the cycle and in bewtween too. Last injection last Tuesday. Some uveitis seen her about 2 months ago [facility]     MTX every week Mon or Tues 20 mg--tolerating well. Some mild fatigue but tolerating. NO infection now .   No psoriasis --dry   Fingertips not cracking now as using superglue  Trying to loss weight and exercise.  Seen PCP for hard to take a deep breath, SOB--treated with inhaler then didn't help.    SURGEON WANTS TO RE-DO HER LEFT THUMB SURGERY.    April 10, 2019    Taking secukinumab (cosentyx) 150 mg every month, next due in 4 days. Tolerating. Dramatic improvements with the loading dose, noticing more feet pain before the next injections. Taking methotrexate  20 mg every 7 days, tolerating, folic acid  1 mg once a day, tolerating. Denies any fever, chills, SOB, SANCHEZ, night sweats, or chest pain, or cough. Reports healthy. Under the MTPS get very sore, does  have orthotics about 5 yr old and has not seen [facility] podiatry for over 2 years. Trying accupuncture on the feet. No swelling. Self started prednisone x 2 weeks, thinks this is helping. Low back pain with right leg radiculopathy seeing her pain provider who ordered MRI and told her she has facet arthropathy so will be having MBB in a few weeks. Does not work. Hard to use her hands, hard to walk, do fine motor, weak in the hands, hard to stand or sit long periods of time. Eyes seen her local opthalmologist  Who did not see evidence of eye inflammation,. Psoriasis in scalp, hands, left upper back and ears. Feels this is better then the adalimumab (humira). No dactylitis. EAS. On fentyl patch 12.5 mcg and oxycodone 15 mg at bedtime per pain provider. Does not wish to see dermatology at this time. DEXA 7-2018 T-1.8 low bone density, worsening was referred to endo due to risk osteoporosis she will do this in the future.      PROBLEM LIST: Past medical history is notable for her being presumptively treated for Lyme with doxycycline after developing a targetoid lesion, for a history of Hashimoto's thyroiditis with subsequent hypothyroidism, for the diagnosis now of psoriasis, and for a history of depression.Neck pain, cervical stenosis-- MRI  +moderate central stenosis right C3-4, C5-6 degenerative changes 2nd mild-mod central stenosis. Past referral to physiatry-wishes to return to Dr. Edmond Sawant [facility] Left hip pain s/p tear needs THR she reports. B) Hx-Bilateral tendonitis hamstrings with right bursitis, partial tear per Dr. Gomez s/p injections. Seen Dr. Arvin Xie at [facility] s/p ablation SI joint, tendon insertion site. Sees Dr. Maciel Dukes. C). Transient elevation LFT 2/2013 [ NL after held MTX and tramadol 2wk]; transient 4- [ AST 89]. NL . Other hx --Panceatic sphinctor dysfunction. On pancreatic enzymes. SBO 9-2015, now no doing well. Right thumb DJD, s/p surgery .  Healed.  1. Diffuse degenerative joint disease that is both clinically apparent and obvious on multiple imaging modalities including bone scan, MRI 2/2013 or cervical. DJD L4-L5, L5-S1 per MRI 7/2012; MRI [id number]   2. Diffuse inflammatory arthritis with marked morning stiffness, no systemic inflammation by blood tests, but greater than 80% clinical improvement with a Medrol Dosepak.   3. Onset of the inflammatory joint symptoms including sacroiliitis with the development of psoriasis, suggesting that this represents psoriatic arthritis.   4. Development of episcleritis in the left eye with improvement with steroid eyedrops 12/14/2011 with recurrent episodes when wean oral prednisone (9/2012)  5. History of Hashimoto's thyroiditis.   6. History of presumptive treatment for Lyme disease with doxycycline after development of a targetoid lesion.   7. Poor tolerance of sulfasalazine and oral methotrexate.   8. Bilateral hamstring tendonosis, right partial tear, sacroilitis 7/2012. See MRI, repeat 2/2013 hamstring and right SI inflammation seeing Dr. Arvin Xie at [facility] [place] [place] [facility] specialized in SI. , left hamstring   9. DJD L4-L5, L5-S1 per MRI 7/2012  10. Intermittent prednisone exposure  11. Transient elevation LFT ALT 84/AST 58 2/2013 (nl after held MTX and tramadol, then increased folic acid 2mg day)  12. Past LUE burn developed into cellulitis resolved from keflex. Bronchitis now on zithromax irritating her costrochondritis. Improving after 1 day  13. 1-2014 Left knee meniscal tear with chrondomalacia per MRI (had Rt/Lt CMJ surgery)  14. 4- RUQ pain.   15.  Hx recurrent left episcleritis   16. Right thumb tendon surgery with Dr. San [facility]  17. Pneumonia 3-2015; strept  and 4-2017      Past Medical History:   Diagnosis Date     Acute meniscal tear of knee 1/2014    with chrondromalacia per MRI      Depression      DJD (degenerative joint disease), lumbar 7/2012    L4-5, L5-S1 per MRI       Episcleritis 12/14/2011     Hamstring tendonitis at origin 7/2012    Bilaterally with partial tear right, sacroilitis per MRI     Hypothyroid 9/7/2011     Hypothyroidism      PONV (postoperative nausea and vomiting)      Psoriatic arthritis (H) 9/7/2011     Psoriatic arthritis (H) 2/9/2016     Strep throat 04/2017       Surgical-She has a surgical history including appendectomy in 1980, cholecystectomy in approximately 1993, and hysterectomy without oophorectomy in 1996.  Ablation SI Right 4/25 and left Feb 26th 2017 -- pain clinic   PT for her hip--told by therapy could feel the bursitis. Told needs THR and injection of left hip Feb 10th 2017 -  Past Surgical History:   Procedure Laterality Date     APPENDECTOMY       ARTHROPLASTY CARPOMETACARPAL (THUMB JOINT)  11/8/2013    Procedure: ARTHROPLASTY CARPOMETACARPAL (THUMB JOINT);  Left First Carpometacarpal Trapezium Resection, Tendon Interposition  ;  Surgeon: Luiza Farrar MD;  [facility]: [facility]     ARTHROPLASTY CARPOMETACARPAL (THUMB JOINT)  12/20/2013    Procedure: ARTHROPLASTY CARPOMETACARPAL (THUMB JOINT);  Right Thumb Trapezium Resection With Flexor Carpi Radialis Tendon Reconstruction       CHOLECYSTECTOMY       COLONOSCOPY  5/6/2014    Procedure: COLONOSCOPY;  Surgeon: Adria San MD;  Location:  GI     ENDOSCOPIC RETROGRADE CHOLANGIOPANCREATOGRAM  6/13/2014    Procedure: ENDOSCOPIC RETROGRADE CHOLANGIOPANCREATOGRAM;  Surgeon: Lianna Wheeler MD;  Location: [facility] OR     GALLBLADDER SURGERY       GYN SURGERY      hysterectomy and oophorectomy      [facility] ENDOSCOPY W EUS Left 6/10/2014    Procedure: COMBINED ENDOSCOPIC ULTRASOUND, ESOPHAGOSCOPY, GASTROSCOPY, DUODENOSCOPY (EGD);  Surgeon: Lianna Wheeler MD;  Location: [facility] GI     HYSTERECTOMY       Right thumb surgery  7/2015     XR SACROILIAC THERAPEUTIC INJECTION BILATERAL  12/2011       FH:  No autoimmune disorders, psoriasis, UC, crohn's, SLE, RA, PsA, gout, autoimmune  "thyroid.  No MS, heart disease or cancer in family  Mother-endometrial cancer, RHEUMATOID ARTHRITIS (RA)   Father-psoriasis, lymphoma, colon and prostate cancer   Siblings-sister RHEUMATOID ARTHRITIS (RA), OSTEOARTHRITIS  And crohns   Children-  MGM RHEUMATOID ARTHRITIS (RA)       SH:  No Alcohol. No Smoking. No IVDU. Partner on list for lung transplant     [facility] personally reviewed and updated by me.    ROS:  CONSTITUTIONAL: No fevers, night sweats or unintentional weight change. No acute distress, swollen glands  EYES: No vision change, diplopia, pain in eyes or red eyes   EARS, NOSE, MOUTH, THROAT: No tinnitus or hearing change, no epistaxis or nasal discharge, no oral lesions, throat clear. Normal saliva pool.  No drymouth. No thyroid Enlargement.   CARDIOVASCULAR: No chest pain, palpitations, or pain with walking, no orthopnea or PND   RESPIRATORY: No dyspnea, cough, shortness of breath or wheezing. No pleurisy.   GI: No nausea, vomiting, diarrhea or constipation, no abdominal pain, or blood in stools.   : No change in urine, no dysuria or hematuria   MUSCKL: See above   INTEGUMENTARY: No concerning lesions or moles   NEURO: No loss of strength or sensation, no numbness or tingling, no tremor, no dizziness, no headache. No falls   ENDO: No polyuria or polydipsia, no temperature intolerance   HEME/LYMPH:No concerning bumps, bleeding problems, or swollen lymph nodes. No recent infections, hospitalizations or new illnesses.   Otherwise 14 point ROS obtained, reviewed and found negative.     Physical exam:  Vitals: Blood pressure 117/74, pulse 72, temperature 98.3  F (36.8  C), temperature source Oral, height 1.664 m (5' 5.5\"), weight 72.6 kg (160 lb), last menstrual period 03/06/2012, SpO2 97 %, not currently breastfeeding.  Wt Readings from Last 4 Encounters:   04/10/19 72.6 kg (160 lb)   02/26/19 74.5 kg (164 lb 3.2 oz)   01/14/19 73 kg (160 lb 14.4 oz)   10/31/18 74.4 kg (164 lb)     Constitutional: WD-WN-WG " cooperative  Eyes: nl EOM, PERRLA, vision, conjunctiva, sclera, No Unilateral or bilateral external ear inflammation   ENT: nl external ears, nose, hearing, lips, teeth, gums, throat. No saddle nose   Neck: no mass or thyroid enlargement  Resp: lungs clear to auscultation, nl to palpation, nl effort  CV: RRR, no murmurs, rubs or gallops, no edema  GI: no ABD mass or tenderness, no HSM  : not tested  Lymph: no cervical or epitrochlear nodes  MS: contractures in toes, palpable MTPs posteriorly with high arches. Reduced ROM neck laterally and extension. All TMJ, neck, shoulder, elbow, wrist, hand, spine, hip, knee, ankle, and foot joints were examined and otherwise found normal. Weak . Normal prayer sign. Negative Lhermitte's sign. No periuncle erythema  Skin: no nail pitting, alopecia, rash, dry skin most notably on hands and the ends of her fingers cracked, psoriasis in scalp.   Neuro: nl cranial nerves, see above   Psych: nl judgement, orientation, memory, affect.      Labs/Imaging:    Results for orders placed or performed in visit on 04/10/19   Erythrocyte sedimentation rate auto   Result Value Ref Range    Sed Rate 7 0 - 30 mm/h   CRP inflammation   Result Value Ref Range    CRP Inflammation <2.9 0.0 - 8.0 mg/L   Creatinine   Result Value Ref Range    Creatinine 0.73 0.52 - 1.04 mg/dL    GFR Estimate 88 >60 mL/min/[1.73_m2]    GFR Estimate If Black >90 >60 mL/min/[1.73_m2]   Albumin level   Result Value Ref Range    Albumin 3.8 3.4 - 5.0 g/dL   ALT   Result Value Ref Range    ALT 25 0 - 50 U/L   AST   Result Value Ref Range    AST 19 0 - 45 U/L   CBC with platelets differential   Result Value Ref Range    WBC 6.2 4.0 - 11.0 10e9/L    RBC Count 4.61 3.8 - 5.2 10e12/L    Hemoglobin 13.7 11.7 - 15.7 g/dL    Hematocrit 42.1 35.0 - 47.0 %    MCV 91 78 - 100 fl    MCH 29.7 26.5 - 33.0 pg    MCHC 32.5 31.5 - 36.5 g/dL    RDW 14.7 10.0 - 15.0 %    Platelet Count 247 150 - 450 10e9/L    Diff Method Automated Method      % Neutrophils 75.8 %    % Lymphocytes 19.3 %    % Monocytes 3.6 %    % Eosinophils 0.3 %    % Basophils 0.7 %    % Immature Granulocytes 0.3 %    Nucleated RBCs 0 0 /100    Absolute Neutrophil 4.7 1.6 - 8.3 10e9/L    Absolute Lymphocytes 1.2 0.8 - 5.3 10e9/L    Absolute Monocytes 0.2 0.0 - 1.3 10e9/L    Absolute Eosinophils 0.0 0.0 - 0.7 10e9/L    Absolute Basophils 0.0 0.0 - 0.2 10e9/L    Abs Immature Granulocytes 0.0 0 - 0.4 10e9/L    Absolute Nucleated RBC 0.0        Impression/Plan:    1.Psoriatic arthritis w/axial involvement activity with hx episcleritis/uveities, dactylitis, tendonitis, left hamstring tear. Exam improved on secukinumab (cosentyx), and now on lower methotrexate. Improved active axial and peripheral symptoms. New toe deformities (she will get x-rays from [facility]). Needs THR. We will continue secukinumab (cosentyx). I think she her feet pain is not inflammation and she should follow-up  With her podiatry for new orthotics and other options, and her pain provider about her back. She should wean off prednisone.  Aprimilast (otezla) contraindicated due to depression and does have axial disease so I would not favor this. She is not working and nor can she return to work due to her chronic pain, impairments.     -High risk medications, labs normal   -Chronic pain under other provider and goal of off narcotics. I feel she does have a component of fibromylagia (in past reports neurontin cause sleepiness, and cymbalta change in mood, but not sure of the dose of neurontin--she can talk to her pain provider about this, work on sleep).    2. [id number]-qkuxtc-pb ophthalmologist.   No sx today   3. Psoriasis.fingertip skin cracking. Referral to derm for formal consult -she wishes to hold   4. High risk medications monitoring, labs every 12 weeks. Normal today.   5. Low bone density per DEXA 7-2018, worsening. Given high risk osteoporosis , I did refer her to endocrine for complete evaluation   6. Chronic pain  under care of [facility] Dr. Xie.    -Calcium and vitamin D. Complete physical due she wishes to come here   -RTC 4 mth me and 8 mths Dr. Samayoa    The patient understood the rationale for the diagnosis and treatment plan. Patient shared in the decision making. All questions were answered to best of my ability and the patient's satisfaction  Miguel CABRAL, CNP, MSN  [facility]  Department of Rheumatology & Autoimmune Disorders    1. Immunization: Reviewed CDC guidelines with patient updated, information provided to patient based on CDC guidelines for vaccination recommendations, patient will discuss with primary provider  2. Bone Health:Educated on adequate calcium and vitamin D intake, Educated on exercise, Glucocorticoid education discussed risks, benefits & potential long term side effects from use  3. Discussed routine annual physicals, cancer screening, cardiac risk monitoring, bone health and primary care with primary care provider.   4. Educated on diagnosis, prognosis, labs, imaging, treatment options (including risks, benefits, risk of no treatment), medications (use, dose, side-effects, risks of medications including infection/cancer/bone marrow suppression, lab monitoring), infections what to do, plan of cares, goals of treatment.

## 2019-04-10 NOTE — NURSING NOTE
"Chief Complaint   Patient presents with     RECHECK     Follow up for Psoriatic arthritis     Consult     Medicaton trouble     Blood pressure 117/74, pulse 72, temperature 98.3  F (36.8  C), temperature source Oral, height 1.664 m (5' 5.5\"), weight 72.6 kg (160 lb), last menstrual period 03/06/2012, SpO2 97 %, not currently breastfeeding.    Amber Ro, [facility]    "

## 2019-04-15 ENCOUNTER — TELEPHONE (OUTPATIENT)
Dept: RHEUMATOLOGY | Facility: CLINIC | Age: 64
End: 2019-04-15

## 2019-04-18 ENCOUNTER — TELEPHONE (OUTPATIENT)
Dept: RHEUMATOLOGY | Facility: CLINIC | Age: 64
End: 2019-04-18

## 2019-04-24 NOTE — TELEPHONE ENCOUNTER
PA Initiation    Medication: secukinumab (Cosentyx) 150 MG/ML Sensoready pen - RENEWAL  Insurance Company: [employer] - Phone [phone] Fax [phone]  Pharmacy Filling the Rx: [facility] [address]  Filling Pharmacy Phone: [phone]  Filling Pharmacy Fax:    Start Date: 4/24/2019    ** Current Cosentyx PA expires 5/1/19

## 2019-04-24 NOTE — TELEPHONE ENCOUNTER
Prior Authorization Approval    Authorization Effective Date: 4/24/2019  Authorization Expiration Date: 4/23/2022  Medication: secukinumab (Cosentyx) 150 MG/ML Sensoready pen   Approved Dose/Quantity: Monthly maintenance  Reference #: CMM key# [id number]   Insurance Company: [employer] - Phone [phone] Fax [phone]  Expected CoPay:       CoPay Card Available: Yes    Foundation Assistance Needed:    Which Pharmacy is filling the prescription (Not needed for infusion/clinic administered): [address]  Pharmacy Notified:    Patient Notified:

## 2019-05-29 ENCOUNTER — DOCUMENTATION ONLY (OUTPATIENT)
Dept: CARE COORDINATION | Facility: CLINIC | Age: 64
End: 2019-05-29

## 2019-06-04 DIAGNOSIS — L40.50 PSORIATIC ARTHRITIS (H): ICD-10-CM

## 2019-06-04 DIAGNOSIS — M46.99 INFLAMMATORY SPONDYLOPATHY OF MULTIPLE SITES IN SPINE (H): ICD-10-CM

## 2019-06-04 DIAGNOSIS — L40.9 PSORIASIS: ICD-10-CM

## 2019-06-06 RX ORDER — SECUKINUMAB 150 MG/ML
INJECTION SUBCUTANEOUS
Qty: 1 ML | Refills: 5 | Status: SHIPPED | OUTPATIENT
Start: 2019-06-06 | End: 2019-06-26

## 2019-06-06 NOTE — TELEPHONE ENCOUNTER
cosentyx  Last Written Prescription Date:  2/11/19  Last Fill Quantity: 1   # refills: 5  Last Office Visit: 4/10/19  Future Office visit:  6/26/19    CBC RESULTS:   Recent Labs   Lab Test 04/10/19  1401   WBC 6.2   RBC 4.61   HGB 13.7   HCT 42.1   MCV 91   MCH 29.7   MCHC 32.5   RDW 14.7          Creatinine   Date Value Ref Range Status   04/10/2019 0.73 0.52 - 1.04 mg/dL Final   ]    Liver Function Studies -   Recent Labs   Lab Test 04/10/19  1401  04/26/17  1825   PROTTOTAL  --   --  7.3   ALBUMIN 3.8   < > 3.8   BILITOTAL  --   --  0.2   ALKPHOS  --   --  72   AST 19   < > 24   ALT 25   < > 38    < > = values in this interval not displayed.       Routing refill request to provider for review/approval because:Non-Protocol

## 2019-06-26 ENCOUNTER — OFFICE VISIT (OUTPATIENT)
Dept: RHEUMATOLOGY | Facility: CLINIC | Age: 64
End: 2019-06-26
Attending: NURSE PRACTITIONER
Payer: COMMERCIAL

## 2019-06-26 ENCOUNTER — ANCILLARY PROCEDURE (OUTPATIENT)
Dept: GENERAL RADIOLOGY | Facility: CLINIC | Age: 64
End: 2019-06-26
Attending: NURSE PRACTITIONER
Payer: COMMERCIAL

## 2019-06-26 VITALS
OXYGEN SATURATION: 98 % | DIASTOLIC BLOOD PRESSURE: 71 MMHG | WEIGHT: 159 LBS | TEMPERATURE: 97.7 F | SYSTOLIC BLOOD PRESSURE: 108 MMHG | BODY MASS INDEX: 26.06 KG/M2 | HEART RATE: 76 BPM

## 2019-06-26 DIAGNOSIS — M46.99 INFLAMMATORY SPONDYLOPATHY OF MULTIPLE SITES IN SPINE (H): ICD-10-CM

## 2019-06-26 DIAGNOSIS — L40.50 PSORIATIC ARTHRITIS (H): ICD-10-CM

## 2019-06-26 DIAGNOSIS — Z79.899 HIGH RISK MEDICATIONS (NOT ANTICOAGULANTS) LONG-TERM USE: ICD-10-CM

## 2019-06-26 DIAGNOSIS — M06.4 INFLAMMATORY POLYARTHROPATHY (H): ICD-10-CM

## 2019-06-26 DIAGNOSIS — L40.9 PSORIASIS: ICD-10-CM

## 2019-06-26 DIAGNOSIS — L40.50 PSORIATIC ARTHRITIS (H): Primary | ICD-10-CM

## 2019-06-26 LAB
ALBUMIN SERPL-MCNC: 3.8 G/DL (ref 3.4–5)
ALT SERPL W P-5'-P-CCNC: 39 U/L (ref 0–50)
AST SERPL W P-5'-P-CCNC: 27 U/L (ref 0–45)
BASOPHILS # BLD AUTO: 0.1 10E9/L (ref 0–0.2)
BASOPHILS NFR BLD AUTO: 1 %
CREAT SERPL-MCNC: 0.78 MG/DL (ref 0.52–1.04)
CRP SERPL-MCNC: <2.9 MG/L (ref 0–8)
DIFFERENTIAL METHOD BLD: NORMAL
EOSINOPHIL # BLD AUTO: 0.1 10E9/L (ref 0–0.7)
EOSINOPHIL NFR BLD AUTO: 1.3 %
ERYTHROCYTE [DISTWIDTH] IN BLOOD BY AUTOMATED COUNT: 13.9 % (ref 10–15)
ERYTHROCYTE [SEDIMENTATION RATE] IN BLOOD BY WESTERGREN METHOD: 10 MM/H (ref 0–30)
GFR SERPL CREATININE-BSD FRML MDRD: 81 ML/MIN/{1.73_M2}
HCT VFR BLD AUTO: 41.2 % (ref 35–47)
HGB BLD-MCNC: 13.3 G/DL (ref 11.7–15.7)
IMM GRANULOCYTES # BLD: 0 10E9/L (ref 0–0.4)
IMM GRANULOCYTES NFR BLD: 0.2 %
LYMPHOCYTES # BLD AUTO: 1.9 10E9/L (ref 0.8–5.3)
LYMPHOCYTES NFR BLD AUTO: 30.3 %
MCH RBC QN AUTO: 29.8 PG (ref 26.5–33)
MCHC RBC AUTO-ENTMCNC: 32.3 G/DL (ref 31.5–36.5)
MCV RBC AUTO: 92 FL (ref 78–100)
MONOCYTES # BLD AUTO: 0.5 10E9/L (ref 0–1.3)
MONOCYTES NFR BLD AUTO: 7.2 %
NEUTROPHILS # BLD AUTO: 3.7 10E9/L (ref 1.6–8.3)
NEUTROPHILS NFR BLD AUTO: 60 %
NRBC # BLD AUTO: 0 10*3/UL
NRBC BLD AUTO-RTO: 0 /100
PLATELET # BLD AUTO: 232 10E9/L (ref 150–450)
RBC # BLD AUTO: 4.47 10E12/L (ref 3.8–5.2)
WBC # BLD AUTO: 6.2 10E9/L (ref 4–11)

## 2019-06-26 PROCEDURE — 82565 ASSAY OF CREATININE: CPT | Performed by: NURSE PRACTITIONER

## 2019-06-26 PROCEDURE — G0463 HOSPITAL OUTPT CLINIC VISIT: HCPCS | Mod: ZF

## 2019-06-26 PROCEDURE — 82040 ASSAY OF SERUM ALBUMIN: CPT | Performed by: NURSE PRACTITIONER

## 2019-06-26 PROCEDURE — 85025 COMPLETE CBC W/AUTO DIFF WBC: CPT | Performed by: NURSE PRACTITIONER

## 2019-06-26 PROCEDURE — 84450 TRANSFERASE (AST) (SGOT): CPT | Performed by: NURSE PRACTITIONER

## 2019-06-26 PROCEDURE — 36415 COLL VENOUS BLD VENIPUNCTURE: CPT | Performed by: NURSE PRACTITIONER

## 2019-06-26 PROCEDURE — 86481 TB AG RESPONSE T-CELL SUSP: CPT | Performed by: NURSE PRACTITIONER

## 2019-06-26 PROCEDURE — 86140 C-REACTIVE PROTEIN: CPT | Performed by: NURSE PRACTITIONER

## 2019-06-26 PROCEDURE — 84460 ALANINE AMINO (ALT) (SGPT): CPT | Performed by: NURSE PRACTITIONER

## 2019-06-26 PROCEDURE — 85652 RBC SED RATE AUTOMATED: CPT | Performed by: NURSE PRACTITIONER

## 2019-06-26 RX ORDER — FOLIC ACID 1 MG/1
TABLET ORAL
Qty: 210 TABLET | Refills: 3 | Status: SHIPPED | OUTPATIENT
Start: 2019-06-26 | End: 2020-10-27

## 2019-06-26 RX ORDER — METHOTREXATE 25 MG/ML
25 INJECTION INTRA-ARTERIAL; INTRAMUSCULAR; INTRATHECAL; INTRAVENOUS
Qty: 8 ML | Refills: 2 | Status: SHIPPED | OUTPATIENT
Start: 2019-06-26 | End: 2019-07-03

## 2019-06-26 ASSESSMENT — PATIENT HEALTH QUESTIONNAIRE - PHQ9: SUM OF ALL RESPONSES TO PHQ QUESTIONS 1-9: 0

## 2019-06-26 ASSESSMENT — PAIN SCALES - GENERAL: PAINLEVEL: SEVERE PAIN (6)

## 2019-06-26 NOTE — NURSING NOTE
Chief Complaint   Patient presents with     RECHECK     4 mos follow up psoriatic arthritis     /71 (BP Location: Right arm, Patient Position: Sitting, Cuff Size: Adult Regular)   Pulse 76   Temp 97.7  F (36.5  C)   Wt 72.1 kg (159 lb)   LMP 03/06/2012   SpO2 98%   BMI 26.06 kg/m          Miguel Santos  EMT

## 2019-06-26 NOTE — RESULT ENCOUNTER NOTE
All labs reviewed and discussed with patient at office visit. Instructed to call for any further questions.       Miguel CABRAL CNP, MSN  6/26/2019  11:36 AM

## 2019-06-26 NOTE — LETTER
6/26/2019      RE: Krissy Weldon  [address] [address] [address] [address]         [facility] - Rheumatology Clinic Visit    Krissy Weldon  is a 63 year old female who presents with follow-up for undifferentiated polyarthropathy, psoriatic arthritis. Complicated by eye inflammation (uveitis and scleritis--sees [facility]), tendonitis, costrochondritis, tendon tears, synovitis, dactylitis, OA/DJD jessica thumbs C[facility], now toe contractures and laxity all hand/wrist joints, bowel changes (sister with crohns and dad with inflammatory bowel). Methotrexate since . Former nurse anesthestist    Copy forward: [-SSa, -SSb, -CCP,HLA-B27 negative on outside workup with previous SI injections] with hx- inflammatory eye left eye episcleritis requiring prednisone gtt or oral with bone scan unrevealing. Previous medrol or steroid responsiveness. Past tried humira, SSZ, simponi SQ/IV, remicade and otelza. Failed humira EoW recurrent episcleritis, right wrist, right SI, bilateral hamstrings tendonosis with partial tear bilaterally. Failed simponi sq/IV ineffective. SSZ and oral MTX. Past recurrent iritis (ER if eye pain, blurred vision, red eye). Remicade. Otezla. LLL pneumonia 3/2015. MRI L hip showed high grade tear gluteus minimus insertion site, effusion 4/2015. C/o neck issues with MRI cervical spine show DJD, EMG, paramedian osteophytes and disk protrusion at C3-C4 with some moderate central canal stenosis and moderate to severe right-sided foraminal stenosis as a consequence.  C5-C6 also showed some mild to moderate central canal stenosis and moderate bilateral central foraminal stenosis.  Seen Dr. Wheeler with significant improvement in GI issues pancreatic sphinctor dysfunction requiring surgery now on pancreatic enzymes after pancreatic duct is open. Failed certolizumab pegol (cimzia) lost effectiveness (more uveitis, arthritis, synovitis,bursitis and tendonitis, strept and CAP). Secukinumab (cosentyx)  2-2019 more laxity in joints hands/wrists, inflammatory eye, toe contractures, changes in skin stopped then changed to etanercept (enbrel) 6- June 26, 2019  Continues on secukinumab (cosentyx) 150 mg injection every 30 days. Prior dramatic improved with loading dose then loss of effectiveness after 10 days. Feels like adalimumab (humira) worked better    Reports changes in skin (leathery) and other changes, seeing Dr. Conti tomorrow for formal evaluation  Reports more diarrhea and constipation with cramps, no blood in stools, on prednisone 5 mg day and seen eye provider dx inflammatory eye on prednisone gtts and other drops, more laxity of hand and wrist joints, fullness over the joints, the toes are now contracted and not able to straighten, the bilateral 3rd palm of hand tendon is tight hard to straigthen,  overall stiff in all joints and tendons, does not feel this is working, neck pain, bowels are irregular. No liver pain. Colonoscopy 2014 no inflammation. Methotrexate  25 mg injection every 7 days, folic acid  2 mg day except days before. Pain in feet. Seen podiatry at  Ortho told mild arthritis but this was more tendons and ligaments. Neck is more stiff and now as able to move, some kyphosis      Denies any fever, chills, SOB, SANCHEZ, night sweats, or chest pain, or cough. Reports healthy. Caring for partner full-time. The pain impacts her function. Right SI injection, and RFA L4-S1 injection for facet arthropathy at [facility] [place] right leg radiculopathy. No loss of bowel or bladder, no arm weakness or pain. DEXA 7-2018 T-1.8 low bone density, worsening was referred to endo due to risk osteoporosis she will do this in the future.    PROBLEM LIST: Past medical history is notable for her being presumptively treated for Lyme with doxycycline after developing a targetoid lesion, for a history of Hashimoto's thyroiditis with subsequent hypothyroidism, for the diagnosis now of psoriasis, and for a history of  depression.Neck pain, cervical stenosis-- MRI  +moderate central stenosis right C3-4, C5-6 degenerative changes 2nd mild-mod central stenosis. Past referral to physiatry-wishes to return to Dr. Edmond Sawant [facility] Left hip pain s/p tear needs THR she reports. B) Hx-Bilateral tendonitis hamstrings with right bursitis, partial tear per Dr. Gomez s/p injections. [place] [facility] [place] SI joint, tendon insertion site. Sees Dr. Maciel Dukes. C). Transient elevation LFT 2/2013 [ NL after held MTX and tramadol 2wk]; transient 4- [ AST 89]. NL . Other hx --Panceatic sphinctor dysfunction. On pancreatic enzymes. SBO 9-2015, now no doing well. Right thumb DJD, s/p surgery . Healed.  1. Diffuse degenerative joint disease that is both clinically apparent and obvious on multiple imaging modalities including bone scan, MRI 2/2013 or cervical. DJD L4-L5, L5-S1 per MRI 7/2012; MRI 9570-6510   2. Diffuse inflammatory arthritis with marked morning stiffness, no systemic inflammation by blood tests, but greater than 80% clinical improvement with a Medrol Dosepak.   3. Onset of the inflammatory joint symptoms including sacroiliitis with the development of psoriasis, suggesting that this represents psoriatic arthritis.   4. Development of episcleritis in the left eye with improvement with steroid eyedrops 12/14/2011 with recurrent episodes when wean oral prednisone (9/2012)  5. History of Hashimoto's thyroiditis.   6. History of presumptive treatment for Lyme disease with doxycycline after development of a targetoid lesion.   7. Poor tolerance of sulfasalazine and oral methotrexate.   8. Bilateral hamstring tendonosis, right partial tear, sacroilitis 7/2012. See MRI, repeat 2/2013 hamstring and right SI inflammation seeing [place] [facility] [facility] [place]. , left hamstring   9. DJD L4-L5, L5-S1 per MRI 7/2012  10. Intermittent prednisone  exposure  11. Transient elevation LFT ALT 84/AST 58 2/2013 (nl after held MTX and tramadol, then increased folic acid 2mg day)  12. Past LUE burn developed into cellulitis resolved from keflex. Bronchitis now on zithromax irritating her costrochondritis. Improving after 1 day  13. 1-2014 Left knee meniscal tear with chrondomalacia per MRI (had Rt/Lt CMJ surgery)  14. 4- RUQ pain.   15.  Hx recurrent left episcleritis   16. Right thumb tendon surgery with Dr. [facility]  17. Pneumonia 3-2015; strept  and 4-2017      Past Medical History:   Diagnosis Date     Acute meniscal tear of knee 1/2014    with chrondromalacia per MRI      Depression      DJD (degenerative joint disease), lumbar 7/2012    L4-5, L5-S1 per MRI      Episcleritis 12/14/2011     Hamstring tendonitis at origin 7/2012    Bilaterally with partial tear right, sacroilitis per MRI     Hypothyroid 9/7/2011     Hypothyroidism      PONV (postoperative nausea and vomiting)      Psoriatic arthritis (H) 9/7/2011     Psoriatic arthritis (H) 2/9/2016     Strep throat 04/2017       Surgical-She has a surgical history including appendectomy in 1980, cholecystectomy in approximately 1993, and hysterectomy without oophorectomy in 1996.  Ablation SI Right 4/25 and left Feb 26th 2017 -- pain clinic   PT for her hip--told by therapy could feel the bursitis. Told needs THR and injection of left hip Feb 10th 2017 -  Past Surgical History:   Procedure Laterality Date     APPENDECTOMY       ARTHROPLASTY CARPOMETACARPAL (THUMB JOINT)  11/8/2013    Procedure: ARTHROPLASTY CARPOMETACARPAL (THUMB JOINT);  Left First Carpometacarpal Trapezium Resection, Tendon Interposition  ;  Surgeon: Luiza Farrar MD;  [facility]     ARTHROPLASTY CARPOMETACARPAL (THUMB JOINT)  12/20/2013    Procedure: ARTHROPLASTY CARPOMETACARPAL (THUMB JOINT);  Right Thumb Trapezium Resection With Flexor Carpi Radialis Tendon Reconstruction       CHOLECYSTECTOMY        COLONOSCOPY  5/6/2014    Procedure: COLONOSCOPY;  Surgeon: Adria San MD;  Location:  GI     ENDOSCOPIC RETROGRADE CHOLANGIOPANCREATOGRAM  6/13/2014    Procedure: ENDOSCOPIC RETROGRADE CHOLANGIOPANCREATOGRAM;  Surgeon: Lianna Wheeler MD;  Location: [facility] OR     GALLBLADDER SURGERY       GYN SURGERY      hysterectomy and oophorectomy     [facility] ENDOSCOPY W EUS Left 6/10/2014    Procedure: COMBINED ENDOSCOPIC ULTRASOUND, ESOPHAGOSCOPY, GASTROSCOPY, DUODENOSCOPY (EGD);  Surgeon: Lianna Wheeler MD;  Location: [facility] GI     HYSTERECTOMY       Right thumb surgery  7/2015     XR SACROILIAC THERAPEUTIC INJECTION BILATERAL  12/2011       FH:  No autoimmune disorders, psoriasis, UC, crohn's, SLE, RA, PsA, gout, autoimmune thyroid.  No MS, heart disease or cancer in family  Mother-endometrial cancer, RHEUMATOID ARTHRITIS (RA)   Father-psoriasis, lymphoma, colon and prostate cancer, inflammatory bowel   Siblings-sister rheumatoid arthritis (RA) and OSTEOARTHRITIS  And crohns   [place] autoimmune process [place]. [place]   [facility] [place]RA[place]       SH:  No Alcohol. No Smoking. No IVDU. Partner on list for lung transplant     [facility] personally reviewed and updated by me.    ROS:  CONSTITUTIONAL: No fevers, night sweats or unintentional weight change. No acute distress, swollen glands  EYES: No vision change, diplopia, pain in eyes or red eyes   EARS, NOSE, MOUTH, THROAT: No tinnitus or hearing change, no epistaxis or nasal discharge, no oral lesions, throat clear. Normal saliva pool.  No drymouth. No thyroid Enlargement.   CARDIOVASCULAR: No chest pain, palpitations, or pain with walking, no orthopnea or PND   RESPIRATORY: No dyspnea, cough, shortness of breath or wheezing. No pleurisy.   GI: See above   : No change in urine, no dysuria or hematuria   MUSCKL: See above   INTEGUMENTARY: No concerning lesions or moles   NEURO: No loss of strength or sensation, no numbness or tingling, no tremor, no  dizziness, no headache. No falls   ENDO: No polyuria or polydipsia, no temperature intolerance   HEME/LYMPH:No concerning bumps, bleeding problems, or swollen lymph nodes. No recent infections, hospitalizations or new illnesses.   Otherwise 14 point ROS obtained, reviewed and found negative.     Physical exam:  Vitals: Blood pressure 108/71, pulse 76, temperature 97.7  F (36.5  C), weight 72.1 kg (159 lb), last menstrual period 03/06/2012, SpO2 98 %, not currently breastfeeding.  Wt Readings from Last 4 Encounters:   06/26/19 72.1 kg (159 lb)   04/10/19 72.6 kg (160 lb)   02/26/19 74.5 kg (164 lb 3.2 oz)   01/14/19 73 kg (160 lb 14.4 oz)     Constitutional: WD-WN-WG cooperative  Eyes: nl EOM, PERRLA, vision, conjunctiva, sclera, No Unilateral or bilateral external ear inflammation   ENT: nl external ears, nose, hearing, lips, teeth, gums, throat. No saddle nose   Neck: no mass or thyroid enlargement  Resp: lungs clear to auscultation, nl to palpation, nl effort  CV: RRR, no murmurs, rubs or gallops, no edema  GI: no ABD mass or tenderness, no HSM  : not tested  Lymph: no cervical or epitrochlear nodes  MS: pannus over MCPs, PIPs, severe laxity of all hand and wrist joints, contractures in toes, palpable MTPs posteriorly with high arches. Reduced ROM neck laterally and extension. Kyphosis. Right finger contracture. All TMJ, neck, shoulder, elbow, wrist, hand, spine, hip, knee, ankle, and foot joints were examined and otherwise found normal. Weak . Normal prayer sign. Negative Lhermitte's sign. No periuncle erythema  Skin: no nail pitting, alopecia, rash--defer as seeing derm tomorrow   Neuro: nl cranial nerves, see above   Psych: nl judgement, orientation, memory, affect.      Labs/Imaging:    Results for orders placed or performed in visit on 06/26/19   Erythrocyte sedimentation rate auto   Result Value Ref Range    Sed Rate 10 0 - 30 mm/h   CRP inflammation   Result Value Ref Range    CRP Inflammation <2.9  0.0 - 8.0 mg/L   Creatinine   Result Value Ref Range    Creatinine 0.78 0.52 - 1.04 mg/dL    GFR Estimate 81 >60 mL/min/[1.73_m2]    GFR Estimate If Black >90 >60 mL/min/[1.73_m2]   Albumin level   Result Value Ref Range    Albumin 3.8 3.4 - 5.0 g/dL   ALT   Result Value Ref Range    ALT 39 0 - 50 U/L   AST   Result Value Ref Range    AST 27 0 - 45 U/L   CBC with platelets differential   Result Value Ref Range    WBC 6.2 4.0 - 11.0 10e9/L    RBC Count 4.47 3.8 - 5.2 10e12/L    Hemoglobin 13.3 11.7 - 15.7 g/dL    Hematocrit 41.2 35.0 - 47.0 %    MCV 92 78 - 100 fl    MCH 29.8 26.5 - 33.0 pg    MCHC 32.3 31.5 - 36.5 g/dL    RDW 13.9 10.0 - 15.0 %    Platelet Count 232 150 - 450 10e9/L    Diff Method Automated Method     % Neutrophils 60.0 %    % Lymphocytes 30.3 %    % Monocytes 7.2 %    % Eosinophils 1.3 %    % Basophils 1.0 %    % Immature Granulocytes 0.2 %    Nucleated RBCs 0 0 /100    Absolute Neutrophil 3.7 1.6 - 8.3 10e9/L    Absolute Lymphocytes 1.9 0.8 - 5.3 10e9/L    Absolute Monocytes 0.5 0.0 - 1.3 10e9/L    Absolute Eosinophils 0.1 0.0 - 0.7 10e9/L    Absolute Basophils 0.1 0.0 - 0.2 10e9/L    Abs Immature Granulocytes 0.0 0 - 0.4 10e9/L    Absolute Nucleated RBC 0.0        Impression/Plan:    1.Psoriatic arthritis w/axial involvement activity with hx episcleritis/uveities, dactylitis, tendonitis, left hamstring tear. Worse now with secukinumab (cosentyx) and concern for worsening tendons, more bowel symptoms (+family history crohns and brother as well), (she reports new article about anesthesists and autoimmune disease article) eye inflammation, significant joint laxity, changes in toes now contractures, feet pain, more limited cervical ROM and changes in bowel/skin. We discussed changing this to etanercept (enbrel) 50 mg injection syringes every 7 days start 30 day after last injection, stop secukinumab (cosentyx) but if more infections or inflammatory eye then will go back to adalimumab (humira). The  next will be to wean down and off the methotrexate and re-try sulfasalazine (this can be more effective in tendonitis) if she can tolerate this, and now on lower methotrexate. Needs THR.  Aprimilast (otezla) contraindicated due to depression and does have axial disease so I would not favor this. She is not working and nor can she return to work due to her chronic pain, impairments.  I will do x-rays of neck to look for erosions, degenerative changes and AA instability. She is aware of the symptoms to look for. I gave her Mago W card as well. Lastly, her skin manifestatations are different then in ever in the past. I will ask dermatology for his input and if would favor bx or if this is psoriasis or if needs more labs or his recommendations etanercept (enbrel) vs other ustekinumab (Stelara) or other options. Abatacept (orencia) is now approved in psoriatic arthritis  So is tofacitinib (xeljantz) (this increase risk of shingles)  -High risk medications, labs normal will delete CRP/ESR as have been normal    2. Opwembi-yyzidx-vo ophthalmologist.   No sx today   3. Psoriasis and skin changes. Referral to derm for formal consult.   4. High risk medications monitoring, labs every 12 weeks. Normal today.   5. Low bone density per DEXA 7-2018, worsening. Given high risk osteoporosis , I did refer her to endocrine for complete evaluation   6. Chronic pain under care of [facility] [place]. Xie. [place] [facility]     -Calcium and vitamin D. Complete physical due she wishes to come here   -RTC 2 mth me and 4 mth Dr. Samayoa    The patient understood the rationale for the diagnosis and treatment plan. Patient shared in the decision making. All questions were answered to best of my ability and the patient's satisfaction  Miguel Umana APRN, CNP, MSN  [facility]  Department of Rheumatology & Autoimmune Disorders    1. Immunization: Reviewed CDC guidelines with patient updated, information provided to  patient based on CDC guidelines for vaccination recommendations, patient will discuss with primary provider  2. Bone Health:Educated on adequate calcium and vitamin D intake, Educated on exercise, Glucocorticoid education discussed risks, benefits & potential long term side effects from use  3. Discussed routine annual physicals, cancer screening, cardiac risk monitoring, bone health and primary care with primary care provider.   4. Educated on diagnosis, prognosis, labs, imaging, treatment options (including risks, benefits, risk of no treatment), medications (use, dose, side-effects, risks of medications including infection/cancer/bone marrow suppression, lab monitoring), infections what to do, plan of cares, goals of treatment.         Miguel Umana, APRN CNP

## 2019-06-26 NOTE — PROGRESS NOTES
[facility] - Rheumatology Clinic Visit    Krissy Weldon  is a 63 year old female who presents with follow-up for undifferentiated polyarthropathy, psoriatic arthritis. Complicated by eye inflammation (uveitis and scleritis--sees [facility]), tendonitis, costrochondritis, tendon tears, synovitis, dactylitis, OA/DJD jessica thumbs C[facility], now toe contractures and laxity all hand/wrist joints, bowel changes (sister with crohns and dad with inflammatory bowel). Methotrexate since . Former nurse anesthestist    Copy forward: [-SSa, -SSb, -CCP,HLA-B27 negative on outside workup with previous SI injections] with hx- inflammatory eye left eye episcleritis requiring prednisone gtt or oral with bone scan unrevealing. Previous medrol or steroid responsiveness. Past tried humira, SSZ, simponi SQ/IV, remicade and otelza. Failed humira EoW recurrent episcleritis, right wrist, right SI, bilateral hamstrings tendonosis with partial tear bilaterally. Failed simponi sq/IV ineffective. SSZ and oral MTX. Past recurrent iritis (ER if eye pain, blurred vision, red eye). Remicade. Otezla. LLL pneumonia 3/2015. MRI L hip showed high grade tear gluteus minimus insertion site, effusion 4/2015. C/o neck issues with MRI cervical spine show DJD, EMG, paramedian osteophytes and disk protrusion at C3-C4 with some moderate central canal stenosis and moderate to severe right-sided foraminal stenosis as a consequence.  C5-C6 also showed some mild to moderate central canal stenosis and moderate bilateral central foraminal stenosis.  Seen Dr. Wheeler with significant improvement in GI issues pancreatic sphinctor dysfunction requiring surgery now on pancreatic enzymes after pancreatic duct is open. Failed certolizumab pegol (cimzia) lost effectiveness (more uveitis, arthritis, synovitis,bursitis and tendonitis, strept and CAP). Secukinumab (cosentyx) 2-2019 more laxity in joints hands/wrists, inflammatory eye, toe contractures,  changes in skin stopped then changed to etanercept (enbrel) 6- June 26, 2019  Continues on secukinumab (cosentyx) 150 mg injection every 30 days. Prior dramatic improved with loading dose then loss of effectiveness after 10 days. Feels like adalimumab (humira) worked better    Reports changes in skin (leathery) and other changes, seeing Dr. Conti tomorrow for formal evaluation  Reports more diarrhea and constipation with cramps, no blood in stools, on prednisone 5 mg day and seen eye provider dx inflammatory eye on prednisone gtts and other drops, more laxity of hand and wrist joints, fullness over the joints, the toes are now contracted and not able to straighten, the bilateral 3rd palm of hand tendon is tight hard to straigthen,  overall stiff in all joints and tendons, does not feel this is working, neck pain, bowels are irregular. No liver pain. Colonoscopy 2014 no inflammation. Methotrexate  25 mg injection every 7 days, folic acid  2 mg day except days before. Pain in feet. Seen podiatry at  Ortho told mild arthritis but this was more tendons and ligaments. Neck is more stiff and now as able to move, some kyphosis      Denies any fever, chills, SOB, SANCHEZ, night sweats, or chest pain, or cough. Reports healthy. Caring for partner full-time. The pain impacts her function. Right SI injection, and RFA L4-S1 injection for facet arthropathy at [facility] right leg radiculopathy. No loss of bowel or bladder, no arm weakness or pain. DEXA 7-2018 T-1.8 low bone density, worsening was referred to endo due to risk osteoporosis she will do this in the future.    PROBLEM LIST: Past medical history is notable for her being presumptively treated for Lyme with doxycycline after developing a targetoid lesion, for a history of Hashimoto's thyroiditis with subsequent hypothyroidism, for the diagnosis now of psoriasis, and for a history of depression.Neck pain, cervical stenosis-- MRI  +moderate central stenosis  right C3-4, C5-6 degenerative changes 2nd mild-mod central stenosis. Past referral to physiatry-wishes to return to Dr. Edmond Sawant [facility] Left hip pain s/p tear needs THR she reports. B) Hx-Bilateral tendonitis hamstrings with right bursitis, partial tear per Dr. Gomez s/p injections. Seen Dr. Arvin Xie at [facility] s/p ablation SI joint, tendon insertion site. Sees Dr. Maciel Dukes. C). Transient elevation LFT 2/2013 [ NL after held MTX and tramadol 2wk]; transient 4- [ AST 89]. NL . Other hx --Panceatic sphinctor dysfunction. On pancreatic enzymes. SBO 9-2015, now no doing well. Right thumb DJD, s/p surgery . Healed.  1. Diffuse degenerative joint disease that is both clinically apparent and obvious on multiple imaging modalities including bone scan, MRI 2/2013 or cervical. DJD L4-L5, L5-S1 per MRI 7/2012; MRI [id number]   2. Diffuse inflammatory arthritis with marked morning stiffness, no systemic inflammation by blood tests, but greater than 80% clinical improvement with a Medrol Dosepak.   3. Onset of the inflammatory joint symptoms including sacroiliitis with the development of psoriasis, suggesting that this represents psoriatic arthritis.   4. Development of episcleritis in the left eye with improvement with steroid eyedrops 12/14/2011 with recurrent episodes when wean oral prednisone (9/2012)  5. History of Hashimoto's thyroiditis.   6. History of presumptive treatment for Lyme disease with doxycycline after development of a targetoid lesion.   7. Poor tolerance of sulfasalazine and oral methotrexate.   8. Bilateral hamstring tendonosis, right partial tear, sacroilitis 7/2012. See MRI, repeat 2/2013 hamstring and right SI inflammation seeing Dr. Arvin Xie at [facility] specialized in SI. , left hamstring   9. DJD L4-L5, L5-S1 per MRI 7/2012  10. Intermittent prednisone exposure  11. Transient elevation LFT ALT 84/AST 58 2/2013 (nl after held MTX and  tramadol, then increased folic acid 2mg day)  12. Past LUE burn developed into cellulitis resolved from keflex. Bronchitis now on zithromax irritating her costrochondritis. Improving after 1 day  13. 1-2014 Left knee meniscal tear with chrondomalacia per MRI (had Rt/Lt CMJ surgery)  14. 4- RUQ pain.   15.  Hx recurrent left episcleritis   16. Right thumb tendon surgery with Dr. [facility]  17. Pneumonia 3-2015; strept  and 4-2017      Past Medical History:   Diagnosis Date     Acute meniscal tear of knee 1/2014    with chrondromalacia per MRI      Depression      DJD (degenerative joint disease), lumbar 7/2012    L4-5, L5-S1 per MRI      Episcleritis 12/14/2011     Hamstring tendonitis at origin 7/2012    Bilaterally with partial tear right, sacroilitis per MRI     Hypothyroid 9/7/2011     Hypothyroidism      PONV (postoperative nausea and vomiting)      Psoriatic arthritis (H) 9/7/2011     Psoriatic arthritis (H) 2/9/2016     Strep throat 04/2017       Surgical-She has a surgical history including appendectomy in 1980, cholecystectomy in approximately 1993, and hysterectomy without oophorectomy in 1996.  Ablation SI Right 4/25 and left Feb 26th 2017 -- pain clinic   PT for her hip--told by therapy could feel the bursitis. Told needs THR and injection of left hip Feb 10th 2017 -  Past Surgical History:   Procedure Laterality Date     APPENDECTOMY       ARTHROPLASTY CARPOMETACARPAL (THUMB JOINT)  11/8/2013    Procedure: ARTHROPLASTY CARPOMETACARPAL (THUMB JOINT);  Left First Carpometacarpal Trapezium Resection, Tendon Interposition  ;  Surgeon: Luiza Farrar MD;  [facility]     ARTHROPLASTY CARPOMETACARPAL (THUMB JOINT)  12/20/2013    Procedure: ARTHROPLASTY CARPOMETACARPAL (THUMB JOINT);  Right Thumb Trapezium Resection With Flexor Carpi Radialis Tendon Reconstruction       CHOLECYSTECTOMY       COLONOSCOPY  5/6/2014    Procedure: COLONOSCOPY;  Surgeon: Adria San MD;   Location:  GI     ENDOSCOPIC RETROGRADE CHOLANGIOPANCREATOGRAM  6/13/2014    Procedure: ENDOSCOPIC RETROGRADE CHOLANGIOPANCREATOGRAM;  Surgeon: Lianna Wheeler MD;  Location: [facility]     GALLBLADDER SURGERY       GYN SURGERY      hysterectomy and oophorectomy     [facility] ENDOSCOPY W EUS Left 6/10/2014    Procedure: COMBINED ENDOSCOPIC ULTRASOUND, ESOPHAGOSCOPY, GASTROSCOPY, DUODENOSCOPY (EGD);  Surgeon: Lianna Wheeler MD;  Location: [facility] GI     HYSTERECTOMY       Right thumb surgery  7/2015     XR SACROILIAC THERAPEUTIC INJECTION BILATERAL  12/2011       FH:  No autoimmune disorders, psoriasis, UC, crohn's, SLE, RA, PsA, gout, autoimmune thyroid.  No MS, heart disease or cancer in family  Mother-endometrial cancer, RHEUMATOID ARTHRITIS (RA)   Father-psoriasis, lymphoma, colon and prostate cancer, inflammatory bowel   Siblings-sister rheumatoid arthritis (RA) and OSTEOARTHRITIS  And crohns   Brother autoimmune process sees Dr. Ni   [facility] RHEUMATOID ARTHRITIS (RA)       SH:  No Alcohol. No Smoking. No IVDU. Partner on list for lung transplant     [facility] personally reviewed and updated by me.    ROS:  CONSTITUTIONAL: No fevers, night sweats or unintentional weight change. No acute distress, swollen glands  EYES: No vision change, diplopia, pain in eyes or red eyes   EARS, NOSE, MOUTH, THROAT: No tinnitus or hearing change, no epistaxis or nasal discharge, no oral lesions, throat clear. Normal saliva pool.  No drymouth. No thyroid Enlargement.   CARDIOVASCULAR: No chest pain, palpitations, or pain with walking, no orthopnea or PND   RESPIRATORY: No dyspnea, cough, shortness of breath or wheezing. No pleurisy.   GI: See above   : No change in urine, no dysuria or hematuria   MUSCKL: See above   INTEGUMENTARY: No concerning lesions or moles   NEURO: No loss of strength or sensation, no numbness or tingling, no tremor, no dizziness, no headache. No falls   ENDO: No polyuria or polydipsia, no temperature  intolerance   HEME/LYMPH:No concerning bumps, bleeding problems, or swollen lymph nodes. No recent infections, hospitalizations or new illnesses.   Otherwise 14 point ROS obtained, reviewed and found negative.     Physical exam:  Vitals: Blood pressure 108/71, pulse 76, temperature 97.7  F (36.5  C), weight 72.1 kg (159 lb), last menstrual period 03/06/2012, SpO2 98 %, not currently breastfeeding.  Wt Readings from Last 4 Encounters:   06/26/19 72.1 kg (159 lb)   04/10/19 72.6 kg (160 lb)   02/26/19 74.5 kg (164 lb 3.2 oz)   01/14/19 73 kg (160 lb 14.4 oz)     Constitutional: WD-WN-WG cooperative  Eyes: nl EOM, PERRLA, vision, conjunctiva, sclera, No Unilateral or bilateral external ear inflammation   ENT: nl external ears, nose, hearing, lips, teeth, gums, throat. No saddle nose   Neck: no mass or thyroid enlargement  Resp: lungs clear to auscultation, nl to palpation, nl effort  CV: RRR, no murmurs, rubs or gallops, no edema  GI: no ABD mass or tenderness, no HSM  : not tested  Lymph: no cervical or epitrochlear nodes  MS: pannus over MCPs, PIPs, severe laxity of all hand and wrist joints, contractures in toes, palpable MTPs posteriorly with high arches. Reduced ROM neck laterally and extension. Kyphosis. Right finger contracture. All TMJ, neck, shoulder, elbow, wrist, hand, spine, hip, knee, ankle, and foot joints were examined and otherwise found normal. Weak . Normal prayer sign. Negative Lhermitte's sign. No periuncle erythema  Skin: no nail pitting, alopecia, rash--defer as seeing derm tomorrow   Neuro: nl cranial nerves, see above   Psych: nl judgement, orientation, memory, affect.      Labs/Imaging:    Results for orders placed or performed in visit on 06/26/19   Erythrocyte sedimentation rate auto   Result Value Ref Range    Sed Rate 10 0 - 30 mm/h   CRP inflammation   Result Value Ref Range    CRP Inflammation <2.9 0.0 - 8.0 mg/L   Creatinine   Result Value Ref Range    Creatinine 0.78 0.52 - 1.04  mg/dL    GFR Estimate 81 >60 mL/min/[1.73_m2]    GFR Estimate If Black >90 >60 mL/min/[1.73_m2]   Albumin level   Result Value Ref Range    Albumin 3.8 3.4 - 5.0 g/dL   ALT   Result Value Ref Range    ALT 39 0 - 50 U/L   AST   Result Value Ref Range    AST 27 0 - 45 U/L   CBC with platelets differential   Result Value Ref Range    WBC 6.2 4.0 - 11.0 10e9/L    RBC Count 4.47 3.8 - 5.2 10e12/L    Hemoglobin 13.3 11.7 - 15.7 g/dL    Hematocrit 41.2 35.0 - 47.0 %    MCV 92 78 - 100 fl    MCH 29.8 26.5 - 33.0 pg    MCHC 32.3 31.5 - 36.5 g/dL    RDW 13.9 10.0 - 15.0 %    Platelet Count 232 150 - 450 10e9/L    Diff Method Automated Method     % Neutrophils 60.0 %    % Lymphocytes 30.3 %    % Monocytes 7.2 %    % Eosinophils 1.3 %    % Basophils 1.0 %    % Immature Granulocytes 0.2 %    Nucleated RBCs 0 0 /100    Absolute Neutrophil 3.7 1.6 - 8.3 10e9/L    Absolute Lymphocytes 1.9 0.8 - 5.3 10e9/L    Absolute Monocytes 0.5 0.0 - 1.3 10e9/L    Absolute Eosinophils 0.1 0.0 - 0.7 10e9/L    Absolute Basophils 0.1 0.0 - 0.2 10e9/L    Abs Immature Granulocytes 0.0 0 - 0.4 10e9/L    Absolute Nucleated RBC 0.0        Impression/Plan:    1.Psoriatic arthritis w/axial involvement activity with hx episcleritis/uveities, dactylitis, tendonitis, left hamstring tear. Worse now with secukinumab (cosentyx) and concern for worsening tendons, more bowel symptoms (+family history crohns and brother as well), (she reports new article about anesthesists and autoimmune disease article) eye inflammation, significant joint laxity, changes in toes now contractures, feet pain, more limited cervical ROM and changes in bowel/skin. We discussed changing this to etanercept (enbrel) 50 mg injection syringes every 7 days start 30 day after last injection, stop secukinumab (cosentyx) but if more infections or inflammatory eye then will go back to adalimumab (humira). The next will be to wean down and off the methotrexate and re-try sulfasalazine (this can  be more effective in tendonitis) if she can tolerate this, and now on lower methotrexate. Needs THR.  Aprimilast (otezla) contraindicated due to depression and does have axial disease so I would not favor this. She is not working and nor can she return to work due to her chronic pain, impairments.  I will do x-rays of neck to look for erosions, degenerative changes and AA instability. She is aware of the symptoms to look for. I gave her Mago W card as well. Lastly, her skin manifestatations are different then in ever in the past. I will ask dermatology for his input and if would favor bx or if this is psoriasis or if needs more labs or his recommendations etanercept (enbrel) vs other ustekinumab (Stelara) or other options. Abatacept (orencia) is now approved in psoriatic arthritis  So is tofacitinib (xeljantz) (this increase risk of shingles)  -High risk medications, labs normal will delete CRP/ESR as have been normal    2. Kecpniu-anbyuv-lv ophthalmologist.   No sx today   3. Psoriasis and skin changes. Referral to derm for formal consult.   4. High risk medications monitoring, labs every 12 weeks. Normal today.   5. Low bone density per DEXA 7-2018, worsening. Given high risk osteoporosis , I did refer her to endocrine for complete evaluation   6. Chronic pain under care of [facility] Dr. Xie. Spine per [facility]     -Calcium and vitamin D. Complete physical due she wishes to come here   -RTC 2 mth me and 4 mth Dr. Samayoa    The patient understood the rationale for the diagnosis and treatment plan. Patient shared in the decision making. All questions were answered to best of my ability and the patient's satisfaction  Miguel Umana APRN, CNP, MSN  [facility]  Department of Rheumatology & Autoimmune Disorders    1. Immunization: Reviewed CDC guidelines with patient updated, information provided to patient based on CDC guidelines for vaccination recommendations, patient will discuss with  primary provider  2. Bone Health:Educated on adequate calcium and vitamin D intake, Educated on exercise, Glucocorticoid education discussed risks, benefits & potential long term side effects from use  3. Discussed routine annual physicals, cancer screening, cardiac risk monitoring, bone health and primary care with primary care provider.   4. Educated on diagnosis, prognosis, labs, imaging, treatment options (including risks, benefits, risk of no treatment), medications (use, dose, side-effects, risks of medications including infection/cancer/bone marrow suppression, lab monitoring), infections what to do, plan of cares, goals of treatment.

## 2019-06-26 NOTE — LETTER
6/26/2019       RE: Krissy Weldon  [id number] [facility] [address]     Dear Colleague,    Thank you for referring your patient, Krissy Weldon, to the [facility] RHEUMATOLOGY at [facility]. Please see a copy of my visit note below.      [facility] - Rheumatology Clinic Visit    Krissy Weldon  is a 63 year old female who presents with follow-up for undifferentiated polyarthropathy, psoriatic arthritis. Complicated by eye inflammation (uveitis and scleritis--sees [facility]), tendonitis, costrochondritis, tendon tears, synovitis, dactylitis, OA/DJD jessica thumbs [facility], now toe contractures and laxity all hand/wrist joints, bowel changes (sister with crohns and dad with inflammatory bowel). Methotrexate since . Former nurse anesthestist    Copy forward: [-SSa, -SSb, -CCP,HLA-B27 negative on outside workup with previous SI injections] with hx- inflammatory eye left eye episcleritis requiring prednisone gtt or oral with bone scan unrevealing. Previous medrol or steroid responsiveness. Past tried humira, SSZ, simponi SQ/IV, remicade and otelza. Failed humira EoW recurrent episcleritis, right wrist, right SI, bilateral hamstrings tendonosis with partial tear bilaterally. Failed simponi sq/IV ineffective. SSZ and oral MTX. Past recurrent iritis (ER if eye pain, blurred vision, red eye). Remicade. Otezla. LLL pneumonia 3/2015. MRI L hip showed high grade tear gluteus minimus insertion site, effusion 4/2015. C/o neck issues with MRI cervical spine show DJD, EMG, paramedian osteophytes and disk protrusion at C3-C4 with some moderate central canal stenosis and moderate to severe right-sided foraminal stenosis as a consequence.  C5-C6 also showed some mild to moderate central canal stenosis and moderate bilateral central foraminal stenosis.  Seen Dr. Wheeler with significant improvement in GI issues pancreatic sphinctor dysfunction requiring surgery now on  pancreatic enzymes after pancreatic duct is open. Failed certolizumab pegol (cimzia) lost effectiveness (more uveitis, arthritis, synovitis,bursitis and tendonitis, strept and CAP). Secukinumab (cosentyx) 2-2019 more laxity in joints hands/wrists, inflammatory eye, toe contractures, changes in skin stopped then changed to etanercept (enbrel) 6- June 26, 2019  Continues on secukinumab (cosentyx) 150 mg injection every 30 days. Prior dramatic improved with loading dose then loss of effectiveness after 10 days. Feels like adalimumab (humira) worked better    Reports changes in skin (leathery) and other changes, seeing Dr. Conti tomorrow for formal evaluation  Reports more diarrhea and constipation with cramps, no blood in stools, on prednisone 5 mg day and seen eye provider dx inflammatory eye on prednisone gtts and other drops, more laxity of hand and wrist joints, fullness over the joints, the toes are now contracted and not able to straighten, the bilateral 3rd palm of hand tendon is tight hard to straigthen,  overall stiff in all joints and tendons, does not feel this is working, neck pain, bowels are irregular. No liver pain. Colonoscopy 2014 no inflammation. Methotrexate  25 mg injection every 7 days, folic acid  2 mg day except days before. Pain in feet. Seen podiatry at  Ortho told mild arthritis but this was more tendons and ligaments. Neck is more stiff and now as able to move, some kyphosis      Denies any fever, chills, SOB, SANCHEZ, night sweats, or chest pain, or cough. Reports healthy. Caring for partner full-time. The pain impacts her function. Right SI injection, and RFA L4-S1 injection for facet arthropathy at [facility] right leg radiculopathy. No loss of bowel or bladder, no arm weakness or pain. DEXA 7-2018 T-1.8 low bone density, worsening was referred to endo due to risk osteoporosis she will do this in the future.    PROBLEM LIST: Past medical history is notable for her being presumptively  treated for Lyme with doxycycline after developing a targetoid lesion, for a history of Hashimoto's thyroiditis with subsequent hypothyroidism, for the diagnosis now of psoriasis, and for a history of depression.Neck pain, cervical stenosis-- MRI  +moderate central stenosis right C3-4, C5-6 degenerative changes 2nd mild-mod central stenosis. Past referral to physiatry-wishes to return to Dr. Edmond [facility]ant [facility] Left hip pain s/p tear needs THR she reports. B) Hx-Bilateral tendonitis hamstrings with right bursitis, partial tear per Dr. Gomez s/p injections. Seen Dr. Arvin Xie at [facility] s/p ablation SI joint, tendon insertion site. Sees Dr. Maciel Dukes. C). Transient elevation LFT 2/2013 [ NL after held MTX and tramadol 2wk]; transient 4- [ AST 89]. NL . Other hx --Panceatic sphinctor dysfunction. On pancreatic enzymes. SBO 9-2015, now no doing well. Right thumb DJD, s/p surgery . Healed.  1. Diffuse degenerative joint disease that is both clinically apparent and obvious on multiple imaging modalities including bone scan, MRI 2/2013 or cervical. DJD L4-L5, L5-S1 per MRI 7/2012; MRI [id number]   2. Diffuse inflammatory arthritis with marked morning stiffness, no systemic inflammation by blood tests, but greater than 80% clinical improvement with a Medrol Dosepak.   3. Onset of the inflammatory joint symptoms including sacroiliitis with the development of psoriasis, suggesting that this represents psoriatic arthritis.   4. Development of episcleritis in the left eye with improvement with steroid eyedrops 12/14/2011 with recurrent episodes when wean oral prednisone (9/2012)  5. History of Hashimoto's thyroiditis.   6. History of presumptive treatment for Lyme disease with doxycycline after development of a targetoid lesion.   7. Poor tolerance of sulfasalazine and oral methotrexate.   8. Bilateral hamstring tendonosis, right partial tear, sacroilitis 7/2012. See MRI, repeat  2/2013 hamstring and right SI inflammation seeing [place] [place] X[place] [facility] [place]. , left hamstring   9. DJD L4-L5, L5-S1 per MRI 7/2012  10. Intermittent prednisone exposure  11. Transient elevation LFT ALT 84/AST 58 2/2013 (nl after held MTX and tramadol, then increased folic acid 2mg day)  12. Past LUE burn developed into cellulitis resolved from keflex. Bronchitis now on zithromax irritating her costrochondritis. Improving after 1 day  13. 1-2014 Left knee meniscal tear with chrondomalacia per MRI (had Rt/Lt CMJ surgery)  14. 4- RUQ pain.   15.  Hx recurrent left episcleritis   16. Right thumb tendon surgery with Dr. [facility]  17. Pneumonia 3-2015; strept  and 4-2017      Past Medical History:   Diagnosis Date     Acute meniscal tear of knee 1/2014    with chrondromalacia per MRI      Depression      DJD (degenerative joint disease), lumbar 7/2012    L4-5, L5-S1 per MRI      Episcleritis 12/14/2011     Hamstring tendonitis at origin 7/2012    Bilaterally with partial tear right, sacroilitis per MRI     Hypothyroid 9/7/2011     Hypothyroidism      PONV (postoperative nausea and vomiting)      Psoriatic arthritis (H) 9/7/2011     Psoriatic arthritis (H) 2/9/2016     Strep throat 04/2017       Surgical-She has a surgical history including appendectomy in 1980, cholecystectomy in approximately 1993, and hysterectomy without oophorectomy in 1996.  Ablation SI Right 4/25 and left Feb 26th 2017 -- pain clinic   PT for her hip--told by therapy could feel the bursitis. Told needs THR and injection of left hip Feb 10th 2017 -  Past Surgical History:   Procedure Laterality Date     APPENDECTOMY       ARTHROPLASTY CARPOMETACARPAL (THUMB JOINT)  11/8/2013    Procedure: ARTHROPLASTY CARPOMETACARPAL (THUMB JOINT);  Left First Carpometacarpal Trapezium Resection, Tendon Interposition  ;  Surgeon: Luiza Farrar MD;  [facility]     ARTHROPLASTY CARPOMETACARPAL  (THUMB JOINT)  12/20/2013    Procedure: ARTHROPLASTY CARPOMETACARPAL (THUMB JOINT);  Right Thumb Trapezium Resection With Flexor Carpi Radialis Tendon Reconstruction       CHOLECYSTECTOMY       COLONOSCOPY  5/6/2014    Procedure: COLONOSCOPY;  Surgeon: Adria San MD;  Location:  GI     ENDOSCOPIC RETROGRADE CHOLANGIOPANCREATOGRAM  6/13/2014    Procedure: ENDOSCOPIC RETROGRADE CHOLANGIOPANCREATOGRAM;  Surgeon: Lianna Wheeler MD;  Location: [facility] OR     GALLBLADDER SURGERY       GYN SURGERY      hysterectomy and oophorectomy     [facility] ENDOSCOPY W EUS Left 6/10/2014    Procedure: COMBINED ENDOSCOPIC ULTRASOUND, ESOPHAGOSCOPY, GASTROSCOPY, DUODENOSCOPY (EGD);  Surgeon: Lianna Wheeler MD;  Location: [facility] GI     HYSTERECTOMY       Right thumb surgery  7/2015     XR SACROILIAC THERAPEUTIC INJECTION BILATERAL  12/2011       FH:  No autoimmune disorders, psoriasis, UC, crohn's, SLE, RA, PsA, gout, autoimmune thyroid.  No MS, heart disease or cancer in family  Mother-endometrial cancer, RHEUMATOID ARTHRITIS (RA)   Father-psoriasis, lymphoma, colon and prostate cancer, inflammatory bowel   Siblings-sister rheumatoid arthritis (RA) and OSTEOARTHRITIS  And crohns   Brother autoimmune process sees Dr. Ni   [facility] [place]HEUM[facility]RIT[facility] (RA)       SH:  No Alcohol. No Smoking. No IVDU. Partner on list for lung transplant     [facility] personally reviewed and updated by me.    ROS:  CONSTITUTIONAL: No fevers, night sweats or unintentional weight change. No acute distress, swollen glands  EYES: No vision change, diplopia, pain in eyes or red eyes   EARS, NOSE, MOUTH, THROAT: No tinnitus or hearing change, no epistaxis or nasal discharge, no oral lesions, throat clear. Normal saliva pool.  No drymouth. No thyroid Enlargement.   CARDIOVASCULAR: No chest pain, palpitations, or pain with walking, no orthopnea or PND   RESPIRATORY: No dyspnea, cough, shortness of breath or wheezing. No pleurisy.   GI: See above   : No  change in urine, no dysuria or hematuria   MUSCKL: See above   INTEGUMENTARY: No concerning lesions or moles   NEURO: No loss of strength or sensation, no numbness or tingling, no tremor, no dizziness, no headache. No falls   ENDO: No polyuria or polydipsia, no temperature intolerance   HEME/LYMPH:No concerning bumps, bleeding problems, or swollen lymph nodes. No recent infections, hospitalizations or new illnesses.   Otherwise 14 point ROS obtained, reviewed and found negative.     Physical exam:  Vitals: Blood pressure 108/71, pulse 76, temperature 97.7  F (36.5  C), weight 72.1 kg (159 lb), last menstrual period 03/06/2012, SpO2 98 %, not currently breastfeeding.  Wt Readings from Last 4 Encounters:   06/26/19 72.1 kg (159 lb)   04/10/19 72.6 kg (160 lb)   02/26/19 74.5 kg (164 lb 3.2 oz)   01/14/19 73 kg (160 lb 14.4 oz)     Constitutional: WD-WN-WG cooperative  Eyes: nl EOM, PERRLA, vision, conjunctiva, sclera, No Unilateral or bilateral external ear inflammation   ENT: nl external ears, nose, hearing, lips, teeth, gums, throat. No saddle nose   Neck: no mass or thyroid enlargement  Resp: lungs clear to auscultation, nl to palpation, nl effort  CV: RRR, no murmurs, rubs or gallops, no edema  GI: no ABD mass or tenderness, no HSM  : not tested  Lymph: no cervical or epitrochlear nodes  MS: pannus over MCPs, PIPs, severe laxity of all hand and wrist joints, contractures in toes, palpable MTPs posteriorly with high arches. Reduced ROM neck laterally and extension. Kyphosis. Right finger contracture. All TMJ, neck, shoulder, elbow, wrist, hand, spine, hip, knee, ankle, and foot joints were examined and otherwise found normal. Weak . Normal prayer sign. Negative Lhermitte's sign. No periuncle erythema  Skin: no nail pitting, alopecia, rash--defer as seeing derm tomorrow   Neuro: nl cranial nerves, see above   Psych: nl judgement, orientation, memory, affect.      Labs/Imaging:    Results for orders placed or  performed in visit on 06/26/19   Erythrocyte sedimentation rate auto   Result Value Ref Range    Sed Rate 10 0 - 30 mm/h   CRP inflammation   Result Value Ref Range    CRP Inflammation <2.9 0.0 - 8.0 mg/L   Creatinine   Result Value Ref Range    Creatinine 0.78 0.52 - 1.04 mg/dL    GFR Estimate 81 >60 mL/min/[1.73_m2]    GFR Estimate If Black >90 >60 mL/min/[1.73_m2]   Albumin level   Result Value Ref Range    Albumin 3.8 3.4 - 5.0 g/dL   ALT   Result Value Ref Range    ALT 39 0 - 50 U/L   AST   Result Value Ref Range    AST 27 0 - 45 U/L   CBC with platelets differential   Result Value Ref Range    WBC 6.2 4.0 - 11.0 10e9/L    RBC Count 4.47 3.8 - 5.2 10e12/L    Hemoglobin 13.3 11.7 - 15.7 g/dL    Hematocrit 41.2 35.0 - 47.0 %    MCV 92 78 - 100 fl    MCH 29.8 26.5 - 33.0 pg    MCHC 32.3 31.5 - 36.5 g/dL    RDW 13.9 10.0 - 15.0 %    Platelet Count 232 150 - 450 10e9/L    Diff Method Automated Method     % Neutrophils 60.0 %    % Lymphocytes 30.3 %    % Monocytes 7.2 %    % Eosinophils 1.3 %    % Basophils 1.0 %    % Immature Granulocytes 0.2 %    Nucleated RBCs 0 0 /100    Absolute Neutrophil 3.7 1.6 - 8.3 10e9/L    Absolute Lymphocytes 1.9 0.8 - 5.3 10e9/L    Absolute Monocytes 0.5 0.0 - 1.3 10e9/L    Absolute Eosinophils 0.1 0.0 - 0.7 10e9/L    Absolute Basophils 0.1 0.0 - 0.2 10e9/L    Abs Immature Granulocytes 0.0 0 - 0.4 10e9/L    Absolute Nucleated RBC 0.0        Impression/Plan:    1.Psoriatic arthritis w/axial involvement activity with hx episcleritis/uveities, dactylitis, tendonitis, left hamstring tear. Worse now with secukinumab (cosentyx) and concern for worsening tendons, more bowel symptoms (+family history crohns and brother as well), (she reports new article about anesthesists and autoimmune disease article) eye inflammation, significant joint laxity, changes in toes now contractures, feet pain, more limited cervical ROM and changes in bowel/skin. We discussed changing this to etanercept (enbrel) 50  mg injection syringes every 7 days start 30 day after last injection, stop secukinumab (cosentyx) but if more infections or inflammatory eye then will go back to adalimumab (humira). The next will be to wean down and off the methotrexate and re-try sulfasalazine (this can be more effective in tendonitis) if she can tolerate this, and now on lower methotrexate. Needs THR.  Aprimilast (otezla) contraindicated due to depression and does have axial disease so I would not favor this. She is not working and nor can she return to work due to her chronic pain, impairments.  I will do x-rays of neck to look for erosions, degenerative changes and AA instability. She is aware of the symptoms to look for. I gave her Mago W card as well. Lastly, her skin manifestatations are different then in ever in the past. I will ask dermatology for his input and if would favor bx or if this is psoriasis or if needs more labs or his recommendations etanercept (enbrel) vs other ustekinumab (Stelara) or other options. Abatacept (orencia) is now approved in psoriatic arthritis  So is tofacitinib (xeljantz) (this increase risk of shingles)  -High risk medications, labs normal will delete CRP/ESR as have been normal    2. Wwenpof-mfgedw-ld ophthalmologist.   No sx today   3. Psoriasis and skin changes. Referral to derm for formal consult.   4. High risk medications monitoring, labs every 12 weeks. Normal today.   5. Low bone density per DEXA 7-2018, worsening. Given high risk osteoporosis , I did refer her to endocrine for complete evaluation   6. Chronic pain under care of [facility] Dr. Xie. Spine per [facility]     -Calcium and vitamin D. Complete physical due she wishes to come here   -RTC 2 mth me and 4 mth Dr. Samayoa    The patient understood the rationale for the diagnosis and treatment plan. Patient shared in the decision making. All questions were answered to best of my ability and the patient's satisfaction  Miguel CABRAL, CNP,  MSN  [facility]  Department of Rheumatology & Autoimmune Disorders    1. Immunization: Reviewed CDC guidelines with patient updated, information provided to patient based on CDC guidelines for vaccination recommendations, patient will discuss with primary provider  2. Bone Health:Educated on adequate calcium and vitamin D intake, Educated on exercise, Glucocorticoid education discussed risks, benefits & potential long term side effects from use  3. Discussed routine annual physicals, cancer screening, cardiac risk monitoring, bone health and primary care with primary care provider.   4. Educated on diagnosis, prognosis, labs, imaging, treatment options (including risks, benefits, risk of no treatment), medications (use, dose, side-effects, risks of medications including infection/cancer/bone marrow suppression, lab monitoring), infections what to do, plan of cares, goals of treatment.         Again, thank you for allowing me to participate in the care of your patient.      Sincerely,    MARIANNA Santos CNP

## 2019-06-27 ENCOUNTER — OFFICE VISIT (OUTPATIENT)
Dept: DERMATOLOGY | Facility: CLINIC | Age: 64
End: 2019-06-27
Attending: NURSE PRACTITIONER
Payer: COMMERCIAL

## 2019-06-27 VITALS
BODY MASS INDEX: 25.54 KG/M2 | OXYGEN SATURATION: 99 % | TEMPERATURE: 97.6 F | HEIGHT: 66 IN | SYSTOLIC BLOOD PRESSURE: 127 MMHG | DIASTOLIC BLOOD PRESSURE: 79 MMHG | HEART RATE: 62 BPM | WEIGHT: 158.9 LBS

## 2019-06-27 DIAGNOSIS — L40.50 PSORIATIC ARTHRITIS (H): ICD-10-CM

## 2019-06-27 DIAGNOSIS — M06.4 INFLAMMATORY POLYARTHROPATHY (H): ICD-10-CM

## 2019-06-27 DIAGNOSIS — L40.9 PSORIASIS: ICD-10-CM

## 2019-06-27 DIAGNOSIS — L57.0 ACTINIC KERATOSIS: ICD-10-CM

## 2019-06-27 DIAGNOSIS — Z79.899 ENCOUNTER FOR LONG-TERM (CURRENT) USE OF HIGH-RISK MEDICATION: Primary | ICD-10-CM

## 2019-06-27 LAB
GAMMA INTERFERON BACKGROUND BLD IA-ACNC: 0.06 IU/ML
M TB IFN-G BLD-IMP: NEGATIVE
M TB IFN-G CD4+ BCKGRND COR BLD-ACNC: >10 IU/ML
MITOGEN IGNF BCKGRD COR BLD-ACNC: 0 IU/ML
MITOGEN IGNF BCKGRD COR BLD-ACNC: 0 IU/ML

## 2019-06-27 PROCEDURE — G0463 HOSPITAL OUTPT CLINIC VISIT: HCPCS | Mod: ZF

## 2019-06-27 PROCEDURE — 17000 DESTRUCT PREMALG LESION: CPT | Mod: ZF | Performed by: DERMATOLOGY

## 2019-06-27 PROCEDURE — 17003 DESTRUCT PREMALG LES 2-14: CPT | Mod: ZF | Performed by: DERMATOLOGY

## 2019-06-27 RX ORDER — AMMONIUM LACTATE 12 G/100G
LOTION TOPICAL 2 TIMES DAILY
Qty: 225 G | Refills: 3 | Status: SHIPPED | OUTPATIENT
Start: 2019-06-27 | End: 2023-05-09

## 2019-06-27 ASSESSMENT — PAIN SCALES - GENERAL: PAINLEVEL: SEVERE PAIN (6)

## 2019-06-27 ASSESSMENT — MIFFLIN-ST. JEOR: SCORE: 1284.58

## 2019-06-27 NOTE — PROGRESS NOTES
[facility]  Dermatology-Rheumatology Clinic  Salvador Conti MD  2019     Name: Krissy Weldon  MRN: [id number]  Age: 64 year old  : 1955  Referring provider: Miguel Umana    Dermatology Problem List:   1. Psoriatic Arthritis  2. Psoriasis  3. Actinic keratoses   SH:  Former nurse anesthestist    Encounter Date: 2019       HPI:   Krissy Weldon  is a 63 year old female who presents with follow-up for undifferentiated polyarthropathy, psoriatic arthritis, hx of psoriasis, uveitis and scleritis (sees [facility]), tendonitis, costrochondritis, tendon tears, synovitis, dactylitis, OA/DJD jessica thumbs [facility], charlie-term Methotrexate (use since ). Here for second opinion regarding psoriatic arthritis.  She reports Remicade helped with chest and feet pain. She had waning efficacy at only 2 to 3 weeks of relief. Remicade was stopped. Humira was stopped previously as well for waning efficacy. Consentyx was stopped since it was not effective. She is continued on 20mg of methotrexate weekly. She report constant slight pain with walking particularly on the plantar area of the feet and around the achilles insterion sites b/l.    Patient Background History (obtained from chart)   [-SSa, -SSb, -CCP,HLA-B27 negative on outside workup with previous SI injections] with hx- inflammatory eye left eye episcleritis requiring prednisone gtt or oral with bone scan unrevealing. Previous medrol or steroid responsiveness. Past tried humira, SSZ, simponi SQ/IV, remicade and otelza. Failed humira EoW recurrent episcleritis, right wrist, right SI, bilateral hamstrings tendonosis with partial tear bilaterally. Failed simponi sq/IV ineffective. SSZ and oral MTX. Past recurrent iritis (ER if eye pain, blurred vision, red eye). Remicade. Otezla. LLL pneumonia 3/2015. MRI L hip showed high grade tear gluteus minimus insertion site, effusion 2015. C/o neck issues with MRI cervical spine show DJD, EMG, paramedian  "osteophytes and disk protrusion at C3-C4 with some moderate central canal stenosis and moderate to severe right-sided foraminal stenosis as a consequence.  C5-C6 also showed some mild to moderate central canal stenosis and moderate bilateral central foraminal stenosis.  Seen Dr. Wheeler with significant improvement in GI issues pancreatic sphinctor dysfunction requiring surgery now on pancreatic enzymes after pancreatic duct is open. Failed certolizumab pegol (cimzia) lost effectiveness (more uveitis, arthritis, synovitis,bursitis and tendonitis, strept and CAP). Secukinumab (cosentyx) 2-2019 more laxity in joints hands/wrists, inflammatory eye, toe contractures, changes in skin stopped then changed to etanercept (enbrel) 6-    Review of Systems:   Pertinent items are noted in HPI or as below, remainder of complete ROS is negative.      Active Medications:   Current Outpatient Medications:      B-D SYRINGE LUER-FILI 1 ML MISC, USE TO INJECT METHOTREXATE EVERY 7 DAYS, Disp: 25 each, Rfl: 0     BD DISP NEEDLES 25G X 5/8\" MISC, USE WITH METHOTREXATE UNDER THE SKIN EVERY 7 DAYS, Disp: 25 each, Rfl: 0     etanercept (ENBREL) 50 MG/ML injection, Inject 0.98 mLs (50 mg) Subcutaneous once a week Hold for signs of infection, and seek medical attention., Disp: 4 Syringe, Rfl: 5     FLUoxetine (PROZAC) 40 MG capsule, Take 40 mg by mouth daily., Disp: , Rfl:      folic acid (FOLVITE) 1 MG tablet, Take 3 tablet a day few days before and day after methotrexate then the other day 2 mg day, Disp: 210 tablet, Rfl: 3     levothyroxine (SYNTHROID) 100 MCG tablet, Take  by mouth daily., Disp: , Rfl:      methotrexate sodium, pres-free, 50 MG/2ML SOLN injection CHEMO, Inject 1 mL (25 mg) Subcutaneous every 7 days *DISCARD REMAINDER** MONITORING LABS DUE EVERY 8 WEEKS, Disp: 8 mL, Rfl: 2     multivitamin, therapeutic (THERA-VIT) TABS, Take 1 tablet by mouth daily, Disp: , Rfl:      predniSONE (DELTASONE) 5 MG tablet, Take 10 mg once " a day for 3 weeks then go to 5 mg day until seen., Disp: 60 tablet, Rfl: 2     sennosides (SENOKOT) 8.6 MG tablet, Take 1 tablet by mouth 2 times daily, Disp: 1 tablet, Rfl: 0     Syringe Luer Slip (B-D SYRINGE LUER-FILI) 3 ML MISC, 1 Units by Device route every 7 days, Disp: 25 each, Rfl: 0     vitamin D3 (VITAMIN D3) 1000 units (25 mcg) tablet, Take 2 tablets (2,000 Units) by mouth daily, Disp: 180 tablet, Rfl: 3     cefdinir (OMNICEF) 300 MG capsule, Take 1 capsule (300 mg) by mouth 2 times daily (Patient not taking: Reported on 4/10/2019), Disp: 20 capsule, Rfl: 0      Allergies:   Review of patient's allergies indicates no known allergies.        Past Medical History:  Past medical history is notable for her being presumptively treated for Lyme with doxycycline after developing a targetoid lesion, for a history of Hashimoto's thyroiditis with subsequent hypothyroidism, for the diagnosis now of psoriasis, and for a history of depression.Neck pain, cervical stenosis-- MRI  +moderate central stenosis right C3-4, C5-6 degenerative changes 2nd mild-mod central stenosis. Past referral to physiatry-wishes to return to Dr. Edmond [facility] Left hip pain s/p tear needs THR she reports. B) Hx-Bilateral tendonitis hamstrings with right bursitis, partial tear per Dr. Gomez s/p injections. Seen Dr. Arvin Xie at [facility] [place] s/p ablation SI joint, tendon insertion site. Sees Dr. Maciel Dukes. C). Transient elevation LFT 2/2013 [ NL after held MTX and tramadol 2wk]; transient 4- [ AST 89]. NL . Other hx --Panceatic sphinctor dysfunction. On pancreatic enzymes. SBO 9-2015, now no doing well. Right thumb DJD, s/p surgery . Healed.  1. Diffuse degenerative joint disease that is both clinically apparent and obvious on multiple imaging modalities including bone scan, MRI 2/2013 or cervical. DJD L4-L5, L5-S1 per MRI 7/2012; MRI [id number]   2. Diffuse inflammatory arthritis with marked  morning stiffness, no systemic inflammation by blood tests, but greater than 80% clinical improvement with a Medrol Dosepak.   3. Onset of the inflammatory joint symptoms including sacroiliitis with the development of psoriasis, suggesting that this represents psoriatic arthritis.   4. Development of episcleritis in the left eye with improvement with steroid eyedrops 12/14/2011 with recurrent episodes when wean oral prednisone (9/2012)  5. History of Hashimoto's thyroiditis.   6. History of presumptive treatment for Lyme disease with doxycycline after development of a targetoid lesion.   7. Poor tolerance of sulfasalazine and oral methotrexate.   8. Bilateral hamstring tendonosis, right partial tear, sacroilitis 7/2012. See MRI, repeat 2/2013 hamstring and right SI inflammation seeing Dr. Arvin Xie at [facility] specialized in SI. , left hamstring   9. DJD L4-L5, L5-S1 per MRI 7/2012  10. Intermittent prednisone exposure  11. Transient elevation LFT ALT 84/AST 58 2/2013 (nl after held MTX and tramadol, then increased folic acid 2mg day)  12. Past LUE burn developed into cellulitis resolved from keflex. Bronchitis now on zithromax irritating her costrochondritis. Improving after 1 day  13. 1-2014 Left knee meniscal tear with chrondomalacia per MRI (had Rt/Lt CMJ surgery)  14. 4- RUQ pain.   15.  Hx recurrent left episcleritis   16. Right thumb tendon surgery with Dr. [facility]  17. Pneumonia 3-2015; strept  and 4-2017        Past Surgical History:  Surgical-She has a surgical history including appendectomy in 1980, cholecystectomy in approximately 1993, and hysterectomy without oophorectomy in 1996.  Ablation SI Right 4/25 and left Feb 26th 2017 -- pain clinic   PT for her hip--told by therapy could feel the bursitis. Told needs THR and injection of left hip Feb 10th 2017 -    Family History:   No autoimmune disorders, psoriasis, UC, crohn's, SLE, RA, PsA, gout, autoimmune thyroid.  " No MS, heart disease or cancer in family  Mother-endometrial cancer, RHEUMATOID ARTHRITIS (RA)   Father-psoriasis, lymphoma, colon and prostate cancer, inflammatory bowel   Siblings-sister rheumatoid arthritis (RA) and OSTEOARTHRITIS  And crohns   Brother autoimmune process sees Dr. Ni   [facility] RHEUMATOID ARTHRITIS (RA)       Social History:   No Alcohol. No Smoking. No IVDU. Partner on list for lung transplant      Physical Exam:   /79   Pulse 62   Temp 97.6  F (36.4  C) (Oral)   Ht 1.664 m (5' 5.5\")   Wt 72.1 kg (158 lb 14.4 oz)   LMP 03/06/2012   SpO2 99%   BMI 26.04 kg/m     General: Well-appearing female in no distress. Alert and oriented.   Skin: Focused exam of the face, legs, feet, arms, hands back, and chest.was performed.   Findings:   - Gritty pink papule on the right malar cheek.   - Abdomen and chest are clear.   - Xerosis of the legs.   - Gritty pink papule on right anterior shin.   - Light brown macule on right dorsal arm, homogenous.     MSK:   - Tenderness between the MCPs, and tenderness at the PIPs. Knuckle pad at the dorsal left PIP.   - Tenderness to palpation of b/l plantar feet    Laboratory:   Orders Only on 06/26/2019   Component Date Value Ref Range Status     Sed Rate 06/26/2019 10  0 - 30 mm/h Final     CRP Inflammation 06/26/2019 <2.9  0.0 - 8.0 mg/L Final     Creatinine 06/26/2019 0.78  0.52 - 1.04 mg/dL Final     GFR Estimate 06/26/2019 81  >60 mL/min/[1.73_m2] Final    Comment: Non  GFR Calc  Starting 12/18/2018, serum creatinine based estimated GFR (eGFR) will be   calculated using the Chronic Kidney Disease Epidemiology Collaboration   (CKD-EPI) equation.       GFR Estimate If Black 06/26/2019 >90  >60 mL/min/[1.73_m2] Final    Comment:  GFR Calc  Starting 12/18/2018, serum creatinine based estimated GFR (eGFR) will be   calculated using the Chronic Kidney Disease Epidemiology Collaboration   (CKD-EPI) equation.       Albumin 06/26/2019 " 3.8  3.4 - 5.0 g/dL Final     ALT 06/26/2019 39  0 - 50 U/L Final     AST 06/26/2019 27  0 - 45 U/L Final     WBC 06/26/2019 6.2  4.0 - 11.0 10e9/L Final     RBC Count 06/26/2019 4.47  3.8 - 5.2 10e12/L Final     Hemoglobin 06/26/2019 13.3  11.7 - 15.7 g/dL Final     Hematocrit 06/26/2019 41.2  35.0 - 47.0 % Final     MCV 06/26/2019 92  78 - 100 fl Final     MCH 06/26/2019 29.8  26.5 - 33.0 pg Final     MCHC 06/26/2019 32.3  31.5 - 36.5 g/dL Final     RDW 06/26/2019 13.9  10.0 - 15.0 % Final     Platelet Count 06/26/2019 232  150 - 450 10e9/L Final     Diff Method 06/26/2019 Automated Method   Final     % Neutrophils 06/26/2019 60.0  % Final     % Lymphocytes 06/26/2019 30.3  % Final     % Monocytes 06/26/2019 7.2  % Final     % Eosinophils 06/26/2019 1.3  % Final     % Basophils 06/26/2019 1.0  % Final     % Immature Granulocytes 06/26/2019 0.2  % Final     Nucleated RBCs 06/26/2019 0  0 /100 Final     Absolute Neutrophil 06/26/2019 3.7  1.6 - 8.3 10e9/L Final     Absolute Lymphocytes 06/26/2019 1.9  0.8 - 5.3 10e9/L Final     Absolute Monocytes 06/26/2019 0.5  0.0 - 1.3 10e9/L Final     Absolute Eosinophils 06/26/2019 0.1  0.0 - 0.7 10e9/L Final     Absolute Basophils 06/26/2019 0.1  0.0 - 0.2 10e9/L Final     Abs Immature Granulocytes 06/26/2019 0.0  0 - 0.4 10e9/L Final     Absolute Nucleated RBC 06/26/2019 0.0   Final     Imaging:   XR Cervical Spine (06/26/2019);  Impression:  1. Multilevel degenerative change in the cervical spine, greatest at C5-6.  2. No evidence of dynamic instability on flexion-extension views.  Marly Kaba, DO     Assessment and Plan:   # Psoriatic arthritis/Psoriasis  We had a good discussion about her options including:  Infliximab 5mg q4 weeks, weekly Humira, IL23 inhibitors (limited data), or Toficitinab. I will discuss this with Miguel Umana.  - We also discussed fibroscan to screen for liver fibrosis with long-term methotrexate use. This was ordered for her.     # Xerosis   We  discussed the use of ammonium lactate lotion for her dry skin.   - ammonium lactate (LAC-HYDRIN) 12 % external lotion  Dispense: 225 g; Refill: 3     # Actinic keratosis  - Two AKs treated with LNx1 at above noted sites.    Follow-up: Return in about 1 year (around 6/27/2020), or Follow-up.     Scribe Disclosure:  I, Heather Spivey, am serving as a scribe to document services personally performed by Salvador Conti MD at this visit, based upon the provider's statements to me. All documentation has been reviewed by the aforementioned provider prior to being entered into the official medical record.    Provider Disclosure:   The documentation recorded by the scribe accurately reflects the services I personally performed and the decisions made by me.    Salvador Conti MD, FAAD    Departments of Internal Medicine and Dermatology  [facility]  [phone]

## 2019-06-27 NOTE — NURSING NOTE
"Chief Complaint   Patient presents with     Consult     psoriasis     /79   Pulse 62   Temp 97.6  F (36.4  C) (Oral)   Ht 1.664 m (5' 5.5\")   Wt 72.1 kg (158 lb 14.4 oz)   LMP 03/06/2012   SpO2 99%   BMI 26.04 kg/m    Deisy Field [facility]  6/27/2019 10:42 AM      "

## 2019-06-27 NOTE — LETTER
2019      RE: Krissy Weldon  [address] [facility] [address]       [facility]  Dermatology-Rheumatology Clinic  Salvador Conti MD  2019     Name: Krissy Weldon  MRN: [id number]  Age: 64 year old  : 1955  Referring provider: Miguel Umana    Dermatology Problem List:   1. Psoriatic Arthritis  2. Psoriasis  3. Actinic keratoses   SH:  Former nurse anesthestist    Encounter Date: 2019       HPI:   Krissy Weldon  is a 63 year old female who presents with follow-up for undifferentiated polyarthropathy, psoriatic arthritis, hx of psoriasis, uveitis and scleritis (sees [facility]), tendonitis, costrochondritis, tendon tears, synovitis, dactylitis, OA/DJD jessica thumbs [facility], charlie-term Methotrexate (use since ). Here for second opinion regarding psoriatic arthritis.  She reports Remicade helped with chest and feet pain. She had waning efficacy at only 2 to 3 weeks of relief. Remicade was stopped. Humira was stopped previously as well for waning efficacy. Consentyx was stopped since it was not effective. She is continued on 20mg of methotrexate weekly. She report constant slight pain with walking particularly on the plantar area of the feet and around the achilles insterion sites b/l.    Patient Background History (obtained from chart)   [-SSa, -SSb, -CCP,HLA-B27 negative on outside workup with previous SI injections] with hx- inflammatory eye left eye episcleritis requiring prednisone gtt or oral with bone scan unrevealing. Previous medrol or steroid responsiveness. Past tried humira, SSZ, simponi SQ/IV, remicade and otelza. Failed humira EoW recurrent episcleritis, right wrist, right SI, bilateral hamstrings tendonosis with partial tear bilaterally. Failed simponi sq/IV ineffective. SSZ and oral MTX. Past recurrent iritis (ER if eye pain, blurred vision, red eye). Remicade. Otezla. LLL pneumonia 3/2015. MRI L hip showed high grade tear gluteus minimus insertion site, effusion  "4/2015. C/o neck issues with MRI cervical spine show DJD, EMG, paramedian osteophytes and disk protrusion at C3-C4 with some moderate central canal stenosis and moderate to severe right-sided foraminal stenosis as a consequence.  C5-C6 also showed some mild to moderate central canal stenosis and moderate bilateral central foraminal stenosis.  Seen Dr. Wheeler with significant improvement in GI issues pancreatic sphinctor dysfunction requiring surgery now on pancreatic enzymes after pancreatic duct is open. Failed certolizumab pegol (cimzia) lost effectiveness (more uveitis, arthritis, synovitis,bursitis and tendonitis, strept and CAP). Secukinumab (cosentyx) 2-2019 more laxity in joints hands/wrists, inflammatory eye, toe contractures, changes in skin stopped then changed to etanercept (enbrel) 6-    Review of Systems:   Pertinent items are noted in HPI or as below, remainder of complete ROS is negative.      Active Medications:   Current Outpatient Medications:      B-D SYRINGE LUER-FILI 1 ML MISC, USE TO INJECT METHOTREXATE EVERY 7 DAYS, Disp: 25 each, Rfl: 0     BD DISP NEEDLES 25G X 5/8\" MISC, USE WITH METHOTREXATE UNDER THE SKIN EVERY 7 DAYS, Disp: 25 each, Rfl: 0     etanercept (ENBREL) 50 MG/ML injection, Inject 0.98 mLs (50 mg) Subcutaneous once a week Hold for signs of infection, and seek medical attention., Disp: 4 Syringe, Rfl: 5     FLUoxetine (PROZAC) 40 MG capsule, Take 40 mg by mouth daily., Disp: , Rfl:      folic acid (FOLVITE) 1 MG tablet, Take 3 tablet a day few days before and day after methotrexate then the other day 2 mg day, Disp: 210 tablet, Rfl: 3     levothyroxine (SYNTHROID) 100 MCG tablet, Take  by mouth daily., Disp: , Rfl:      methotrexate sodium, pres-free, 50 MG/2ML SOLN injection CHEMO, Inject 1 mL (25 mg) Subcutaneous every 7 days *DISCARD REMAINDER** MONITORING LABS DUE EVERY 8 WEEKS, Disp: 8 mL, Rfl: 2     multivitamin, therapeutic (THERA-VIT) TABS, Take 1 tablet by mouth " daily, Disp: , Rfl:      predniSONE (DELTASONE) 5 MG tablet, Take 10 mg once a day for 3 weeks then go to 5 mg day until seen., Disp: 60 tablet, Rfl: 2     sennosides (SENOKOT) 8.6 MG tablet, Take 1 tablet by mouth 2 times daily, Disp: 1 tablet, Rfl: 0     Syringe Luer Slip (B-D SYRINGE LUER-FILI) 3 ML MISC, 1 Units by Device route every 7 days, Disp: 25 each, Rfl: 0     vitamin D3 (VITAMIN D3) 1000 units (25 mcg) tablet, Take 2 tablets (2,000 Units) by mouth daily, Disp: 180 tablet, Rfl: 3     cefdinir (OMNICEF) 300 MG capsule, Take 1 capsule (300 mg) by mouth 2 times daily (Patient not taking: Reported on 4/10/2019), Disp: 20 capsule, Rfl: 0      Allergies:   Review of patient's allergies indicates no known allergies.        Past Medical History:  Past medical history is notable for her being presumptively treated for Lyme with doxycycline after developing a targetoid lesion, for a history of Hashimoto's thyroiditis with subsequent hypothyroidism, for the diagnosis now of psoriasis, and for a history of depression.Neck pain, cervical stenosis-- MRI  +moderate central stenosis right C3-4, C5-6 degenerative changes 2nd mild-mod central stenosis. Past referral to physiatry-wishes to return to Dr. Edmond [facility] Left hip pain s/p tear needs THR she reports. B) Hx-Bilateral tendonitis hamstrings with right bursitis, partial tear per Dr. Gomez s/p injections. Seen Dr. Arvin Xie at [facility] s/p ablation SI joint, tendon insertion site. Sees Dr. Maciel Dukes. C). Transient elevation LFT 2/2013 [ NL after held MTX and tramadol 2wk]; transient 4- [ AST 89]. NL . Other hx --Panceatic sphinctor dysfunction. On pancreatic enzymes. SBO 9-2015, now no doing well. Right thumb DJD, s/p surgery . Healed.  1. Diffuse degenerative joint disease that is both clinically apparent and obvious on multiple imaging modalities including bone scan, MRI 2/2013 or cervical. DJD L4-L5, L5-S1 per  MRI 7/2012; MRI [id number]   2. Diffuse inflammatory arthritis with marked morning stiffness, no systemic inflammation by blood tests, but greater than 80% clinical improvement with a Medrol Dosepak.   3. Onset of the inflammatory joint symptoms including sacroiliitis with the development of psoriasis, suggesting that this represents psoriatic arthritis.   4. Development of episcleritis in the left eye with improvement with steroid eyedrops 12/14/2011 with recurrent episodes when wean oral prednisone (9/2012)  5. History of Hashimoto's thyroiditis.   6. History of presumptive treatment for Lyme disease with doxycycline after development of a targetoid lesion.   7. Poor tolerance of sulfasalazine and oral methotrexate.   8. Bilateral hamstring tendonosis, right partial tear, sacroilitis 7/2012. See MRI, repeat 2/2013 hamstring and right SI inflammation seeing Dr. Arvin Xie at [facility] specialized in SI. , left hamstring   9. DJD L4-L5, L5-S1 per MRI 7/2012  10. Intermittent prednisone exposure  11. Transient elevation LFT ALT 84/AST 58 2/2013 (nl after held MTX and tramadol, then increased folic acid 2mg day)  12. Past LUE burn developed into cellulitis resolved from keflex. Bronchitis now on zithromax irritating her costrochondritis. Improving after 1 day  13. 1-2014 Left knee meniscal tear with chrondomalacia per MRI (had Rt/Lt CMJ surgery)  14. 4- RUQ pain.   15.  Hx recurrent left episcleritis   16. Right thumb tendon surgery with Dr. [facility]  17. Pneumonia 3-2015; strept  and 4-2017        Past Surgical History:  Surgical-She has a surgical history including appendectomy in 1980, cholecystectomy in approximately 1993, and hysterectomy without oophorectomy in 1996.  Ablation SI Right 4/25 and left Feb 26th 2017 -- pain clinic   PT for her hip--told by therapy could feel the bursitis. Told needs THR and injection of left hip Feb 10th 2017 -    Family History:   No autoimmune  "disorders, psoriasis, UC, crohn's, SLE, RA, PsA, gout, autoimmune thyroid.  No MS, heart disease or cancer in family  Mother-endometrial cancer, RHEUMATOID ARTHRITIS (RA)   Father-psoriasis, lymphoma, colon and prostate cancer, inflammatory bowel   Siblings-sister rheumatoid arthritis (RA) and OSTEOARTHRITIS  And crohns   Brother autoimmune process sees Dr. Last [facility] RHEUMATOID ARTHRITIS (RA)       Social History:   No Alcohol. No Smoking. No IVDU. Partner on list for lung transplant      Physical Exam:   /79   Pulse 62   Temp 97.6  F (36.4  C) (Oral)   Ht 1.664 m (5' 5.5\")   Wt 72.1 kg (158 lb 14.4 oz)   LMP 03/06/2012   SpO2 99%   BMI 26.04 kg/m      General: Well-appearing female in no distress. Alert and oriented.   Skin: Focused exam of the face, legs, feet, arms, hands back, and chest.was performed.   Findings:   - Gritty pink papule on the right malar cheek.   - Abdomen and chest are clear.   - Xerosis of the legs.   - Gritty pink papule on right anterior shin.   - Light brown macule on right dorsal arm, homogenous.     MSK:   - Tenderness between the MCPs, and tenderness at the PIPs. Knuckle pad at the dorsal left PIP.   - Tenderness to palpation of b/l plantar feet    Laboratory:   Orders Only on 06/26/2019   Component Date Value Ref Range Status     Sed Rate 06/26/2019 10  0 - 30 mm/h Final     CRP Inflammation 06/26/2019 <2.9  0.0 - 8.0 mg/L Final     Creatinine 06/26/2019 0.78  0.52 - 1.04 mg/dL Final     GFR Estimate 06/26/2019 81  >60 mL/min/[1.73_m2] Final    Comment: Non  GFR Calc  Starting 12/18/2018, serum creatinine based estimated GFR (eGFR) will be   calculated using the Chronic Kidney Disease Epidemiology Collaboration   (CKD-EPI) equation.       GFR Estimate If Black 06/26/2019 >90  >60 mL/min/[1.73_m2] Final    Comment:  GFR Calc  Starting 12/18/2018, serum creatinine based estimated GFR (eGFR) will be   calculated using the Chronic Kidney " Disease Epidemiology Collaboration   (CKD-EPI) equation.       Albumin 06/26/2019 3.8  3.4 - 5.0 g/dL Final     ALT 06/26/2019 39  0 - 50 U/L Final     AST 06/26/2019 27  0 - 45 U/L Final     WBC 06/26/2019 6.2  4.0 - 11.0 10e9/L Final     RBC Count 06/26/2019 4.47  3.8 - 5.2 10e12/L Final     Hemoglobin 06/26/2019 13.3  11.7 - 15.7 g/dL Final     Hematocrit 06/26/2019 41.2  35.0 - 47.0 % Final     MCV 06/26/2019 92  78 - 100 fl Final     MCH 06/26/2019 29.8  26.5 - 33.0 pg Final     MCHC 06/26/2019 32.3  31.5 - 36.5 g/dL Final     RDW 06/26/2019 13.9  10.0 - 15.0 % Final     Platelet Count 06/26/2019 232  150 - 450 10e9/L Final     Diff Method 06/26/2019 Automated Method   Final     % Neutrophils 06/26/2019 60.0  % Final     % Lymphocytes 06/26/2019 30.3  % Final     % Monocytes 06/26/2019 7.2  % Final     % Eosinophils 06/26/2019 1.3  % Final     % Basophils 06/26/2019 1.0  % Final     % Immature Granulocytes 06/26/2019 0.2  % Final     Nucleated RBCs 06/26/2019 0  0 /100 Final     Absolute Neutrophil 06/26/2019 3.7  1.6 - 8.3 10e9/L Final     Absolute Lymphocytes 06/26/2019 1.9  0.8 - 5.3 10e9/L Final     Absolute Monocytes 06/26/2019 0.5  0.0 - 1.3 10e9/L Final     Absolute Eosinophils 06/26/2019 0.1  0.0 - 0.7 10e9/L Final     Absolute Basophils 06/26/2019 0.1  0.0 - 0.2 10e9/L Final     Abs Immature Granulocytes 06/26/2019 0.0  0 - 0.4 10e9/L Final     Absolute Nucleated RBC 06/26/2019 0.0   Final     Imaging:   XR Cervical Spine (06/26/2019);  Impression:  1. Multilevel degenerative change in the cervical spine, greatest at C5-6.  2. No evidence of dynamic instability on flexion-extension views.  Marly Kaba, DO     Assessment and Plan:   # Psoriatic arthritis/Psoriasis  We had a good discussion about her options including:  Infliximab 5mg q4 weeks, weekly Humira, IL23 inhibitors (limited data), or Toficitinab. I will discuss this with Miguel Umana.  - We also discussed fibroscan to screen for liver  fibrosis with long-term methotrexate use. This was ordered for her.     # Xerosis   We discussed the use of ammonium lactate lotion for her dry skin.   - ammonium lactate (LAC-HYDRIN) 12 % external lotion  Dispense: 225 g; Refill: 3     # Actinic keratosis  - Two AKs treated with LNx1 at above noted sites.    Follow-up: Return in about 1 year (around 6/27/2020), or Follow-up.     Scribe Disclosure:  I, Heather Spivey, am serving as a scribe to document services personally performed by Salvador Conti MD at this visit, based upon the provider's statements to me. All documentation has been reviewed by the aforementioned provider prior to being entered into the official medical record.    Provider Disclosure:   The documentation recorded by the scribe accurately reflects the services I personally performed and the decisions made by me.    Salvador Conti MD, FAAD    Departments of Internal Medicine and Dermatology  [facility]  [phone]

## 2019-09-11 ENCOUNTER — MYC MEDICAL ADVICE (OUTPATIENT)
Dept: RHEUMATOLOGY | Facility: CLINIC | Age: 64
End: 2019-09-11

## 2019-09-17 NOTE — TELEPHONE ENCOUNTER
I am sitting right next him and he has never seen or heard of her. She also has not had a viral level done here, only  A negativ e Ab in 2014. Who saw her and where?

## 2019-09-26 NOTE — TELEPHONE ENCOUNTER
Pt does have a negative a negative CMV antibody result that was done on 4/12/14 and ordered by Gail Peralta PA-C.    Contacted pt. Krissy said she was last tested about 6 weeks ago by her primary care provider: Dr Burns at [facility]. Recommended that pt have Dr Burns order the testing and having them fax the results to us would be very beneficial. Pt is agreeable to this and had thought about doing that in the first place.    Matt Hogan, CLEMENTN RN  Rheumatology RN Coordinator  [facility]

## 2019-10-03 ENCOUNTER — HEALTH MAINTENANCE LETTER (OUTPATIENT)
Age: 64
End: 2019-10-03

## 2019-10-10 ENCOUNTER — TRANSFERRED RECORDS (OUTPATIENT)
Dept: HEALTH INFORMATION MANAGEMENT | Facility: CLINIC | Age: 64
End: 2019-10-10

## 2019-10-21 DIAGNOSIS — M06.4 INFLAMMATORY POLYARTHROPATHY (H): ICD-10-CM

## 2019-10-22 ENCOUNTER — OFFICE VISIT (OUTPATIENT)
Dept: RHEUMATOLOGY | Facility: CLINIC | Age: 64
End: 2019-10-22
Attending: INTERNAL MEDICINE
Payer: COMMERCIAL

## 2019-10-22 VITALS
TEMPERATURE: 97.9 F | WEIGHT: 166.3 LBS | OXYGEN SATURATION: 98 % | HEART RATE: 73 BPM | DIASTOLIC BLOOD PRESSURE: 71 MMHG | SYSTOLIC BLOOD PRESSURE: 108 MMHG | BODY MASS INDEX: 27.25 KG/M2

## 2019-10-22 DIAGNOSIS — Z79.899 HIGH RISK MEDICATIONS (NOT ANTICOAGULANTS) LONG-TERM USE: Primary | ICD-10-CM

## 2019-10-22 DIAGNOSIS — L40.50 PSORIATIC ARTHRITIS (H): ICD-10-CM

## 2019-10-22 DIAGNOSIS — Z79.52 ON PREDNISONE THERAPY: ICD-10-CM

## 2019-10-22 DIAGNOSIS — Z79.899 HIGH RISK MEDICATIONS (NOT ANTICOAGULANTS) LONG-TERM USE: ICD-10-CM

## 2019-10-22 DIAGNOSIS — M46.99 INFLAMMATORY SPONDYLOPATHY OF MULTIPLE SITES IN SPINE (H): Primary | ICD-10-CM

## 2019-10-22 DIAGNOSIS — Z78.0 POSTMENOPAUSAL: ICD-10-CM

## 2019-10-22 DIAGNOSIS — L40.9 PSORIASIS: ICD-10-CM

## 2019-10-22 DIAGNOSIS — M06.4 INFLAMMATORY POLYARTHROPATHY (H): ICD-10-CM

## 2019-10-22 LAB
ALBUMIN SERPL-MCNC: 3.6 G/DL (ref 3.4–5)
ALT SERPL W P-5'-P-CCNC: 31 U/L (ref 0–50)
AST SERPL W P-5'-P-CCNC: 25 U/L (ref 0–45)
BASOPHILS # BLD AUTO: 0.1 10E9/L (ref 0–0.2)
BASOPHILS NFR BLD AUTO: 1 %
CREAT SERPL-MCNC: 0.84 MG/DL (ref 0.52–1.04)
DIFFERENTIAL METHOD BLD: NORMAL
EOSINOPHIL # BLD AUTO: 0.1 10E9/L (ref 0–0.7)
EOSINOPHIL NFR BLD AUTO: 1.4 %
ERYTHROCYTE [DISTWIDTH] IN BLOOD BY AUTOMATED COUNT: 14.3 % (ref 10–15)
GFR SERPL CREATININE-BSD FRML MDRD: 73 ML/MIN/{1.73_M2}
HCT VFR BLD AUTO: 40.4 % (ref 35–47)
HGB BLD-MCNC: 13.1 G/DL (ref 11.7–15.7)
IMM GRANULOCYTES # BLD: 0 10E9/L (ref 0–0.4)
IMM GRANULOCYTES NFR BLD: 0.2 %
LYMPHOCYTES # BLD AUTO: 2.2 10E9/L (ref 0.8–5.3)
LYMPHOCYTES NFR BLD AUTO: 43.6 %
MCH RBC QN AUTO: 29.6 PG (ref 26.5–33)
MCHC RBC AUTO-ENTMCNC: 32.4 G/DL (ref 31.5–36.5)
MCV RBC AUTO: 91 FL (ref 78–100)
MONOCYTES # BLD AUTO: 0.4 10E9/L (ref 0–1.3)
MONOCYTES NFR BLD AUTO: 7 %
NEUTROPHILS # BLD AUTO: 2.3 10E9/L (ref 1.6–8.3)
NEUTROPHILS NFR BLD AUTO: 46.8 %
NRBC # BLD AUTO: 0 10*3/UL
NRBC BLD AUTO-RTO: 0 /100
PLATELET # BLD AUTO: 206 10E9/L (ref 150–450)
RBC # BLD AUTO: 4.43 10E12/L (ref 3.8–5.2)
WBC # BLD AUTO: 5 10E9/L (ref 4–11)

## 2019-10-22 PROCEDURE — 84460 ALANINE AMINO (ALT) (SGPT): CPT | Performed by: NURSE PRACTITIONER

## 2019-10-22 PROCEDURE — 82040 ASSAY OF SERUM ALBUMIN: CPT | Performed by: NURSE PRACTITIONER

## 2019-10-22 PROCEDURE — 82565 ASSAY OF CREATININE: CPT | Performed by: NURSE PRACTITIONER

## 2019-10-22 PROCEDURE — 85025 COMPLETE CBC W/AUTO DIFF WBC: CPT | Performed by: NURSE PRACTITIONER

## 2019-10-22 PROCEDURE — G0463 HOSPITAL OUTPT CLINIC VISIT: HCPCS | Mod: ZF

## 2019-10-22 PROCEDURE — 36415 COLL VENOUS BLD VENIPUNCTURE: CPT | Performed by: NURSE PRACTITIONER

## 2019-10-22 PROCEDURE — 84450 TRANSFERASE (AST) (SGOT): CPT | Performed by: NURSE PRACTITIONER

## 2019-10-22 RX ORDER — OXYCODONE HYDROCHLORIDE 15 MG/1
15 TABLET ORAL
COMMUNITY
Start: 2019-10-14 | End: 2020-06-11

## 2019-10-22 RX ORDER — FENTANYL 12.5 UG/1
PATCH TRANSDERMAL
Refills: 0 | Status: ON HOLD | COMMUNITY
Start: 2019-10-16 | End: 2020-08-04

## 2019-10-22 ASSESSMENT — PAIN SCALES - GENERAL: PAINLEVEL: MODERATE PAIN (5)

## 2019-10-22 NOTE — PATIENT INSTRUCTIONS
Preventive Care:    Breast Cancer Screening: During our visit today, we discussed that it is recommended you receive breast cancer screening. Please call or make an appointment with your primary care provider to discuss this with them. You may also call the [facility] scheduling line ([phone]) to set up a mammography appointment at the Breast Center within the [facility].

## 2019-10-22 NOTE — PROGRESS NOTES
[facility]  Rheumatology Clinic  Jensen Samayoa MD  10/22/2019     Name: Krissy Weldon  MRN: [id number]  Age: 64 year old  : 1955  Referring provider: Jensen Samayoa    Problem List:  1. Psoriatic arthritis w/axial involvement activity with history episcleritis, tendonitis, left hamstring tear and uveitis  2. Polyarthropathy    3. Chronic pain management patient   4. Cervical DJD 2/2 underlying inflammatory arthritis, psoriatic   5. Bilateral foot pain   6. On prednisone therapy     Assessment and Plan:  The patient relates return of psoriatic arthritis symptoms 10 days into her 14 day Humira injection cycle. She has been taking prednisone for this. The patient reports that she had a DEXA scan in the past, sometime around 5-10 years ago. We discussed long-term risks of prednisone therapy and, due to this, we decided to obtain a repeat DEXA scan. In the meantime, we will make sure to limit her prednisone dosage.    The patient has had a small downward trend in serum albumin over the past year or so and, along with Dr. Conti's recommendation, we will complete a FibroScan. We will also decrease methotrexate dosage to 15 mg in the meantime. If this FibroScan is minimally abnormal, we will not pursue lowering her methotrexate long-term. She will repeat DEXA scans every two years while she is on prednisone.    We had a long discussion about stem cell transplants given her plans to pursue a mesenchymal stem cell transplant in the future.    Orders:  - DEXA hip/pelvis/spine    Follow-up: 1 year or sooner if symptoms worsen.    HPI:   Krissy Weldon is a 64 year old female who presents for follow-up of undifferentiated polyarthropathy, psoriatic arthritis. Complicated by eye inflammation (uveitis and scleritis--sees [facility]), tendonitis, costochondritis, tendon tears, synovitis, dactylitis, OA/DJD jessica thumbs CMC, now toe contractures and laxity all hand/wrist joints, bowel changes (sister with Crohn's and dad  with inflammatory bowel). The patient was last evaluated by Miguel Umana on 06/26/2019, at which time there was concern for worsening tendons and increased bowel symptoms despite continuation of secukinumab injections 150 mg every 30 days. A discussion was had about changing to Enbrel 50 mg injections every week starting 30 days after her last injection with plans to stop Cosentyx.    Today, the patient reports that she has symptom exacerbation after around 10 days following her Humira injections. She states that she has been using prednisone off-and-on for these four days leading up to her next injection. She adds that she can live with this, but, as her wife had a double lung transplant, she has been care taking for her and needs to remain active for this. She notes that her last DEXA scan was completed around 5-10 years ago. She states that she continues methotrexate injections 20 mg weekly with 3 mg folic acid daily. She says that Dr. Conti recommended a liver scan due to long-term methotrexate use during their last visit, and she did not complete this as her wife has been very sick.    She states that her hands and feet have been swelling, and she has obtained larger shoes to treat this. She reports that she also has had trouble extending her fingers. She also reports psoriatic skin changes on her scalp, arms, and buttocks, but she explains that these have remained mostly unchanged.     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 She states that she could barely walk before this therapy, she did not start steroids following the procedure, and she had relief for around one year.    Background History:   (-SSa, -SSb, -CCP, HLA-B27 negative on outside workup with previous SI injections) with hx- inflammatory eye left eye episcleritis requiring prednisone gtt or oral with bone scan unrevealing.  Previous medrol or steroid responsiveness. Past tried Humira, SSZ, Simponi SQ/IV, remicade and Otezla. Failed Humira EoW recurrent episcleritis, right wrist, right SI, bilateral hamstrings tendinosis with partial tear bilaterally. Failed Simponi sq/IV ineffective. SSZ and oral MTX. Past recurrent iritis (ER if eye pain, blurred vision, red eye). Remicade. Otezla. LLL pneumonia 3/2015. MRI L hip showed high grade tear gluteus minimus insertion site, effusion 4/2015. C/o neck issues with MRI cervical spine show DJD, EMG, paramedian osteophytes and disk protrusion at C3-C4 with some moderate central canal stenosis and moderate to severe right-sided foraminal stenosis as a consequence. C5-C6 also showed some mild to moderate central canal stenosis and moderate bilateral central foraminal stenosis. Seen Dr. Wheeler with significant improvement in GI issues pancreatic sphincter dysfunction requiring surgery now on pancreatic enzymes after pancreatic duct is open. Failed certolizumab pegol (Cimzia) lost effectiveness (more uveitis, arthritis, synovitis, bursitis and tendonitis, strept and CAP). Secukinumab (cosentyx) 2-2019 more laxity in joints hands/wrists, inflammatory eye, toe contractures, changes in skin stopped then changed to etanercept (Enbrel) 6-.     Review of Systems:   Pertinent items are noted in HPI or as below, remainder of complete ROS is negative.      No recent problems with hearing or vision. No swallowing problems.   No breathing difficulty, shortness of breath, coughing, or wheezing.  No chest pain or palpitations.  No heart burn, indigestion, abdominal pain, nausea, vomiting, diarrhea.  No urination problems, no bloody, cloudy urine, no dysuria.  No numbing, tingling, weakness.  No headaches or confusion.  No easy bleeding or bruising.     Active Medications:   Current Outpatient Medications:      adalimumab (HUMIRA *CF*) 40 MG/0.4ML prefilled syringe kit, Inject 0.4 mLs (40 mg) Subcutaneous  "every 14 days Hold for signs of infection, then seek medical attention., Disp: 1 kit, Rfl: 5     ammonium lactate (LAC-HYDRIN) 12 % external lotion, Apply topically 2 times daily (Patient taking differently: Apply topically daily ), Disp: 225 g, Rfl: 3     B-D SYRINGE LUER-FILI 1 ML MISC, USE TO INJECT METHOTREXATE EVERY 7 DAYS, Disp: 25 each, Rfl: 0     BD DISP NEEDLES 25G X 5/8\" MISC, USE WITH METHOTREXATE UNDER THE SKIN EVERY 7 DAYS, Disp: 25 each, Rfl: 0     fentaNYL (DURAGESIC) 12 mcg/hr 72 hr patch, APPLY 1 PATCH TO SKIN EVERY 48 HOURS FOR CHRONIC PAIN., Disp: , Rfl: 0     FLUoxetine (PROZAC) 40 MG capsule, Take 40 mg by mouth daily., Disp: , Rfl:      folic acid (FOLVITE) 1 MG tablet, Take 3 tablet a day few days before and day after methotrexate then the other day 2 mg day, Disp: 210 tablet, Rfl: 3     levothyroxine (SYNTHROID) 100 MCG tablet, Take  by mouth daily., Disp: , Rfl:      methotrexate sodium, pres-free, 50 MG/2ML SOLN injection CHEMO, Inject 0.8 mLs (20 mg) Subcutaneous every 7 days *DISCARD REMAINDER** MONITORING LABS DUE EVERY 8 WEEKS, Disp: 8 mL, Rfl: 2     multivitamin, therapeutic (THERA-VIT) TABS, Take 1 tablet by mouth daily, Disp: , Rfl:      oxyCODONE IR (ROXICODONE) 15 MG tablet, Take 15 mg by mouth, Disp: , Rfl:      sennosides (SENOKOT) 8.6 MG tablet, Take 1 tablet by mouth daily , Disp: 1 tablet, Rfl: 0     Syringe Luer Slip (B-D SYRINGE LUER-FILI) 3 ML MISC, 1 Units by Device route every 7 days, Disp: 25 each, Rfl: 0     vitamin D3 (VITAMIN D3) 1000 units (25 mcg) tablet, Take 2 tablets (2,000 Units) by mouth daily, Disp: 180 tablet, Rfl: 3     cefdinir (OMNICEF) 300 MG capsule, Take 1 capsule (300 mg) by mouth 2 times daily (Patient not taking: Reported on 4/10/2019), Disp: 20 capsule, Rfl: 0     predniSONE (DELTASONE) 5 MG tablet, Take 10 mg once a day for 3 weeks then go to 5 mg day until seen. (Patient not taking: Reported on 10/22/2019), Disp: 60 tablet, Rfl: 2    Allergies:   No " known drug allergies.     Past Medical History:  Past medical history is notable for her being presumptively treated for Lyme with doxycycline after developing a targetoid lesion, for a history of Hashimoto's thyroiditis with subsequent hypothyroidism, for the diagnosis now of psoriasis, and for a history of depression.Neck pain, cervical stenosis-- MRI  +moderate central stenosis right C3-4, C5-6 degenerative changes 2nd mild-mod central stenosis. Past referral to physiatry-wishes to return to Dr. Edmond [facility] Left hip pain s/p tear needs THR she reports. B) Hx-Bilateral tendonitis hamstrings with right bursitis, partial tear per Dr. Gomez s/p injections. [place] [facility] [place] SI joint, tendon insertion site. Sees Dr. Maciel Dukes. C). Transient elevation LFT 2/2013 [ NL after held MTX and tramadol 2wk]; transient 4- [ AST 89]. NL . Other hx --pancreatic sphinctor dysfunction. On pancreatic enzymes. SBO 9-2015, now no doing well. Right thumb DJD, s/p surgery . Healed.  1. Diffuse degenerative joint disease that is both clinically apparent and obvious on multiple imaging modalities including bone scan, MRI 2/2013 or cervical. DJD L4-L5, L5-S1 per MRI 7/2012; MRI [id number]   2. Diffuse inflammatory arthritis with marked morning stiffness, no systemic inflammation by blood tests, but greater than 80% clinical improvement with a Medrol Dosepak.   3. Onset of the inflammatory joint symptoms including sacroiliitis with the development of psoriasis, suggesting that this represents psoriatic arthritis.   4. Development of episcleritis in the left eye with improvement with steroid eyedrops 12/14/2011 with recurrent episodes when wean oral prednisone (9/2012)  5. History of Hashimoto's thyroiditis.   6. History of presumptive treatment for Lyme disease with doxycycline after development of a targetoid lesion.   7. Poor tolerance of sulfasalazine and  oral methotrexate.   8. Bilateral hamstring tendonosis, right partial tear, sacroilitis 7/2012. See MRI, repeat 2/2013 hamstring and right SI inflammation seeing Dr. Arvin Xie at [facility] [place] [facility] specialized in SI. , left hamstring   9. DJD L4-L5, L5-S1 per MRI 7/2012  10. Intermittent prednisone exposure  11. Transient elevation LFT ALT 84/AST 58 2/2013 (nl after held MTX and tramadol, then increased folic acid 2mg day)  12. Past LUE burn developed into cellulitis resolved from keflex. Bronchitis now on zithromax irritating her costrochondritis. Improving after 1 day  13. 1-2014 Left knee meniscal tear with chrondomalacia per MRI (had Rt/Lt CMJ surgery)  14. 4- RUQ pain.   15.  Hx recurrent left episcleritis   16. Right thumb tendon surgery with Dr. [facility]  17. Pneumonia 3-2015; strept  and 4-2017      Past Surgical History:  Appendectomy    Arthroplasty carpometacarpal, left 11/8/2013  Arthroplasty carpometacarpal, right 12/20/2013  Cholecystectomy    Colonoscopy 5/6/2014  Endoscopic retrograde cholangiopancreatogram 6/13/2014  Gallbladder surgery      Hysterectomy and oophorectomy  Hc ugi endoscopy w eus, left 6/10/2014  Procedure: combined endoscopic ultrasound, esophagoscopy, gastroscopy, duodenoscopy  Right thumb surgery 7/2015  Xr sacroiliac therapeutic injection bilateral 12/2011    Family History:    No autoimmune disorders, psoriasis, UC, crohn's, SLE, RA, PsA, gout, autoimmune thyroid. No MS, heart disease or cancer in family  Mother- Endometrial cancer, RHEUMATOID ARTHRITIS (RA)   Father- Psoriasis, lymphoma, colon and prostate cancer, inflammatory bowel   Siblings- Sister rheumatoid arthritis (RA) and OSTEOARTHRITIS  And crohns   Brother autoimmune process sees Dr. Ni   Maternal grandmother- Rheumatoid arthritis     Social History:  The patient reports that she has never smoked. She has never used smokeless tobacco. She reports that she does not drink alcohol or use  drugs.   PCP: Deshawn Burns  Marital Status:      Physical Exam:   /71 (BP Location: Right arm, Patient Position: Sitting, Cuff Size: Adult Regular)   Pulse 73   Temp 97.9  F (36.6  C) (Oral)   Wt 75.4 kg (166 lb 4.8 oz)   LMP 03/06/2012   SpO2 98%   BMI 27.25 kg/m     Wt Readings from Last 4 Encounters:   10/22/19 75.4 kg (166 lb 4.8 oz)   06/27/19 72.1 kg (158 lb 14.4 oz)   06/26/19 72.1 kg (159 lb)   04/10/19 72.6 kg (160 lb)     Constitutional: Well-developed, appearing stated age; cooperative.  Eyes: Normal EOM, PERRLA, vision, conjunctiva, sclera.  ENT: Normal external ears, nose, hearing, lips, teeth, gums, throat. No mucous membrane lesions, normal saliva pool.  Neck: No mass or thyroid enlargement.  Resp: Lungs clear to auscultation, nl to palpation.  CV: RRR, no murmurs, rubs or gallops, no edema.  GI: No ABD mass or tenderness, no HSM.  : Not tested.  Lymph: No cervical, supraclavicular, inguinal or epitrochlear nodes.  MS: The TMJ, neck, shoulder, elbow, wrist, spine, hip, knee, ankle, and foot MTP/IP joints were examined and found normal. No altered joint anatomy. Full joint ROM. Bilateral 2nd, 3rd MCP synovitis, worse on left. Less pronounced, trace 2nd and 3rd PIP synovitis on the left.  Skin: No nail pitting, alopecia, rash, nodules or lesions  Neuro: Normal cranial nerves, strength, sensation, DTRs.   Psych: Normal judgement, orientation, memory, affect.     Laboratory:   RHEUM RESULTS Latest Ref Rng & Units 4/10/2019 6/26/2019 10/22/2019   SED RATE 0 - 30 mm/h 7 10 -   CRP, INFLAMMATION 0.0 - 8.0 mg/L <2.9 <2.9 -   CK TOTAL 30 - 225 U/L - - -   RHEUMATOID FACTOR <20 IU/mL - - -   DESEAN SCREEN BY EIA <1.0 - - -   AST 0 - 45 U/L 19 27 25   ALT 0 - 50 U/L 25 39 31   ALBUMIN 3.4 - 5.0 g/dL 3.8 3.8 3.6   WBC 4.0 - 11.0 10e9/L 6.2 6.2 5.0   RBC 3.8 - 5.2 10e12/L 4.61 4.47 4.43   HGB 11.7 - 15.7 g/dL 13.7 13.3 13.1   HCT 35.0 - 47.0 % 42.1 41.2 40.4   MCV 78 - 100 fl 91 92 91   MCHC  31.5 - 36.5 g/dL 32.5 32.3 32.4   RDW 10.0 - 15.0 % 14.7 13.9 14.3    - 450 10e9/L 247 232 206   CREATININE 0.52 - 1.04 mg/dL 0.73 0.78 0.84   GFR ESTIMATE, IF BLACK >60 mL/min/[1.73:m2] >90 >90 85   GFR ESTIMATE >60 mL/min/[1.73:m2] 88 81 73    - 1,620 mg/dL - - -   IGA 70 - 380 mg/dL - - -   IGM 60 - 265 mg/dL - - -   HEPATITIS C ANTIBODY NR:Nonreactive - - -     Rheumatoid Factor   Date Value Ref Range Status   05/31/2017 <20 <20 IU/mL Final      Cyclic Citrullinated Peptide Antibody, IgG   Date Value Ref Range Status   05/31/2017 1 <7 U/mL Final     Comment:     Negative      Cyclic Cit Pept IgG/IgA   Date Value Ref Range Status   06/30/2011 <20  Interpretation:  Negative <20 UNITS Final     SSA (RO) Antibody IgG   Date Value Ref Range Status   06/30/2011 2  Final     Comment:     Reference range: 0 to 40  Unit: AU/mL  (Note)  REFERENCE INTERVALS: SSA (Ro) (BERTA) Ab, IgG   29 AU/mL or Less ............. Negative   30 - 40 AU/mL ................ Equivocal   41 AU/mL or Greater .......... Positive  SSA (Ro) antibody is seen in 70-75% of Sjogren syndrome  cases, 30-40% of systemic lupus erythematosus (SLE) and  5-10% of progressive systemic sclerosis (PSS).  Performed by [facility],  [address] [place] [id number] [phone]  [email or web address], Estella Huertas MD, Lab. Director     SSB (LA) Antibody IgG   Date Value Ref Range Status   06/30/2011 0  Final     Comment:     Reference range: 0 to 40  Unit: AU/mL  (Note)  REFERENCE INTERVALS: SSB (La) (BERTA) Ab, IgG   29 AU/mL or Less ............. Negative   30 - 40 AU/mL ................ Equivocal   41 AU/mL or Greater .......... Positive  SSB (La) antibody is seen in 50-60% of Sjogren syndrome  cases and is specific if it is the only BERTA antibody  present. 15-25% of patients with systemic lupus  erythematosus (SLE) and 5-10% of patients with progressive  systemic sclerosis (PSS) also have this antibody.  Performed by [facility],  [address]  [place] [address] [phone]  [email or web address], Estella Huertas MD, Lab. Director     DESEAN Screen by EIA   Date Value Ref Range Status   05/15/2014 <1.0  Interpretation:  Negative   <1.0 Final     Hepatitis B Core Emily   Date Value Ref Range Status   01/14/2019 Nonreactive NR^Nonreactive Final      Hep B Surface Agn   Date Value Ref Range Status   01/14/2019 Nonreactive NR^Nonreactive Final     Quantiferon-TB Gold Plus Result   Date Value Ref Range Status   06/26/2019 Negative NEG^Negative Final     Comment:     No interferon gamma response to M.tuberculosis antigens was detected.   Infection with M.tuberculosis is unlikely, however a single negative result   does not exclude infection. In patients at high risk for infection, a second   test should be considered  in accordance with the 2017 ATS/IDSA/CDC Clinical Practice Guidelines for   Diagnosis of Tuberculosis in Adults and Children [Wanda JOHNSON et   al.Clin.Infect.Dis. 2017 64(2):111-115].       TB1 Ag minus Nil Value   Date Value Ref Range Status   06/26/2019 0.00 IU/mL Final     TB2 Ag minus Nil Value   Date Value Ref Range Status   06/26/2019 0.00 IU/mL Final     Mitogen minus Nil Result   Date Value Ref Range Status   06/26/2019 >10.00 IU/mL Final     Nil Result   Date Value Ref Range Status   06/26/2019 0.06 IU/mL Final     Albumin Fraction   Date Value Ref Range Status   05/07/2014 4.4 3.7 - 5.1 g/dL Final     Alpha 2 Fraction   Date Value Ref Range Status   05/07/2014 0.6 0.5 - 0.9 g/dL Final     Beta Fraction   Date Value Ref Range Status   05/07/2014 0.8 0.6 - 1.0 g/dL Final     Gamma Fraction   Date Value Ref Range Status   05/07/2014 1.1 0.7 - 1.6 g/dL Final     Monoclonal Peak   Date Value Ref Range Status   05/07/2014 0.0 0.0 g/dL Final     ELP Interpretation:   Date Value Ref Range Status   05/07/2014   Final    No monoclonal protein seen by capillarys electrophoresis, however a monoclonla   protein was seen in this sample by  immunofixation which is a more sensitive   method for monoclonal dectection.  See immunofixation report on same sample.   Pathologic significance requires clinical correlation.  NORI Stauffer M.D.,   Ph.D., Pathologist       Immunofixation ELP   Date Value Ref Range Status   05/07/2014   Final    (Note)  Monoclonal IgG immunoglobulin of kappa light chain type.  Pathological significance requires clinical correlation.  JUAN PABLO Stauffer M.D., Ph.D., Pathologist         IGG   Date Value Ref Range Status   05/07/2014 1,060 695 - 1,620 mg/dL Final     IGA   Date Value Ref Range Status   05/07/2014 138 70 - 380 mg/dL Final     IGM   Date Value Ref Range Status   05/07/2014 123 60 - 265 mg/dL Final     Scribe Disclosure:  IDelon, am serving as a scribe to document services personally performed by Jensen Samayoa MD at this visit, based upon the provider's statements to me. All documentation has been reviewed by the aforementioned provider prior to being entered into the official medical record.    This visit was 40 mins in duration, the majority spent in counseling.     ZAKIA Samayoa MD, PhD    Rheumatology

## 2019-10-22 NOTE — LETTER
10/22/2019      RE: Krissy Weldon  [address] [facility] [address]       [facility]  Rheumatology Clinic  Jensen Samayoa MD  10/22/2019     Name: Krissy Weldon  MRN: [id number]  Age: 64 year old  : 1955  Referring provider: Jensen Samayoa    Problem List:  1. Psoriatic arthritis w/axial involvement activity with history episcleritis, tendonitis, left hamstring tear and uveitis  2. Polyarthropathy    3. Chronic pain management patient   4. Cervical DJD 2/2 underlying inflammatory arthritis, psoriatic   5. Bilateral foot pain   6. On prednisone therapy     Assessment and Plan:  The patient relates return of psoriatic arthritis symptoms 10 days into her 14 day Humira injection cycle. She has been taking prednisone for this. The patient reports that she had a DEXA scan in the past, sometime around 5-10 years ago. We discussed long-term risks of prednisone therapy and, due to this, we decided to obtain a repeat DEXA scan. In the meantime, we will make sure to limit her prednisone dosage.    The patient has had a small downward trend in serum albumin over the past year or so and, along with Dr. Conti's recommendation, we will complete a FibroScan. We will also decrease methotrexate dosage to 15 mg in the meantime. If this FibroScan is minimally abnormal, we will not pursue lowering her methotrexate long-term. She will repeat DEXA scans every two years while she is on prednisone.    We had a long discussion about stem cell transplants given her plans to pursue a mesenchymal stem cell transplant in the future.    Orders:  - DEXA hip/pelvis/spine    Follow-up: 1 year or sooner if symptoms worsen.    HPI:   Krissy Weldon is a 64 year old female who presents for follow-up of undifferentiated polyarthropathy, psoriatic arthritis. Complicated by eye inflammation (uveitis and scleritis--sees [facility]), tendonitis, costochondritis, tendon tears, synovitis, dactylitis, OA/DJD jessica thumbs C[facility], now toe  contractures and laxity all hand/wrist joints, bowel changes (sister with Crohn's and dad with inflammatory bowel). The patient was last evaluated by Miguel Umana on 06/26/2019, at which time there was concern for worsening tendons and increased bowel symptoms despite continuation of secukinumab injections 150 mg every 30 days. A discussion was had about changing to Enbrel 50 mg injections every week starting 30 days after her last injection with plans to stop Cosentyx.    Today, the patient reports that she has symptom exacerbation after around 10 days following her Humira injections. She states that she has been using prednisone off-and-on for these four days leading up to her next injection. She adds that she can live with this, but, as her wife had a double lung transplant, she has been care taking for her and needs to remain active for this. She notes that her last DEXA scan was completed around 5-10 years ago. She states that she continues methotrexate injections 20 mg weekly with 3 mg folic acid daily. She says that Dr. Conti recommended a liver scan due to long-term methotrexate use during their last visit, and she did not complete this as her wife has been very sick.    She states that her hands and feet have been swelling, and she has obtained larger shoes to treat this. She reports that she also has had trouble extending her fingers. She also reports psoriatic skin changes on her scalp, arms, and buttocks, but she explains that these have remained mostly unchanged.     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 She states that she could barely walk before this therapy, she did not start steroids following the procedure, and she had relief for around one year.    Background History:   (-SSa, -SSb, -CCP, HLA-B27 negative on outside workup with previous SI injections) with hx- inflammatory  eye left eye episcleritis requiring prednisone gtt or oral with bone scan unrevealing. Previous medrol or steroid responsiveness. Past tried Humira, SSZ, Simponi SQ/IV, remicade and Otezla. Failed Humira EoW recurrent episcleritis, right wrist, right SI, bilateral hamstrings tendinosis with partial tear bilaterally. Failed Simponi sq/IV ineffective. SSZ and oral MTX. Past recurrent iritis (ER if eye pain, blurred vision, red eye). Remicade. Otezla. LLL pneumonia 3/2015. MRI L hip showed high grade tear gluteus minimus insertion site, effusion 4/2015. C/o neck issues with MRI cervical spine show DJD, EMG, paramedian osteophytes and disk protrusion at C3-C4 with some moderate central canal stenosis and moderate to severe right-sided foraminal stenosis as a consequence. C5-C6 also showed some mild to moderate central canal stenosis and moderate bilateral central foraminal stenosis. Seen Dr. Wheeler with significant improvement in GI issues pancreatic sphincter dysfunction requiring surgery now on pancreatic enzymes after pancreatic duct is open. Failed certolizumab pegol (Cimzia) lost effectiveness (more uveitis, arthritis, synovitis, bursitis and tendonitis, strept and CAP). Secukinumab (cosentyx) 2-2019 more laxity in joints hands/wrists, inflammatory eye, toe contractures, changes in skin stopped then changed to etanercept (Enbrel) 6- .     Review of Systems:   Pertinent items are noted in HPI or as below, remainder of complete ROS is negative.      No recent problems with hearing or vision. No swallowing problems.   No breathing difficulty, shortness of breath, coughing, or wheezing.  No chest pain or palpitations.  No heart burn, indigestion, abdominal pain, nausea, vomiting, diarrhea.  No urination problems, no bloody, cloudy urine, no dysuria.  No numbing, tingling, weakness.  No headaches or confusion.  No easy bleeding or bruising.     Active Medications:   Current Outpatient Medications:      adalimumab  "(HUMIRA *CF*) 40 MG/0.4ML prefilled syringe kit, Inject 0.4 mLs (40 mg) Subcutaneous every 14 days Hold for signs of infection, then seek medical attention., Disp: 1 kit, Rfl: 5     ammonium lactate (LAC-HYDRIN) 12 % external lotion, Apply topically 2 times daily (Patient taking differently: Apply topically daily ), Disp: 225 g, Rfl: 3     B-D SYRINGE LUER-FILI 1 ML MISC, USE TO INJECT METHOTREXATE EVERY 7 DAYS, Disp: 25 each, Rfl: 0     BD DISP NEEDLES 25G X 5/8\" MISC, USE WITH METHOTREXATE UNDER THE SKIN EVERY 7 DAYS, Disp: 25 each, Rfl: 0     fentaNYL (DURAGESIC) 12 mcg/hr 72 hr patch, APPLY 1 PATCH TO SKIN EVERY 48 HOURS FOR CHRONIC PAIN., Disp: , Rfl: 0     FLUoxetine (PROZAC) 40 MG capsule, Take 40 mg by mouth daily., Disp: , Rfl:      folic acid (FOLVITE) 1 MG tablet, Take 3 tablet a day few days before and day after methotrexate then the other day 2 mg day, Disp: 210 tablet, Rfl: 3     levothyroxine (SYNTHROID) 100 MCG tablet, Take  by mouth daily., Disp: , Rfl:      methotrexate sodium, pres-free, 50 MG/2ML SOLN injection CHEMO, Inject 0.8 mLs (20 mg) Subcutaneous every 7 days *DISCARD REMAINDER** MONITORING LABS DUE EVERY 8 WEEKS, Disp: 8 mL, Rfl: 2     multivitamin, therapeutic (THERA-VIT) TABS, Take 1 tablet by mouth daily, Disp: , Rfl:      oxyCODONE IR (ROXICODONE) 15 MG tablet, Take 15 mg by mouth, Disp: , Rfl:      sennosides (SENOKOT) 8.6 MG tablet, Take 1 tablet by mouth daily , Disp: 1 tablet, Rfl: 0     Syringe Luer Slip (B-D SYRINGE LUER-FILI) 3 ML MISC, 1 Units by Device route every 7 days, Disp: 25 each, Rfl: 0     vitamin D3 (VITAMIN D3) 1000 units (25 mcg) tablet, Take 2 tablets (2,000 Units) by mouth daily, Disp: 180 tablet, Rfl: 3     cefdinir (OMNICEF) 300 MG capsule, Take 1 capsule (300 mg) by mouth 2 times daily (Patient not taking: Reported on 4/10/2019), Disp: 20 capsule, Rfl: 0     predniSONE (DELTASONE) 5 MG tablet, Take 10 mg once a day for 3 weeks then go to 5 mg day until seen. " (Patient not taking: Reported on 10/22/2019), Disp: 60 tablet, Rfl: 2    Allergies:   No known drug allergies.     Past Medical History:  Past medical history is notable for her being presumptively treated for Lyme with doxycycline after developing a targetoid lesion, for a history of Hashimoto's thyroiditis with subsequent hypothyroidism, for the diagnosis now of psoriasis, and for a history of depression.Neck pain, cervical stenosis-- MRI  +moderate central stenosis right C3-4, C5-6 degenerative changes 2nd mild-mod central stenosis. Past referral to physiatry-wishes to return to Dr. Edmond [facility] Left hip pain s/p tear needs THR she reports. B) Hx-Bilateral tendonitis hamstrings with right bursitis, partial tear per Dr. Gomez s/p injections. [place] [facility]p [place] SI joint, tendon insertion site. Sees Dr. Maciel Dukes. C). Transient elevation LFT 2/2013 [ NL after held MTX and tramadol 2wk]; transient 4- [ AST 89]. NL . Other hx --pancreatic sphinctor dysfunction. On pancreatic enzymes. SBO 9-2015, now no doing well. Right thumb DJD, s/p surgery . Healed.  1. Diffuse degenerative joint disease that is both clinically apparent and obvious on multiple imaging modalities including bone scan, MRI 2/2013 or cervical. DJD L4-L5, L5-S1 per MRI 7/2012; MRI [id number]   2. Diffuse inflammatory arthritis with marked morning stiffness, no systemic inflammation by blood tests, but greater than 80% clinical improvement with a Medrol Dosepak.   3. Onset of the inflammatory joint symptoms including sacroiliitis with the development of psoriasis, suggesting that this represents psoriatic arthritis.   4. Development of episcleritis in the left eye with improvement with steroid eyedrops 12/14/2011 with recurrent episodes when wean oral prednisone (9/2012)  5. History of Hashimoto's thyroiditis.   6. History of presumptive treatment for Lyme disease with  doxycycline after development of a targetoid lesion.   7. Poor tolerance of sulfasalazine and oral methotrexate.   8. Bilateral hamstring tendonosis, right partial tear, sacroilitis 7/2012. See MRI, repeat 2/2013 hamstring and right SI inflammation seeing Dr. Arvin Xie at [facility] specialized in SI. , left hamstring   9. DJD L4-L5, L5-S1 per MRI 7/2012  10. Intermittent prednisone exposure  11. Transient elevation LFT ALT 84/AST 58 2/2013 (nl after held MTX and tramadol, then increased folic acid 2mg day)  12. Past LUE burn developed into cellulitis resolved from keflex. Bronchitis now on zithromax irritating her costrochondritis. Improving after 1 day  13. 1-2014 Left knee meniscal tear with chrondomalacia per MRI (had Rt/Lt CMJ surgery)  14. 4- RUQ pain.   15.  Hx recurrent left episcleritis   16. Right thumb tendon surgery with Dr. [facility]  17. Pneumonia 3-2015; strept  and 4-2017      Past Surgical History:  Appendectomy    Arthroplasty carpometacarpal, left 11/8/2013  Arthroplasty carpometacarpal, right 12/20/2013  Cholecystectomy    Colonoscopy 5/6/2014  Endoscopic retrograde cholangiopancreatogram 6/13/2014  Gallbladder surgery      Hysterectomy and oophorectomy  Hc ugi endoscopy w eus, left 6/10/2014  Procedure: combined endoscopic ultrasound, esophagoscopy, gastroscopy, duodenoscopy  Right thumb surgery 7/2015  Xr sacroiliac therapeutic injection bilateral 12/2011    Family History:    No autoimmune disorders, psoriasis, UC, crohn's, SLE, RA, PsA, gout, autoimmune thyroid. No MS, heart disease or cancer in family  Mother- Endometrial cancer, RHEUMATOID ARTHRITIS (RA)   Father- Psoriasis, lymphoma, colon and prostate cancer, inflammatory bowel   Siblings- Sister rheumatoid arthritis (RA) and OSTEOARTHRITIS  And crohns   Brother autoimmune process sees Dr. Ni   Maternal grandmother- Rheumatoid arthritis     Social History:  The patient reports that she has never  smoked. She has never used smokeless tobacco. She reports that she does not drink alcohol or use drugs.   PCP: Deshawn Burns  Marital Status:      Physical Exam:   /71 (BP Location: Right arm, Patient Position: Sitting, Cuff Size: Adult Regular)   Pulse 73   Temp 97.9  F (36.6  C) (Oral)   Wt 75.4 kg (166 lb 4.8 oz)   LMP 03/06/2012   SpO2 98%   BMI 27.25 kg/m      Wt Readings from Last 4 Encounters:   10/22/19 75.4 kg (166 lb 4.8 oz)   06/27/19 72.1 kg (158 lb 14.4 oz)   06/26/19 72.1 kg (159 lb)   04/10/19 72.6 kg (160 lb)     Constitutional: Well-developed, appearing stated age; cooperative.  Eyes: Normal EOM, PERRLA, vision, conjunctiva, sclera.  ENT: Normal external ears, nose, hearing, lips, teeth, gums, throat. No mucous membrane lesions, normal saliva pool.  Neck: No mass or thyroid enlargement.  Resp: Lungs clear to auscultation, nl to palpation.  CV: RRR, no murmurs, rubs or gallops, no edema.  GI: No ABD mass or tenderness, no HSM.  : Not tested.  Lymph: No cervical, supraclavicular, inguinal or epitrochlear nodes.  MS: The TMJ, neck, shoulder, elbow, wrist, spine, hip, knee, ankle, and foot MTP/IP joints were examined and found normal. No altered joint anatomy. Full joint ROM. Bilateral 2nd, 3rd MCP synovitis, worse on left. Less pronounced, trace 2nd and 3rd PIP synovitis on the left.  Skin: No nail pitting, alopecia, rash, nodules or lesions  Neuro: Normal cranial nerves, strength, sensation, DTRs.   Psych: Normal judgement, orientation, memory, affect.     Laboratory:   RHEUM RESULTS Latest Ref Rng & Units 4/10/2019 6/26/2019 10/22/2019   SED RATE 0 - 30 mm/h 7 10 -   CRP, INFLAMMATION 0.0 - 8.0 mg/L <2.9 <2.9 -   CK TOTAL 30 - 225 U/L - - -   RHEUMATOID FACTOR <20 IU/mL - - -   DESEAN SCREEN BY EIA <1.0 - - -   AST 0 - 45 U/L 19 27 25   ALT 0 - 50 U/L 25 39 31   ALBUMIN 3.4 - 5.0 g/dL 3.8 3.8 3.6   WBC 4.0 - 11.0 10e9/L 6.2 6.2 5.0   RBC 3.8 - 5.2 10e12/L 4.61 4.47 4.43   HGB 11.7  - 15.7 g/dL 13.7 13.3 13.1   HCT 35.0 - 47.0 % 42.1 41.2 40.4   MCV 78 - 100 fl 91 92 91   MCHC 31.5 - 36.5 g/dL 32.5 32.3 32.4   RDW 10.0 - 15.0 % 14.7 13.9 14.3    - 450 10e9/L 247 232 206   CREATININE 0.52 - 1.04 mg/dL 0.73 0.78 0.84   GFR ESTIMATE, IF BLACK >60 mL/min/[1.73:m2] >90 >90 85   GFR ESTIMATE >60 mL/min/[1.73:m2] 88 81 73    - 1,620 mg/dL - - -   IGA 70 - 380 mg/dL - - -   IGM 60 - 265 mg/dL - - -   HEPATITIS C ANTIBODY NR:Nonreactive - - -     Rheumatoid Factor   Date Value Ref Range Status   05/31/2017 <20 <20 IU/mL Final      Cyclic Citrullinated Peptide Antibody, IgG   Date Value Ref Range Status   05/31/2017 1 <7 U/mL Final     Comment:     Negative      Cyclic Cit Pept IgG/IgA   Date Value Ref Range Status   06/30/2011 <20  Interpretation:  Negative <20 UNITS Final     SSA (RO) Antibody IgG   Date Value Ref Range Status   06/30/2011 2  Final     Comment:     Reference range: 0 to 40  Unit: AU/mL  (Note)  REFERENCE INTERVALS: SSA (Ro) (BERTA) Ab, IgG   29 AU/mL or Less ............. Negative   30 - 40 AU/mL ................ Equivocal   41 AU/mL or Greater .......... Positive  SSA (Ro) antibody is seen in 70-75% of Sjogren syndrome  cases, 30-40% of systemic lupus erythematosus (SLE) and  5-10% of progressive systemic sclerosis (PSS).  Performed by [facility],  [address] [place] [id number] [phone]  [email or web address], Estella Huertas MD, Lab. Director     SSB (LA) Antibody IgG   Date Value Ref Range Status   06/30/2011 0  Final     Comment:     Reference range: 0 to 40  Unit: AU/mL  (Note)  REFERENCE INTERVALS: SSB (La) (BERTA) Ab, IgG   29 AU/mL or Less ............. Negative   30 - 40 AU/mL ................ Equivocal   41 AU/mL or Greater .......... Positive  SSB (La) antibody is seen in 50-60% of Sjogren syndrome  cases and is specific if it is the only BERTA antibody  present. 15-25% of patients with systemic lupus  erythematosus (SLE) and 5-10% of patients with  progressive  systemic sclerosis (PSS) also have this antibody.  Performed by [facility],  [address] [place] [address] [phone]  [email or web address], Estella Huertas MD, Lab. Director     DESEAN Screen by EIA   Date Value Ref Range Status   05/15/2014 <1.0  Interpretation:  Negative   <1.0 Final     Hepatitis B Core Emily   Date Value Ref Range Status   01/14/2019 Nonreactive NR^Nonreactive Final      Hep B Surface Agn   Date Value Ref Range Status   01/14/2019 Nonreactive NR^Nonreactive Final     Quantiferon-TB Gold Plus Result   Date Value Ref Range Status   06/26/2019 Negative NEG^Negative Final     Comment:     No interferon gamma response to M.tuberculosis antigens was detected.   Infection with M.tuberculosis is unlikely, however a single negative result   does not exclude infection. In patients at high risk for infection, a second   test should be considered  in accordance with the 2017 ATS/IDSA/CDC Clinical Practice Guidelines for   Diagnosis of Tuberculosis in Adults and Children [Rangelinsaureliano DM et   al.Clin.Infect.Dis. 2017 64(2):111-115].       TB1 Ag minus Nil Value   Date Value Ref Range Status   06/26/2019 0.00 IU/mL Final     TB2 Ag minus Nil Value   Date Value Ref Range Status   06/26/2019 0.00 IU/mL Final     Mitogen minus Nil Result   Date Value Ref Range Status   06/26/2019 >10.00 IU/mL Final     Nil Result   Date Value Ref Range Status   06/26/2019 0.06 IU/mL Final     Albumin Fraction   Date Value Ref Range Status   05/07/2014 4.4 3.7 - 5.1 g/dL Final     Alpha 2 Fraction   Date Value Ref Range Status   05/07/2014 0.6 0.5 - 0.9 g/dL Final     Beta Fraction   Date Value Ref Range Status   05/07/2014 0.8 0.6 - 1.0 g/dL Final     Gamma Fraction   Date Value Ref Range Status   05/07/2014 1.1 0.7 - 1.6 g/dL Final     Monoclonal Peak   Date Value Ref Range Status   05/07/2014 0.0 0.0 g/dL Final     ELP Interpretation:   Date Value Ref Range Status   05/07/2014   Final    No monoclonal protein  seen by capillarys electrophoresis, however a monoclonla   protein was seen in this sample by immunofixation which is a more sensitive   method for monoclonal dectection.  See immunofixation report on same sample.   Pathologic significance requires clinical correlation.  NORI Stauffer M.D.,   Ph.D., Pathologist       Immunofixation ELP   Date Value Ref Range Status   05/07/2014   Final    (Note)  Monoclonal IgG immunoglobulin of kappa light chain type.  Pathological significance requires clinical correlation.  JUAN PABLO Stauffer M.D., Ph.D., Pathologist         IGG   Date Value Ref Range Status   05/07/2014 1,060 695 - 1,620 mg/dL Final     IGA   Date Value Ref Range Status   05/07/2014 138 70 - 380 mg/dL Final     IGM   Date Value Ref Range Status   05/07/2014 123 60 - 265 mg/dL Final     Scribe Disclosure:  IDelon, am serving as a scribe to document services personally performed by Jensen Samayoa MD at this visit, based upon the provider's statements to me. All documentation has been reviewed by the aforementioned provider prior to being entered into the official medical record.    This visit was 40 mins in duration, the majority spent in counseling.     ZAKIA Samayoa MD, PhD    Rheumatology

## 2019-10-22 NOTE — TELEPHONE ENCOUNTER
Methotrexate inject 0.8 ml every 7 days      Last Written Prescription Date:  6/26/2019  Last Fill Quantity: 8 ml ,   # refills: 1  Last Office Visit: 6/26/2019  Future Office visit:  10/22/2019    CBC RESULTS:   Recent Labs   Lab Test 06/26/19  1044   WBC 6.2   RBC 4.47   HGB 13.3   HCT 41.2   MCV 92   MCH 29.8   MCHC 32.3   RDW 13.9          Creatinine   Date Value Ref Range Status   06/26/2019 0.78 0.52 - 1.04 mg/dL Final   ]    Liver Function Studies -   Recent Labs   Lab Test 06/26/19  1044  04/26/17  1825   PROTTOTAL  --   --  7.3   ALBUMIN 3.8   < > 3.8   BILITOTAL  --   --  0.2   ALKPHOS  --   --  72   AST 27   < > 24   ALT 39   < > 38    < > = values in this interval not displayed.       Routing refill request to provider for review/approval because:  DMARD

## 2019-10-23 RX ORDER — METHOTREXATE 25 MG/ML
20 INJECTION INTRA-ARTERIAL; INTRAMUSCULAR; INTRATHECAL; INTRAVENOUS
Qty: 8 ML | Refills: 1 | Status: SHIPPED | OUTPATIENT
Start: 2019-10-23 | End: 2019-12-13

## 2019-10-23 NOTE — RESULT ENCOUNTER NOTE
The blood counts, liver, kidney labs are normal.     Miguel CABRAL, CNP, MSN  10/23/2019  9:10 AM

## 2019-10-30 ENCOUNTER — ANCILLARY PROCEDURE (OUTPATIENT)
Dept: BONE DENSITY | Facility: CLINIC | Age: 64
End: 2019-10-30
Attending: INTERNAL MEDICINE
Payer: COMMERCIAL

## 2019-10-30 DIAGNOSIS — Z79.899 ENCOUNTER FOR LONG-TERM (CURRENT) USE OF HIGH-RISK MEDICATION: ICD-10-CM

## 2019-10-30 DIAGNOSIS — L40.50 PSORIATIC ARTHRITIS (H): ICD-10-CM

## 2019-10-30 DIAGNOSIS — L40.9 PSORIASIS: ICD-10-CM

## 2019-10-30 DIAGNOSIS — M46.99 INFLAMMATORY SPONDYLOPATHY OF MULTIPLE SITES IN SPINE (H): ICD-10-CM

## 2019-10-30 DIAGNOSIS — Z79.52 ON PREDNISONE THERAPY: ICD-10-CM

## 2019-10-30 DIAGNOSIS — Z79.899 HIGH RISK MEDICATIONS (NOT ANTICOAGULANTS) LONG-TERM USE: ICD-10-CM

## 2019-10-30 DIAGNOSIS — Z78.0 POSTMENOPAUSAL: ICD-10-CM

## 2019-10-30 PROCEDURE — 91200 LIVER ELASTOGRAPHY: CPT | Mod: ZF

## 2019-12-13 DIAGNOSIS — L40.50 PSORIATIC ARTHRITIS (H): ICD-10-CM

## 2019-12-13 DIAGNOSIS — M06.4 INFLAMMATORY POLYARTHROPATHY (H): ICD-10-CM

## 2019-12-13 DIAGNOSIS — L40.9 PSORIASIS: ICD-10-CM

## 2019-12-16 RX ORDER — METHOTREXATE 25 MG/ML
20 INJECTION INTRA-ARTERIAL; INTRAMUSCULAR; INTRATHECAL; INTRAVENOUS
Qty: 8 ML | Refills: 1 | Status: SHIPPED | OUTPATIENT
Start: 2019-12-16 | End: 2020-02-12

## 2019-12-16 NOTE — TELEPHONE ENCOUNTER
adalimumab (HUMIRA *CF*) 40 MG/0.4ML prefilled syringe kit      Last Written Prescription Date:  7/3/19  Last Fill Quantity: 1 kit,   # refills: 5  Last Office Visit : 10/22/19  Future Office visit:  4/29/20    Refill to pharmacy.   .  methotrexate sodium, pres-free, 50 MG/2ML SOLN injection      Last Written Prescription Date:  10/23/19  Last Fill Quantity: 8 ml,   # refills: 1      CBC RESULTS:   Recent Labs   Lab Test 10/22/19  1302   WBC 5.0   RBC 4.43   HGB 13.1   HCT 40.4   MCV 91   MCH 29.6   MCHC 32.4   RDW 14.3          Creatinine   Date Value Ref Range Status   10/22/2019 0.84 0.52 - 1.04 mg/dL Final   ]    Liver Function Studies -   Recent Labs   Lab Test 10/22/19  1302  04/26/17  1825   PROTTOTAL  --   --  7.3   ALBUMIN 3.6   < > 3.8   BILITOTAL  --   --  0.2   ALKPHOS  --   --  72   AST 25   < > 24   ALT 31   < > 38    < > = values in this interval not displayed.       Routing refill request to provider for review/approval because:  Drug not on the [facility], [facility] or [facility] refill protocol or controlled substance

## 2020-02-07 DIAGNOSIS — M06.4 INFLAMMATORY POLYARTHROPATHY (H): ICD-10-CM

## 2020-02-08 ENCOUNTER — HEALTH MAINTENANCE LETTER (OUTPATIENT)
Age: 65
End: 2020-02-08

## 2020-02-10 NOTE — TELEPHONE ENCOUNTER
methotrexate sodium, pres-free, 50 MG/2ML SOLN injection      Last Written Prescription Date:  12-16-19  Last Fill Quantity: 8 ml,   # refills: 1  Last Office Visit : 10-22-19  Future Office visit:  4-      CBC RESULTS:   Recent Labs   Lab Test 10/22/19  1302   WBC 5.0   RBC 4.43   HGB 13.1   HCT 40.4   MCV 91   MCH 29.6   MCHC 32.4   RDW 14.3          Creatinine   Date Value Ref Range Status   10/22/2019 0.84 0.52 - 1.04 mg/dL Final   ]    Liver Function Studies -   Recent Labs   Lab Test 10/22/19  1302  04/26/17  1825   PROTTOTAL  --   --  7.3   ALBUMIN 3.6   < > 3.8   BILITOTAL  --   --  0.2   ALKPHOS  --   --  72   AST 25   < > 24   ALT 31   < > 38    < > = values in this interval not displayed.       Kathleen M Doege RN

## 2020-02-11 DIAGNOSIS — M06.4 INFLAMMATORY POLYARTHROPATHY (H): ICD-10-CM

## 2020-02-11 DIAGNOSIS — Z79.899 HIGH RISK MEDICATIONS (NOT ANTICOAGULANTS) LONG-TERM USE: ICD-10-CM

## 2020-02-11 LAB
ALBUMIN SERPL-MCNC: 3.8 G/DL (ref 3.4–5)
ALT SERPL W P-5'-P-CCNC: 31 U/L (ref 0–50)
AST SERPL W P-5'-P-CCNC: 22 U/L (ref 0–45)
BASOPHILS # BLD AUTO: 0 10E9/L (ref 0–0.2)
BASOPHILS NFR BLD AUTO: 0.7 %
CREAT SERPL-MCNC: 0.85 MG/DL (ref 0.52–1.04)
DIFFERENTIAL METHOD BLD: NORMAL
EOSINOPHIL # BLD AUTO: 0.1 10E9/L (ref 0–0.7)
EOSINOPHIL NFR BLD AUTO: 1.8 %
ERYTHROCYTE [DISTWIDTH] IN BLOOD BY AUTOMATED COUNT: 14.6 % (ref 10–15)
GFR SERPL CREATININE-BSD FRML MDRD: 72 ML/MIN/{1.73_M2}
HCT VFR BLD AUTO: 40.8 % (ref 35–47)
HGB BLD-MCNC: 13.2 G/DL (ref 11.7–15.7)
IMM GRANULOCYTES # BLD: 0 10E9/L (ref 0–0.4)
IMM GRANULOCYTES NFR BLD: 0.3 %
LYMPHOCYTES # BLD AUTO: 2.3 10E9/L (ref 0.8–5.3)
LYMPHOCYTES NFR BLD AUTO: 38.1 %
MCH RBC QN AUTO: 29.7 PG (ref 26.5–33)
MCHC RBC AUTO-ENTMCNC: 32.4 G/DL (ref 31.5–36.5)
MCV RBC AUTO: 92 FL (ref 78–100)
MONOCYTES # BLD AUTO: 0.4 10E9/L (ref 0–1.3)
MONOCYTES NFR BLD AUTO: 6.1 %
NEUTROPHILS # BLD AUTO: 3.2 10E9/L (ref 1.6–8.3)
NEUTROPHILS NFR BLD AUTO: 53 %
NRBC # BLD AUTO: 0 10*3/UL
NRBC BLD AUTO-RTO: 0 /100
PLATELET # BLD AUTO: 257 10E9/L (ref 150–450)
RBC # BLD AUTO: 4.45 10E12/L (ref 3.8–5.2)
WBC # BLD AUTO: 6 10E9/L (ref 4–11)

## 2020-02-11 NOTE — TELEPHONE ENCOUNTER
Called and left a message stating that she is due for labs and to call me back if she is needing them to be faxed outside of the [facility] system.    Sangita Helms, BSN RN   Rheumatology Clinic Nurse   JIMMY Vanegas

## 2020-02-12 RX ORDER — METHOTREXATE 25 MG/ML
20 INJECTION INTRA-ARTERIAL; INTRAMUSCULAR; INTRATHECAL; INTRAVENOUS
Qty: 8 ML | Refills: 1 | Status: SHIPPED | OUTPATIENT
Start: 2020-02-12 | End: 2020-04-10

## 2020-02-12 NOTE — RESULT ENCOUNTER NOTE
The blood counts, liver, kidney labs are normal.     Miguel CABRAL, CNP, MSN  2/12/2020  8:41 AM

## 2020-03-16 ENCOUNTER — HOSPITAL ENCOUNTER (EMERGENCY)
Facility: CLINIC | Age: 65
Discharge: HOME | End: 2020-03-16
Attending: EMERGENCY MEDICINE | Admitting: EMERGENCY MEDICINE
Payer: COMMERCIAL

## 2020-03-16 ENCOUNTER — APPOINTMENT (OUTPATIENT)
Dept: GENERAL RADIOLOGY | Facility: CLINIC | Age: 65
End: 2020-03-16
Attending: EMERGENCY MEDICINE
Payer: COMMERCIAL

## 2020-03-16 VITALS
BODY MASS INDEX: 26.49 KG/M2 | WEIGHT: 159 LBS | DIASTOLIC BLOOD PRESSURE: 77 MMHG | TEMPERATURE: 99.2 F | SYSTOLIC BLOOD PRESSURE: 115 MMHG | RESPIRATION RATE: 16 BRPM | HEART RATE: 73 BPM | HEIGHT: 65 IN | OXYGEN SATURATION: 96 %

## 2020-03-16 DIAGNOSIS — J06.9 VIRAL URI WITH COUGH: ICD-10-CM

## 2020-03-16 DIAGNOSIS — J11.1 INFLUENZA-LIKE ILLNESS: ICD-10-CM

## 2020-03-16 LAB
ALBUMIN SERPL-MCNC: 3.6 G/DL (ref 3.4–5)
ALP SERPL-CCNC: 90 U/L (ref 40–150)
ALT SERPL W P-5'-P-CCNC: 48 U/L (ref 0–50)
ANION GAP SERPL CALCULATED.3IONS-SCNC: 4 MMOL/L (ref 3–14)
AST SERPL W P-5'-P-CCNC: 33 U/L (ref 0–45)
BASOPHILS # BLD AUTO: 0 10E9/L (ref 0–0.2)
BASOPHILS NFR BLD AUTO: 0.5 %
BILIRUB SERPL-MCNC: 0.1 MG/DL (ref 0.2–1.3)
BUN SERPL-MCNC: 16 MG/DL (ref 7–30)
CALCIUM SERPL-MCNC: 8.6 MG/DL (ref 8.5–10.1)
CHLORIDE SERPL-SCNC: 107 MMOL/L (ref 94–109)
CO2 SERPL-SCNC: 25 MMOL/L (ref 20–32)
CREAT SERPL-MCNC: 0.86 MG/DL (ref 0.52–1.04)
DIFFERENTIAL METHOD BLD: ABNORMAL
EOSINOPHIL # BLD AUTO: 0 10E9/L (ref 0–0.7)
EOSINOPHIL NFR BLD AUTO: 0 %
ERYTHROCYTE [DISTWIDTH] IN BLOOD BY AUTOMATED COUNT: 15.3 % (ref 10–15)
FLUAV+FLUBV AG SPEC QL: NEGATIVE
FLUAV+FLUBV AG SPEC QL: NEGATIVE
GFR SERPL CREATININE-BSD FRML MDRD: 72 ML/MIN/{1.73_M2}
GLUCOSE SERPL-MCNC: 102 MG/DL (ref 70–99)
HCT VFR BLD AUTO: 41.5 % (ref 35–47)
HGB BLD-MCNC: 13.8 G/DL (ref 11.7–15.7)
IMM GRANULOCYTES # BLD: 0 10E9/L (ref 0–0.4)
IMM GRANULOCYTES NFR BLD: 0.2 %
LACTATE BLD-SCNC: 0.7 MMOL/L (ref 0.7–2)
LYMPHOCYTES # BLD AUTO: 1.5 10E9/L (ref 0.8–5.3)
LYMPHOCYTES NFR BLD AUTO: 35.3 %
MCH RBC QN AUTO: 29.4 PG (ref 26.5–33)
MCHC RBC AUTO-ENTMCNC: 33.3 G/DL (ref 31.5–36.5)
MCV RBC AUTO: 88 FL (ref 78–100)
MONOCYTES # BLD AUTO: 0.8 10E9/L (ref 0–1.3)
MONOCYTES NFR BLD AUTO: 19.2 %
NEUTROPHILS # BLD AUTO: 1.8 10E9/L (ref 1.6–8.3)
NEUTROPHILS NFR BLD AUTO: 44.8 %
PLATELET # BLD AUTO: 198 10E9/L (ref 150–450)
POTASSIUM SERPL-SCNC: 3.9 MMOL/L (ref 3.4–5.3)
PROT SERPL-MCNC: 7.6 G/DL (ref 6.8–8.8)
RBC # BLD AUTO: 4.7 10E12/L (ref 3.8–5.2)
SODIUM SERPL-SCNC: 136 MMOL/L (ref 133–144)
SPECIMEN SOURCE: NORMAL
WBC # BLD AUTO: 4.1 10E9/L (ref 4–11)

## 2020-03-16 PROCEDURE — 83605 ASSAY OF LACTIC ACID: CPT | Performed by: EMERGENCY MEDICINE

## 2020-03-16 PROCEDURE — 99284 EMERGENCY DEPT VISIT MOD MDM: CPT | Mod: 25

## 2020-03-16 PROCEDURE — 71046 X-RAY EXAM CHEST 2 VIEWS: CPT

## 2020-03-16 PROCEDURE — 87804 INFLUENZA ASSAY W/OPTIC: CPT | Performed by: EMERGENCY MEDICINE

## 2020-03-16 PROCEDURE — U0002 COVID-19 LAB TEST NON-CDC: HCPCS | Performed by: EMERGENCY MEDICINE

## 2020-03-16 PROCEDURE — 85025 COMPLETE CBC W/AUTO DIFF WBC: CPT | Performed by: EMERGENCY MEDICINE

## 2020-03-16 PROCEDURE — 80053 COMPREHEN METABOLIC PANEL: CPT | Performed by: EMERGENCY MEDICINE

## 2020-03-16 RX ORDER — SODIUM CHLORIDE 9 MG/ML
1000 INJECTION, SOLUTION INTRAVENOUS CONTINUOUS
Status: DISCONTINUED | OUTPATIENT
Start: 2020-03-16 | End: 2020-03-16 | Stop reason: HOSPADM

## 2020-03-16 ASSESSMENT — ENCOUNTER SYMPTOMS
HEADACHES: 1
SHORTNESS OF BREATH: 0
COUGH: 1
FEVER: 1

## 2020-03-16 ASSESSMENT — MIFFLIN-ST. JEOR: SCORE: 1277.1

## 2020-03-16 NOTE — ED AVS SNAPSHOT
Emergency Department  [address] [place] [address]  Phone:  [phone]  Fax:  [phone]                                    Krissy Weldon   MRN: [id number]    Department:   Emergency Department   Date of Visit:  3/16/2020           After Visit Summary Signature Page    I have received my discharge instructions, and my questions have been answered. I have discussed any challenges I see with this plan with the nurse or doctor.    ..........................................................................................................................................  Patient/Patient Representative Signature      ..........................................................................................................................................  Patient Representative Print Name and Relationship to Patient    ..................................................               ................................................  Date                                   Time    ..........................................................................................................................................  Reviewed by Signature/Title    ...................................................              ..............................................  Date                                               Time          22EPIC Rev 08/18

## 2020-03-16 NOTE — DISCHARGE INSTRUCTIONS
You are being tested for Corona virus and possibly Influenza and/or RSV.     Please use the information at the end of this document to sign up for [facility] where you can get your results and a message about those results sent to you through the [employer] ePrint application. If you do not have mychart we will call you with your results but it may take longer.    Isolate Yourself:  Isolate yourself while traveling.  Do Not allow any visitors within 6 feet.  Do Not go to work or school.  Do Not go to Christianity,  centers, shopping, or other public places.  Do Not shake hands.  Avoid close contact with others (hugging, kissing).    Protect Others:  Cover Your Mouth and Nose with a mask, disposable tissue or wash cloth to avoid spreading germs to others.  Wash your hands and face frequently with soap and water    Call Back If: Breathing difficulty develops or you become worse.    For more information about COVID19 and options for caring for yourself at home, please visit the CDC website at https://www.cdc.gov/coronavirus/2019-ncov/about/steps-when-sick.html  For more options for care at [facility], please visit our website at [email or web address]    Instructions for Patients  It is recommended that you stay home with mild symptoms.  To do this follow these instructions:    Isolate Yourself:  Isolate yourself at home.   Do Not allow any visitors  Do Not go to work or school  Do Not go to Christianity,  centers, shopping, or other public places.  Do Not shake hands.  Avoid close contact with others (hugging, kissing).    Protect Others:  Cover Your Mouth and Nose with a mask, disposable tissue or wash cloth to avoid spreading germs to others.  Wash your hands and face frequently with soap and water.    Fever Medicines:  For fever relief, take acetaminophen or ibuprofen.  Treat fevers above 101  F (38.3  C) to lower fevers and make you more comfortable.   Acetaminophen  (e.g., Tylenol): Take 650 mg (two 325 mg pills) by mouth every 4-6 hours as needed of regular strength Tyleno or 1,000 mg (two 500 mg pills) every 8 hours as needed of Extra Strength Tylenol.   Ibuprofen (e.g., Motrin, Advil): Take 400 mg (two 200 mg pills) by mouth every 6 hours as needed.   Acetaminophen is thought to be safer than ibuprofen or naproxen for people over 65 years old. Acetaminophen is in many OTC and prescription medicines. It might be in more than one medicine that you are taking. You need to be careful and not take an overdose. Before taking any medicine, read all the instructions on the package.  Caution -NSAIDs (e.g., ibuprofen, naproxen): Do not take nonsteroidal anti-inflammatory drugs (NSAIDs) if you have stomach problems, kidney disease, heart failure, or other contraindications to using this type of medicine. Do not take NSAID medicines for over 7 days without consulting your PCP. Do not take NSAID medicines if you are pregnant. Do not take NSAID medicines if you are also taking blood thinners.     Call Back If: Breathing difficulty develops or you become worse.    Thank you for limiting contact with others, wearing a simple mask to cover your cough, practice good hand hygiene habits and accessing our virtual services where possible to limit the spread of this virus.    For more information about COVID19 and options for caring for yourself at home, please visit the CDC website at https://www.cdc.gov/coronavirus/2019-ncov/about/steps-when-sick.html  For more options for care at [facility], please visit our website at [email or web address]

## 2020-03-16 NOTE — ED PROVIDER NOTES
"  History     Chief Complaint:  Flu Symptoms      HPI   Krissy Weldon is a 64 year old female who presents to the emergency department for evaluation of flu symptoms. The patient reports today with a fever up to 102, cough, headache, and body aches since Saturday (3/14). The patient is on methotrexate and Humira for psoriatic arthritis. She has not traveled recently. The patient denies shortness of breath or any other complaints.    Allergies:  No known drug allergies     Medications:    Humira  Lac-hydrin  Omnicef  Duragesic  Prozac  Folvite  Synthroid  Deltasone  Senokot    Past Medical History:    Hypothyroid  Costochondritis, acute  CMC DJD  RUQ abdominal pain  Elevated LFTs  RUQ pain  Nausea with vomiting  Small bowel obstruction  Psoriatic arthritis  Other chronic pain  Inflammatory spondylopathy of multiple sites in spine  Neck pain, chronic  Acute meniscal tear of knee  Depression  DJD, lumbar  Episcleritis  Hamstring tendonitis at origin  Hypothyroidism  PONV  Strip throat    Past Surgical History:    Appendectomy  Arthroplasty carpometacarpal x2  Cholecystectomy  Colonoscopy  Endoscopic retrograde cholangiopancreatogram  Gallbladder surgery  GYN surgery  UGI endoscopy w EUS  Hysterectomy  Right thumb surgery  Sacroiliac therapeutic injection bilateral    Family History:    Arthritis  Prostate cancer  Rheumatoid arthritis  Chrohns  Endometrial cancer    Social History:  Never smoker  Negative for alcohol use.  Negative for drug use.  Marital Status:      Review of Systems   Constitutional: Positive for fever.   Respiratory: Positive for cough. Negative for shortness of breath.    Musculoskeletal:        Denies body aches   Neurological: Positive for headaches.   All other systems reviewed and are negative.        Physical Exam     Patient Vitals for the past 24 hrs:   BP Temp Temp src Heart Rate Resp SpO2 Height Weight   03/16/20 1707 108/77 99.2  F (37.3  C) Oral 74 16 98 % 1.651 m (5' 5\") 72.1 kg " (159 lb)     Physical Exam  Nursing note and vitals reviewed.  Constitutional:  Oriented to person, place, and time. Cooperative.   HENT:   Nose:    Nose normal.   Mouth/Throat:   Mucous membranes are normal.   Eyes:    Conjunctivae normal and EOM are normal.      Pupils are equal, round, and reactive to light.   Neck:    Trachea normal.   Cardiovascular:  Normal rate, regular rhythm, normal heart sounds and normal pulses. No murmur heard.  Pulmonary/Chest:  Effort normal and breath sounds normal.   Abdominal:   Soft. Normal appearance and bowel sounds are normal.      There is no tenderness.      There is no rebound and no CVA tenderness.   Musculoskeletal:  Extremities atraumatic x 4.   Lymphadenopathy:  No cervical adenopathy.   Neurological:   Alert and oriented to person, place, and time. Normal strength.      No cranial nerve deficit or sensory deficit. GCS eye subscore is 4. GCS verbal subscore is 5. GCS motor subscore is 6.   Skin:    Skin is intact. No rash noted.   Psychiatric:   Normal mood and affect.    Emergency Department Course     Imaging:  Radiology findings were communicated with the patient who voiced understanding of the findings.    XR Chest 2 Views:  There are no acute infiltrates. The cardiac silhouette is  not enlarged. Pulmonary vasculature is unremarkable.  As per radiology.    Laboratory:  Laboratory findings were communicated with the patient who voiced understanding of the findings.    CBC: WNL. (WBC 4.1, HGB 13.8, )   CMP: Glucose 102 (H), Bilirubin Total 0.1 (L) o/w WNL (Creatinine 0.86)    Lactic acid whole blood 0.7     Influenza A & B Antigen: Influenza A Negative, Influenza B Negative    Novel COVID-19 SARS-CoV-2 by PCR Nasopharyngeal swab In process    Emergency Department Course:    1711 Nursing notes and vitals reviewed.    1723 I performed an exam of the patient as documented above.     1739 IV was inserted and blood was drawn for laboratory testing, results  above.    1752 A nasopharyngeal sample was obtained for laboratory testing as documented above.    Findings and plan explained to the Patient. Patient discharged home with instructions regarding supportive care, medications, and reasons to return. The importance of close follow-up was reviewed.     Impression & Plan     Medical Decision Making:  Krissy Weldon is a 64 year old female who presents to the emergency department today with flu like symptoms for the last couple of days. She was instructed to come here given the fact that she is immunocompromised with methotrexate and Humira. She had a relatively normal exam here, and did not appear septic, toxic, or in any respiratory distress. Given her immunocompromised status, I felt it was best to proceed with the above workup including the blood work, chest XR, influenza swab, and COVID-19 swab. Her workup here is unremarkable, other than the COVID swab which is still pending. I feel that she is safe for discharge, and she was provided discharge instructions with regards to quarantining at home until she receives word of the COVID testing results. She should return with any concerns or worsening symptoms.     Discharge Diagnosis:    ICD-10-CM    1. Viral URI with cough  J06.9     B97.89    2. Influenza-like illness  R69      Disposition:  The patient is discharged to home.     Scribe Disclosure:  I, Shen Lezama, am serving as a scribe at 5:22 PM on 3/16/2020 to document services personally performed by Tate Shaffer MD based on my observations and the provider's statements to me.        Tate Shaffer MD  03/16/20 [id number]

## 2020-03-19 ENCOUNTER — DOCUMENTATION ONLY (OUTPATIENT)
Dept: CARE COORDINATION | Facility: CLINIC | Age: 65
End: 2020-03-19

## 2020-03-23 ENCOUNTER — TELEPHONE (OUTPATIENT)
Dept: EMERGENCY MEDICINE | Facility: CLINIC | Age: 65
End: 2020-03-23

## 2020-03-23 NOTE — TELEPHONE ENCOUNTER
Patient seen in ED for influenza type symptoms  Patient is immune suppressed with Humira use for psoriatic arthritis.  Patient feeling much better.  Wondering when she can resume her Humira.  I advised her to discuss with her rheumatologist.  She has not been contacted by MD as of yet.  She had no other questions or concerns.

## 2020-04-01 LAB
LAB SCANNED RESULT: ABNORMAL
SARS-COV-2 RNA SPEC QL NAA+PROBE: DETECTED

## 2020-04-08 DIAGNOSIS — M06.4 INFLAMMATORY POLYARTHROPATHY (H): ICD-10-CM

## 2020-04-10 RX ORDER — METHOTREXATE 25 MG/ML
INJECTION INTRA-ARTERIAL; INTRAMUSCULAR; INTRATHECAL; INTRAVENOUS
Qty: 8 ML | Refills: 1 | Status: SHIPPED | OUTPATIENT
Start: 2020-04-10 | End: 2020-06-09

## 2020-04-10 NOTE — TELEPHONE ENCOUNTER
methotrexate sodium, pres-free, 50 MG/2ML SOLN injection      Last Written Prescription Date:  2-  Last Fill Quantity: 8 ml,   # refills: 1  Last Office Visit: 10-22-19  Future Office visit:  4-    CBC RESULTS:   Recent Labs   Lab Test 03/16/20  1739   WBC 4.1   RBC 4.70   HGB 13.8   HCT 41.5   MCV 88   MCH 29.4   MCHC 33.3   RDW 15.3*          Creatinine   Date Value Ref Range Status   03/16/2020 0.86 0.52 - 1.04 mg/dL Final   ]    Liver Function Studies -   Recent Labs   Lab Test 03/16/20  1739   PROTTOTAL 7.6   ALBUMIN 3.6   BILITOTAL 0.1*   ALKPHOS 90   AST 33   ALT 48       Kathleen M Doege RN

## 2020-04-29 ENCOUNTER — VIRTUAL VISIT (OUTPATIENT)
Dept: RHEUMATOLOGY | Facility: CLINIC | Age: 65
End: 2020-04-29
Attending: NURSE PRACTITIONER
Payer: COMMERCIAL

## 2020-04-29 DIAGNOSIS — L40.9 PSORIASIS: ICD-10-CM

## 2020-04-29 DIAGNOSIS — L40.50 PSORIATIC ARTHRITIS (H): ICD-10-CM

## 2020-04-29 RX ORDER — BUPRENORPHINE 15 UG/H
PATCH TRANSDERMAL
COMMUNITY
Start: 2020-04-24 | End: 2022-01-25

## 2020-04-29 ASSESSMENT — PAIN SCALES - GENERAL: PAINLEVEL: MODERATE PAIN (5)

## 2020-04-29 NOTE — PROGRESS NOTES
"Krissy Weldon is a 64 year old female who is being evaluated via a billable video visit.      The patient has been notified of following:     \"This video visit will be conducted via a call between you and your physician/provider. We have found that certain health care needs can be provided without the need for an in-person physical exam.  This service lets us provide the care you need with a video conversation.  If a prescription is necessary we can send it directly to your pharmacy.  If lab work is needed we can place an order for that and you can then stop by our lab to have the test done at a later time.    Video visits are billed at different rates depending on your insurance coverage.  Please reach out to your insurance provider with any questions.    If during the course of the call the physician/provider feels a video visit is not appropriate, you will not be charged for this service.\"    Patient has given verbal consent for Video visit? Yes    How would you like to obtain your AVS? Leo    Patient would like the video invitation sent by: Send to e-mail at: [email or web address]    Will anyone else be joining your video visit? No        Video-Visit Details    Type of service:  Video Visit    Video Start Time: 11:30 AM  Video End Time: 11:51 AM    Originating Location (pt. Location): Home    Distant Location (provider location):  [facility] RHEUMATOLOGY     Mode of Communication:  Video Conference via [employer]      MARIANNA Santos [facility] - Rheumatology Clinic Visit    Krissy Weldon  is a 63 year old female who presents with follow-up for undifferentiated polyarthropathy, psoriatic arthritis. Complicated by eye inflammation (uveitis and scleritis--sees [facility]), tendonitis, costrochondritis, tendon tears, synovitis, dactylitis, OA/DJD jessica thumbs CMC, now toe contractures and laxity all hand/wrist joints, bowel changes (sister with crohns and dad with inflammatory " bowel). Methotrexate since . Former nurse anesthestist    Copy forward: [-SSa, -SSb, -CCP,HLA-B27 negative on outside workup with previous SI injections] with hx- inflammatory eye left eye episcleritis requiring prednisone gtt or oral with bone scan unrevealing. Previous medrol or steroid responsiveness. Past tried humira, SSZ, simponi SQ/IV, remicade and otelza. Failed humira EoW recurrent episcleritis, right wrist, right SI, bilateral hamstrings tendonosis with partial tear bilaterally. Failed simponi sq/IV ineffective. SSZ and oral MTX. Past recurrent iritis (ER if eye pain, blurred vision, red eye). Remicade. Otezla. LLL pneumonia 3/2015. MRI L hip showed high grade tear gluteus minimus insertion site, effusion 4/2015. C/o neck issues with MRI cervical spine show DJD, EMG, paramedian osteophytes and disk protrusion at C3-C4 with some moderate central canal stenosis and moderate to severe right-sided foraminal stenosis as a consequence.  C5-C6 also showed some mild to moderate central canal stenosis and moderate bilateral central foraminal stenosis.  Seen Dr. Wheeler with significant improvement in GI issues pancreatic sphinctor dysfunction requiring surgery now on pancreatic enzymes after pancreatic duct is open. Failed certolizumab pegol (cimzia) lost effectiveness (more uveitis, arthritis, synovitis,bursitis and tendonitis, strept and CAP). Secukinumab (cosentyx) 2-2019 more laxity in joints hands/wrists, inflammatory eye, toe contractures, changes in skinFailed  concern for worsening tendons, more bowel symptoms (+family history crohns and brother as well  stopped then changed to adalimumab (humira)      Copy forward  June 26, 2019  Continues on secukinumab (cosentyx) 150 mg injection every 30 days. Prior dramatic improved with loading dose then loss of effectiveness after 10 days. Feels like adalimumab (humira) worked better    Reports changes in skin (leathery) and other changes, seeing Dr. Conti  "tomorrow for formal evaluation  Reports more diarrhea and constipation with cramps, no blood in stools, on prednisone 5 mg day and seen eye provider dx inflammatory eye on prednisone gtts and other drops, more laxity of hand and wrist joints, fullness over the joints, the toes are now contracted and not able to straighten, the bilateral 3rd palm of hand tendon is tight hard to straigthen,  overall stiff in all joints and tendons, does not feel this is working, neck pain, bowels are irregular. No liver pain. Colonoscopy 2014 no inflammation. Methotrexate  25 mg injection every 7 days, folic acid  2 mg day except days before. Pain in feet. Seen podiatry at  Ortho told mild arthritis but this was more tendons and ligaments. Neck is more stiff and now as able to move, some kyphosis      Denies any fever, chills, SOB, SANCHEZ, night sweats, or chest pain, or cough. Reports healthy. Caring for partner full-time. The pain impacts her function. Right SI injection, and RFA L4-S1 injection [place] [facility] [place]. No loss of bowel or bladder, no arm weakness or pain. DEXA 7-2018 T-1.8 low bone density, worsening was referred to endo due to risk osteoporosis she will do this in the future.    April 29, 2020    Covid-19 (coronavirus) 3-16 was very ill until the end of March where she was exposed she believes when bringing her partner to appt \"I had to walk through an urgent care\". Her partner passed 3-31 for Covid-19 (coronavirus)     Still some lingering cough and loss of taste, fatigue and is sad depressed. Psoriatic arthritis severely flared during and off her arthritis medications, was off until the end of March. Still active in bilateral 2-4 fingers claw with swelling in middles knuckles where can straighten, right ball of the feet and the hips are harder to externally rotate. Prior to this was doing well. Is some better. Next adalimumab (humira) next Tues #3. DEXA scan [id number] T-1.4 Denies any " fever, chills, SOB, SANCHEZ, night sweats, or chest pain, high fever. No eye flares. Skin is good.     Adalimumab (humira) citrate free every 14 days, methotrexate  15 mg 0.6 mg injection every 7 days MOn, folic acid 3 mg day before and 2 mg other days, no prednisone since last summer. Tolerating no side-effects. Fibroscan 0-1. On new pain patch butran off fentanyl which was not working and is doing better.      PROBLEM LIST: Past medical history is notable for her being presumptively treated for Lyme with doxycycline after developing a targetoid lesion, for a history of Hashimoto's thyroiditis with subsequent hypothyroidism, for the diagnosis now of psoriasis, and for a history of depression.Neck pain, cervical stenosis-- MRI  +moderate central stenosis right C3-4, C5-6 degenerative changes 2nd mild-mod central stenosis. Past referral to physiatry-wishes to return to Dr. Ed[facility] Left hip pain s/p tear needs THR she reports. B) Hx-Bilateral tendonitis hamstrings with right bursitis, partial tear per Dr. Gomez s/p injections. Seen Dr. Arvin Xie at [facility] s/p ablation SI joint, tendon insertion site. Sees Dr. Maciel Dukes. C). Transient elevation LFT 2/2013 [ NL after held MTX and tramadol 2wk]; transient 4- [ AST 89]. NL . Other hx --Panceatic sphinctor dysfunction. On pancreatic enzymes. SBO 9-2015, now no doing well. Right thumb DJD, s/p surgery . Healed.  1. Diffuse degenerative joint disease that is both clinically apparent and obvious on multiple imaging modalities including bone scan, MRI 2/2013 or cervical. DJD L4-L5, L5-S1 per MRI 7/2012; MRI [id number]   2. Diffuse inflammatory arthritis with marked morning stiffness, no systemic inflammation by blood tests, but greater than 80% clinical improvement with a Medrol Dosepak.   3. Onset of the inflammatory joint symptoms including sacroiliitis with the development of psoriasis, suggesting that this represents  psoriatic arthritis.   4. Development of episcleritis in the left eye with improvement with steroid eyedrops 12/14/2011 with recurrent episodes when wean oral prednisone (9/2012)  5. History of Hashimoto's thyroiditis.   6. History of presumptive treatment for Lyme disease with doxycycline after development of a targetoid lesion.   7. Poor tolerance of sulfasalazine and oral methotrexate.   8. Bilateral hamstring tendonosis, right partial tear, sacroilitis 7/2012. See MRI, repeat 2/2013 hamstring and right SI inflammation seeing Dr. Arvin Xie at [facility] specialized in SI. , left hamstring   9. DJD L4-L5, L5-S1 per MRI 7/2012  10. Intermittent prednisone exposure  11. Transient elevation LFT ALT 84/AST 58 2/2013 (nl after held MTX and tramadol, then increased folic acid 2mg day)  12. Past LUE burn developed into cellulitis resolved from keflex. Bronchitis now on zithromax irritating her costrochondritis. Improving after 1 day  13. 1-2014 Left knee meniscal tear with chrondomalacia per MRI (had Rt/Lt CMJ surgery)  14. 4- RUQ pain.   15.  Hx recurrent left episcleritis   16. Right thumb tendon surgery with Dr. [facility]  17. Pneumonia 3-2015; strept  and 4-2017    18. Covid-19 (coronavirus) 3-2020     Past Medical History:   Diagnosis Date     Acute meniscal tear of knee 1/2014    with chrondromalacia per MRI      Depression      DJD (degenerative joint disease), lumbar 7/2012    L4-5, L5-S1 per MRI      Episcleritis 12/14/2011     Hamstring tendonitis at origin 7/2012    Bilaterally with partial tear right, sacroilitis per MRI     Hypothyroid 9/7/2011     Hypothyroidism      PONV (postoperative nausea and vomiting)      Psoriatic arthritis (H) 9/7/2011     Psoriatic arthritis (H) 2/9/2016     Strep throat 04/2017       Surgical-She has a surgical history including appendectomy in 1980, cholecystectomy in approximately 1993, and hysterectomy without oophorectomy in 1996.  Ablation  SI Right 4/25 and left Feb 26th 2017 -- pain clinic   PT for her hip--told by therapy could feel the bursitis. Told needs THR and injection of left hip Feb 10th 2017 -  Past Surgical History:   Procedure Laterality Date     APPENDECTOMY       ARTHROPLASTY CARPOMETACARPAL (THUMB JOINT)  11/8/2013    Procedure: ARTHROPLASTY CARPOMETACARPAL (THUMB JOINT);  Left First Carpometacarpal Trapezium Resection, Tendon Interposition  ;  Surgeon: Luiza Farrar MD;  [facility]     ARTHROPLASTY CARPOMETACARPAL (THUMB JOINT)  12/20/2013    Procedure: ARTHROPLASTY CARPOMETACARPAL (THUMB JOINT);  Right Thumb Trapezium Resection With Flexor Carpi Radialis Tendon Reconstruction       CHOLECYSTECTOMY       COLONOSCOPY  5/6/2014    Procedure: COLONOSCOPY;  Surgeon: Adria San MD;  Location:  GI     ENDOSCOPIC RETROGRADE CHOLANGIOPANCREATOGRAM  6/13/2014    Procedure: ENDOSCOPIC RETROGRADE CHOLANGIOPANCREATOGRAM;  Surgeon: Lianna Wheeler MD;  Location: [facility] OR     GALLBLADDER SURGERY       GYN SURGERY      hysterectomy and oophorectomy      [facility] ENDOSCOPY W EUS Left 6/10/2014    Procedure: COMBINED ENDOSCOPIC ULTRASOUND, ESOPHAGOSCOPY, GASTROSCOPY, DUODENOSCOPY (EGD);  Surgeon: Lianna Wheeler MD;  Location: [facility] GI     HYSTERECTOMY       Right thumb surgery  7/2015     XR SACROILIAC THERAPEUTIC INJECTION BILATERAL  12/2011       FH:  No autoimmune disorders, psoriasis, UC, crohn's, SLE, RA, PsA, gout, autoimmune thyroid.  No MS, heart disease or cancer in family  Mother-endometrial cancer, RHEUMATOID ARTHRITIS (RA)   Father-psoriasis, lymphoma, colon and prostate cancer, inflammatory bowel   Siblings-sister rheumatoid arthritis (RA) and OSTEOARTHRITIS  And crohns   Brother autoimmune process sees Dr. Ni   [facility] RHEUMATOID ARTHRITIS (RA)       SH:  No Alcohol. No Smoking. No IVDU. Partner on list for lung transplant     [facility] personally reviewed and updated by me.    ROS:  CONSTITUTIONAL: No  fevers, night sweats or unintentional weight change. No acute distress, swollen glands  EYES: No vision change, diplopia, pain in eyes or red eyes   EARS, NOSE, MOUTH, THROAT: No tinnitus or hearing change, no epistaxis or nasal discharge, no oral lesions, throat clear. Normal saliva pool.  No drymouth. No thyroid Enlargement.   CARDIOVASCULAR: No chest pain, palpitations, or pain with walking, no orthopnea or PND   RESPIRATORY: No dyspnea, cough, shortness of breath or wheezing. No pleurisy.   GI: See above   : No change in urine, no dysuria or hematuria   MUSCKL: See above   INTEGUMENTARY: No concerning lesions or moles   NEURO: No loss of strength or sensation, no numbness or tingling, no tremor, no dizziness, no headache. No falls   ENDO: No polyuria or polydipsia, no temperature intolerance   HEME/LYMPH:No concerning bumps, bleeding problems, or swollen lymph nodes. No recent infections, hospitalizations or new illnesses.   Otherwise 14 point ROS obtained, reviewed and found negative.     Assessment via video:    Constitutional: [id number] cooperative  Eyes: nl EOM, PERRLA, vision, conjunctiva, sclera  ENT: nl external ears, nose, hearing, lips, teeth, gums, throat  Neck: no mass or thyroid enlargement  RESP: No cough, no audible wheezing, able to talk in full sentences  MS:  pannus over MCPs, PIPs, severe laxity of all hand and wrist joints, bilateral 2-4 PIP swelling with contractures not able to straighten, right MTP swelling, contractures in toes, palpable MTPs posteriorly with high arches. Reduced ROM neck laterally and extension. Kyphosis. Right finger contracture.   Skin: no nail pitting, alopecia, rash  Neuro: nl cranial nerves, strength, sensation  Psych: nl judgement, orientation, memory, affect.  PSYCH: Alert and oriented times 3; coherent speech, normal   rate and volume, able to articulate logical thoughts, able   to abstract reason, no tangential thoughts, no hallucinations   or delusions  His  affect is normal and pleasant  Remainder of exam unable to be completed due to video visits    Labs/Imaging:    No results found for any visits on 04/29/20.  Component      Latest Ref Rng & Units 3/16/2020   WBC      4.0 - 11.0 10e9/L 4.1   RBC Count      3.8 - 5.2 10e12/L 4.70   Hemoglobin      11.7 - 15.7 g/dL 13.8   Hematocrit      35.0 - 47.0 % 41.5   MCV      78 - 100 fl 88   MCH      26.5 - 33.0 pg 29.4   MCHC      31.5 - 36.5 g/dL 33.3   RDW      10.0 - 15.0 % 15.3 (H)   Platelet Count      150 - 450 10e9/L 198   Diff Method       Automated Method   % Neutrophils      % 44.8   % Lymphocytes      % 35.3   % Monocytes      % 19.2   % Eosinophils      % 0.0   % Basophils      % 0.5   % Immature Granulocytes      % 0.2   Nucleated RBCs      0 /100    Absolute Neutrophil      1.6 - 8.3 10e9/L 1.8   Absolute Lymphocytes      0.8 - 5.3 10e9/L 1.5   Absolute Monocytes      0.0 - 1.3 10e9/L 0.8   Absolute Eosinophils      0.0 - 0.7 10e9/L 0.0   Absolute Basophils      0.0 - 0.2 10e9/L 0.0   Abs Immature Granulocytes      0 - 0.4 10e9/L 0.0   Absolute Nucleated RBC          Sodium      133 - 144 mmol/L 136   Potassium      3.4 - 5.3 mmol/L 3.9   Chloride      94 - 109 mmol/L 107   Carbon Dioxide      20 - 32 mmol/L 25   Anion Gap      3 - 14 mmol/L 4   Glucose      70 - 99 mg/dL 102 (H)   Urea Nitrogen      7 - 30 mg/dL 16   Creatinine      0.52 - 1.04 mg/dL 0.86   GFR Estimate      >60 mL/min/1.73:m2 72   GFR Estimate If Black      >60 mL/min/1.73:m2 83   Calcium      8.5 - 10.1 mg/dL 8.6   Bilirubin Total      0.2 - 1.3 mg/dL 0.1 (L)   Albumin      3.4 - 5.0 g/dL 3.6   Protein Total      6.8 - 8.8 g/dL 7.6   Alkaline Phosphatase      40 - 150 U/L 90   ALT      0 - 50 U/L 48   AST      0 - 45 U/L 33   Influenza A/B Agn Specimen       Nasopharyngeal   Influenza A      NEG:Negative Negative   Influenza B      NEG:Negative Negative   Lab Scanned Result       SARS-COV-2 BY PCR-Scanned (A)   COVID-19 Virus (SARS-CoV-2) by  PCR      NOT DETECTED DETECTED   Lactic Acid      0.7 - 2.0 mmol/L 0.7     Impression/Plan:    1.Psoriatic arthritis w/axial involvement activity with hx episcleritis/uveities, dactylitis, tendonitis, left hamstring tear, contractures, loss ROM. Interval Covid-19 (coronavirus) was very ill and flared her arthritis to point now her bilateral 2-4 PIP are contracted and loss ROM hips. I am not sure we will get the ROM back, had changes in toes now contractures,  Needs THR.  Aprimilast (otezla) contraindicated due to depression and does have axial disease so I would not favor this. She is not working and nor can she return to work due to her chronic pain, impairments.  High risk of further deformities. We will continue this plan since this was working before she got ill, but I did inform her I am not sure how much ROM she will get back. She should avoid prednisone and follow the strict CDC Covid-19 (coronavirus) guidelines but go [email or web address] if worse or not improved.   -High risk medications, labs normal   2. [id number] ophthalmologist.   No sx today   3. Psoriasis and skin changes. Referral to derm for formal consult.   4. High risk medications monitoring, labs every 12 weeks. Normal today.   5. Low bone density per DEXA  T-1.4, worsening. Given high risk osteoporosis ,DEXA every 2 week. Continue calcium with vitamin D   6. Chronic pain under care of [facility] Dr. Xie. Spine per [facility]   -Calcium and vitamin D. Complete physical due she wishes to come here schedule after Covid-19 (coronavirus) pandemic    -RTC 2 mth me and 4 mth Dr. Samayoa    The patient understood the rationale for the diagnosis and treatment plan. Patient shared in the decision making. All questions were answered to best of my ability and the patient's satisfaction  Miguel Umana APRREYMUNDO, CNP, MSN  [facility]  Department of Rheumatology & Autoimmune Disorders

## 2020-06-03 DIAGNOSIS — M06.4 INFLAMMATORY POLYARTHROPATHY (H): ICD-10-CM

## 2020-06-03 DIAGNOSIS — L40.50 PSORIATIC ARTHRITIS (H): ICD-10-CM

## 2020-06-03 DIAGNOSIS — L40.9 PSORIASIS: ICD-10-CM

## 2020-06-05 RX ORDER — METHOTREXATE 25 MG/ML
INJECTION INTRA-ARTERIAL; INTRAMUSCULAR; INTRATHECAL; INTRAVENOUS
Qty: 8 ML | Refills: 1 | OUTPATIENT
Start: 2020-06-05

## 2020-06-05 RX ORDER — ADALIMUMAB 40MG/0.4ML
40 KIT SUBCUTANEOUS
Qty: 2 EACH | Refills: 4 | Status: SHIPPED | OUTPATIENT
Start: 2020-06-05 | End: 2020-09-15

## 2020-06-09 ENCOUNTER — MYC REFILL (OUTPATIENT)
Dept: RHEUMATOLOGY | Facility: CLINIC | Age: 65
End: 2020-06-09

## 2020-06-09 DIAGNOSIS — M06.4 INFLAMMATORY POLYARTHROPATHY (H): ICD-10-CM

## 2020-06-09 NOTE — TELEPHONE ENCOUNTER
methotrexate sodium, pres-free, 50 MG/2ML SOLN injection   Last Written Prescription Date:  4/10/20  Last Fill Quantity: 8ml,   # refills: 1  Last Office Visit: 4/29/20  Future Office visit: 7/1/20    CBC RESULTS:   Recent Labs   Lab Test 03/16/20  1739   WBC 4.1   RBC 4.70   HGB 13.8   HCT 41.5   MCV 88   MCH 29.4   MCHC 33.3   RDW 15.3*          Creatinine   Date Value Ref Range Status   03/16/2020 0.86 0.52 - 1.04 mg/dL Final   ]    Liver Function Studies -   Recent Labs   Lab Test 03/16/20  1739   PROTTOTAL 7.6   ALBUMIN 3.6   BILITOTAL 0.1*   ALKPHOS 90   AST 33   ALT 48       Routing refill request to provider for review/approval because: labs due in 1 wk.

## 2020-06-10 RX ORDER — METHOTREXATE 25 MG/ML
20 INJECTION INTRA-ARTERIAL; INTRAMUSCULAR; INTRATHECAL; INTRAVENOUS
Qty: 8 ML | Refills: 2 | Status: SHIPPED | OUTPATIENT
Start: 2020-06-10 | End: 2020-07-01

## 2020-06-11 ENCOUNTER — VIRTUAL VISIT (OUTPATIENT)
Dept: DERMATOLOGY | Facility: CLINIC | Age: 65
End: 2020-06-11
Attending: DERMATOLOGY
Payer: COMMERCIAL

## 2020-06-11 DIAGNOSIS — R21 CUTANEOUS ERUPTION: ICD-10-CM

## 2020-06-11 DIAGNOSIS — L40.50 PSORIATIC ARTHRITIS (H): ICD-10-CM

## 2020-06-11 DIAGNOSIS — L40.9 PSORIASIS: Primary | ICD-10-CM

## 2020-06-11 ASSESSMENT — PAIN SCALES - GENERAL: PAINLEVEL: MODERATE PAIN (5)

## 2020-06-11 NOTE — PROGRESS NOTES
[facility]  Dermatology-Rheumatology Clinic  Salvador Conti MD  2019      Name: Krissy Weldon  MRN: [id number]  Age: 64 year old  : 1955  Referring provider: Miguel Umana     Dermatology Problem List:   1. Psoriatic Arthritis  2. Psoriasis  3. Actinic keratoses   SH:  Former nurse anesthestist     Encounter Date: 2020  Last visit: 2019      Interval Hx  At her last visit, we discussed several options for her psoriasis/psoriatic arthritis. including:  Infliximab 5mg q4 weeks, weekly Humira, IL23 inhibitors (limited data), or Toficitinab. We also did a fibroscan which showed she has limited to ni fibrosis. Since then she has continued on humira every other week as well as methotrexate 15,g weekly, folic acid, Of note, her partner passed away from COVID-19 and she thinks she got it. Had chronic cough, loss of taste/smell and feeling depressed regarding her loss. She held her TNF-inhibitor until march and her joints flared. At her last visit I also gave her lac-hydrin for xerosis and treated 2 AKs.      Since restarting methotrexate, she has had much more issues with nausea and fatigue after injection.     She still hs issues with taste,. Still having issues with feet and hand stiffness. Still having significant swelling. She notes her skin has extremely dry. She notes that it burns. She now is using vanicream. She notes that her skin is cracking and red and itching in areas. She notes that she has cracking and crusty and itchy above eyes. Will also send her desonide ointment BID fr face.     She has a few scaly lesion that are changing, but nothing bleeding painful or rapidly growing, She is concerned about these    She has been using triamcinolone cream on her eyer brows for about 1 year and it does not help.        HPI:   Krissy Weldon  is a 63 year old female who presents with follow-up for undifferentiated polyarthropathy, psoriatic arthritis, hx of psoriasis, uveitis and  scleritis (sees [facility]), tendonitis, costrochondritis, tendon tears, synovitis, dactylitis, OA/DJD jessica thumbs CMC, charlie-term Methotrexate (use since ). Here for second opinion regarding psoriatic arthritis.  She reports Remicade helped with chest and feet pain. She had waning efficacy at only 2 to 3 weeks of relief. Remicade was stopped. Humira was stopped previously as well for waning efficacy. Consentyx was stopped since it was not effective. She is continued on 20mg of methotrexate weekly. She report constant slight pain with walking particularly on the plantar area of the feet and around the achilles insterion sites b/l.     Patient Background History (obtained from chart)   [-SSa, -SSb, -CCP,HLA-B27 negative on outside workup with previous SI injections] with hx- inflammatory eye left eye episcleritis requiring prednisone gtt or oral with bone scan unrevealing. Previous medrol or steroid responsiveness. Past tried humira, SSZ, simponi SQ/IV, remicade and otelza. Failed humira EoW recurrent episcleritis, right wrist, right SI, bilateral hamstrings tendonosis with partial tear bilaterally. Failed simponi sq/IV ineffective. SSZ and oral MTX. Past recurrent iritis (ER if eye pain, blurred vision, red eye). Remicade. Otezla. LLL pneumonia 3/2015. MRI L hip showed high grade tear gluteus minimus insertion site, effusion 4/2015. C/o neck issues with MRI cervical spine show DJD, EMG, paramedian osteophytes and disk protrusion at C3-C4 with some moderate central canal stenosis and moderate to severe right-sided foraminal stenosis as a consequence.  C5-C6 also showed some mild to moderate central canal stenosis and moderate bilateral central foraminal stenosis.  Seen Dr. Wheeler with significant improvement in GI issues pancreatic sphinctor dysfunction requiring surgery now on pancreatic enzymes after pancreatic duct is open. Failed certolizumab pegol (cimzia) lost effectiveness (more uveitis, arthritis,  "synovitis,bursitis and tendonitis, strept and CAP). Secukinumab (cosentyx) 2-2019 more laxity in joints hands/wrists, inflammatory eye, toe contractures, changes in skin stopped then changed to etanercept (enbrel) 6-     Review of Systems:   Pertinent items are noted in HPI or as below, remainder of complete ROS is negative.       Active Medications:   Current Outpatient Medications:      B-D SYRINGE LUER-FILI 1 ML MISC, USE TO INJECT METHOTREXATE EVERY 7 DAYS, Disp: 25 each, Rfl: 0     BD DISP NEEDLES 25G X 5/8\" MISC, USE WITH METHOTREXATE UNDER THE SKIN EVERY 7 DAYS, Disp: 25 each, Rfl: 0     etanercept (ENBREL) 50 MG/ML injection, Inject 0.98 mLs (50 mg) Subcutaneous once a week Hold for signs of infection, and seek medical attention., Disp: 4 Syringe, Rfl: 5     FLUoxetine (PROZAC) 40 MG capsule, Take 40 mg by mouth daily., Disp: , Rfl:      folic acid (FOLVITE) 1 MG tablet, Take 3 tablet a day few days before and day after methotrexate then the other day 2 mg day, Disp: 210 tablet, Rfl: 3     levothyroxine (SYNTHROID) 100 MCG tablet, Take  by mouth daily., Disp: , Rfl:      methotrexate sodium, pres-free, 50 MG/2ML SOLN injection CHEMO, Inject 1 mL (25 mg) Subcutaneous every 7 days *DISCARD REMAINDER** MONITORING LABS DUE EVERY 8 WEEKS, Disp: 8 mL, Rfl: 2     multivitamin, therapeutic (THERA-VIT) TABS, Take 1 tablet by mouth daily, Disp: , Rfl:      predniSONE (DELTASONE) 5 MG tablet, Take 10 mg once a day for 3 weeks then go to 5 mg day until seen., Disp: 60 tablet, Rfl: 2     sennosides (SENOKOT) 8.6 MG tablet, Take 1 tablet by mouth 2 times daily, Disp: 1 tablet, Rfl: 0     Syringe Luer Slip (B-D SYRINGE LUER-FILI) 3 ML MISC, 1 Units by Device route every 7 days, Disp: 25 each, Rfl: 0     vitamin D3 (VITAMIN D3) 1000 units (25 mcg) tablet, Take 2 tablets (2,000 Units) by mouth daily, Disp: 180 tablet, Rfl: 3     cefdinir (OMNICEF) 300 MG capsule, Take 1 capsule (300 mg) by mouth 2 times daily (Patient " not taking: Reported on 4/10/2019), Disp: 20 capsule, Rfl: 0      Allergies:   Review of patient's allergies indicates no known allergies.        Past Medical History:  Past medical history is notable for her being presumptively treated for Lyme with doxycycline after developing a targetoid lesion, for a history of Hashimoto's thyroiditis with subsequent hypothyroidism, for the diagnosis now of psoriasis, and for a history of depression.Neck pain, cervical stenosis-- MRI  +moderate central stenosis right C3-4, C5-6 degenerative changes 2nd mild-mod central stenosis. Past referral to physiatry-wishes to return to Dr. Edmond [facility] Left hip pain s/p tear needs THR she reports. B) Hx-Bilateral tendonitis hamstrings with right bursitis, partial tear per Dr. Gomez s/p injections. Seen Dr. Arvin Xie at [facility] [place] s/p ablation SI joint, tendon insertion site. Sees Dr. Maciel Dukes. C). Transient elevation LFT 2/2013 [ NL after held MTX and tramadol 2wk]; transient 4- [ AST 89]. NL . Other hx --Panceatic sphinctor dysfunction. On pancreatic enzymes. SBO 9-2015, now no doing well. Right thumb DJD, s/p surgery . Healed.  1. Diffuse degenerative joint disease that is both clinically apparent and obvious on multiple imaging modalities including bone scan, MRI 2/2013 or cervical. DJD L4-L5, L5-S1 per MRI 7/2012; MRI [id number]   2. Diffuse inflammatory arthritis with marked morning stiffness, no systemic inflammation by blood tests, but greater than 80% clinical improvement with a Medrol Dosepak.   3. Onset of the inflammatory joint symptoms including sacroiliitis with the development of psoriasis, suggesting that this represents psoriatic arthritis.   4. Development of episcleritis in the left eye with improvement with steroid eyedrops 12/14/2011 with recurrent episodes when wean oral prednisone (9/2012)  5. History of Hashimoto's thyroiditis.   6. History of presumptive treatment  for Lyme disease with doxycycline after development of a targetoid lesion.   7. Poor tolerance of sulfasalazine and oral methotrexate.   8. Bilateral hamstring tendonosis, right partial tear, sacroilitis 7/2012. See MRI, repeat 2/2013 hamstring and right SI inflammation seeing Dr. Arvin Xie at [facility] [place] [facility] specialized in SI. , left hamstring   9. DJD L4-L5, L5-S1 per MRI 7/2012  10. Intermittent prednisone exposure  11. Transient elevation LFT ALT 84/AST 58 2/2013 (nl after held MTX and tramadol, then increased folic acid 2mg day)  12. Past LUE burn developed into cellulitis resolved from keflex. Bronchitis now on zithromax irritating her costrochondritis. Improving after 1 day  13. 1-2014 Left knee meniscal tear with chrondomalacia per MRI (had Rt/Lt CMJ surgery)  14. 4- RUQ pain.   15.  Hx recurrent left episcleritis   16. Right thumb tendon surgery with Dr. [facility]  17. Pneumonia 3-2015; strept  and 4-2017        Past Surgical History:  Surgical-She has a surgical history including appendectomy in 1980, cholecystectomy in approximately 1993, and hysterectomy without oophorectomy in 1996.  Ablation SI Right 4/25 and left Feb 26th 2017 -- pain clinic   PT for her hip--told by therapy could feel the bursitis. Told needs THR and injection of left hip Feb 10th 2017 -     Family History:   No autoimmune disorders, psoriasis, UC, crohn's, SLE, RA, PsA, gout, autoimmune thyroid.  No MS, heart disease or cancer in family  Mother-endometrial cancer, RHEUMATOID ARTHRITIS (RA)   Father-psoriasis, lymphoma, colon and prostate cancer, inflammatory bowel   Siblings-sister rheumatoid arthritis (RA) and OSTEOARTHRITIS  And crohns   Brother autoimmune process sees Dr. Last [facility] RHEUMATOID ARTHRITIS (RA)       Social History:   No Alcohol. No Smoking. No IVDU. Partner on list for lung transplant      Physical Exam: no exam       Assessment and Plan:   # Psoriatic arthritis/Psoriasis  -  We  will continue her current regimen for now as she is getting of her illness and consider a more aggressive medication in  Few months at follow-up. Will consider:    Infliximab 5mg q4 weeks, weekly Humira, IL23 inhibitors (limited data), or Toficitinab    # Xerosis   - Go back to using vanicream twice a day   - Did not like lac-hydrin due to burning    # Eruption NOS  - This sounds like a dermatitis on the body and possible seb derm on the face vs other dermatiis. She will sned     # Hx of actinic keratosis  - She has a few [id number] spots that could be AKs. No rtapidly growing paiunful bleeding spots. Thee plan for skin check in 3-4 months.      Follow-up: 3-4 months    Salvador Conti MD, FAAD, FACP    Departments of Internal Medicine and Dermatology  [facility]  [phone]

## 2020-06-11 NOTE — LETTER
"2020       RE: Krissy Weldon  [address] [address]     Dear Colleague,    Thank you for referring your patient, Krissy Weldon, to the [facility] RHEUMATOLOGY at [facility]. Please see a copy of my visit note below.    Krissy Weldon is a 65 year old female who is being evaluated via a billable video visit.      The patient has been notified of following:     \"This video visit will be conducted via a call between you and your physician/provider. We have found that certain health care needs can be provided without the need for an in-person physical exam.  This service lets us provide the care you need with a video conversation.  If a prescription is necessary we can send it directly to your pharmacy.  If lab work is needed we can place an order for that and you can then stop by our lab to have the test done at a later time.    Video visits are billed at different rates depending on your insurance coverage.  Please reach out to your insurance provider with any questions.    If during the course of the call the physician/provider feels a video visit is not appropriate, you will not be charged for this service.\"    Patient has given verbal consent for Video visit? Yes    Will anyone else be joining your video visit? No        Video-Visit Details    Type of service:  Video Visit    Video Start Time: 11:16 AM  Video End Time: 11:32 AM    Originating Location (pt. Location): Home    Distant Location (provider location):  [facility] RHEUMATOLOGY     Platform used for Video Visit: Jocy Conti MD          [facility]  Dermatology-Rheumatology Clinic  Salvador Conti MD  2019      Name: Krissy Weldon  MRN: [id number]  Age: 64 year old  : 1955  Referring provider: Miguel Umana     Dermatology Problem List:   1. Psoriatic Arthritis  2. Psoriasis  3. Actinic keratoses   SH:  Former nurse anesthestist     Encounter Date: " 2020  Last visit: 06/27/2019      Interval Hx  At her last visit, we discussed several options for her psoriasis/psoriatic arthritis. including:  Infliximab 5mg q4 weeks, weekly Humira, IL23 inhibitors (limited data), or Toficitinab. We also did a fibroscan which showed she has limited to ni fibrosis. Since then she has continued on humira every other week as well as methotrexate 15,g weekly, folic acid, Of note, her partner passed away from COVID-19 and she thinks she got it. Had chronic cough, loss of taste/smell and feeling depressed regarding her loss. She held her TNF-inhibitor until march and her joints flared. At her last visit I also gave her lac-hydrin for xerosis and treated 2 AKs.      Since restarting methotrexate, she has had much more issues with nausea and fatigue after injection.     She still hs issues with taste,. Still having issues with feet and hand stiffness. Still having significant swelling. She notes her skin has extremely dry. She notes that it burns. She now is using vanicream. She notes that her skin is cracking and red and itching in areas. She notes that she has cracking and crusty and itchy above eyes. Will also send her desonide ointment BID fr face.     She has a few scaly lesion that are changing, but nothing bleeding painful or rapidly growing, She is concerned about these    She has been using triamcinolone cream on her eyer brows for about 1 year and it does not help.        HPI:   Krissy Weldon  is a 63 year old female who presents with follow-up for undifferentiated polyarthropathy, psoriatic arthritis, hx of psoriasis, uveitis and scleritis (sees [facility]), tendonitis, costrochondritis, tendon tears, synovitis, dactylitis, OA/DJD jessica thumbs CMC, charlie-term Methotrexate (use since ). Here for second opinion regarding psoriatic arthritis.  She reports Remicade helped with chest and feet pain. She had waning efficacy at only 2 to 3 weeks of relief. Remicade was stopped.  Humira was stopped previously as well for waning efficacy. Consentyx was stopped since it was not effective. She is continued on 20mg of methotrexate weekly. She report constant slight pain with walking particularly on the plantar area of the feet and around the achilles insterion sites b/l.     Patient Background History (obtained from chart)   [-SSa, -SSb, -CCP,HLA-B27 negative on outside workup with previous SI injections] with hx- inflammatory eye left eye episcleritis requiring prednisone gtt or oral with bone scan unrevealing. Previous medrol or steroid responsiveness. Past tried humira, SSZ, simponi SQ/IV, remicade and otelza. Failed humira EoW recurrent episcleritis, right wrist, right SI, bilateral hamstrings tendonosis with partial tear bilaterally. Failed simponi sq/IV ineffective. SSZ and oral MTX. Past recurrent iritis (ER if eye pain, blurred vision, red eye). Remicade. Otezla. LLL pneumonia 3/2015. MRI L hip showed high grade tear gluteus minimus insertion site, effusion 4/2015. C/o neck issues with MRI cervical spine show DJD, EMG, paramedian osteophytes and disk protrusion at C3-C4 with some moderate central canal stenosis and moderate to severe right-sided foraminal stenosis as a consequence.  C5-C6 also showed some mild to moderate central canal stenosis and moderate bilateral central foraminal stenosis.  Seen Dr. Wheeler with significant improvement in GI issues pancreatic sphinctor dysfunction requiring surgery now on pancreatic enzymes after pancreatic duct is open. Failed certolizumab pegol (cimzia) lost effectiveness (more uveitis, arthritis, synovitis,bursitis and tendonitis, strept and CAP). Secukinumab (cosentyx) 2-2019 more laxity in joints hands/wrists, inflammatory eye, toe contractures, changes in skin stopped then changed to etanercept (enbrel) 6-     Review of Systems:   Pertinent items are noted in HPI or as below, remainder of complete ROS is negative.       Active  "Medications:   Current Outpatient Medications:      B-D SYRINGE LUER-FILI 1 ML MISC, USE TO INJECT METHOTREXATE EVERY 7 DAYS, Disp: 25 each, Rfl: 0     BD DISP NEEDLES 25G X 5/8\" MISC, USE WITH METHOTREXATE UNDER THE SKIN EVERY 7 DAYS, Disp: 25 each, Rfl: 0     etanercept (ENBREL) 50 MG/ML injection, Inject 0.98 mLs (50 mg) Subcutaneous once a week Hold for signs of infection, and seek medical attention., Disp: 4 Syringe, Rfl: 5     FLUoxetine (PROZAC) 40 MG capsule, Take 40 mg by mouth daily., Disp: , Rfl:      folic acid (FOLVITE) 1 MG tablet, Take 3 tablet a day few days before and day after methotrexate then the other day 2 mg day, Disp: 210 tablet, Rfl: 3     levothyroxine (SYNTHROID) 100 MCG tablet, Take  by mouth daily., Disp: , Rfl:      methotrexate sodium, pres-free, 50 MG/2ML SOLN injection CHEMO, Inject 1 mL (25 mg) Subcutaneous every 7 days *DISCARD REMAINDER** MONITORING LABS DUE EVERY 8 WEEKS, Disp: 8 mL, Rfl: 2     multivitamin, therapeutic (THERA-VIT) TABS, Take 1 tablet by mouth daily, Disp: , Rfl:      predniSONE (DELTASONE) 5 MG tablet, Take 10 mg once a day for 3 weeks then go to 5 mg day until seen., Disp: 60 tablet, Rfl: 2     sennosides (SENOKOT) 8.6 MG tablet, Take 1 tablet by mouth 2 times daily, Disp: 1 tablet, Rfl: 0     Syringe Luer Slip (B-D SYRINGE LUER-FILI) 3 ML MISC, 1 Units by Device route every 7 days, Disp: 25 each, Rfl: 0     vitamin D3 (VITAMIN D3) 1000 units (25 mcg) tablet, Take 2 tablets (2,000 Units) by mouth daily, Disp: 180 tablet, Rfl: 3     cefdinir (OMNICEF) 300 MG capsule, Take 1 capsule (300 mg) by mouth 2 times daily (Patient not taking: Reported on 4/10/2019), Disp: 20 capsule, Rfl: 0      Allergies:   Review of patient's allergies indicates no known allergies.        Past Medical History:  Past medical history is notable for her being presumptively treated for Lyme with doxycycline after developing a targetoid lesion, for a history of Hashimoto's thyroiditis with " subsequent hypothyroidism, for the diagnosis now of psoriasis, and for a history of depression.Neck pain, cervical stenosis-- MRI  +moderate central stenosis right C3-4, C5-6 degenerative changes 2nd mild-mod central stenosis. Past referral to physiatry-wishes to return to Dr. [facility] Left hip pain s/p tear needs THR she reports. B) Hx-Bilateral tendonitis hamstrings with right bursitis, partial tear per Dr. Gomez s/p injections. [place] [facility] [place] SI joint, tendon insertion site. Sees Dr. Maciel Dukes. C). Transient elevation LFT 2/2013 [ NL after held MTX and tramadol 2wk]; transient 4- [ AST 89]. NL . Other hx --Panceatic sphinctor dysfunction. On pancreatic enzymes. SBO 9-2015, now no doing well. Right thumb DJD, s/p surgery . Healed.  1. Diffuse degenerative joint disease that is both clinically apparent and obvious on multiple imaging modalities including bone scan, MRI 2/2013 or cervical. DJD L4-L5, L5-S1 per MRI 7/2012; MRI [id number]   2. Diffuse inflammatory arthritis with marked morning stiffness, no systemic inflammation by blood tests, but greater than 80% clinical improvement with a Medrol Dosepak.   3. Onset of the inflammatory joint symptoms including sacroiliitis with the development of psoriasis, suggesting that this represents psoriatic arthritis.   4. Development of episcleritis in the left eye with improvement with steroid eyedrops 12/14/2011 with recurrent episodes when wean oral prednisone (9/2012)  5. History of Hashimoto's thyroiditis.   6. History of presumptive treatment for Lyme disease with doxycycline after development of a targetoid lesion.   7. Poor tolerance of sulfasalazine and oral methotrexate.   8. Bilateral hamstring tendonosis, right partial tear, sacroilitis 7/2012. See MRI, repeat 2/2013 hamstring and right SI inflammation seeing Dr. Arvin Xie [place] [facility] [place] [facility] [place] in [place]. , left  hamstring   9. DJD L4-L5, L5-S1 per MRI 7/2012  10. Intermittent prednisone exposure  11. Transient elevation LFT ALT 84/AST 58 2/2013 (nl after held MTX and tramadol, then increased folic acid 2mg day)  12. Past LUE burn developed into cellulitis resolved from keflex. Bronchitis now on zithromax irritating her costrochondritis. Improving after 1 day  13. 1-2014 Left knee meniscal tear with chrondomalacia per MRI (had Rt/Lt CMJ surgery)  14. 4- RUQ pain.   15.  Hx recurrent left episcleritis   16. Right thumb tendon surgery with Dr. [facility]  17. Pneumonia 3-2015; strept  and 4-2017        Past Surgical History:  Surgical-She has a surgical history including appendectomy in 1980, cholecystectomy in approximately 1993, and hysterectomy without oophorectomy in 1996.  Ablation SI Right 4/25 and left Feb 26th 2017 -- pain clinic   PT for her hip--told by therapy could feel the bursitis. Told needs THR and injection of left hip Feb 10th 2017 -     Family History:   No autoimmune disorders, psoriasis, UC, crohn's, SLE, RA, PsA, gout, autoimmune thyroid.  No MS, heart disease or cancer in family  Mother-endometrial cancer, RHEUMATOID ARTHRITIS (RA)   Father-psoriasis, lymphoma, colon and prostate cancer, inflammatory bowel   Siblings-sister rheumatoid arthritis (RA) and OSTEOARTHRITIS  And crohns   Brother autoimmune process sees Dr. Ni   [facility] [place]UMAT[facility]RIT[facility] (RA)       Social History:   No Alcohol. No Smoking. No IVDU. Partner on list for lung transplant      Physical Exam: no exam       Assessment and Plan:   # Psoriatic arthritis/Psoriasis  -  We will continue her current regimen for now as she is getting of her illness and consider a more aggressive medication in  Few months at follow-up. Will consider:    Infliximab 5mg q4 weeks, weekly Humira, IL23 inhibitors (limited data), or Toficitinab    # Xerosis   - Go back to using vanicream twice a day   - Did not like lac-hydrin due to  burning    # Eruption NOS  - This sounds like a dermatitis on the body and possible seb derm on the face vs other dermatiis. She will sned     # Hx of actinic keratosis  - She has a few [id number] spots that could be AKs. No rtapidly growing paiunful bleeding spots. Thee plan for skin check in 3-4 months.      Follow-up: 3-4 months    Salvador Conti MD, FAAD, FACP    Departments of Internal Medicine and Dermatology  [facility]  [phone]

## 2020-06-11 NOTE — PROGRESS NOTES
"Krissy Weldon is a 65 year old female who is being evaluated via a billable video visit.      The patient has been notified of following:     \"This video visit will be conducted via a call between you and your physician/provider. We have found that certain health care needs can be provided without the need for an in-person physical exam.  This service lets us provide the care you need with a video conversation.  If a prescription is necessary we can send it directly to your pharmacy.  If lab work is needed we can place an order for that and you can then stop by our lab to have the test done at a later time.    Video visits are billed at different rates depending on your insurance coverage.  Please reach out to your insurance provider with any questions.    If during the course of the call the physician/provider feels a video visit is not appropriate, you will not be charged for this service.\"    Patient has given verbal consent for Video visit? Yes    Will anyone else be joining your video visit? No        Video-Visit Details    Type of service:  Video Visit    Video Start Time: 11:16 AM  Video End Time: 11:32 AM    Originating Location (pt. Location): Home    Distant Location (provider location):  [facility]     Platform used for Video Visit: Jocy Conti MD        "

## 2020-06-17 DIAGNOSIS — Z79.899 HIGH RISK MEDICATIONS (NOT ANTICOAGULANTS) LONG-TERM USE: ICD-10-CM

## 2020-06-17 DIAGNOSIS — M06.4 INFLAMMATORY POLYARTHROPATHY (H): ICD-10-CM

## 2020-06-17 PROCEDURE — 84460 ALANINE AMINO (ALT) (SGPT): CPT | Performed by: NURSE PRACTITIONER

## 2020-06-17 PROCEDURE — 82040 ASSAY OF SERUM ALBUMIN: CPT | Performed by: NURSE PRACTITIONER

## 2020-06-17 PROCEDURE — 85025 COMPLETE CBC W/AUTO DIFF WBC: CPT | Performed by: NURSE PRACTITIONER

## 2020-06-17 PROCEDURE — 82565 ASSAY OF CREATININE: CPT | Performed by: NURSE PRACTITIONER

## 2020-06-17 PROCEDURE — 84450 TRANSFERASE (AST) (SGOT): CPT | Performed by: NURSE PRACTITIONER

## 2020-06-17 PROCEDURE — 36415 COLL VENOUS BLD VENIPUNCTURE: CPT | Performed by: NURSE PRACTITIONER

## 2020-06-18 LAB
ALBUMIN SERPL-MCNC: 3.6 G/DL (ref 3.4–5)
ALT SERPL W P-5'-P-CCNC: 27 U/L (ref 0–50)
AST SERPL W P-5'-P-CCNC: 20 U/L (ref 0–45)
BASOPHILS # BLD AUTO: 0 10E9/L (ref 0–0.2)
BASOPHILS NFR BLD AUTO: 0.6 %
CREAT SERPL-MCNC: 0.84 MG/DL (ref 0.52–1.04)
DIFFERENTIAL METHOD BLD: NORMAL
EOSINOPHIL # BLD AUTO: 0.1 10E9/L (ref 0–0.7)
EOSINOPHIL NFR BLD AUTO: 1.5 %
ERYTHROCYTE [DISTWIDTH] IN BLOOD BY AUTOMATED COUNT: 14.8 % (ref 10–15)
GFR SERPL CREATININE-BSD FRML MDRD: 74 ML/MIN/{1.73_M2}
HCT VFR BLD AUTO: 39.7 % (ref 35–47)
HGB BLD-MCNC: 13 G/DL (ref 11.7–15.7)
LYMPHOCYTES # BLD AUTO: 2.1 10E9/L (ref 0.8–5.3)
LYMPHOCYTES NFR BLD AUTO: 32.5 %
MCH RBC QN AUTO: 30 PG (ref 26.5–33)
MCHC RBC AUTO-ENTMCNC: 32.7 G/DL (ref 31.5–36.5)
MCV RBC AUTO: 92 FL (ref 78–100)
MONOCYTES # BLD AUTO: 0.4 10E9/L (ref 0–1.3)
MONOCYTES NFR BLD AUTO: 5.4 %
NEUTROPHILS # BLD AUTO: 3.9 10E9/L (ref 1.6–8.3)
NEUTROPHILS NFR BLD AUTO: 60 %
PLATELET # BLD AUTO: 215 10E9/L (ref 150–450)
RBC # BLD AUTO: 4.33 10E12/L (ref 3.8–5.2)
WBC # BLD AUTO: 6.5 10E9/L (ref 4–11)

## 2020-06-18 NOTE — RESULT ENCOUNTER NOTE
The blood counts, liver, kidney labs are normal.     Miugel CABRAL, CNP, MSN  6/18/2020  12:11 PM

## 2020-07-01 ENCOUNTER — VIRTUAL VISIT (OUTPATIENT)
Dept: RHEUMATOLOGY | Facility: CLINIC | Age: 65
End: 2020-07-01
Attending: NURSE PRACTITIONER
Payer: COMMERCIAL

## 2020-07-01 DIAGNOSIS — L40.50 PSORIATIC ARTHRITIS (H): Primary | ICD-10-CM

## 2020-07-01 DIAGNOSIS — M46.99 INFLAMMATORY SPONDYLOPATHY OF MULTIPLE SITES IN SPINE (H): ICD-10-CM

## 2020-07-01 DIAGNOSIS — M06.4 INFLAMMATORY POLYARTHROPATHY (H): ICD-10-CM

## 2020-07-01 DIAGNOSIS — Z79.899 HIGH RISK MEDICATIONS (NOT ANTICOAGULANTS) LONG-TERM USE: ICD-10-CM

## 2020-07-01 DIAGNOSIS — L40.9 PSORIASIS: ICD-10-CM

## 2020-07-01 RX ORDER — SULFASALAZINE 500 MG/1
500 TABLET, DELAYED RELEASE ORAL 2 TIMES DAILY
Qty: 60 TABLET | Refills: 1 | Status: SHIPPED | OUTPATIENT
Start: 2020-07-01 | End: 2020-09-15

## 2020-07-01 RX ORDER — METHOTREXATE 25 MG/ML
15 INJECTION INTRA-ARTERIAL; INTRAMUSCULAR; INTRATHECAL; INTRAVENOUS
Qty: 8 ML | Refills: 2 | Status: ON HOLD | COMMUNITY
Start: 2020-07-01 | End: 2020-08-04

## 2020-07-01 ASSESSMENT — PATIENT HEALTH QUESTIONNAIRE - PHQ9: SUM OF ALL RESPONSES TO PHQ QUESTIONS 1-9: 6

## 2020-07-01 ASSESSMENT — PAIN SCALES - GENERAL: PAINLEVEL: MODERATE PAIN (5)

## 2020-07-01 NOTE — PROGRESS NOTES
[facility] - Rheumatology Clinic Visit    Krissy Weldon  is a 65 year old female who presents with follow-up for undifferentiated polyarthropathy, psoriatic arthritis. Complicated by eye inflammation (uveitis and scleritis--sees [facility]), tendonitis, costrochondritis, tendon tears, synovitis, dactylitis, OA/DJD jessica thumbs CMC, now toe contractures and laxity all hand/wrist joints, bowel changes (sister with crohns and dad with inflammatory bowel). Methotrexate since . Former nurse anesthestist    Review Copy forward: [-SSa, -SSb, -CCP,HLA-B27 negative on outside workup with previous SI injections] with hx- inflammatory eye left eye episcleritis requiring prednisone gtt or oral with bone scan unrevealing. Previous medrol or steroid responsiveness. Past tried humira, SSZ, simponi SQ/IV, remicade and otelza. Failed humira EoW recurrent episcleritis, right wrist, right SI, bilateral hamstrings tendonosis with partial tear bilaterally. Failed simponi sq/IV ineffective. SSZ and oral MTX. Past recurrent iritis (ER if eye pain, blurred vision, red eye). Remicade. Otezla. LLL pneumonia 3/2015. MRI L hip showed high grade tear gluteus minimus insertion site, effusion 4/2015. C/o neck issues with MRI cervical spine show DJD, EMG, paramedian osteophytes and disk protrusion at C3-C4 with some moderate central canal stenosis and moderate to severe right-sided foraminal stenosis as a consequence.  C5-C6 also showed some mild to moderate central canal stenosis and moderate bilateral central foraminal stenosis.  Seen Dr. Wheeler with significant improvement in GI issues pancreatic sphinctor dysfunction requiring surgery now on pancreatic enzymes after pancreatic duct is open. Failed certolizumab pegol (cimzia) lost effectiveness (more uveitis, arthritis, synovitis,bursitis and tendonitis, strept and CAP). Secukinumab (cosentyx) 2-2019 more laxity in joints hands/wrists, inflammatory eye, toe  contractures, changes in skin. Failed  concern for worsening tendons, more bowel symptoms (+family history crohns and brother as well  stopped then changed to adalimumab (humira)     Not tried: abatacept (orencia), tofacitinib (xeljantz), ustekinumab (Stelara)      Copy forward  June 26, 2019  Continues on secukinumab (cosentyx) 150 mg injection every 30 days. Prior dramatic improved with loading dose then loss of effectiveness after 10 days. Feels like adalimumab (humira) worked better    Reports changes in skin (leathery) and other changes, seeing Dr. Conti tomorrow for formal evaluation  Reports more diarrhea and constipation with cramps, no blood in stools, on prednisone 5 mg day and seen eye provider dx inflammatory eye on prednisone gtts and other drops, more laxity of hand and wrist joints, fullness over the joints, the toes are now contracted and not able to straighten, the bilateral 3rd palm of hand tendon is tight hard to straigthen,  overall stiff in all joints and tendons, does not feel this is working, neck pain, bowels are irregular. No liver pain. Colonoscopy 2014 no inflammation. Methotrexate  25 mg injection every 7 days, folic acid  2 mg day except days before. Pain in feet. Seen podiatry at  Ortho told mild arthritis but this was more tendons and ligaments. Neck is more stiff and now as able to move, some kyphosis      Denies any fever, chills, SOB, SANCHEZ, night sweats, or chest pain, or cough. Reports healthy. Caring for partner full-time. The pain impacts her function. Right SI injection, and RFA L4-S1 injection for facet arthropathy at [facility] right leg radiculopathy. No loss of bowel or bladder, no arm weakness or pain. DEXA 7-2018 T-1.8 low bone density, worsening was referred to endo due to risk osteoporosis she will do this in the future.    Copy forward  April 29, 2020  Covid-19 (coronavirus) 3-16 was very ill until the end of March where she was exposed she believes when bringing her  "partner to appt \"I had to walk through an urgent care\". Her partner passed 3-31 for Covid-19 (coronavirus)     Still some lingering cough and loss of taste, fatigue and is sad depressed. Psoriatic arthritis severely flared during and off her arthritis medications, was off until the end of March. Still active in bilateral 2-4 fingers claw with swelling in middles knuckles where can straighten, right ball of the feet and the hips are harder to externally rotate. Prior to this was doing well. Is some better. Next adalimumab (humira) next Tues #3. DEXA scan [id number] T-1.4 Denies any fever, chills, SOB, SANCHEZ, night sweats, or chest pain, high fever. No eye flares. Skin is good.     Adalimumab (humira) citrate free every 14 days, methotrexate  15 mg 0.6 mg injection every 7 days MOn, folic acid 3 mg day before and 2 mg other days, no prednisone since last summer. Tolerating no side-effects. Fibroscan 0-1. On new pain patch butran off fentanyl which was not working and is doing better.    July 1, 2020  Psoriatic arthritis is the same (still hammertoes which are worsening, \"claw hands\" the same, some dactylitis in toes, swelling in some MCPs, LBP chronic and a \"mess\", sides of hips tendons warm sore, bunions 1 and 5th toes), psoriasis in ears and eye brows (seen Dr. Conti over the phone). Function is good. Breathing is good. Occasional cough, no SANCHEZ or sweats or chills. No chest pain. Feels her toes and hands are curling. Denies neurologic red flags.     Seen Dr. Titus at [facility], told no inflammation noted \"maybe some swelling\" discussed MRI \"if we wanted it\" but that she felt did not need. Patient denies eye inflammation now or vision issues. We dont have the records.     Covid-19 (coronavirus) fatigue is improved, still some loss of taste and smell. Appetite it good.       Adalimumab (humira) 40 mg injection every 14 days (feels this works), methotrexate 0.6 ml subcutaneous 15 mg every 7 days (getting some nausea, " diarrhea now day of but not losing hair or mouth sores, had tolerated this before), folic acid  3 mg day. Went down per Dr. Samayoa as her albumin was lower.     PROBLEM LIST: Past medical history is notable for her being presumptively treated for Lyme with doxycycline after developing a targetoid lesion, for a history of Hashimoto's thyroiditis with subsequent hypothyroidism, for the diagnosis now of psoriasis, and for a history of depression.Neck pain, cervical stenosis-- MRI  +moderate central stenosis right C3-4, C5-6 degenerative changes 2nd mild-mod central stenosis. Past referral to physiatry-wishes to return to Dr. Edmond [facility] Left hip pain s/p tear needs THR she reports. B) Hx-Bilateral tendonitis hamstrings with right bursitis, partial tear per Dr. Gomez s/p injections. [place] [facility] [place] SI joint, tendon insertion site. Sees Dr. Maciel Dukes. C). Transient elevation LFT 2/2013 [ NL after held MTX and tramadol 2wk]; transient 4- [ AST 89]. NL . Other hx --Panceatic sphinctor dysfunction. On pancreatic enzymes. SBO 9-2015, now no doing well. Right thumb DJD, s/p surgery . Healed.  1. Diffuse degenerative joint disease that is both clinically apparent and obvious on multiple imaging modalities including bone scan, MRI 2/2013 or cervical. DJD L4-L5, L5-S1 per MRI 7/2012; MRI [id number]   2. Diffuse inflammatory arthritis with marked morning stiffness, no systemic inflammation by blood tests, but greater than 80% clinical improvement with a Medrol Dosepak.   3. Onset of the inflammatory joint symptoms including sacroiliitis with the development of psoriasis, suggesting that this represents psoriatic arthritis.   4. Development of episcleritis in the left eye with improvement with steroid eyedrops 12/14/2011 with recurrent episodes when wean oral prednisone (9/2012)  5. History of Hashimoto's thyroiditis.   6. History of presumptive  treatment for Lyme disease with doxycycline after development of a targetoid lesion.   7. Poor tolerance of sulfasalazine and oral methotrexate.   8. Bilateral hamstring tendonosis, right partial tear, sacroilitis 7/2012. See MRI, repeat 2/2013 hamstring and right SI inflammation seeing Dr. Arvin Xie at [facility] specialized in SI. , left hamstring   9. DJD L4-L5, L5-S1 per MRI 7/2012  10. Intermittent prednisone exposure  11. Transient elevation LFT ALT 84/AST 58 2/2013 (nl after held MTX and tramadol, then increased folic acid 2mg day)  12. Past LUE burn developed into cellulitis resolved from keflex. Bronchitis now on zithromax irritating her costrochondritis. Improving after 1 day  13. 1-2014 Left knee meniscal tear with chrondomalacia per MRI (had Rt/Lt CMJ surgery)  14. 4- RUQ pain.   15.  Hx recurrent left episcleritis   16. Right thumb tendon surgery with Dr. [facility]  17. Pneumonia 3-2015; strept  and 4-2017    18. Covid-19 (coronavirus) 3-2020     Past Medical History:   Diagnosis Date     Acute meniscal tear of knee 1/2014    with chrondromalacia per MRI      Depression      DJD (degenerative joint disease), lumbar 7/2012    L4-5, L5-S1 per MRI      Episcleritis 12/14/2011     Hamstring tendonitis at origin 7/2012    Bilaterally with partial tear right, sacroilitis per MRI     Hypothyroid 9/7/2011     Hypothyroidism      PONV (postoperative nausea and vomiting)      Psoriatic arthritis (H) 9/7/2011     Psoriatic arthritis (H) 2/9/2016     Strep throat 04/2017       Surgical-She has a surgical history including appendectomy in 1980, cholecystectomy in approximately 1993, and hysterectomy without oophorectomy in 1996.  Ablation SI Right 4/25 and left Feb 26th 2017 -- pain clinic   PT for her hip--told by therapy could feel the bursitis. Told needs THR and injection of left hip Feb 10th 2017 -  Past Surgical History:   Procedure Laterality Date     APPENDECTOMY        ARTHROPLASTY CARPOMETACARPAL (THUMB JOINT)  11/8/2013    Procedure: ARTHROPLASTY CARPOMETACARPAL (THUMB JOINT);  Left First Carpometacarpal Trapezium Resection, Tendon Interposition  ;  Surgeon: Luiza Farrar MD;  [facility]     ARTHROPLASTY CARPOMETACARPAL (THUMB JOINT)  12/20/2013    Procedure: ARTHROPLASTY CARPOMETACARPAL (THUMB JOINT);  Right Thumb Trapezium Resection With Flexor Carpi Radialis Tendon Reconstruction       CHOLECYSTECTOMY       COLONOSCOPY  5/6/2014    Procedure: COLONOSCOPY;  Surgeon: Adria San MD;  Location:  GI     ENDOSCOPIC RETROGRADE CHOLANGIOPANCREATOGRAM  6/13/2014    Procedure: ENDOSCOPIC RETROGRADE CHOLANGIOPANCREATOGRAM;  Surgeon: Lianna Wheeler MD;  Location: [facility]     GALLBLADDER SURGERY       GYN SURGERY      hysterectomy and oophorectomy     [facility] ENDOSCOPY W EUS Left 6/10/2014    Procedure: COMBINED ENDOSCOPIC ULTRASOUND, ESOPHAGOSCOPY, GASTROSCOPY, DUODENOSCOPY (EGD);  Surgeon: Lianna Wheeler MD;  Location: [facility] GI     HYSTERECTOMY       Right thumb surgery  7/2015     XR SACROILIAC THERAPEUTIC INJECTION BILATERAL  12/2011       FH:  No autoimmune disorders, psoriasis, UC, crohn's, SLE, RA, PsA, gout, autoimmune thyroid.  No MS, heart disease or cancer in family  Mother-endometrial cancer, RHEUMATOID ARTHRITIS (RA)   Father-psoriasis, lymphoma, colon and prostate cancer, inflammatory bowel   Siblings-sister rheumatoid arthritis (RA) and OSTEOARTHRITIS  And crohns   Brother autoimmune process sees Dr. Ni   [facility] [place]IT[place] (RA)       SH:  No Alcohol. No Smoking. No IVDU. Partner on list for lung transplant     [facility] personally reviewed and updated by me.    ROS:  CONSTITUTIONAL: No fevers, night sweats or unintentional weight change. No acute distress, swollen glands  EYES: No vision change, diplopia, pain in eyes or red eyes   EARS, NOSE, MOUTH, THROAT: No tinnitus or hearing change, no epistaxis or nasal discharge, no oral  lesions, throat clear. Normal saliva pool.  No drymouth. No thyroid Enlargement.   CARDIOVASCULAR: No chest pain, palpitations, or pain with walking, no orthopnea or PND   RESPIRATORY: No dyspnea, cough, shortness of breath or wheezing. No pleurisy.   GI: See above   : No change in urine, no dysuria or hematuria   MUSCKL: See above   INTEGUMENTARY: No concerning lesions or moles   NEURO: No loss of strength or sensation, no numbness or tingling, no tremor, no dizziness, no headache. No falls   ENDO: No polyuria or polydipsia, no temperature intolerance   HEME/LYMPH:No concerning bumps, bleeding problems, or swollen lymph nodes. No recent infections, hospitalizations or new illnesses.   Otherwise 14 point ROS obtained, reviewed and found negative.     Assessment via video:    Constitutional: [id number] cooperative  Eyes: nl EOM, PERRLA, vision, conjunctiva, sclera  ENT: nl external ears, nose, hearing, lips, teeth, gums, throat  Neck: no mass or thyroid enlargement  RESP: No cough, no audible wheezing, able to talk in full sentences  MS:  pannus over MCPs, PIPs, severe laxity of all hand and wrist joints, left 1-3 MCP swelling, right 2-3 MCP swelling, toe dactylitis. 2-4 PIP contractures not able to straighten, right MTP swelling, DIPS with swelling, =hammertoes, left 1st bunion, bilateral 5th bunions. high arches. Reduced ROM neck laterally and extension. Kyphosis. Right finger contracture. Not able to assess her back and hips. Knees, elbows or shoulders not swelling--FROM.   Skin: no nail pitting, alopecia, rash +some in eye brows, reports in ears but cant see it due to limitations   Neuro: nl cranial nerves, strength, sensation  Psych: nl judgement, orientation, memory, affect.  PSYCH: Alert and oriented times 3; coherent speech, normal   rate and volume, able to articulate logical thoughts, able   to abstract reason, no tangential thoughts, no hallucinations   or delusions  His affect is normal and  pleasant  Remainder of exam unable to be completed due to video visits    Discussed corovid-19 prevention, CDC guidelines/resources, MD guidelines, to follow CDC/MDH for updates and what to do for exposure signs or symptoms and where to go. 6. Discussed corovid-19 prevention, [facility] guidelines/resources including hand washing, social distance and what to do for exposure signs or symptoms and where to go, to follow CDC/MD guidelines, and if any signs or symptoms of infection to stop immunosuppression and seek immediate medical att. That prednisone can increase change of serious infections so should be avoided. We also agree that the benefits of continued immunosuppression outweigh the risks of COVID-19 infection. COVID-19 Precautions. Discussed the importance of good hand washing techniques with soap and water for at least 20 seconds, avoid touching eyes, nose, mouth, avoiding crowds, social distancing      Labs/Imaging:  Component      Latest Ref Rng & Units 6/17/2020   WBC      4.0 - 11.0 10e9/L 6.5   RBC Count      3.8 - 5.2 10e12/L 4.33   Hemoglobin      11.7 - 15.7 g/dL 13.0   Hematocrit      35.0 - 47.0 % 39.7   MCV      78 - 100 fl 92   MCH      26.5 - 33.0 pg 30.0   MCHC      31.5 - 36.5 g/dL 32.7   RDW      10.0 - 15.0 % 14.8   Platelet Count      150 - 450 10e9/L 215   % Neutrophils      % 60.0   % Lymphocytes      % 32.5   % Monocytes      % 5.4   % Eosinophils      % 1.5   % Basophils      % 0.6   Absolute Neutrophil      1.6 - 8.3 10e9/L 3.9   Absolute Lymphocytes      0.8 - 5.3 10e9/L 2.1   Absolute Monocytes      0.0 - 1.3 10e9/L 0.4   Absolute Eosinophils      0.0 - 0.7 10e9/L 0.1   Absolute Basophils      0.0 - 0.2 10e9/L 0.0   Diff Method       Automated Method   Creatinine      0.52 - 1.04 mg/dL 0.84   GFR Estimate      >60 mL/min/1.73:m2 74   GFR Estimate If Black      >60 mL/min/1.73:m2 86   Albumin      3.4 - 5.0 g/dL 3.6   ALT      0 - 50 U/L 27   AST      0 - 45 U/L 20        Impression/Plan:    1.Psoriatic arthritis w/axial involvement activity with hx episcleritis/uveities, dactylitis, tendonitis, left hamstring tear, contractures, loss ROM. Recent Covid-19 (coronavirus) was very ill and flared her arthritis ( to point now her bilateral 2-4 PIP are contracted and loss ROM hips. I am not sure we will get the ROM back, had changes in toes now contractures,  Needs THR). Her psoriatic arthritis continues to active with synovitis and dactylitis, progressive contractures to her fingers and toes, and bunions. We discussed abatacept (orencia) vs weekly adalimumab (humira) vs re-trial sulfasalazine vs Infliximab (remicade). Weekly adalimumab (humira) failed in the past, failed golimumab (simponi) and Infliximab (remicade) lost effectiveness before, abatacept (orencia) not proven effective in inflammatory eye disease, ustekinumab (Stelara) long half life and tofacitinib (xeljantz) concerning with still recovering from Covid-19 (coronavirus) as this latter higher chance of serious infections . Methotrexate cant tolerate higher doses, also been on this long-time. She chose to try low dose sulfasalazine  mg BID with labs in a month, then if tolerating wean down to off methotrexate and try for 1 GM BID.   Aprimilast (otezla) contraindicated due to depression and does have axial disease so I would not favor this, failed in past. She is not working and nor can she return to work due to her chronic pain, impairments.  High risk of further deformities.   We will continue this plan otherwise since this was working before she got ill, but I did inform her I am not sure how much ROM she will get back. She should avoid prednisone as this can increase chance of complications from Covid-19 (coronavirus) and increase infection risk and follow the strict CDC Covid-19 (coronavirus) guidelines but go [email or web address] if worse or not improved.   2. [id number] ophthalmologist.   No sx today. Request  records. IF the opthalmologist feels she needs MRI, then it is advised to do so.   3. Psoriasis, nails and skin changes. Follow-up Dr. Conti 3-4 month who is considering Infliximab 5mg q4 weeks (tried before lost effectiveness, failed golimumab (simponi)), weekly Humira, IL23 inhibitors (limited data), or Toficitinab  4. High risk medications monitoring, labs every 12 weeks. Normal today.normal CBC, liver and kidney tests. Labs 1 month after sulfasalazine   5. Low bone density per DEXA  T-1.4, worsening. Given high risk osteoporosis ,DEXA every 2 week. Continue calcium with vitamin D   6. Chronic pain under care of [facility] Dr. Xie. Spine per [facility]   -Calcium and vitamin D. Complete physical due she wishes to come here schedule after Covid-19 (coronavirus) pandemic    -RTC 2 mth Dr. Samayoa  The patient understood the rationale for the diagnosis and treatment plan. Patient shared in the decision making. All questions were answered to best of my ability and the patient's satisfaction  Miguel Umana APRN, CNP, MSN  [facility]  Department of Rheumatology & Autoimmune Disorders

## 2020-07-01 NOTE — LETTER
7/1/2020       RE: Krissy Weldon  [id number] [facility] [address]     Dear Colleague,    Thank you for referring your patient, Krissy Weldon, to the [facility] RHEUMATOLOGY at [facility]. Please see a copy of my visit note below.      [facility] - Rheumatology Clinic Visit    Krissy Weldon  is a 65 year old female who presents with follow-up for undifferentiated polyarthropathy, psoriatic arthritis. Complicated by eye inflammation (uveitis and scleritis--sees [facility]), tendonitis, costrochondritis, tendon tears, synovitis, dactylitis, OA/DJD jessica thumbs CMC, now toe contractures and laxity all hand/wrist joints, bowel changes (sister with crohns and dad with inflammatory bowel). Methotrexate since . Former nurse anesthestist    Review Copy forward: [-SSa, -SSb, -CCP,HLA-B27 negative on outside workup with previous SI injections] with hx- inflammatory eye left eye episcleritis requiring prednisone gtt or oral with bone scan unrevealing. Previous medrol or steroid responsiveness. Past tried humira, SSZ, simponi SQ/IV, remicade and otelza. Failed humira EoW recurrent episcleritis, right wrist, right SI, bilateral hamstrings tendonosis with partial tear bilaterally. Failed simponi sq/IV ineffective. SSZ and oral MTX. Past recurrent iritis (ER if eye pain, blurred vision, red eye). Remicade. Otezla. LLL pneumonia 3/2015. MRI L hip showed high grade tear gluteus minimus insertion site, effusion 4/2015. C/o neck issues with MRI cervical spine show DJD, EMG, paramedian osteophytes and disk protrusion at C3-C4 with some moderate central canal stenosis and moderate to severe right-sided foraminal stenosis as a consequence.  C5-C6 also showed some mild to moderate central canal stenosis and moderate bilateral central foraminal stenosis.  Seen Dr. Wheeler with significant improvement in GI issues pancreatic sphinctor dysfunction requiring surgery now on  pancreatic enzymes after pancreatic duct is open. Failed certolizumab pegol (cimzia) lost effectiveness (more uveitis, arthritis, synovitis,bursitis and tendonitis, strept and CAP). Secukinumab (cosentyx) 2-2019 more laxity in joints hands/wrists, inflammatory eye, toe contractures, changes in skin. Failed  concern for worsening tendons, more bowel symptoms (+family history crohns and brother as well  stopped then changed to adalimumab (humira)     Not tried: abatacept (orencia), tofacitinib (xeljantz), ustekinumab (Stelara)      Copy forward  June 26, 2019  Continues on secukinumab (cosentyx) 150 mg injection every 30 days. Prior dramatic improved with loading dose then loss of effectiveness after 10 days. Feels like adalimumab (humira) worked better    Reports changes in skin (leathery) and other changes, seeing Dr. Conti tomorrow for formal evaluation  Reports more diarrhea and constipation with cramps, no blood in stools, on prednisone 5 mg day and seen eye provider dx inflammatory eye on prednisone gtts and other drops, more laxity of hand and wrist joints, fullness over the joints, the toes are now contracted and not able to straighten, the bilateral 3rd palm of hand tendon is tight hard to straigthen,  overall stiff in all joints and tendons, does not feel this is working, neck pain, bowels are irregular. No liver pain. Colonoscopy 2014 no inflammation. Methotrexate  25 mg injection every 7 days, folic acid  2 mg day except days before. Pain in feet. Seen podiatry at  Ortho told mild arthritis but this was more tendons and ligaments. Neck is more stiff and now as able to move, some kyphosis      Denies any fever, chills, SOB, SANCHEZ, night sweats, or chest pain, or cough. Reports healthy. Caring for partner full-time. The pain impacts her function. Right SI injection, and RFA L4-S1 injection for facet arthropathy at [facility] right leg radiculopathy. No loss of bowel or bladder, no arm weakness or pain. DEXA  "7-2018 T-1.8 low bone density, worsening was referred to endo due to risk osteoporosis she will do this in the future.    Copy forward  April 29, 2020  Covid-19 (coronavirus) 3-16 was very ill until the end of March where she was exposed she believes when bringing her partner to appt \"I had to walk through an urgent care\". Her partner passed 3-31 for Covid-19 (coronavirus)     Still some lingering cough and loss of taste, fatigue and is sad depressed. Psoriatic arthritis severely flared during and off her arthritis medications, was off until the end of March. Still active in bilateral 2-4 fingers claw with swelling in middles knuckles where can straighten, right ball of the feet and the hips are harder to externally rotate. Prior to this was doing well. Is some better. Next adalimumab (humira) next Tues #3. DEXA scan [id number] T-1.4 Denies any fever, chills, SOB, SANCHEZ, night sweats, or chest pain, high fever. No eye flares. Skin is good.     Adalimumab (humira) citrate free every 14 days, methotrexate  15 mg 0.6 mg injection every 7 days MOn, folic acid 3 mg day before and 2 mg other days, no prednisone since last summer. Tolerating no side-effects. Fibroscan 0-1. On new pain patch butran off fentanyl which was not working and is doing better.    July 1, 2020  Psoriatic arthritis is the same (still hammertoes which are worsening, \"claw hands\" the same, some dactylitis in toes, swelling in some MCPs, LBP chronic and a \"mess\", sides of hips tendons warm sore, bunions 1 and 5th toes), psoriasis in ears and eye brows (seen Dr. Conti over the phone). Function is good. Breathing is good. Occasional cough, no SANCHEZ or sweats or chills. No chest pain. Feels her toes and hands are curling. Denies neurologic red flags.     Seen Dr. Titus at [facility], told no inflammation noted \"maybe some swelling\" discussed MRI \"if we wanted it\" but that she felt did not need. Patient denies eye inflammation now or vision issues. We dont " have the records.     Covid-19 (coronavirus) fatigue is improved, still some loss of taste and smell. Appetite it good.       Adalimumab (humira) 40 mg injection every 14 days (feels this works), methotrexate 0.6 ml subcutaneous 15 mg every 7 days (getting some nausea, diarrhea now day of but not losing hair or mouth sores, had tolerated this before), folic acid  3 mg day. Went down per Dr. Samayoa as her albumin was lower.     PROBLEM LIST: Past medical history is notable for her being presumptively treated for Lyme with doxycycline after developing a targetoid lesion, for a history of Hashimoto's thyroiditis with subsequent hypothyroidism, for the diagnosis now of psoriasis, and for a history of depression.Neck pain, cervical stenosis-- MRI  +moderate central stenosis right C3-4, C5-6 degenerative changes 2nd mild-mod central stenosis. Past referral to physiatry-wishes to return to Dr. Edmond [facility] Left hip pain s/p tear needs THR she reports. B) Hx-Bilateral tendonitis hamstrings with right bursitis, partial tear per Dr. Gomez s/p injections. Seen Dr. Arvin Xie at [facility] s/p ablation SI joint, tendon insertion site. Sees Dr. Maciel Dukes. C). Transient elevation LFT 2/2013 [ NL after held MTX and tramadol 2wk]; transient 4- [ AST 89]. NL . Other hx --Panceatic sphinctor dysfunction. On pancreatic enzymes. SBO 9-2015, now no doing well. Right thumb DJD, s/p surgery . Healed.  1. Diffuse degenerative joint disease that is both clinically apparent and obvious on multiple imaging modalities including bone scan, MRI 2/2013 or cervical. DJD L4-L5, L5-S1 per MRI 7/2012; MRI [id number]   2. Diffuse inflammatory arthritis with marked morning stiffness, no systemic inflammation by blood tests, but greater than 80% clinical improvement with a Medrol Dosepak.   3. Onset of the inflammatory joint symptoms including sacroiliitis with the development of psoriasis, suggesting  that this represents psoriatic arthritis.   4. Development of episcleritis in the left eye with improvement with steroid eyedrops 12/14/2011 with recurrent episodes when wean oral prednisone (9/2012)  5. History of Hashimoto's thyroiditis.   6. History of presumptive treatment for Lyme disease with doxycycline after development of a targetoid lesion.   7. Poor tolerance of sulfasalazine and oral methotrexate.   8. Bilateral hamstring tendonosis, right partial tear, sacroilitis 7/2012. See MRI, repeat 2/2013 hamstring and right SI inflammation seeing Dr. Arvin Xie at [facility] specialized in SI. , left hamstring   9. DJD L4-L5, L5-S1 per MRI 7/2012  10. Intermittent prednisone exposure  11. Transient elevation LFT ALT 84/AST 58 2/2013 (nl after held MTX and tramadol, then increased folic acid 2mg day)  12. Past LUE burn developed into cellulitis resolved from keflex. Bronchitis now on zithromax irritating her costrochondritis. Improving after 1 day  13. 1-2014 Left knee meniscal tear with chrondomalacia per MRI (had Rt/Lt CMJ surgery)  14. 4- RUQ pain.   15.  Hx recurrent left episcleritis   16. Right thumb tendon surgery with Dr. [facility]  17. Pneumonia 3-2015; strept  and 4-2017    18. Covid-19 (coronavirus) 3-2020     Past Medical History:   Diagnosis Date     Acute meniscal tear of knee 1/2014    with chrondromalacia per MRI      Depression      DJD (degenerative joint disease), lumbar 7/2012    L4-5, L5-S1 per MRI      Episcleritis 12/14/2011     Hamstring tendonitis at origin 7/2012    Bilaterally with partial tear right, sacroilitis per MRI     Hypothyroid 9/7/2011     Hypothyroidism      PONV (postoperative nausea and vomiting)      Psoriatic arthritis (H) 9/7/2011     Psoriatic arthritis (H) 2/9/2016     Strep throat 04/2017       Surgical-She has a surgical history including appendectomy in 1980, cholecystectomy in approximately 1993, and hysterectomy without oophorectomy  in 1996.  Ablation SI Right 4/25 and left Feb 26th 2017 -- pain clinic   PT for her hip--told by therapy could feel the bursitis. Told needs THR and injection of left hip Feb 10th 2017 -  Past Surgical History:   Procedure Laterality Date     APPENDECTOMY       ARTHROPLASTY CARPOMETACARPAL (THUMB JOINT)  11/8/2013    Procedure: ARTHROPLASTY CARPOMETACARPAL (THUMB JOINT);  Left First Carpometacarpal Trapezium Resection, Tendon Interposition  ;  Surgeon: Luiza Farrar MD;  [facility]: [facility]     ARTHROPLASTY CARPOMETACARPAL (THUMB JOINT)  12/20/2013    Procedure: ARTHROPLASTY CARPOMETACARPAL (THUMB JOINT);  Right Thumb Trapezium Resection With Flexor Carpi Radialis Tendon Reconstruction       CHOLECYSTECTOMY       COLONOSCOPY  5/6/2014    Procedure: COLONOSCOPY;  Surgeon: Adria San MD;  Location:  GI     ENDOSCOPIC RETROGRADE CHOLANGIOPANCREATOGRAM  6/13/2014    Procedure: ENDOSCOPIC RETROGRADE CHOLANGIOPANCREATOGRAM;  Surgeon: Lianna Wheeler MD;  Location: [facility] OR     GALLBLADDER SURGERY       GYN SURGERY      hysterectomy and oophorectomy      [facility] ENDOSCOPY W EUS Left 6/10/2014    Procedure: COMBINED ENDOSCOPIC ULTRASOUND, ESOPHAGOSCOPY, GASTROSCOPY, DUODENOSCOPY (EGD);  Surgeon: Lianna Wheeler MD;  Location: [facility] GI     HYSTERECTOMY       Right thumb surgery  7/2015     XR SACROILIAC THERAPEUTIC INJECTION BILATERAL  12/2011       FH:  No autoimmune disorders, psoriasis, UC, crohn's, SLE, RA, PsA, gout, autoimmune thyroid.  No MS, heart disease or cancer in family  Mother-endometrial cancer, RHEUMATOID ARTHRITIS (RA)   Father-psoriasis, lymphoma, colon and prostate cancer, inflammatory bowel   Siblings-sister rheumatoid arthritis (RA) and OSTEOARTHRITIS  And crohns   Brother autoimmune process sees Dr. Ni   [facility] RHEUMATOID ARTHRITIS (RA)       SH:  No Alcohol. No Smoking. No IVDU. Partner on list for lung transplant     [facility] personally reviewed and updated by  me.    ROS:  CONSTITUTIONAL: No fevers, night sweats or unintentional weight change. No acute distress, swollen glands  EYES: No vision change, diplopia, pain in eyes or red eyes   EARS, NOSE, MOUTH, THROAT: No tinnitus or hearing change, no epistaxis or nasal discharge, no oral lesions, throat clear. Normal saliva pool.  No drymouth. No thyroid Enlargement.   CARDIOVASCULAR: No chest pain, palpitations, or pain with walking, no orthopnea or PND   RESPIRATORY: No dyspnea, cough, shortness of breath or wheezing. No pleurisy.   GI: See above   : No change in urine, no dysuria or hematuria   MUSCKL: See above   INTEGUMENTARY: No concerning lesions or moles   NEURO: No loss of strength or sensation, no numbness or tingling, no tremor, no dizziness, no headache. No falls   ENDO: No polyuria or polydipsia, no temperature intolerance   HEME/LYMPH:No concerning bumps, bleeding problems, or swollen lymph nodes. No recent infections, hospitalizations or new illnesses.   Otherwise 14 point ROS obtained, reviewed and found negative.     Assessment via video:    Constitutional: [id number] cooperative  Eyes: nl EOM, PERRLA, vision, conjunctiva, sclera  ENT: nl external ears, nose, hearing, lips, teeth, gums, throat  Neck: no mass or thyroid enlargement  RESP: No cough, no audible wheezing, able to talk in full sentences  MS:  pannus over MCPs, PIPs, severe laxity of all hand and wrist joints, left 1-3 MCP swelling, right 2-3 MCP swelling, toe dactylitis. 2-4 PIP contractures not able to straighten, right MTP swelling, DIPS with swelling, =hammertoes, left 1st bunion, bilateral 5th bunions. high arches. Reduced ROM neck laterally and extension. Kyphosis. Right finger contracture. Not able to assess her back and hips. Knees, elbows or shoulders not swelling--FROM.   Skin: no nail pitting, alopecia, rash +some in eye brows, reports in ears but cant see it due to limitations   Neuro: nl cranial nerves, strength, sensation  Psych: nl  judgement, orientation, memory, affect.  PSYCH: Alert and oriented times 3; coherent speech, normal   rate and volume, able to articulate logical thoughts, able   to abstract reason, no tangential thoughts, no hallucinations   or delusions  His affect is normal and pleasant  Remainder of exam unable to be completed due to video visits    Discussed corovid-19 prevention, CDC guidelines/resources, [facility] guidelines, to follow CDC/MD for updates and what to do for exposure signs or symptoms and where to go. 6. Discussed corovid-19 prevention, CDC guidelines/resources including hand washing, social distance and what to do for exposure signs or symptoms and where to go, to follow CDC/MD guidelines, and if any signs or symptoms of infection to stop immunosuppression and seek immediate medical att. That prednisone can increase change of serious infections so should be avoided. We also agree that the benefits of continued immunosuppression outweigh the risks of COVID-19 infection. COVID-19 Precautions. Discussed the importance of good hand washing techniques with soap and water for at least 20 seconds, avoid touching eyes, nose, mouth, avoiding crowds, social distancing      Labs/Imaging:  Component      Latest Ref Rng & Units 6/17/2020   WBC      4.0 - 11.0 10e9/L 6.5   RBC Count      3.8 - 5.2 10e12/L 4.33   Hemoglobin      11.7 - 15.7 g/dL 13.0   Hematocrit      35.0 - 47.0 % 39.7   MCV      78 - 100 fl 92   MCH      26.5 - 33.0 pg 30.0   MCHC      31.5 - 36.5 g/dL 32.7   RDW      10.0 - 15.0 % 14.8   Platelet Count      150 - 450 10e9/L 215   % Neutrophils      % 60.0   % Lymphocytes      % 32.5   % Monocytes      % 5.4   % Eosinophils      % 1.5   % Basophils      % 0.6   Absolute Neutrophil      1.6 - 8.3 10e9/L 3.9   Absolute Lymphocytes      0.8 - 5.3 10e9/L 2.1   Absolute Monocytes      0.0 - 1.3 10e9/L 0.4   Absolute Eosinophils      0.0 - 0.7 10e9/L 0.1   Absolute Basophils      0.0 - 0.2 10e9/L 0.0   Diff  Method       Automated Method   Creatinine      0.52 - 1.04 mg/dL 0.84   GFR Estimate      >60 mL/min/1.73:m2 74   GFR Estimate If Black      >60 mL/min/1.73:m2 86   Albumin      3.4 - 5.0 g/dL 3.6   ALT      0 - 50 U/L 27   AST      0 - 45 U/L 20       Impression/Plan:    1.Psoriatic arthritis w/axial involvement activity with hx episcleritis/uveities, dactylitis, tendonitis, left hamstring tear, contractures, loss ROM. Recent Covid-19 (coronavirus) was very ill and flared her arthritis ( to point now her bilateral 2-4 PIP are contracted and loss ROM hips. I am not sure we will get the ROM back, had changes in toes now contractures,  Needs THR). Her psoriatic arthritis continues to active with synovitis and dactylitis, progressive contractures to her fingers and toes, and bunions. We discussed abatacept (orencia) vs weekly adalimumab (humira) vs re-trial sulfasalazine vs Infliximab (remicade). Weekly adalimumab (humira) failed in the past, failed golimumab (simponi) and Infliximab (remicade) lost effectiveness before, abatacept (orencia) not proven effective in inflammatory eye disease, ustekinumab (Stelara) long half life and tofacitinib (xeljantz) concerning with still recovering from Covid-19 (coronavirus) as this latter higher chance of serious infections . Methotrexate cant tolerate higher doses, also been on this long-time. She chose to try low dose sulfasalazine  mg BID with labs in a month, then if tolerating wean down to off methotrexate and try for 1 GM BID.   Aprimilast (otezla) contraindicated due to depression and does have axial disease so I would not favor this, failed in past. She is not working and nor can she return to work due to her chronic pain, impairments.  High risk of further deformities.   We will continue this plan otherwise since this was working before she got ill, but I did inform her I am not sure how much ROM she will get back. She should avoid prednisone as this can increase  chance of complications from Covid-19 (coronavirus) and increase infection risk and follow the strict CDC Covid-19 (coronavirus) guidelines but go [email or web address] if worse or not improved.   2. Qwakdhn-kebncq-dd ophthalmologist.   No sx today. Request records. IF the opthalmologist feels she needs MRI, then it is advised to do so.   3. Psoriasis, nails and skin changes. Follow-up Dr. Conti 3-4 month who is considering Infliximab 5mg q4 weeks (tried before lost effectiveness, failed golimumab (simponi)), weekly Humira, IL23 inhibitors (limited data), or Toficitinab  4. High risk medications monitoring, labs every 12 weeks. Normal today.normal CBC, liver and kidney tests. Labs 1 month after sulfasalazine   5. Low bone density per DEXA  T-1.4, worsening. Given high risk osteoporosis ,DEXA every 2 week. Continue calcium with vitamin D   6. Chronic pain under care of [facility] Dr. Xie. Spine per [facility]   -Calcium and vitamin D. Complete physical due she wishes to come here schedule after Covid-19 (coronavirus) pandemic    -RTC 2 mth Dr. Samayoa  The patient understood the rationale for the diagnosis and treatment plan. Patient shared in the decision making. All questions were answered to best of my ability and the patient's satisfaction  Miguel CABRAL, CNP, MSN  [facility]  Department of Rheumatology & Autoimmune Disorders      Krissy Weldon is a 65 year old female who is being evaluated via a billable video visit.            Video-Visit Details    Type of service:  Video Visit    Video Start Time: 11:10 PM  Video End Time: 11:30 PM  Total time 20 min, face to face counseling on options for her psoriatic arthritis, risks, benefits and concern long-term loss function with new deformities.     Originating Location (pt. Location): Home    Distant Location (provider location):  [facility] RHEUMATOLOGY     Platform used for Video Visit: MARIANNA Hutchins CNP

## 2020-07-17 DIAGNOSIS — Z11.59 ENCOUNTER FOR SCREENING FOR OTHER VIRAL DISEASES: Primary | ICD-10-CM

## 2020-07-30 ENCOUNTER — TRANSFERRED RECORDS (OUTPATIENT)
Dept: HEALTH INFORMATION MANAGEMENT | Facility: CLINIC | Age: 65
End: 2020-07-30

## 2020-07-31 LAB
ALT SERPL-CCNC: 13 IU/L (ref 0–32)
AST SERPL-CCNC: 20 IU/L (ref 0–40)
CREAT SERPL-MCNC: 0.92 MG/DL (ref 0.57–1)
GFR SERPL CREATININE-BSD FRML MDRD: 66 ML/MIN/1.73
GLUCOSE SERPL-MCNC: 81 MG/DL (ref 65–99)
POTASSIUM SERPL-SCNC: 4.3 MMOL/L (ref 3.5–5.2)

## 2020-08-01 DIAGNOSIS — Z11.59 ENCOUNTER FOR SCREENING FOR OTHER VIRAL DISEASES: ICD-10-CM

## 2020-08-01 PROCEDURE — U0003 INFECTIOUS AGENT DETECTION BY NUCLEIC ACID (DNA OR RNA); SEVERE ACUTE RESPIRATORY SYNDROME CORONAVIRUS 2 (SARS-COV-2) (CORONAVIRUS DISEASE [COVID-19]), AMPLIFIED PROBE TECHNIQUE, MAKING USE OF HIGH THROUGHPUT TECHNOLOGIES AS DESCRIBED BY CMS-2020-01-R: HCPCS | Performed by: OPHTHALMOLOGY

## 2020-08-02 LAB
SARS-COV-2 RNA SPEC QL NAA+PROBE: NOT DETECTED
SPECIMEN SOURCE: NORMAL

## 2020-08-04 ENCOUNTER — ANESTHESIA (OUTPATIENT)
Dept: SURGERY | Facility: CLINIC | Age: 65
End: 2020-08-04
Payer: COMMERCIAL

## 2020-08-04 ENCOUNTER — ANESTHESIA EVENT (OUTPATIENT)
Dept: SURGERY | Facility: CLINIC | Age: 65
End: 2020-08-04
Payer: COMMERCIAL

## 2020-08-04 ENCOUNTER — HOSPITAL ENCOUNTER (OUTPATIENT)
Facility: CLINIC | Age: 65
Discharge: HOME | End: 2020-08-04
Attending: OPHTHALMOLOGY | Admitting: OPHTHALMOLOGY
Payer: COMMERCIAL

## 2020-08-04 VITALS
RESPIRATION RATE: 16 BRPM | BODY MASS INDEX: 27.34 KG/M2 | HEART RATE: 72 BPM | HEIGHT: 65 IN | DIASTOLIC BLOOD PRESSURE: 87 MMHG | WEIGHT: 164.1 LBS | TEMPERATURE: 97 F | OXYGEN SATURATION: 99 % | SYSTOLIC BLOOD PRESSURE: 131 MMHG

## 2020-08-04 DIAGNOSIS — H02.831 DERMATOCHALASIS OF BOTH UPPER EYELIDS: ICD-10-CM

## 2020-08-04 DIAGNOSIS — H02.834 DERMATOCHALASIS OF BOTH UPPER EYELIDS: ICD-10-CM

## 2020-08-04 DIAGNOSIS — H57.813 BROW PTOSIS, BILATERAL: Primary | ICD-10-CM

## 2020-08-04 PROCEDURE — 37000008 ZZH ANESTHESIA TECHNICAL FEE, 1ST 30 MIN: Performed by: OPHTHALMOLOGY

## 2020-08-04 PROCEDURE — 27210794 ZZH OR GENERAL SUPPLY STERILE: Performed by: OPHTHALMOLOGY

## 2020-08-04 PROCEDURE — 36000056 ZZH SURGERY LEVEL 3 1ST 30 MIN: Performed by: OPHTHALMOLOGY

## 2020-08-04 PROCEDURE — 25000125 ZZHC RX 250: Performed by: OPHTHALMOLOGY

## 2020-08-04 PROCEDURE — 36000058 ZZH SURGERY LEVEL 3 EA 15 ADDTL MIN: Performed by: OPHTHALMOLOGY

## 2020-08-04 PROCEDURE — 71000027 ZZH RECOVERY PHASE 2 EACH 15 MINS: Performed by: OPHTHALMOLOGY

## 2020-08-04 PROCEDURE — 40000169 ZZH STATISTIC PRE-PROCEDURE ASSESSMENT I: Performed by: OPHTHALMOLOGY

## 2020-08-04 PROCEDURE — 71000012 ZZH RECOVERY PHASE 1 LEVEL 1 FIRST HR: Performed by: OPHTHALMOLOGY

## 2020-08-04 PROCEDURE — 88305 TISSUE EXAM BY PATHOLOGIST: CPT | Mod: 26 | Performed by: OPHTHALMOLOGY

## 2020-08-04 PROCEDURE — 25800030 ZZH RX IP 258 OP 636: Performed by: NURSE ANESTHETIST, CERTIFIED REGISTERED

## 2020-08-04 PROCEDURE — 25000128 H RX IP 250 OP 636: Performed by: NURSE ANESTHETIST, CERTIFIED REGISTERED

## 2020-08-04 PROCEDURE — 88305 TISSUE EXAM BY PATHOLOGIST: CPT | Performed by: OPHTHALMOLOGY

## 2020-08-04 PROCEDURE — 37000009 ZZH ANESTHESIA TECHNICAL FEE, EACH ADDTL 15 MIN: Performed by: OPHTHALMOLOGY

## 2020-08-04 RX ORDER — PROPOFOL 10 MG/ML
INJECTION, EMULSION INTRAVENOUS PRN
Status: DISCONTINUED | OUTPATIENT
Start: 2020-08-04 | End: 2020-08-04

## 2020-08-04 RX ORDER — FENTANYL CITRATE 50 UG/ML
INJECTION, SOLUTION INTRAMUSCULAR; INTRAVENOUS PRN
Status: DISCONTINUED | OUTPATIENT
Start: 2020-08-04 | End: 2020-08-04

## 2020-08-04 RX ORDER — ONDANSETRON 4 MG/1
4 TABLET, ORALLY DISINTEGRATING ORAL EVERY 30 MIN PRN
Status: DISCONTINUED | OUTPATIENT
Start: 2020-08-04 | End: 2020-08-04 | Stop reason: HOSPADM

## 2020-08-04 RX ORDER — BUPIVACAINE HYDROCHLORIDE AND EPINEPHRINE 5; 5 MG/ML; UG/ML
INJECTION, SOLUTION EPIDURAL; INTRACAUDAL; PERINEURAL
Status: DISCONTINUED
Start: 2020-08-04 | End: 2020-08-04 | Stop reason: HOSPADM

## 2020-08-04 RX ORDER — NEOMYCIN SULFATE, POLYMYXIN B SULFATE, AND DEXAMETHASONE 3.5; 10000; 1 MG/G; [USP'U]/G; MG/G
0.5 OINTMENT OPHTHALMIC 3 TIMES DAILY
Qty: 2 TUBE | Refills: 0 | Status: SHIPPED | OUTPATIENT
Start: 2020-08-04 | End: 2020-12-09

## 2020-08-04 RX ORDER — NALOXONE HYDROCHLORIDE 0.4 MG/ML
.1-.4 INJECTION, SOLUTION INTRAMUSCULAR; INTRAVENOUS; SUBCUTANEOUS
Status: DISCONTINUED | OUTPATIENT
Start: 2020-08-04 | End: 2020-08-04 | Stop reason: HOSPADM

## 2020-08-04 RX ORDER — ONDANSETRON 2 MG/ML
INJECTION INTRAMUSCULAR; INTRAVENOUS PRN
Status: DISCONTINUED | OUTPATIENT
Start: 2020-08-04 | End: 2020-08-04

## 2020-08-04 RX ORDER — ERYTHROMYCIN 5 MG/G
OINTMENT OPHTHALMIC PRN
Status: DISCONTINUED | OUTPATIENT
Start: 2020-08-04 | End: 2020-08-04 | Stop reason: HOSPADM

## 2020-08-04 RX ORDER — HYDROMORPHONE HYDROCHLORIDE 1 MG/ML
.3-.5 INJECTION, SOLUTION INTRAMUSCULAR; INTRAVENOUS; SUBCUTANEOUS EVERY 5 MIN PRN
Status: DISCONTINUED | OUTPATIENT
Start: 2020-08-04 | End: 2020-08-04 | Stop reason: HOSPADM

## 2020-08-04 RX ORDER — TETRACAINE HYDROCHLORIDE 5 MG/ML
SOLUTION OPHTHALMIC PRN
Status: DISCONTINUED | OUTPATIENT
Start: 2020-08-04 | End: 2020-08-04 | Stop reason: HOSPADM

## 2020-08-04 RX ORDER — MAGNESIUM HYDROXIDE 1200 MG/15ML
LIQUID ORAL PRN
Status: DISCONTINUED | OUTPATIENT
Start: 2020-08-04 | End: 2020-08-04 | Stop reason: HOSPADM

## 2020-08-04 RX ORDER — TETRACAINE HYDROCHLORIDE 5 MG/ML
SOLUTION OPHTHALMIC
Status: DISCONTINUED
Start: 2020-08-04 | End: 2020-08-04 | Stop reason: HOSPADM

## 2020-08-04 RX ORDER — OXYCODONE AND ACETAMINOPHEN 5; 325 MG/1; MG/1
1 TABLET ORAL EVERY 6 HOURS PRN
Qty: 12 TABLET | Refills: 0 | Status: SHIPPED | OUTPATIENT
Start: 2020-08-04 | End: 2020-08-07

## 2020-08-04 RX ORDER — SODIUM CHLORIDE, SODIUM LACTATE, POTASSIUM CHLORIDE, CALCIUM CHLORIDE 600; 310; 30; 20 MG/100ML; MG/100ML; MG/100ML; MG/100ML
INJECTION, SOLUTION INTRAVENOUS CONTINUOUS
Status: DISCONTINUED | OUTPATIENT
Start: 2020-08-04 | End: 2020-08-04 | Stop reason: HOSPADM

## 2020-08-04 RX ORDER — ERYTHROMYCIN 5 MG/G
OINTMENT OPHTHALMIC
Status: DISCONTINUED
Start: 2020-08-04 | End: 2020-08-04 | Stop reason: HOSPADM

## 2020-08-04 RX ORDER — DEXAMETHASONE SODIUM PHOSPHATE 4 MG/ML
INJECTION, SOLUTION INTRA-ARTICULAR; INTRALESIONAL; INTRAMUSCULAR; INTRAVENOUS; SOFT TISSUE PRN
Status: DISCONTINUED | OUTPATIENT
Start: 2020-08-04 | End: 2020-08-04

## 2020-08-04 RX ORDER — SODIUM CHLORIDE, SODIUM LACTATE, POTASSIUM CHLORIDE, CALCIUM CHLORIDE 600; 310; 30; 20 MG/100ML; MG/100ML; MG/100ML; MG/100ML
INJECTION, SOLUTION INTRAVENOUS CONTINUOUS PRN
Status: DISCONTINUED | OUTPATIENT
Start: 2020-08-04 | End: 2020-08-04

## 2020-08-04 RX ORDER — ONDANSETRON 2 MG/ML
4 INJECTION INTRAMUSCULAR; INTRAVENOUS EVERY 30 MIN PRN
Status: DISCONTINUED | OUTPATIENT
Start: 2020-08-04 | End: 2020-08-04 | Stop reason: HOSPADM

## 2020-08-04 RX ORDER — FENTANYL CITRATE 50 UG/ML
25-50 INJECTION, SOLUTION INTRAMUSCULAR; INTRAVENOUS
Status: DISCONTINUED | OUTPATIENT
Start: 2020-08-04 | End: 2020-08-04 | Stop reason: HOSPADM

## 2020-08-04 RX ORDER — LIDOCAINE HCL/EPINEPHRINE/PF 2%-1:200K
VIAL (ML) INJECTION
Status: DISCONTINUED
Start: 2020-08-04 | End: 2020-08-04 | Stop reason: HOSPADM

## 2020-08-04 RX ADMIN — ONDANSETRON 4 MG: 2 INJECTION INTRAMUSCULAR; INTRAVENOUS at 08:35

## 2020-08-04 RX ADMIN — MIDAZOLAM 2 MG: 1 INJECTION INTRAMUSCULAR; INTRAVENOUS at 08:25

## 2020-08-04 RX ADMIN — FENTANYL CITRATE 25 MCG: 50 INJECTION, SOLUTION INTRAMUSCULAR; INTRAVENOUS at 09:05

## 2020-08-04 RX ADMIN — DEXAMETHASONE SODIUM PHOSPHATE 4 MG: 4 INJECTION, SOLUTION INTRA-ARTICULAR; INTRALESIONAL; INTRAMUSCULAR; INTRAVENOUS; SOFT TISSUE at 08:35

## 2020-08-04 RX ADMIN — FENTANYL CITRATE 50 MCG: 50 INJECTION, SOLUTION INTRAMUSCULAR; INTRAVENOUS at 08:25

## 2020-08-04 RX ADMIN — PROPOFOL 50 MG: 10 INJECTION, EMULSION INTRAVENOUS at 08:27

## 2020-08-04 RX ADMIN — PROPOFOL 20 MG: 10 INJECTION, EMULSION INTRAVENOUS at 08:28

## 2020-08-04 RX ADMIN — FENTANYL CITRATE 25 MCG: 50 INJECTION, SOLUTION INTRAMUSCULAR; INTRAVENOUS at 09:07

## 2020-08-04 RX ADMIN — SODIUM CHLORIDE, POTASSIUM CHLORIDE, SODIUM LACTATE AND CALCIUM CHLORIDE: 600; 310; 30; 20 INJECTION, SOLUTION INTRAVENOUS at 08:25

## 2020-08-04 ASSESSMENT — MIFFLIN-ST. JEOR: SCORE: 1295.23

## 2020-08-04 NOTE — ANESTHESIA POSTPROCEDURE EVALUATION
Patient: Krissy Weldon    Procedure(s):  BILATERAL UPPER LID BLEPHAROPLASTY AND  BILATERAL BROW PTOSIS    Diagnosis:Mechanical ptosis of eyelid of both eyes [H02.413]  Diagnosis Additional Information: No value filed.    Anesthesia Type:  MAC    Note:  Anesthesia Post Evaluation    Patient location during evaluation: PACU  Patient participation: Able to fully participate in evaluation  Level of consciousness: awake and alert  Pain management: adequate  Airway patency: patent  Cardiovascular status: acceptable  Respiratory status: acceptable and unassisted  Hydration status: acceptable  PONV: none             Last vitals:  Vitals:    08/04/20 0923 08/04/20 0930 08/04/20 0940   BP: 128/81 119/77 125/74   Pulse:  60    Resp: 20 16 20   Temp: 36.1  C (97  F)     SpO2: 98% 98% 97%         Electronically Signed By: Manny Rosenbaum MD  August 4, 2020  10:09 AM

## 2020-08-04 NOTE — ANESTHESIA PREPROCEDURE EVALUATION
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]    Procedure(s):  BILATERAL UPPER LID BLEPHAROPLASTY AND  BILATERAL BROW PTOSIS    Past Medical History:   Diagnosis Date     Acute meniscal tear of knee 2014    with chrondromalacia per MRI      Depression      DJD (degenerative joint disease), lumbar 2012    L4-5, L5-S1 per MRI      Episcleritis 2011     Hamstring tendonitis at origin 2012    Bilaterally with partial tear right, sacroilitis per MRI     Hypothyroid 2011     Hypothyroidism      PONV (postoperative nausea and vomiting)      Psoriatic arthritis (H) 2011     Psoriatic arthritis (H) 2016     Strep throat 2017     Past Surgical History:   Procedure Laterality Date     APPENDECTOMY       ARTHROPLASTY CARPOMETACARPAL (THUMB JOINT)  2013    Procedure: ARTHROPLASTY CARPOMETACARPAL (THUMB JOINT);  Left First Carpometacarpal Trapezium Resection, Tendon Interposition  ;  Surgeon: Luiza Farrar MD;  Location:  OR     ARTHROPLASTY CARPOMETACARPAL (THUMB JOINT)  2013    Procedure: ARTHROPLASTY CARPOMETACARPAL (THUMB JOINT);  Right Thumb Trapezium Resection With Flexor Carpi Radialis Tendon Reconstruction       CHOLECYSTECTOMY       COLONOSCOPY  2014    Procedure: COLONOSCOPY;  Surgeon: Adria San MD;  Location:  GI     ENDOSCOPIC RETROGRADE CHOLANGIOPANCREATOGRAM  2014    Procedure: ENDOSCOPIC RETROGRADE CHOLANGIOPANCREATOGRAM;  Surgeon: Lianna Wheeler MD;  Location: [facility] OR     GALLBLADDER SURGERY       GYN SURGERY      hysterectomy and oophorectomy     [facility] ENDOSCOPY W EUS Left 6/10/2014    Procedure: COMBINED ENDOSCOPIC ULTRASOUND, ESOPHAGOSCOPY, GASTROSCOPY, DUODENOSCOPY (EGD);  Surgeon: Lianna Wheeler MD;  Location: [facility] GI     HYSTERECTOMY       Right thumb surgery  2015     XR SACROILIAC  "THERAPEUTIC INJECTION BILATERAL  12/2011       Anesthesia Evaluation     . Pt has had prior anesthetic.     History of anesthetic complications   - PONV        ROS/MED HX    ENT/Pulmonary:       Neurologic:       Cardiovascular:         METS/Exercise Tolerance:     Hematologic:         Musculoskeletal:   (+) arthritis,  -       GI/Hepatic:        (-) GERD   Renal/Genitourinary:         Endo:     (+) thyroid problem hypothyroidism, .      Psychiatric:         Infectious Disease:         Malignancy:         Other:    (+) H/O Chronic Pain,H/O chronic opiod use ,                         Physical Exam  Normal systems: cardiovascular, pulmonary and dental    Airway   Mallampati: I  Neck ROM: full    Dental     Cardiovascular       Pulmonary             Lab Results   Component Value Date    WBC 6.5 06/17/2020    HGB 13.0 06/17/2020    HCT 39.7 06/17/2020     06/17/2020    CRP <2.9 06/26/2019    SED 10 06/26/2019     03/16/2020    POTASSIUM 3.9 03/16/2020    CHLORIDE 107 03/16/2020    CO2 25 03/16/2020    BUN 16 03/16/2020    CR 0.84 06/17/2020     (H) 03/16/2020    ALISSA 8.6 03/16/2020    PHOS 4.5 03/06/2012    ALBUMIN 3.6 06/17/2020    PROTTOTAL 7.6 03/16/2020    ALT 27 06/17/2020    AST 20 06/17/2020    GGT 20 03/18/2013    ALKPHOS 90 03/16/2020    BILITOTAL 0.1 (L) 03/16/2020    LIPASE 134 04/26/2017    AMYLASE 64 06/13/2014    INR 0.93 06/13/2014    TSH 0.12 (L) 05/07/2014    T4 1.42 05/07/2014       Preop Vitals  BP Readings from Last 3 Encounters:   08/04/20 (!) 140/70   03/16/20 115/77   10/22/19 108/71    Pulse Readings from Last 3 Encounters:   03/16/20 73   10/22/19 73   06/27/19 62      Resp Readings from Last 3 Encounters:   08/04/20 18   03/16/20 16   06/23/18 10    SpO2 Readings from Last 3 Encounters:   08/04/20 99%   03/16/20 96%   10/22/19 98%      Temp Readings from Last 1 Encounters:   08/04/20 36.2  C (97.1  F) (Oral)    Ht Readings from Last 1 Encounters:   08/04/20 1.651 m (5' 5\")    " "  Wt Readings from Last 1 Encounters:   08/04/20 74.4 kg (164 lb 1.6 oz)    Estimated body mass index is 27.31 kg/m  as calculated from the following:    Height as of this encounter: 1.651 m (5' 5\").    Weight as of this encounter: 74.4 kg (164 lb 1.6 oz).       Anesthesia Plan      History & Physical Review  History and physical reviewed and following examination; no interval change.    ASA Status:  2 .    NPO Status:  > 8 hours    Plan for MAC with Intravenous induction. Maintenance will be Balanced.  Reason for MAC:  Deep or markedly invasive procedure (G8)  PONV prophylaxis:  Ondansetron (or other 5HT-3)         Postoperative Care  Postoperative pain management:  IV analgesics.      Consents  Anesthetic plan, risks, benefits and alternatives discussed with:  Patient..                 Gallo Hodgson MD  "

## 2020-08-04 NOTE — ANESTHESIA CARE TRANSFER NOTE
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    Anesthesia Type:   MAC     Note:  Airway :Room Air  Patient transferred to:PACU  Handoff Report: Identifed the Patient, Identified the Reponsible Provider, Reviewed the pertinent medical history, Discussed the surgical course, Reviewed Intra-OP anesthesia mangement and issues during anesthesia, Set expectations for post-procedure period and Allowed opportunity for questions and acknowledgement of understanding      Vitals: (Last set prior to Anesthesia Care Transfer)    CRNA VITALS  8/4/2020 0850 - 8/4/2020 0926      8/4/2020             Pulse:  66    Ht Rate:  66    SpO2:  96 %    Resp Rate (set):  10                Electronically Signed By: MARIANNA Mojica CRNA  August 4, 2020  9:26 AM

## 2020-08-04 NOTE — OP NOTE
"Pre-operative Diagnosis:   1. Bilateral upper eyelid dermatochalasis with dermatitis versus lesion more on the right than the left, visually significant  2. Bilateral brow ptosis     Post-operative Diagnosis:  1. Same as above      Procedure(s):   1. Bilateral upper eyelid blepharoplasty   2. Bilateral brow ptosis repair  3. Biopsy of right upper medial eyelid     Surgeon: Xuan Back MD      Anesthesia: Monitored anesthesia care with local anesthetic      Blood loss: <5 cc      Specimens: Right upper eyelid tissue     Findings: See operative report for details       Complications: None      Operative Procedure:  In the pre operative area, the planned procedure was again discussed with the patient as well as the risks/benefits/alternatives.  The patient was brought to the operating room.  A \"time out\" was performed to ensure the correct surgical site was being operated on.  Topical anesthesia was placed in both eyes.  The eyelid skin and eyebrow skin was cleaned with isopropyl alcohol.  Browplasty incisions were marked with care taken to use the patient's natural creases. Blepharoplasty incisions were also marked with care taken to use the patient's natural creases and to avoid post operative lagophthalmos. Local anesthetic was injected.  The patient was prepped and draped in the usual sterile fashion.        Attention was turned to the bilateral brow ptosis repair.  A #15 blade was used to make an incisions in the brow skin.  The tissue was excised in a subcutaneous plane.  Hemostasis was achieved with cautery.  The same procedure was performed on the side.  The deep tissue was closed with 4-0 vicryl sutures.  The skin edges were then closed with 5-0 Prolene sutures.  The same procedure was performed on both sides.       Attention was turned to the upper eyelid creases.  A #15 blade followed by monopolar cautery was used to make an incisions in the upper eyelid creases and the previously placed markings. "  The tissue was dissected in preseptal planes.  Hemostasis was achieved with cautery. Medially, septum was opened, and the medial fat pad was conservatively dissected and excised.  Hemostasis was confirmed.  The same procedure as performed on the side.     A biopsy was taken and sent to pathology from the right upper medial eyelid.      The upper eyelid crease incisions were closed with 6-0 Prolene suture in a running and interrupted fashion bilaterally.       The eyelids were washed with wet 4x4 gauze. Antibiotic ointment was placed on the incisions and antibiotic drops were placed in the patient's eyes.  The patient was transferred to recovery in stable position.  Ice packs were placed on each eyelid.  The patient will return for a post-operative follow up appointment, sooner with any decrease in vision, increase in pain, redness, or bleeding.         Xuan Back MD

## 2020-08-05 LAB — COPATH REPORT: NORMAL

## 2020-08-06 DIAGNOSIS — M06.4 INFLAMMATORY POLYARTHROPATHY (H): ICD-10-CM

## 2020-08-06 DIAGNOSIS — M46.99 INFLAMMATORY SPONDYLOPATHY OF MULTIPLE SITES IN SPINE (H): ICD-10-CM

## 2020-08-06 DIAGNOSIS — L40.50 PSORIATIC ARTHRITIS (H): ICD-10-CM

## 2020-08-06 DIAGNOSIS — L40.9 PSORIASIS: ICD-10-CM

## 2020-08-10 RX ORDER — SULFASALAZINE 500 MG/1
500 TABLET, DELAYED RELEASE ORAL 2 TIMES DAILY
Qty: 60 TABLET | Refills: 1 | OUTPATIENT
Start: 2020-08-10

## 2020-09-11 DIAGNOSIS — L40.9 PSORIASIS: ICD-10-CM

## 2020-09-11 DIAGNOSIS — L40.50 PSORIATIC ARTHRITIS (H): ICD-10-CM

## 2020-09-11 DIAGNOSIS — M06.4 INFLAMMATORY POLYARTHROPATHY (H): ICD-10-CM

## 2020-09-11 DIAGNOSIS — M46.99 INFLAMMATORY SPONDYLOPATHY OF MULTIPLE SITES IN SPINE (H): ICD-10-CM

## 2020-09-11 DIAGNOSIS — Z79.899 HIGH RISK MEDICATIONS (NOT ANTICOAGULANTS) LONG-TERM USE: ICD-10-CM

## 2020-09-11 LAB
ALBUMIN SERPL-MCNC: 3.7 G/DL (ref 3.4–5)
ALT SERPL W P-5'-P-CCNC: 24 U/L (ref 0–50)
AST SERPL W P-5'-P-CCNC: 20 U/L (ref 0–45)
BASOPHILS # BLD AUTO: 0 10E9/L (ref 0–0.2)
BASOPHILS NFR BLD AUTO: 0.5 %
CREAT SERPL-MCNC: 0.84 MG/DL (ref 0.52–1.04)
CRP SERPL-MCNC: <2.9 MG/L (ref 0–8)
DIFFERENTIAL METHOD BLD: NORMAL
EOSINOPHIL # BLD AUTO: 0.1 10E9/L (ref 0–0.7)
EOSINOPHIL NFR BLD AUTO: 1.9 %
ERYTHROCYTE [DISTWIDTH] IN BLOOD BY AUTOMATED COUNT: 14.7 % (ref 10–15)
ERYTHROCYTE [SEDIMENTATION RATE] IN BLOOD BY WESTERGREN METHOD: 12 MM/H (ref 0–30)
GFR SERPL CREATININE-BSD FRML MDRD: 74 ML/MIN/{1.73_M2}
HCT VFR BLD AUTO: 40.3 % (ref 35–47)
HGB BLD-MCNC: 12.9 G/DL (ref 11.7–15.7)
LYMPHOCYTES # BLD AUTO: 2.4 10E9/L (ref 0.8–5.3)
LYMPHOCYTES NFR BLD AUTO: 41.7 %
MCH RBC QN AUTO: 29 PG (ref 26.5–33)
MCHC RBC AUTO-ENTMCNC: 32 G/DL (ref 31.5–36.5)
MCV RBC AUTO: 91 FL (ref 78–100)
MONOCYTES # BLD AUTO: 0.4 10E9/L (ref 0–1.3)
MONOCYTES NFR BLD AUTO: 7.2 %
NEUTROPHILS # BLD AUTO: 2.8 10E9/L (ref 1.6–8.3)
NEUTROPHILS NFR BLD AUTO: 48.7 %
PLATELET # BLD AUTO: 251 10E9/L (ref 150–450)
RBC # BLD AUTO: 4.45 10E12/L (ref 3.8–5.2)
WBC # BLD AUTO: 5.7 10E9/L (ref 4–11)

## 2020-09-11 PROCEDURE — 84460 ALANINE AMINO (ALT) (SGPT): CPT | Performed by: NURSE PRACTITIONER

## 2020-09-11 PROCEDURE — 85652 RBC SED RATE AUTOMATED: CPT | Performed by: NURSE PRACTITIONER

## 2020-09-11 PROCEDURE — 82565 ASSAY OF CREATININE: CPT | Performed by: NURSE PRACTITIONER

## 2020-09-11 PROCEDURE — 85025 COMPLETE CBC W/AUTO DIFF WBC: CPT | Performed by: NURSE PRACTITIONER

## 2020-09-11 PROCEDURE — 86140 C-REACTIVE PROTEIN: CPT | Performed by: NURSE PRACTITIONER

## 2020-09-11 PROCEDURE — 36415 COLL VENOUS BLD VENIPUNCTURE: CPT | Performed by: NURSE PRACTITIONER

## 2020-09-11 PROCEDURE — 82040 ASSAY OF SERUM ALBUMIN: CPT | Performed by: NURSE PRACTITIONER

## 2020-09-11 PROCEDURE — 84450 TRANSFERASE (AST) (SGOT): CPT | Performed by: NURSE PRACTITIONER

## 2020-09-14 NOTE — RESULT ENCOUNTER NOTE
The blood counts, liver, kidney and CRP inflammation labs are normal.     Miguel CABRAL, CNP, MSN  9/14/2020  9:48 AM

## 2020-09-15 ENCOUNTER — VIRTUAL VISIT (OUTPATIENT)
Dept: RHEUMATOLOGY | Facility: CLINIC | Age: 65
End: 2020-09-15
Attending: INTERNAL MEDICINE
Payer: COMMERCIAL

## 2020-09-15 DIAGNOSIS — L40.9 PSORIASIS: ICD-10-CM

## 2020-09-15 DIAGNOSIS — M06.4 INFLAMMATORY POLYARTHROPATHY (H): ICD-10-CM

## 2020-09-15 DIAGNOSIS — L40.50 PSORIATIC ARTHRITIS (H): ICD-10-CM

## 2020-09-15 DIAGNOSIS — Z79.899 HIGH RISK MEDICATIONS (NOT ANTICOAGULANTS) LONG-TERM USE: ICD-10-CM

## 2020-09-15 DIAGNOSIS — K21.9 GASTROESOPHAGEAL REFLUX DISEASE, ESOPHAGITIS PRESENCE NOT SPECIFIED: Primary | ICD-10-CM

## 2020-09-15 DIAGNOSIS — K29.60 GASTRITIS MEDICAMENTOSA: ICD-10-CM

## 2020-09-15 DIAGNOSIS — M46.99 INFLAMMATORY SPONDYLOPATHY OF MULTIPLE SITES IN SPINE (H): ICD-10-CM

## 2020-09-15 RX ORDER — SULFASALAZINE 500 MG/1
1000 TABLET, DELAYED RELEASE ORAL 2 TIMES DAILY
Qty: 180 TABLET | Refills: 1 | Status: SHIPPED | OUTPATIENT
Start: 2020-09-15 | End: 2021-04-14

## 2020-09-15 RX ORDER — ADALIMUMAB 40MG/0.4ML
40 KIT SUBCUTANEOUS
Qty: 2 EACH | Refills: 4 | Status: SHIPPED | OUTPATIENT
Start: 2020-09-15 | End: 2021-01-26

## 2020-09-15 RX ORDER — BUPROPION HYDROCHLORIDE 150 MG/1
TABLET ORAL
COMMUNITY
Start: 2020-07-06 | End: 2023-04-10

## 2020-09-15 ASSESSMENT — PAIN SCALES - GENERAL: PAINLEVEL: SEVERE PAIN (6)

## 2020-09-15 NOTE — LETTER
"9/15/2020       RE: Krissy Weldon  [address] [facility] [address]     Dear Colleague,    Thank you for referring your patient, Krissy Weldon, to the [facility] RHEUMATOLOGY at [facility] Please see a copy of my visit note below.    Krissy Weldon is a 65 year old female who is being evaluated via a billable video visit.      The patient has been notified of following:     \"This video visit will be conducted via a call between you and your physician/provider. We have found that certain health care needs can be provided without the need for an in-person physical exam.  This service lets us provide the care you need with a video conversation.  If a prescription is necessary we can send it directly to your pharmacy.  If lab work is needed we can place an order for that and you can then stop by our lab to have the test done at a later time.    Video visits are billed at different rates depending on your insurance coverage.  Please reach out to your insurance provider with any questions.    If during the course of the call the physician/provider feels a video visit is not appropriate, you will not be charged for this service.\"    Patient has given verbal consent for Video visit? Yes  How would you like to obtain your AVS? MyChart  If you are dropped from the video visit, the video invite should be resent to: Send to e-mail at: [email or web address]  Will anyone else be joining your video visit? No        Video-Visit Details    Type of service:  Video Visit      Originating Location (pt. Location): Home    Distant Location (provider location):  [facility] RHEUMATOLOGY     Platform used for Video Visit: Kik         [facility] - Rheumatology Clinic Visit    Krissy Weldon  is a 63 year old female who presents with follow-up for undifferentiated polyarthropathy, psoriatic arthritis. Complicated by eye inflammation (uveitis and scleritis--sees [facility]), " tendonitis, costrochondritis, tendon tears, synovitis, dactylitis, OA/DJD jessica thumbs CMC, now toe contractures and laxity all hand/wrist joints, bowel changes (sister with crohns and dad with inflammatory bowel). Methotrexate since . Former nurse anesthestist    Copy forward: [-SSa, -SSb, -CCP,HLA-B27 negative on outside workup with previous SI injections] with hx- inflammatory eye left eye episcleritis requiring prednisone gtt or oral with bone scan unrevealing. Previous medrol or steroid responsiveness. Past tried humira, SSZ, simponi SQ/IV, remicade and otelza. Failed humira EoW recurrent episcleritis, right wrist, right SI, bilateral hamstrings tendonosis with partial tear bilaterally. Failed simponi sq/IV ineffective. SSZ and oral MTX. Past recurrent iritis (ER if eye pain, blurred vision, red eye). Remicade. Otezla. LLL pneumonia 3/2015. MRI L hip showed high grade tear gluteus minimus insertion site, effusion 4/2015. C/o neck issues with MRI cervical spine show DJD, EMG, paramedian osteophytes and disk protrusion at C3-C4 with some moderate central canal stenosis and moderate to severe right-sided foraminal stenosis as a consequence.  C5-C6 also showed some mild to moderate central canal stenosis and moderate bilateral central foraminal stenosis.  Seen Dr. Wheeler with significant improvement in GI issues pancreatic sphinctor dysfunction requiring surgery now on pancreatic enzymes after pancreatic duct is open. Failed certolizumab pegol (cimzia) lost effectiveness (more uveitis, arthritis, synovitis,bursitis and tendonitis, strept and CAP). Secukinumab (cosentyx) 2-2019 more laxity in joints hands/wrists, inflammatory eye, toe contractures, changes in skinFailed  concern for worsening tendons, more bowel symptoms (+family history crohns and brother as well  stopped then changed to adalimumab (humira)      Copy forward  June 26, 2019  Continues on secukinumab (cosentyx) 150 mg injection every 30 days.  "Prior dramatic improved with loading dose then loss of effectiveness after 10 days. Feels like adalimumab (humira) worked better    Reports changes in skin (leathery) and other changes, seeing Dr. Conti tomorrow for formal evaluation  Reports more diarrhea and constipation with cramps, no blood in stools, on prednisone 5 mg day and seen eye provider dx inflammatory eye on prednisone gtts and other drops, more laxity of hand and wrist joints, fullness over the joints, the toes are now contracted and not able to straighten, the bilateral 3rd palm of hand tendon is tight hard to straigthen,  overall stiff in all joints and tendons, does not feel this is working, neck pain, bowels are irregular. No liver pain. Colonoscopy 2014 no inflammation. Methotrexate  25 mg injection every 7 days, folic acid  2 mg day except days before. Pain in feet. Seen podiatry at  Ortho told mild arthritis but this was more tendons and ligaments. Neck is more stiff and now as able to move, some kyphosis      Denies any fever, chills, SOB, SANCHEZ, night sweats, or chest pain, or cough. Reports healthy. Caring for partner full-time. The pain impacts her function. Right SI injection, and RFA L4-S1 injection [place] [facility] [place]iculopathy. No loss of bowel or bladder, no arm weakness or pain. DEXA 7-2018 T-1.8 low bone density, worsening was referred to endo due to risk osteoporosis she will do this in the future.    April 29, 2020    Covid-19 (coronavirus) 3-16 was very ill until the end of March where she was exposed she believes when bringing her partner to appt \"I had to walk through an urgent care\". Her partner passed 3-31 for Covid-19 (coronavirus)     Still some lingering cough and loss of taste, fatigue and is sad depressed. Psoriatic arthritis severely flared during and off her arthritis medications, was off until the end of March. Still active in bilateral 2-4 fingers claw with swelling in middles knuckles where " can straighten, right ball of the feet and the hips are harder to externally rotate. Prior to this was doing well. Is some better. Next adalimumab (humira) next Tues #3. DEXA scan [id number] T-1.4 Denies any fever, chills, SOB, SANCHEZ, night sweats, or chest pain, high fever. No eye flares. Skin is good.     Adalimumab (humira) citrate free every 14 days, methotrexate  15 mg 0.6 mg injection every 7 days MOn, folic acid 3 mg day before and 2 mg other days, no prednisone since last summer. Tolerating no side-effects. Fibroscan 0-1. On new pain patch butran off fentanyl which was not working and is doing better.      September 15, 2020 interval history:    As noted above, the patient unfortunately developed COVID-19 infection and lost her partner to it back in March.  Since that time she is gradually felt improved.    1 area that has not improved visit she had developed some increasing right leg numbness already prior to that infection and that is been persistent.  She has been working with her pain clinic however and is getting physical therapy for it.  So far that has not improved things a lot.  She previously however has had injections which have been intermittently helpful and she is being seen back on a regular basis.  She has fortunately not experienced any weakness.    She got some left thigh pain during the time of her coronavirus infection that was concerning to her because it did remind her of her prior uveitis.  She contacted the ophthalmologist who deferred on seeing her since she had no vision loss and fortunately since that time those symptoms have entirely resolved.    At the time she last saw Miguel, she was having significant pain in her psoriatic arthritis peripheral joints.  She has started back on sulfasalazine and although she previously tolerated it fairly poorly from a gastrointestinal side effect standpoint, she is now finding that significantly improved as she is using omeprazole together with it.   She definitely feels like the sulfasalazine is improving things even though she is only one 1 g a day total dose.  She is not having any side effects otherwise with it, and her laboratory tests are reviewed and those show no evidence of toxicity.    PROBLEM LIST: Past medical history is notable for her being presumptively treated for Lyme with doxycycline after developing a targetoid lesion, for a history of Hashimoto's thyroiditis with subsequent hypothyroidism, for the diagnosis now of psoriasis, and for a history of depression.Neck pain, cervical stenosis-- MRI  +moderate central stenosis right C3-4, C5-6 degenerative changes 2nd mild-mod central stenosis. Past referral to physiatry-wishes to return to Dr. Edmond [facility] Left hip pain s/p tear needs THR she reports. B) Hx-Bilateral tendonitis hamstrings with right bursitis, partial tear per Dr. Gomez s/p injections. Seen Dr. Arvin Xie at [facility] s/p ablation SI joint, tendon insertion site. Sees Dr. Maciel Dukes. C). Transient elevation LFT 2/2013 [ NL after held MTX and tramadol 2wk]; transient 4- [ AST 89]. NL . Other hx --Panceatic sphinctor dysfunction. On pancreatic enzymes. SBO 9-2015, now no doing well. Right thumb DJD, s/p surgery . Healed.  1. Diffuse degenerative joint disease that is both clinically apparent and obvious on multiple imaging modalities including bone scan, MRI 2/2013 or cervical. DJD L4-L5, L5-S1 per MRI 7/2012; MRI [id number]   2. Diffuse inflammatory arthritis with marked morning stiffness, no systemic inflammation by blood tests, but greater than 80% clinical improvement with a Medrol Dosepak.   3. Onset of the inflammatory joint symptoms including sacroiliitis with the development of psoriasis, suggesting that this represents psoriatic arthritis.   4. Development of episcleritis in the left eye with improvement with steroid eyedrops 12/14/2011 with recurrent episodes when wean oral  prednisone (9/2012)  5. History of Hashimoto's thyroiditis.   6. History of presumptive treatment for Lyme disease with doxycycline after development of a targetoid lesion.   7. Poor tolerance of sulfasalazine and oral methotrexate.   8. Bilateral hamstring tendonosis, right partial tear, sacroilitis 7/2012. See MRI, repeat 2/2013 hamstring and right SI inflammation seeing Dr. Arvin X[place] [facility] [place]. , left hamstring   9. DJD L4-L5, L5-S1 per MRI 7/2012  10. Intermittent prednisone exposure  11. Transient elevation LFT ALT 84/AST 58 2/2013 (nl after held MTX and tramadol, then increased folic acid 2mg day)  12. Past LUE burn developed into cellulitis resolved from keflex. Bronchitis now on zithromax irritating her costrochondritis. Improving after 1 day  13. 1-2014 Left knee meniscal tear with chrondomalacia per MRI (had Rt/Lt CMJ surgery)  14. 4- RUQ pain.   15.  Hx recurrent left episcleritis   16. Right thumb tendon surgery with Dr. [facility]  17. Pneumonia 3-2015; strept  and 4-2017    18. Covid-19 (coronavirus) 3-2020     Past Medical History:   Diagnosis Date     Acute meniscal tear of knee 1/2014    with chrondromalacia per MRI      Depression      DJD (degenerative joint disease), lumbar 7/2012    L4-5, L5-S1 per MRI      Episcleritis 12/14/2011     Hamstring tendonitis at origin 7/2012    Bilaterally with partial tear right, sacroilitis per MRI     Hypothyroid 9/7/2011     Hypothyroidism      PONV (postoperative nausea and vomiting)      Psoriatic arthritis (H) 9/7/2011     Psoriatic arthritis (H) 2/9/2016     Strep throat 04/2017       Surgical-She has a surgical history including appendectomy in 1980, cholecystectomy in approximately 1993, and hysterectomy without oophorectomy in 1996.  Ablation SI Right 4/25 and left Feb 26th 2017 -- pain clinic   PT for her hip--told by therapy could feel the bursitis. Told needs THR and injection of left hip Feb  10th 2017 -  Past Surgical History:   Procedure Laterality Date     APPENDECTOMY       ARTHROPLASTY CARPOMETACARPAL (THUMB JOINT)  11/8/2013    Procedure: ARTHROPLASTY CARPOMETACARPAL (THUMB JOINT);  Left First Carpometacarpal Trapezium Resection, Tendon Interposition  ;  Surgeon: Luiza Farrar MD;  [facility]     ARTHROPLASTY CARPOMETACARPAL (THUMB JOINT)  12/20/2013    Procedure: ARTHROPLASTY CARPOMETACARPAL (THUMB JOINT);  Right Thumb Trapezium Resection With Flexor Carpi Radialis Tendon Reconstruction       CHOLECYSTECTOMY       COLONOSCOPY  5/6/2014    Procedure: COLONOSCOPY;  Surgeon: Adria San MD;  Location:  GI     ENDOSCOPIC RETROGRADE CHOLANGIOPANCREATOGRAM  6/13/2014    Procedure: ENDOSCOPIC RETROGRADE CHOLANGIOPANCREATOGRAM;  Surgeon: Lianna Wheeler MD;  Location: [facility]     GALLBLADDER SURGERY       GYN SURGERY      hysterectomy and oophorectomy     [facility] ENDOSCOPY W EUS Left 6/10/2014    Procedure: COMBINED ENDOSCOPIC ULTRASOUND, ESOPHAGOSCOPY, GASTROSCOPY, DUODENOSCOPY (EGD);  Surgeon: Lianna Wheeler MD;  Location: [facility]     HYSTERECTOMY       Right thumb surgery  7/2015     XR SACROILIAC THERAPEUTIC INJECTION BILATERAL  12/2011       FH:  No autoimmune disorders, psoriasis, UC, crohn's, SLE, RA, PsA, gout, autoimmune thyroid.  No MS, heart disease or cancer in family  Mother-endometrial cancer, RHEUMATOID ARTHRITIS (RA)   Father-psoriasis, lymphoma, colon and prostate cancer, inflammatory bowel   Siblings-sister rheumatoid arthritis (RA) and OSTEOARTHRITIS  And crohns   Brother autoimmune process sees Dr. Ni   [facility] RHEUMATOID ARTHRITIS (RA)       SH:  No Alcohol. No Smoking. No IVDU. Partner on list for lung transplant     [facility] personally reviewed and updated by me.    ROS:  CONSTITUTIONAL: No fevers, night sweats or unintentional weight change. No acute distress, swollen glands  EYES: No vision change, diplopia, pain in eyes or red eyes   EARS, NOSE,  MOUTH, THROAT: No tinnitus or hearing change, no epistaxis or nasal discharge, no oral lesions, throat clear. Normal saliva pool.  No drymouth. No thyroid Enlargement.   CARDIOVASCULAR: No chest pain, palpitations, or pain with walking, no orthopnea or PND   RESPIRATORY: No dyspnea, cough, shortness of breath or wheezing. No pleurisy.   GI: See above   : No change in urine, no dysuria or hematuria   MUSCKL: See above   INTEGUMENTARY: No concerning lesions or moles   NEURO: No loss of strength or sensation, no numbness or tingling, no tremor, no dizziness, no headache. No falls   ENDO: No polyuria or polydipsia, no temperature intolerance   HEME/LYMPH:No concerning bumps, bleeding problems, or swollen lymph nodes. No recent infections, hospitalizations or new illnesses.   Otherwise 14 point ROS obtained, reviewed and found negative.     Assessment via video:    Constitutional: [id number] cooperative  Eyes: nl EOM, PERRLA, vision, conjunctiva, sclera  ENT: nl external ears, nose, hearing, lips, teeth, gums, throat  Neck: no mass or thyroid enlargement  RESP: No cough, no audible wheezing, able to talk in full sentences  MS:  pannus over MCPs, PIPs, severe laxity of all hand and wrist joints, bilateral 2-4 PIP swelling with contractures not able to straighten, right MTP swelling, contractures in toes, palpable MTPs posteriorly with high arches. Reduced ROM neck laterally and extension. Kyphosis. Right finger contracture.   Skin: no nail pitting, alopecia, rash  Neuro: nl cranial nerves, strength, sensation  Psych: nl judgement, orientation, memory, affect.  PSYCH: Alert and oriented times 3; coherent speech, normal   rate and volume, able to articulate logical thoughts, able   to abstract reason, no tangential thoughts, no hallucinations   or delusions   Remainder of exam unable to be completed due to video visits    Labs/Imaging:    No results found for any visits on 04/29/20.  Component      Latest Ref Rng & Units  3/16/2020   WBC      4.0 - 11.0 10e9/L 4.1   RBC Count      3.8 - 5.2 10e12/L 4.70   Hemoglobin      11.7 - 15.7 g/dL 13.8   Hematocrit      35.0 - 47.0 % 41.5   MCV      78 - 100 fl 88   MCH      26.5 - 33.0 pg 29.4   MCHC      31.5 - 36.5 g/dL 33.3   RDW      10.0 - 15.0 % 15.3 (H)   Platelet Count      150 - 450 10e9/L 198   Diff Method       Automated Method   % Neutrophils      % 44.8   % Lymphocytes      % 35.3   % Monocytes      % 19.2   % Eosinophils      % 0.0   % Basophils      % 0.5   % Immature Granulocytes      % 0.2   Nucleated RBCs      0 /100    Absolute Neutrophil      1.6 - 8.3 10e9/L 1.8   Absolute Lymphocytes      0.8 - 5.3 10e9/L 1.5   Absolute Monocytes      0.0 - 1.3 10e9/L 0.8   Absolute Eosinophils      0.0 - 0.7 10e9/L 0.0   Absolute Basophils      0.0 - 0.2 10e9/L 0.0   Abs Immature Granulocytes      0 - 0.4 10e9/L 0.0   Absolute Nucleated RBC          Sodium      133 - 144 mmol/L 136   Potassium      3.4 - 5.3 mmol/L 3.9   Chloride      94 - 109 mmol/L 107   Carbon Dioxide      20 - 32 mmol/L 25   Anion Gap      3 - 14 mmol/L 4   Glucose      70 - 99 mg/dL 102 (H)   Urea Nitrogen      7 - 30 mg/dL 16   Creatinine      0.52 - 1.04 mg/dL 0.86   GFR Estimate      >60 mL/min/1.73:m2 72   GFR Estimate If Black      >60 mL/min/1.73:m2 83   Calcium      8.5 - 10.1 mg/dL 8.6   Bilirubin Total      0.2 - 1.3 mg/dL 0.1 (L)   Albumin      3.4 - 5.0 g/dL 3.6   Protein Total      6.8 - 8.8 g/dL 7.6   Alkaline Phosphatase      40 - 150 U/L 90   ALT      0 - 50 U/L 48   AST      0 - 45 U/L 33   Influenza A/B Agn Specimen       Nasopharyngeal   Influenza A      NEG:Negative Negative   Influenza B      NEG:Negative Negative   Lab Scanned Result       SARS-COV-2 BY PCR-Scanned (A)   COVID-19 Virus (SARS-CoV-2) by PCR      NOT DETECTED DETECTED   Lactic Acid      0.7 - 2.0 mmol/L 0.7     Impression/Plan:    1.Psoriatic arthritis w/axial involvement activity with hx episcleritis/uveities, dactylitis,  tendonitis, left hamstring tear, contractures, loss ROM. Interval Covid-19 (coronavirus) was very ill and flared her arthritis to point now her bilateral 2-4 PIP are contracted and loss ROM hips. I am not sure we will get the ROM back, had changes in toes now contractures,  Needs THR.  Aprimilast (otezla) contraindicated due to depression and does have axial disease so I would not favor this. She is not working and nor can she return to work due to her chronic pain, impairments.  High risk of further deformities. We will continue this plan since this was working before she got ill, but I did inform her I am not sure how much ROM she will get back. She should avoid prednisone and follow the strict CDC Covid-19 (coronavirus) guidelines but go [email or web address] if worse or not improved.   -High risk medications, labs normal   2. [id number] ophthalmologist.   No sx today   3. Psoriasis and skin changes. Referral to derm for formal consult.   4. High risk medications monitoring, labs every 12 weeks. Normal today.   5. Low bone density per DEXA  T-1.4, worsening. Given high risk osteoporosis ,DEXA every 2 week. Continue calcium with vitamin D   6. Chronic pain under care of [facility] Dr. Xie. Spine per [facility]   -Calcium and vitamin D. Complete physical due she wishes to come here schedule after Covid-19 (coronavirus) pandemic    Plan/recommendation:    Given that she feels improved on low-dose sulfasalazine I think it is worth trying to push it higher.  I will give her omeprazole 20 mg twice daily to facilitate that and take her sulfasalazine dose up to 1 g twice daily.    I have also refilled her Humira.    She will follow-up with Miguel in 3 months to see how all that is going sooner as needed and continue doing her labs in the meantime.    This video visit commenced at 4 PM and was completed at 4:22 PM.    ZAKIA Samayoa MD, PhD    Rheumatology

## 2020-09-15 NOTE — PROGRESS NOTES
"Krissy Weldon is a 65 year old female who is being evaluated via a billable video visit.      The patient has been notified of following:     \"This video visit will be conducted via a call between you and your physician/provider. We have found that certain health care needs can be provided without the need for an in-person physical exam.  This service lets us provide the care you need with a video conversation.  If a prescription is necessary we can send it directly to your pharmacy.  If lab work is needed we can place an order for that and you can then stop by our lab to have the test done at a later time.    Video visits are billed at different rates depending on your insurance coverage.  Please reach out to your insurance provider with any questions.    If during the course of the call the physician/provider feels a video visit is not appropriate, you will not be charged for this service.\"    Patient has given verbal consent for Video visit? Yes  How would you like to obtain your AVS? MyChart  If you are dropped from the video visit, the video invite should be resent to: Send to e-mail at: [email or web address]  Will anyone else be joining your video visit? No        Video-Visit Details    Type of service:  Video Visit      Originating Location (pt. Location): Home    Distant Location (provider location):   [facility]     Platform used for Video Visit: [facility] - Rheumatology Clinic Visit    Krissy Weldon  is a 63 year old female who presents with follow-up for undifferentiated polyarthropathy, psoriatic arthritis. Complicated by eye inflammation (uveitis and scleritis--sees [facility]), tendonitis, costrochondritis, tendon tears, synovitis, dactylitis, OA/DJD jessica thumbs CMC, now toe contractures and laxity all hand/wrist joints, bowel changes (sister with crohns and dad with inflammatory bowel). Methotrexate since . Former nurse anesthestist    Copy forward: " [-SSa, -SSb, -CCP,HLA-B27 negative on outside workup with previous SI injections] with hx- inflammatory eye left eye episcleritis requiring prednisone gtt or oral with bone scan unrevealing. Previous medrol or steroid responsiveness. Past tried humira, SSZ, simponi SQ/IV, remicade and otelza. Failed humira EoW recurrent episcleritis, right wrist, right SI, bilateral hamstrings tendonosis with partial tear bilaterally. Failed simponi sq/IV ineffective. SSZ and oral MTX. Past recurrent iritis (ER if eye pain, blurred vision, red eye). Remicade. Otezla. LLL pneumonia 3/2015. MRI L hip showed high grade tear gluteus minimus insertion site, effusion 4/2015. C/o neck issues with MRI cervical spine show DJD, EMG, paramedian osteophytes and disk protrusion at C3-C4 with some moderate central canal stenosis and moderate to severe right-sided foraminal stenosis as a consequence.  C5-C6 also showed some mild to moderate central canal stenosis and moderate bilateral central foraminal stenosis.  Seen Dr. Wheeler with significant improvement in GI issues pancreatic sphinctor dysfunction requiring surgery now on pancreatic enzymes after pancreatic duct is open. Failed certolizumab pegol (cimzia) lost effectiveness (more uveitis, arthritis, synovitis,bursitis and tendonitis, strept and CAP). Secukinumab (cosentyx) 2-2019 more laxity in joints hands/wrists, inflammatory eye, toe contractures, changes in skinFailed  concern for worsening tendons, more bowel symptoms (+family history crohns and brother as well  stopped then changed to adalimumab (humira)      Copy forward  June 26, 2019  Continues on secukinumab (cosentyx) 150 mg injection every 30 days. Prior dramatic improved with loading dose then loss of effectiveness after 10 days. Feels like adalimumab (humira) worked better    Reports changes in skin (leathery) and other changes, seeing Dr. Conti tomorrow for formal evaluation  Reports more diarrhea and constipation with  "cramps, no blood in stools, on prednisone 5 mg day and seen eye provider dx inflammatory eye on prednisone gtts and other drops, more laxity of hand and wrist joints, fullness over the joints, the toes are now contracted and not able to straighten, the bilateral 3rd palm of hand tendon is tight hard to straigthen,  overall stiff in all joints and tendons, does not feel this is working, neck pain, bowels are irregular. No liver pain. Colonoscopy 2014 no inflammation. Methotrexate  25 mg injection every 7 days, folic acid  2 mg day except days before. Pain in feet. Seen podiatry at  Ortho told mild arthritis but this was more tendons and ligaments. Neck is more stiff and now as able to move, some kyphosis      Denies any fever, chills, SOB, SANCHEZ, night sweats, or chest pain, or cough. Reports healthy. Caring for partner full-time. The pain impacts her function. Right SI injection, and RFA L4-S1 injection for facet arthropathy at [facility] right leg radiculopathy. No loss of bowel or bladder, no arm weakness or pain. DEXA 7-2018 T-1.8 low bone density, worsening was referred to endo due to risk osteoporosis she will do this in the future.    April 29, 2020    Covid-19 (coronavirus) 3-16 was very ill until the end of March where she was exposed she believes when bringing her partner to appt \"I had to walk through an urgent care\". Her partner passed 3-31 for Covid-19 (coronavirus)     Still some lingering cough and loss of taste, fatigue and is sad depressed. Psoriatic arthritis severely flared during and off her arthritis medications, was off until the end of March. Still active in bilateral 2-4 fingers claw with swelling in middles knuckles where can straighten, right ball of the feet and the hips are harder to externally rotate. Prior to this was doing well. Is some better. Next adalimumab (humira) next Tues #3. DEXA scan [id number] T-1.4 Denies any fever, chills, SOB, SANCHEZ, night sweats, or chest pain, high fever. No eye " flares. Skin is good.     Adalimumab (humira) citrate free every 14 days, methotrexate  15 mg 0.6 mg injection every 7 days MOn, folic acid 3 mg day before and 2 mg other days, no prednisone since last summer. Tolerating no side-effects. Fibroscan 0-1. On new pain patch butran off fentanyl which was not working and is doing better.      September 15, 2020 interval history:    As noted above, the patient unfortunately developed COVID-19 infection and lost her partner to it back in March.  Since that time she is gradually felt improved.    1 area that has not improved visit she had developed some increasing right leg numbness already prior to that infection and that is been persistent.  She has been working with her pain clinic however and is getting physical therapy for it.  So far that has not improved things a lot.  She previously however has had injections which have been intermittently helpful and she is being seen back on a regular basis.  She has fortunately not experienced any weakness.    She got some left thigh pain during the time of her coronavirus infection that was concerning to her because it did remind her of her prior uveitis.  She contacted the ophthalmologist who deferred on seeing her since she had no vision loss and fortunately since that time those symptoms have entirely resolved.    At the time she last saw Miguel, she was having significant pain in her psoriatic arthritis peripheral joints.  She has started back on sulfasalazine and although she previously tolerated it fairly poorly from a gastrointestinal side effect standpoint, she is now finding that significantly improved as she is using omeprazole together with it.  She definitely feels like the sulfasalazine is improving things even though she is only one 1 g a day total dose.  She is not having any side effects otherwise with it, and her laboratory tests are reviewed and those show no evidence of toxicity.    PROBLEM LIST: Past medical  history is notable for her being presumptively treated for Lyme with doxycycline after developing a targetoid lesion, for a history of Hashimoto's thyroiditis with subsequent hypothyroidism, for the diagnosis now of psoriasis, and for a history of depression.Neck pain, cervical stenosis-- MRI  +moderate central stenosis right C3-4, C5-6 degenerative changes 2nd mild-mod central stenosis. Past referral to physiatry-wishes to return to Dr. Edmond [facility] Left hip pain s/p tear needs THR she reports. B) Hx-Bilateral tendonitis hamstrings with right bursitis, partial tear per Dr. Gomez s/p injections. Seen Dr. Arvin Xie at [facility] s/p ablation SI joint, tendon insertion site. Sees Dr. Maciel Dukes. C). Transient elevation LFT 2/2013 [ NL after held MTX and tramadol 2wk]; transient 4- [ AST 89]. NL . Other hx --Panceatic sphinctor dysfunction. On pancreatic enzymes. SBO 9-2015, now no doing well. Right thumb DJD, s/p surgery . Healed.  1. Diffuse degenerative joint disease that is both clinically apparent and obvious on multiple imaging modalities including bone scan, MRI 2/2013 or cervical. DJD L4-L5, L5-S1 per MRI 7/2012; MRI [id number]   2. Diffuse inflammatory arthritis with marked morning stiffness, no systemic inflammation by blood tests, but greater than 80% clinical improvement with a Medrol Dosepak.   3. Onset of the inflammatory joint symptoms including sacroiliitis with the development of psoriasis, suggesting that this represents psoriatic arthritis.   4. Development of episcleritis in the left eye with improvement with steroid eyedrops 12/14/2011 with recurrent episodes when wean oral prednisone (9/2012)  5. History of Hashimoto's thyroiditis.   6. History of presumptive treatment for Lyme disease with doxycycline after development of a targetoid lesion.   7. Poor tolerance of sulfasalazine and oral methotrexate.   8. Bilateral hamstring tendonosis, right  partial tear, sacroilitis 7/2012. See MRI, repeat 2/2013 hamstring and right SI inflammation seeing Dr. Arvin Xie at [facility] in [place]. , left hamstring   9. DJD L4-L5, L5-S1 per MRI 7/2012  10. Intermittent prednisone exposure  11. Transient elevation LFT ALT 84/AST 58 2/2013 (nl after held MTX and tramadol, then increased folic acid 2mg day)  12. Past LUE burn developed into cellulitis resolved from keflex. Bronchitis now on zithromax irritating her costrochondritis. Improving after 1 day  13. 1-2014 Left knee meniscal tear with chrondomalacia per MRI (had Rt/Lt CMJ surgery)  14. 4- RUQ pain.   15.  Hx recurrent left episcleritis   16. Right thumb tendon surgery with Dr. [facility]  17. Pneumonia 3-2015; strept  and 4-2017    18. Covid-19 (coronavirus) 3-2020     Past Medical History:   Diagnosis Date     Acute meniscal tear of knee 1/2014    with chrondromalacia per MRI      Depression      DJD (degenerative joint disease), lumbar 7/2012    L4-5, L5-S1 per MRI      Episcleritis 12/14/2011     Hamstring tendonitis at origin 7/2012    Bilaterally with partial tear right, sacroilitis per MRI     Hypothyroid 9/7/2011     Hypothyroidism      PONV (postoperative nausea and vomiting)      Psoriatic arthritis (H) 9/7/2011     Psoriatic arthritis (H) 2/9/2016     Strep throat 04/2017       Surgical-She has a surgical history including appendectomy in 1980, cholecystectomy in approximately 1993, and hysterectomy without oophorectomy in 1996.  Ablation SI Right 4/25 and left Feb 26th 2017 -- pain clinic   PT for her hip--told by therapy could feel the bursitis. Told needs THR and injection of left hip Feb 10th 2017 -  Past Surgical History:   Procedure Laterality Date     APPENDECTOMY       ARTHROPLASTY CARPOMETACARPAL (THUMB JOINT)  11/8/2013    Procedure: ARTHROPLASTY CARPOMETACARPAL (THUMB JOINT);  Left First Carpometacarpal Trapezium Resection, Tendon Interposition  ;   Surgeon: Liuza Farrar MD;  [facility]     ARTHROPLASTY CARPOMETACARPAL (THUMB JOINT)  12/20/2013    Procedure: ARTHROPLASTY CARPOMETACARPAL (THUMB JOINT);  Right Thumb Trapezium Resection With Flexor Carpi Radialis Tendon Reconstruction       CHOLECYSTECTOMY       COLONOSCOPY  5/6/2014    Procedure: COLONOSCOPY;  Surgeon: Adria San MD;  Location:  GI     ENDOSCOPIC RETROGRADE CHOLANGIOPANCREATOGRAM  6/13/2014    Procedure: ENDOSCOPIC RETROGRADE CHOLANGIOPANCREATOGRAM;  Surgeon: Lianna Wheeler MD;  Location: [facility]     GALLBLADDER SURGERY       GYN SURGERY      hysterectomy and oophorectomy     [facility] ENDOSCOPY W EUS Left 6/10/2014    Procedure: COMBINED ENDOSCOPIC ULTRASOUND, ESOPHAGOSCOPY, GASTROSCOPY, DUODENOSCOPY (EGD);  Surgeon: Lianna Wheeler MD;  Location: [facility] GI     HYSTERECTOMY       Right thumb surgery  7/2015     XR SACROILIAC THERAPEUTIC INJECTION BILATERAL  12/2011       FH:  No autoimmune disorders, psoriasis, UC, crohn's, SLE, RA, PsA, gout, autoimmune thyroid.  No MS, heart disease or cancer in family  Mother-endometrial cancer, RHEUMATOID ARTHRITIS (RA)   Father-psoriasis, lymphoma, colon and prostate cancer, inflammatory bowel   Siblings-sister rheumatoid arthritis (RA) and OSTEOARTHRITIS  And crohns   Brother autoimmune process sees Dr. Ni   [facility] [place] RHEUMATOID ARTHRITIS (RA)       SH:  No Alcohol. No Smoking. No IVDU. Partner on list for lung transplant     [facility] personally reviewed and updated by me.    ROS:  CONSTITUTIONAL: No fevers, night sweats or unintentional weight change. No acute distress, swollen glands  EYES: No vision change, diplopia, pain in eyes or red eyes   EARS, NOSE, MOUTH, THROAT: No tinnitus or hearing change, no epistaxis or nasal discharge, no oral lesions, throat clear. Normal saliva pool.  No drymouth. No thyroid Enlargement.   CARDIOVASCULAR: No chest pain, palpitations, or pain with walking, no orthopnea or PND   RESPIRATORY: No  dyspnea, cough, shortness of breath or wheezing. No pleurisy.   GI: See above   : No change in urine, no dysuria or hematuria   MUSCKL: See above   INTEGUMENTARY: No concerning lesions or moles   NEURO: No loss of strength or sensation, no numbness or tingling, no tremor, no dizziness, no headache. No falls   ENDO: No polyuria or polydipsia, no temperature intolerance   HEME/LYMPH:No concerning bumps, bleeding problems, or swollen lymph nodes. No recent infections, hospitalizations or new illnesses.   Otherwise 14 point ROS obtained, reviewed and found negative.     Assessment via video:    Constitutional: [id number] cooperative  Eyes: nl EOM, PERRLA, vision, conjunctiva, sclera  ENT: nl external ears, nose, hearing, lips, teeth, gums, throat  Neck: no mass or thyroid enlargement  RESP: No cough, no audible wheezing, able to talk in full sentences  MS:  pannus over MCPs, PIPs, severe laxity of all hand and wrist joints, bilateral 2-4 PIP swelling with contractures not able to straighten, right MTP swelling, contractures in toes, palpable MTPs posteriorly with high arches. Reduced ROM neck laterally and extension. Kyphosis. Right finger contracture.   Skin: no nail pitting, alopecia, rash  Neuro: nl cranial nerves, strength, sensation  Psych: nl judgement, orientation, memory, affect.  PSYCH: Alert and oriented times 3; coherent speech, normal   rate and volume, able to articulate logical thoughts, able   to abstract reason, no tangential thoughts, no hallucinations   or delusions   Remainder of exam unable to be completed due to video visits    Labs/Imaging:    No results found for any visits on 04/29/20.  Component      Latest Ref Rng & Units 3/16/2020   WBC      4.0 - 11.0 10e9/L 4.1   RBC Count      3.8 - 5.2 10e12/L 4.70   Hemoglobin      11.7 - 15.7 g/dL 13.8   Hematocrit      35.0 - 47.0 % 41.5   MCV      78 - 100 fl 88   MCH      26.5 - 33.0 pg 29.4   MCHC      31.5 - 36.5 g/dL 33.3   RDW      10.0 - 15.0 %  15.3 (H)   Platelet Count      150 - 450 10e9/L 198   Diff Method       Automated Method   % Neutrophils      % 44.8   % Lymphocytes      % 35.3   % Monocytes      % 19.2   % Eosinophils      % 0.0   % Basophils      % 0.5   % Immature Granulocytes      % 0.2   Nucleated RBCs      0 /100    Absolute Neutrophil      1.6 - 8.3 10e9/L 1.8   Absolute Lymphocytes      0.8 - 5.3 10e9/L 1.5   Absolute Monocytes      0.0 - 1.3 10e9/L 0.8   Absolute Eosinophils      0.0 - 0.7 10e9/L 0.0   Absolute Basophils      0.0 - 0.2 10e9/L 0.0   Abs Immature Granulocytes      0 - 0.4 10e9/L 0.0   Absolute Nucleated RBC          Sodium      133 - 144 mmol/L 136   Potassium      3.4 - 5.3 mmol/L 3.9   Chloride      94 - 109 mmol/L 107   Carbon Dioxide      20 - 32 mmol/L 25   Anion Gap      3 - 14 mmol/L 4   Glucose      70 - 99 mg/dL 102 (H)   Urea Nitrogen      7 - 30 mg/dL 16   Creatinine      0.52 - 1.04 mg/dL 0.86   GFR Estimate      >60 mL/min/1.73:m2 72   GFR Estimate If Black      >60 mL/min/1.73:m2 83   Calcium      8.5 - 10.1 mg/dL 8.6   Bilirubin Total      0.2 - 1.3 mg/dL 0.1 (L)   Albumin      3.4 - 5.0 g/dL 3.6   Protein Total      6.8 - 8.8 g/dL 7.6   Alkaline Phosphatase      40 - 150 U/L 90   ALT      0 - 50 U/L 48   AST      0 - 45 U/L 33   Influenza A/B Agn Specimen       Nasopharyngeal   Influenza A      NEG:Negative Negative   Influenza B      NEG:Negative Negative   Lab Scanned Result       SARS-COV-2 BY PCR-Scanned (A)   COVID-19 Virus (SARS-CoV-2) by PCR      NOT DETECTED DETECTED   Lactic Acid      0.7 - 2.0 mmol/L 0.7     Impression/Plan:    1.Psoriatic arthritis w/axial involvement activity with hx episcleritis/uveities, dactylitis, tendonitis, left hamstring tear, contractures, loss ROM. Interval Covid-19 (coronavirus) was very ill and flared her arthritis to point now her bilateral 2-4 PIP are contracted and loss ROM hips. I am not sure we will get the ROM back, had changes in toes now contractures,  Needs  THR.  Aprimilast (otezla) contraindicated due to depression and does have axial disease so I would not favor this. She is not working and nor can she return to work due to her chronic pain, impairments.  High risk of further deformities. We will continue this plan since this was working before she got ill, but I did inform her I am not sure how much ROM she will get back. She should avoid prednisone and follow the strict CDC Covid-19 (coronavirus) guidelines but go [email or web address] if worse or not improved.   -High risk medications, labs normal   2. Tfacfuw-eyiysz-qa ophthalmologist.   No sx today   3. Psoriasis and skin changes. Referral to derm for formal consult.   4. High risk medications monitoring, labs every 12 weeks. Normal today.   5. Low bone density per DEXA  T-1.4, worsening. Given high risk osteoporosis ,DEXA every 2 week. Continue calcium with vitamin D   6. Chronic pain under care of [facility] Dr. Xie. Spine per [facility]   -Calcium and vitamin D. Complete physical due she wishes to come here schedule after Covid-19 (coronavirus) pandemic    Plan/recommendation:    Given that she feels improved on low-dose sulfasalazine I think it is worth trying to push it higher.  I will give her omeprazole 20 mg twice daily to facilitate that and take her sulfasalazine dose up to 1 g twice daily.    I have also refilled her Humira.    She will follow-up with Miguel in 3 months to see how all that is going sooner as needed and continue doing her labs in the meantime.    This video visit commenced at 4 PM and was completed at 4:22 PM.    ZAKIA Samayoa MD, PhD    Rheumatology

## 2020-09-17 ENCOUNTER — TELEPHONE (OUTPATIENT)
Dept: RHEUMATOLOGY | Facility: CLINIC | Age: 65
End: 2020-09-17

## 2020-09-17 NOTE — TELEPHONE ENCOUNTER
Pt had her labs done on 9/11/2020 and they were within normal range. Note routed to Dr Samayoa to review.    ALEX Haas RN  Rheumatology Care Coordinator  [facility]

## 2020-09-17 NOTE — TELEPHONE ENCOUNTER
True, and inconsequential unless you are giving Methotrexate to someone in renal failure, or giving cancer chemo doses.

## 2020-09-17 NOTE — TELEPHONE ENCOUNTER
[facility] Call Center    Phone Message    May a detailed message be left on voicemail: yes     Reason for Call: Other: [facility] pharm called and wanted to let you know that the pt takes both Omeprazole and Methotrexate.  They want to make sure that the pt is getting labs done because the omeprazole can increase the methotrexate's concentration. If you have additional questions, feel free to contact them. Thanks     Action Taken: Message routed to:  [facility] RHEUM    Travel Screening: Not Applicable

## 2020-09-21 NOTE — TELEPHONE ENCOUNTER
Pharm called regarding below and wanted to know if Dr. Samayoa is okay with the pt taking the medication together.  Please call them back to discuss. Thanks    Phone#: [phone]

## 2020-09-22 NOTE — TELEPHONE ENCOUNTER
Returned call to pharmacy and relayed Dr Samayoa's response.    CLEMENT HaasN RN  Rheumatology Care Coordinator  [facility]

## 2020-10-23 DIAGNOSIS — Z79.899 HIGH RISK MEDICATIONS (NOT ANTICOAGULANTS) LONG-TERM USE: ICD-10-CM

## 2020-10-27 RX ORDER — FOLIC ACID 1 MG/1
TABLET ORAL
Qty: 210 TABLET | Refills: 3 | Status: SHIPPED | OUTPATIENT
Start: 2020-10-27 | End: 2020-12-09

## 2020-11-07 ENCOUNTER — HEALTH MAINTENANCE LETTER (OUTPATIENT)
Age: 65
End: 2020-11-07

## 2020-12-07 DIAGNOSIS — M06.4 INFLAMMATORY POLYARTHROPATHY (H): ICD-10-CM

## 2020-12-07 DIAGNOSIS — Z79.899 HIGH RISK MEDICATIONS (NOT ANTICOAGULANTS) LONG-TERM USE: ICD-10-CM

## 2020-12-07 LAB
ALBUMIN SERPL-MCNC: 3.5 G/DL (ref 3.4–5)
ALT SERPL W P-5'-P-CCNC: 21 U/L (ref 0–50)
AST SERPL W P-5'-P-CCNC: 20 U/L (ref 0–45)
BASOPHILS # BLD AUTO: 0 10E9/L (ref 0–0.2)
BASOPHILS NFR BLD AUTO: 0.4 %
CREAT SERPL-MCNC: 0.8 MG/DL (ref 0.52–1.04)
DIFFERENTIAL METHOD BLD: NORMAL
EOSINOPHIL # BLD AUTO: 0.1 10E9/L (ref 0–0.7)
EOSINOPHIL NFR BLD AUTO: 1.7 %
ERYTHROCYTE [DISTWIDTH] IN BLOOD BY AUTOMATED COUNT: 14.2 % (ref 10–15)
GFR SERPL CREATININE-BSD FRML MDRD: 78 ML/MIN/{1.73_M2}
HCT VFR BLD AUTO: 39 % (ref 35–47)
HGB BLD-MCNC: 12.5 G/DL (ref 11.7–15.7)
LYMPHOCYTES # BLD AUTO: 2.1 10E9/L (ref 0.8–5.3)
LYMPHOCYTES NFR BLD AUTO: 27.2 %
MCH RBC QN AUTO: 29.1 PG (ref 26.5–33)
MCHC RBC AUTO-ENTMCNC: 32.1 G/DL (ref 31.5–36.5)
MCV RBC AUTO: 91 FL (ref 78–100)
MONOCYTES # BLD AUTO: 0.5 10E9/L (ref 0–1.3)
MONOCYTES NFR BLD AUTO: 6.9 %
NEUTROPHILS # BLD AUTO: 4.8 10E9/L (ref 1.6–8.3)
NEUTROPHILS NFR BLD AUTO: 63.8 %
PLATELET # BLD AUTO: 294 10E9/L (ref 150–450)
RBC # BLD AUTO: 4.3 10E12/L (ref 3.8–5.2)
WBC # BLD AUTO: 7.6 10E9/L (ref 4–11)

## 2020-12-07 PROCEDURE — 82040 ASSAY OF SERUM ALBUMIN: CPT | Performed by: NURSE PRACTITIONER

## 2020-12-07 PROCEDURE — 84450 TRANSFERASE (AST) (SGOT): CPT | Performed by: NURSE PRACTITIONER

## 2020-12-07 PROCEDURE — 85025 COMPLETE CBC W/AUTO DIFF WBC: CPT | Performed by: NURSE PRACTITIONER

## 2020-12-07 PROCEDURE — 36415 COLL VENOUS BLD VENIPUNCTURE: CPT | Performed by: NURSE PRACTITIONER

## 2020-12-07 PROCEDURE — 82565 ASSAY OF CREATININE: CPT | Performed by: NURSE PRACTITIONER

## 2020-12-07 PROCEDURE — 84460 ALANINE AMINO (ALT) (SGPT): CPT | Performed by: NURSE PRACTITIONER

## 2020-12-07 NOTE — RESULT ENCOUNTER NOTE
The blood counts, liver, kidney labs are normal.     Miguel CABRAL, CNP, MSN  12/7/2020  3:59 PM

## 2020-12-09 ENCOUNTER — VIRTUAL VISIT (OUTPATIENT)
Dept: RHEUMATOLOGY | Facility: CLINIC | Age: 65
End: 2020-12-09
Attending: NURSE PRACTITIONER
Payer: COMMERCIAL

## 2020-12-09 DIAGNOSIS — M46.99 INFLAMMATORY SPONDYLOPATHY OF MULTIPLE SITES IN SPINE (H): ICD-10-CM

## 2020-12-09 DIAGNOSIS — L40.9 PSORIASIS: ICD-10-CM

## 2020-12-09 DIAGNOSIS — Z79.899 HIGH RISK MEDICATIONS (NOT ANTICOAGULANTS) LONG-TERM USE: Primary | ICD-10-CM

## 2020-12-09 DIAGNOSIS — L40.50 PSORIATIC ARTHRITIS (H): ICD-10-CM

## 2020-12-09 DIAGNOSIS — M06.4 INFLAMMATORY POLYARTHROPATHY (H): ICD-10-CM

## 2020-12-09 PROCEDURE — 99214 OFFICE O/P EST MOD 30 MIN: CPT | Mod: 95 | Performed by: NURSE PRACTITIONER

## 2020-12-09 RX ORDER — LEVOTHYROXINE SODIUM 125 UG/1
125 TABLET ORAL DAILY
COMMUNITY
Start: 2020-11-06 | End: 2022-12-06

## 2020-12-09 ASSESSMENT — PAIN SCALES - GENERAL: PAINLEVEL: SEVERE PAIN (6)

## 2020-12-09 NOTE — PROGRESS NOTES
"Krissy Weldon is a 65 year old female who is being evaluated via a billable video visit.      The patient has been notified of following:     \"This video visit will be conducted via a call between you and your physician/provider. We have found that certain health care needs can be provided without the need for an in-person physical exam.  This service lets us provide the care you need with a video conversation.  If a prescription is necessary we can send it directly to your pharmacy.  If lab work is needed we can place an order for that and you can then stop by our lab to have the test done at a later time.    Video visits are billed at different rates depending on your insurance coverage.  Please reach out to your insurance provider with any questions.    If during the course of the call the physician/provider feels a video visit is not appropriate, you will not be charged for this service.\"    Patient has given verbal consent for Video visit? Yes  How would you like to obtain your AVS? MyChart  If you are dropped from the video visit, the video invite should be resent to: Text to cell phone: [phone]  Will anyone else be joining your video visit? No        Video-Visit Details    Type of service:  Video Visit    Video Start Time: 10:23PM  Video End Time: 11:03 PM    Originating Location (pt. Location): Home    Distant Location (provider location):  [facility] [place]     Platform used for Video Visit: MARIANNA Hernandez CNP            "

## 2020-12-09 NOTE — PROGRESS NOTES
Formerly [facility] - Rheumatology Clinic Visit    Krissy Weldon  is a 65 year old female who presents with follow-up for undifferentiated polyarthropathy, psoriatic arthritis, psoriasis. Complicated by eye inflammation (uveitis and scleritis--sees [facility]), tendonitis, costrochondritis, tendon tears, synovitis, dactylitis, OA/DJD jessica thumbs CMC, now toe contractures and laxity all hand/wrist joints, bowel changes (sister with crohns and dad with inflammatory bowel). Co-manage with Dr. Samayoa     Review Copy forward: [-SSa, -SSb, -CCP,HLA-[id number] negative on outside workup with previous SI injections] with hx- inflammatory eye left eye episcleritis requiring prednisone gtt or oral with bone scan unrevealing. Previous medrol or steroid responsiveness. Past tried humira, SSZ, simponi SQ/IV, remicade and otelza. Failed humira EoW recurrent episcleritis, right wrist, right SI, bilateral hamstrings tendonosis with partial tear bilaterally. Failed simponi sq/IV ineffective. SSZ and oral MTX. Past recurrent iritis (ER if eye pain, blurred vision, red eye). Remicade. Otezla. LLL pneumonia 3/2015. MRI L hip showed high grade tear gluteus minimus insertion site, effusion 4/2015. C/o neck issues with MRI cervical spine show DJD, EMG, paramedian osteophytes and disk protrusion at C3-C4 with some moderate central canal stenosis and moderate to severe right-sided foraminal stenosis as a consequence.  C5-C6 also showed some mild to moderate central canal stenosis and moderate bilateral central foraminal stenosis.  Seen Dr. Wheeler with significant improvement in GI issues pancreatic sphinctor dysfunction requiring surgery now on pancreatic enzymes after pancreatic duct is open. Failed certolizumab pegol (cimzia) lost effectiveness (more uveitis, arthritis, synovitis,bursitis and tendonitis, strept and CAP). Secukinumab (cosentyx) 2-2019 more laxity in joints hands/wrists, inflammatory eye, toe contractures, changes in  skin. Failed  concern for worsening tendons, more bowel symptoms (+family history crohns and brother as well  stopped then changed to adalimumab (humira), 7-2020 sulfasalazine re-trial (not tolerate higher then 3 tablet)   Not tried: abatacept (orencia), tofacitinib (xeljantz), ustekinumab (Stelara)      Copy forward  June 26, 2019  Continues on secukinumab (cosentyx) 150 mg injection every 30 days. Prior dramatic improved with loading dose then loss of effectiveness after 10 days. Feels like adalimumab (humira) worked better    Reports changes in skin (leathery) and other changes, seeing Dr. Conti tomorrow for formal evaluation  Reports more diarrhea and constipation with cramps, no blood in stools, on prednisone 5 mg day and seen eye provider dx inflammatory eye on prednisone gtts and other drops, more laxity of hand and wrist joints, fullness over the joints, the toes are now contracted and not able to straighten, the bilateral 3rd palm of hand tendon is tight hard to straigthen,  overall stiff in all joints and tendons, does not feel this is working, neck pain, bowels are irregular. No liver pain. Colonoscopy 2014 no inflammation. Methotrexate  25 mg injection every 7 days, folic acid  2 mg day except days before. Pain in feet. Seen podiatry at  Ortho told mild arthritis but this was more tendons and ligaments. Neck is more stiff and now as able to move, some kyphosis      Denies any fever, chills, SOB, SANCHEZ, night sweats, or chest pain, or cough. Reports healthy. Caring for partner full-time. The pain impacts her function. Right SI injection, and RFA L4-S1 injection for facet arthropathy at [facility] [place] leg radiculopathy. No loss of bowel or bladder, no arm weakness or pain. DEXA 7-2018 T-1.8 low bone density, worsening was referred to endo due to risk osteoporosis she will do this in the future.    Copy forward  April 29, 2020  Covid-19 (coronavirus) 3-16 was very ill until the end of March where she was  "exposed she believes when bringing her partner to appt \"I had to walk through an urgent care\". Her partner passed 3-31 for Covid-19 (coronavirus)     Still some lingering cough and loss of taste, fatigue and is sad depressed. Psoriatic arthritis severely flared during and off her arthritis medications, was off until the end of March. Still active in bilateral 2-4 fingers claw with swelling in middles knuckles where can straighten, right ball of the feet and the hips are harder to externally rotate. Prior to this was doing well. Is some better. Next adalimumab (humira) next Tues #3. DEXA scan [id number] T-1.4 Denies any fever, chills, SOB, SANCHEZ, night sweats, or chest pain, high fever. No eye flares. Skin is good.     Adalimumab (humira) citrate free every 14 days, methotrexate  15 mg 0.6 mg injection every 7 days MOn, folic acid 3 mg day before and 2 mg other days, no prednisone since last summer. Tolerating no side-effects. Fibroscan 0-1. On new pain patch butran off fentanyl which was not working and is doing better.    Copy forward  July 1, 2020  Psoriatic arthritis is the same (still hammertoes which are worsening, \"claw hands\" the same, some dactylitis in toes, swelling in some MCPs, LBP chronic and a \"mess\", sides of hips tendons warm sore, bunions 1 and 5th toes), psoriasis in ears and eye brows (seen Dr. Conti over the phone). Function is good. Breathing is good. Occasional cough, no SANCHEZ or sweats or chills. No chest pain. Feels her toes and hands are curling. Denies neurologic red flags.     Seen Dr. Titus at [facility], told no inflammation noted \"maybe some swelling\" discussed MRI \"if we wanted it\" but that she felt did not need. Patient denies eye inflammation now or vision issues. We dont have the records.     Covid-19 (coronavirus) fatigue is improved, still some loss of taste and smell. Appetite it good.       Adalimumab (humira) 40 mg injection every 14 days (feels this works), methotrexate 0.6 ml " "subcutaneous 15 mg every 7 days (getting some nausea, diarrhea now day of but not losing hair or mouth sores, had tolerated this before), folic acid  3 mg day. Went down per Dr. Samayoa as her albumin was lower.     December 9, 2020  Seen Dr. Samayoa in Sept-titrated sulfasalazine to 1 GM BID, right leg numb after Covid-19 (coronavirus) was doing Physical Therapy with the pain clinic for right labrum tear.     Denies any fever, chills, SOB, SANCHEZ, night sweats, or chest pain, high fever, cough, travel in last 14 days or exposure to covid-19 (coronavirus). Reports healthy. Follows CDC guidelines.     No inflammatory eye. Bilateral 2nd MCP swelling red, some in bilateral 3rd MCP and end DIPs, nails splitting. Contractures the same, developed when off arthritis medications during her Covid-19 (coronavirus). This has not improved. Injections in back, right labrum tear very painful.   TSH 1.09 Free 1.13 now on higher synthyoid for 3 weeks-better. No more numb and tingling. Eats well. Bowels are good.  Psoriasis scalp and forehead    Adalimumab (humira) 40 mg injection every 14 days (feels this works but wears off after 10 days \"my joints feel lubricated\", \"amazing\" even sternal pain goes away), off methotrexate since Sept no longer having GI issues like nausea, diarrhea. Dr. Samayoa concerned as her albumin was lower, still is on sulfasalazine . Sulfasalazine  mg takes with daily omeprazole. Tolerating     PROBLEM LIST: Past medical history is notable for her being presumptively treated for Lyme with doxycycline after developing a targetoid lesion, for a history of Hashimoto's thyroiditis with subsequent hypothyroidism, for the diagnosis now of psoriasis, and for a history of depression.Neck pain, cervical stenosis-- MRI  +moderate central stenosis right C3-4, C5-6 degenerative changes 2nd mild-mod central stenosis. Past referral to physiatry-wishes to return to Dr. Edmond [facility] Left hip pain s/p tear " needs THR she reports. B) Hx-Bilateral tendonitis hamstrings with right bursitis, partial tear per Dr. Gomez s/p injections. [place] [facility] [place] SI joint, tendon insertion site. Sees Dr. Maciel Dukes. C). Transient elevation LFT 2/2013 [ NL after held MTX and tramadol 2wk]; transient 4- [ AST 89]. NL . Other hx --Panceatic sphinctor dysfunction. On pancreatic enzymes. SBO 9-2015, now no doing well. Right thumb DJD, s/p surgery . Healed.  1. Diffuse degenerative joint disease that is both clinically apparent and obvious on multiple imaging modalities including bone scan, MRI 2/2013 or cervical. DJD L4-L5, L5-S1 per MRI 7/2012; MRI [id number]   2. Diffuse inflammatory arthritis with marked morning stiffness, no systemic inflammation by blood tests, but greater than 80% clinical improvement with a Medrol Dosepak.   3. Onset of the inflammatory joint symptoms including sacroiliitis with the development of psoriasis, suggesting that this represents psoriatic arthritis.   4. Development of episcleritis in the left eye with improvement with steroid eyedrops 12/14/2011 with recurrent episodes when wean oral prednisone (9/2012)  5. History of Hashimoto's thyroiditis.   6. History of presumptive treatment for Lyme disease with doxycycline after development of a targetoid lesion.   7. Poor tolerance of sulfasalazine and oral methotrexate.   8. Bilateral hamstring tendonosis, right partial tear, sacroilitis 7/2012. See MRI, repeat 2/2013 hamstring and right SI inflammation [place]. [place] [facility] [place] [facility] [place]. , left hamstring   9. DJD L4-L5, L5-S1 per MRI 7/2012  10. Intermittent prednisone exposure  11. Transient elevation LFT ALT 84/AST 58 2/2013 (nl after held MTX and tramadol, then increased folic acid 2mg day)  12. Past LUE burn developed into cellulitis resolved from keflex. Bronchitis now on zithromax irritating her  costrochondritis. Improving after 1 day  13. 1-2014 Left knee meniscal tear with chrondomalacia per MRI (had Rt/Lt CMJ surgery)  14. 4- RUQ pain.   15.  Hx recurrent left episcleritis   16. Right thumb tendon surgery with Dr. [facility]  17. Pneumonia 3-2015; strept  and 4-2017    18. Covid-19 (coronavirus) 3-2020, partner passed from this Covid-19 (coronavirus)      Past Medical History:   Diagnosis Date     Acute meniscal tear of knee 1/2014    with chrondromalacia per MRI      Depression      DJD (degenerative joint disease), lumbar 7/2012    L4-5, L5-S1 per MRI      Episcleritis 12/14/2011     Hamstring tendonitis at origin 7/2012    Bilaterally with partial tear right, sacroilitis per MRI     Hypothyroid 9/7/2011     Hypothyroidism      PONV (postoperative nausea and vomiting)      Psoriatic arthritis (H) 9/7/2011     Psoriatic arthritis (H) 2/9/2016     Strep throat 04/2017       Surgical-She has a surgical history including appendectomy in 1980, cholecystectomy in approximately 1993, and hysterectomy without oophorectomy in 1996.  Ablation SI Right 4/25 and left Feb 26th 2017 -- pain clinic   PT for her hip--told by therapy could feel the bursitis. Told needs THR and injection of left hip Feb 10th 2017 -  Past Surgical History:   Procedure Laterality Date     APPENDECTOMY       ARTHROPLASTY CARPOMETACARPAL (THUMB JOINT)  11/8/2013    Procedure: ARTHROPLASTY CARPOMETACARPAL (THUMB JOINT);  Left First Carpometacarpal Trapezium Resection, Tendon Interposition  ;  Surgeon: Luiza Farrar MD;  Location:  OR     ARTHROPLASTY CARPOMETACARPAL (THUMB JOINT)  12/20/2013    Procedure: ARTHROPLASTY CARPOMETACARPAL (THUMB JOINT);  Right Thumb Trapezium Resection With Flexor Carpi Radialis Tendon Reconstruction       BLEPHAROPLASTY BILATERAL Bilateral 8/4/2020    Procedure: BILATERAL UPPER LID BLEPHAROPLASTY AND;  Surgeon: Xuan Back MD;  Location:  OR     CHOLECYSTECTOMY        COLONOSCOPY  5/6/2014    Procedure: COLONOSCOPY;  Surgeon: Adria San MD;  Location:  GI     ENDOSCOPIC RETROGRADE CHOLANGIOPANCREATOGRAM  6/13/2014    Procedure: ENDOSCOPIC RETROGRADE CHOLANGIOPANCREATOGRAM;  Surgeon: Lianna Wheeler MD;  Location: [facility]     GALLBLADDER SURGERY       GYN SURGERY      hysterectomy and oophorectomy     [facility] ENDOSCOPY W EUS Left 6/10/2014    Procedure: COMBINED ENDOSCOPIC ULTRASOUND, ESOPHAGOSCOPY, GASTROSCOPY, DUODENOSCOPY (EGD);  Surgeon: Lianna Wheeler MD;  Location: [facility]     HYSTERECTOMY       REPAIR PTOSIS BROW BILATERAL Bilateral 8/4/2020    Procedure: BILATERAL BROW PTOSIS;  Surgeon: Xuan Back MD;  Location:  OR     Right thumb surgery  7/2015     XR SACROILIAC THERAPEUTIC INJECTION BILATERAL  12/2011       FH:  No autoimmune disorders, psoriasis, UC, crohn's, SLE, RA, PsA, gout, autoimmune thyroid.  No MS, heart disease or cancer in family  Mother-endometrial cancer, RHEUMATOID ARTHRITIS (RA)   Father-psoriasis, lymphoma, colon and prostate cancer, inflammatory bowel   Siblings-sister rheumatoid arthritis (RA) and OSTEOARTHRITIS  And crohns   [place] autoimmune process [place] Dr. Ni   [facility] [place]       SH:  No Alcohol. No Smoking. No IVDU. Partner on list for lung transplant     [facility] personally reviewed and updated by me.    ROS:  CONSTITUTIONAL: No fevers, night sweats or unintentional weight change. No acute distress, swollen glands  EYES: No vision change, diplopia, pain in eyes or red eyes   EARS, NOSE, MOUTH, THROAT: No tinnitus or hearing change, no epistaxis or nasal discharge, no oral lesions, throat clear. Normal saliva pool.  No drymouth. No thyroid Enlargement.   CARDIOVASCULAR: No chest pain, palpitations, or pain with walking, no orthopnea or PND   RESPIRATORY: No dyspnea, cough, shortness of breath or wheezing. No pleurisy.   GI: See above   : No change in urine, no dysuria or hematuria    MUSCKL: See above   INTEGUMENTARY: No concerning lesions or moles   NEURO: No loss of strength or sensation, no numbness or tingling, no tremor, no dizziness, no headache. No falls   ENDO: No polyuria or polydipsia, no temperature intolerance   HEME/LYMPH:No concerning bumps, bleeding problems, or swollen lymph nodes. No recent infections, hospitalizations or new illnesses.   Otherwise 14 point ROS obtained, reviewed and found negative.     Assessment via video:    Constitutional: [id number] cooperative  Eyes: nl EOM, PERRLA, vision, conjunctiva, sclera  ENT: nl external ears, nose, hearing, lips, teeth, gums, throat  Neck: no mass or thyroid enlargement  RESP: No cough, no audible wheezing, able to talk in full sentences  MS:  2-4 PIP contractures not able to straighten, bilateral 2nd MPC 2+ swell with red, DIPS with swelling, hammertoes, left 1st bunion, bilateral 5th bunions. high arches. Reduced ROM neck laterally and extension. Kyphosis. Right finger contracture. Not able to assess her back and hips. Knees, elbows or shoulders not swelling--FROM.   Skin: no nail pitting, alopecia, rash +some in eye brows, reports in ears but cant see it due to limitations   Neuro: nl cranial nerves, strength, sensation  Psych: nl judgement, orientation, memory, affect.  PSYCH: Alert and oriented times 3; coherent speech, normal   rate and volume, able to articulate logical thoughts, able   to abstract reason, no tangential thoughts, no hallucinations   or delusions  His affect is normal and pleasant  Remainder of exam unable to be completed due to video visits    Discussed corovid-19 prevention, CDC guidelines/resources, [facility] guidelines, to follow CDC/MDH for updates and what to do for exposure signs or symptoms and where to go. 6. Discussed corovid-19 prevention, CDC guidelines/resources including hand washing, social distance and what to do for exposure signs or symptoms and where to go, to follow CDC/MDH guidelines, and if  any signs or symptoms of infection to stop immunosuppression and seek immediate medical att. That prednisone can increase change of serious infections so should be avoided. We also agree that the benefits of continued immunosuppression outweigh the risks of COVID-19 infection. COVID-19 Precautions. Discussed the importance of good hand washing techniques with soap and water for at least 20 seconds, avoid touching eyes, nose, mouth, avoiding crowds, social distancing      Labs/Imaging:  Component      Latest Ref Rng & Units 9/11/2020 12/7/2020   WBC      4.0 - 11.0 10e9/L 5.7 7.6   RBC Count      3.8 - 5.2 10e12/L 4.45 4.30   Hemoglobin      11.7 - 15.7 g/dL 12.9 12.5   Hematocrit      35.0 - 47.0 % 40.3 39.0   MCV      78 - 100 fl 91 91   MCH      26.5 - 33.0 pg 29.0 29.1   MCHC      31.5 - 36.5 g/dL 32.0 32.1   RDW      10.0 - 15.0 % 14.7 14.2   Platelet Count      150 - 450 10e9/L 251 294   % Neutrophils      % 48.7 63.8   % Lymphocytes      % 41.7 27.2   % Monocytes      % 7.2 6.9   % Eosinophils      % 1.9 1.7   % Basophils      % 0.5 0.4   Absolute Neutrophil      1.6 - 8.3 10e9/L 2.8 4.8   Absolute Lymphocytes      0.8 - 5.3 10e9/L 2.4 2.1   Absolute Monocytes      0.0 - 1.3 10e9/L 0.4 0.5   Absolute Eosinophils      0.0 - 0.7 10e9/L 0.1 0.1   Absolute Basophils      0.0 - 0.2 10e9/L 0.0 0.0   Diff Method       Automated Method Automated Method   Creatinine      0.52 - 1.04 mg/dL 0.84 0.80   GFR Estimate      >60 mL/min/1.73:m2 74 78   GFR Estimate If Black      >60 mL/min/1.73:m2 86 >90   COVID-19 Virus PCR to [facility] - Source           COVID-19 Virus PCR to [facility] - Result           Albumin      3.4 - 5.0 g/dL 3.7 3.5   ALT      0 - 50 U/L 24 21   AST      0 - 45 U/L 20 20   CRP Inflammation      0.0 - 8.0 mg/L <2.9    Sed Rate      0 - 30 mm/h 12        Component      Latest Ref Rng & Units 5/18/2016 8/4/2016 11/1/2016 1/10/2017   Albumin      3.4 - 5.0 g/dL 3.5 4.1 3.9 3.6     Component      Latest Ref  Rng & Units 2/15/2017 4/26/2017 5/31/2017 8/22/2017   Albumin      3.4 - 5.0 g/dL 3.7 3.8 3.6 3.9     Component      Latest Ref Rng & Units 10/25/2017 2/28/2018 6/12/2018 10/31/2018   Albumin      3.4 - 5.0 g/dL 3.7 3.8 3.5 3.7     Component      Latest Ref Rng & Units 1/7/2019 2/26/2019 4/10/2019 6/26/2019   Albumin      3.4 - 5.0 g/dL 3.8 3.7 3.8 3.8     Component      Latest Ref Rng & Units 10/22/2019 2/11/2020 3/16/2020 6/17/2020   Albumin      3.4 - 5.0 g/dL 3.6 3.8 3.6 3.6     Component      Latest Ref Rng & Units 9/11/2020 12/7/2020   Albumin      3.4 - 5.0 g/dL 3.7 3.5     Impression/Plan:    1.Psoriatic arthritis w/axial involvement activity with hx episcleritis/uveities, dactylitis, tendonitis, hamstring tear, contractures, loss ROM. With her Covid-19 (coronavirus)  flared her arthritis ( now bilateral 2-4 PIP are contracted and loss ROM hips, has labrum taer. I am not sure we will get the ROM back, had changes in toes now contractures, Needs THR).   Her psoriatic arthritis continues to active with synovitis mainly in bilateral 2-3 MCPs. New  contractures to her fingers and toes, and bunions when off medications. We discussed abatacept (orencia) vs Infliximab (remicade).  She chose to continue the plan for now.  Aprimilast (otezla) contraindicated due to depression and does have axial disease so I would not favor this, failed in past. Chart review I note she tried adalimumab (humira) every 10 days failed in the past, failed golimumab (simponi) and Infliximab (remicade) lost effectiveness before, abatacept (orencia) not proven effective in inflammatory eye disease, ustekinumab (Stelara) long half life and tofacitinib (xeljantz) concerns high risk of serious infections and shingles. Concern with infusions going into hospital. Methotrexate stopped due to lowered ALB.   She wishes to continue this for now. Recently abnl TSH, this may contribute to this as well   High risk of further deformities.  We will  continue this plan otherwise    2. Eoxntor-htpvck-wm ophthalmologist.   No sx today.   3. Psoriasis, nails and skin changes. Follow-up Dr. Conti prn . He was considering Infliximab 5mg q4 weeks (tried before lost effectiveness, failed golimumab (simponi)), weekly Humira, IL23 inhibitors (limited data), or Toficitinab  4. High risk medications monitoring, labs every 12 weeks. Normal 12-7.normal CBC, liver and kidney tests. But lowered albumin. I will send Dr. Samayoa a message on this   5. Low bone density per DEXA  T-1.4, worsening. Given high risk osteoporosis DEXA every 2 year. Continue calcium with vitamin D   6. Chronic pain under care of [facility] Dr. Xie. Spine per [facility]   -Calcium and vitamin D. Complete physical due she wishes to come here schedule after Covid-19 (coronavirus) pandemic    -RTC 4 mth Dr. Samayoa  The patient understood the rationale for the diagnosis and treatment plan. Patient shared in the decision making. All questions were answered to best of my ability and the patient's satisfaction  Miguel Umana APRN, CNP, MSN  [facility]  Department of Rheumatology & Autoimmune Disorders

## 2020-12-09 NOTE — LETTER
"12/9/2020       RE: Krissy Weldon  [address] [facility] [address]     Dear Colleague,    Thank you for referring your patient, Krissy Weldon, to the [facility] [place] at [facility]. Please see a copy of my visit note below.    Krissy Weldon is a 65 year old female who is being evaluated via a billable video visit.      The patient has been notified of following:     \"This video visit will be conducted via a call between you and your physician/provider. We have found that certain health care needs can be provided without the need for an in-person physical exam.  This service lets us provide the care you need with a video conversation.  If a prescription is necessary we can send it directly to your pharmacy.  If lab work is needed we can place an order for that and you can then stop by our lab to have the test done at a later time.    Video visits are billed at different rates depending on your insurance coverage.  Please reach out to your insurance provider with any questions.    If during the course of the call the physician/provider feels a video visit is not appropriate, you will not be charged for this service.\"    Patient has given verbal consent for Video visit? Yes  How would you like to obtain your AVS? MyChart  If you are dropped from the video visit, the video invite should be resent to: Text to cell phone: [phone]  Will anyone else be joining your video visit? No        Video-Visit Details    Type of service:  Video Visit    Video Start Time: 10:23PM  Video End Time: 11:03 PM    Originating Location (pt. Location): Home    Distant Location (provider location):  [facility] [place]     Platform used for Video Visit: MARIANNA Hernandez CNP                [facility] - Rheumatology Clinic Visit    Krissy Weldon  is a 65 year old female who presents with follow-up " for undifferentiated polyarthropathy, psoriatic arthritis, psoriasis. Complicated by eye inflammation (uveitis and scleritis--sees [facility]), tendonitis, costrochondritis, tendon tears, synovitis, dactylitis, OA/DJD jessica thumbs CMC, now toe contractures and laxity all hand/wrist joints, bowel changes (sister with crohns and dad with inflammatory bowel). Co-manage with Dr. Samayoa     Review Copy forward: [-SSa, -SSb, -CCP,HLA-[id number] negative on outside workup with previous SI injections] with hx- inflammatory eye left eye episcleritis requiring prednisone gtt or oral with bone scan unrevealing. Previous medrol or steroid responsiveness. Past tried humira, SSZ, simponi SQ/IV, remicade and otelza. Failed humira EoW recurrent episcleritis, right wrist, right SI, bilateral hamstrings tendonosis with partial tear bilaterally. Failed simponi sq/IV ineffective. SSZ and oral MTX. Past recurrent iritis (ER if eye pain, blurred vision, red eye). Remicade. Otezla. LLL pneumonia 3/2015. MRI L hip showed high grade tear gluteus minimus insertion site, effusion 4/2015. C/o neck issues with MRI cervical spine show DJD, EMG, paramedian osteophytes and disk protrusion at C3-C4 with some moderate central canal stenosis and moderate to severe right-sided foraminal stenosis as a consequence.  C5-C6 also showed some mild to moderate central canal stenosis and moderate bilateral central foraminal stenosis.  Seen Dr. Wheeler with significant improvement in GI issues pancreatic sphinctor dysfunction requiring surgery now on pancreatic enzymes after pancreatic duct is open. Failed certolizumab pegol (cimzia) lost effectiveness (more uveitis, arthritis, synovitis,bursitis and tendonitis, strept and CAP). Secukinumab (cosentyx) 2-2019 more laxity in joints hands/wrists, inflammatory eye, toe contractures, changes in skin. Failed  concern for worsening tendons, more bowel symptoms (+family history crohns and brother as well  stopped then changed to  "adalimumab (humira), 7-2020 sulfasalazine re-trial (not tolerate higher then 3 tablet)   Not tried: abatacept (orencia), tofacitinib (xeljantz), ustekinumab (Stelara)      Copy forward  June 26, 2019  Continues on secukinumab (cosentyx) 150 mg injection every 30 days. Prior dramatic improved with loading dose then loss of effectiveness after 10 days. Feels like adalimumab (humira) worked better    Reports changes in skin (leathery) and other changes, seeing Dr. Conti tomorrow for formal evaluation  Reports more diarrhea and constipation with cramps, no blood in stools, on prednisone 5 mg day and seen eye provider dx inflammatory eye on prednisone gtts and other drops, more laxity of hand and wrist joints, fullness over the joints, the toes are now contracted and not able to straighten, the bilateral 3rd palm of hand tendon is tight hard to straigthen,  overall stiff in all joints and tendons, does not feel this is working, neck pain, bowels are irregular. No liver pain. Colonoscopy 2014 no inflammation. Methotrexate  25 mg injection every 7 days, folic acid  2 mg day except days before. Pain in feet. Seen podiatry at  Ortho told mild arthritis but this was more tendons and ligaments. Neck is more stiff and now as able to move, some kyphosis      Denies any fever, chills, SOB, SANCHEZ, night sweats, or chest pain, or cough. Reports healthy. Caring for partner full-time. The pain impacts her function. Right SI injection, and RFA L4-S1 injection for facet arthropathy at [facility] [place] leg radiculopathy. No loss of bowel or bladder, no arm weakness or pain. DEXA 7-2018 T-1.8 low bone density, worsening was referred to endo due to risk osteoporosis she will do this in the future.    Copy forward  April 29, 2020  Covid-19 (coronavirus) 3-16 was very ill until the end of March where she was exposed she believes when bringing her partner to appt \"I had to walk through an urgent care\". Her partner passed 3-31 for Covid-19 " "(coronavirus)     Still some lingering cough and loss of taste, fatigue and is sad depressed. Psoriatic arthritis severely flared during and off her arthritis medications, was off until the end of March. Still active in bilateral 2-4 fingers claw with swelling in middles knuckles where can straighten, right ball of the feet and the hips are harder to externally rotate. Prior to this was doing well. Is some better. Next adalimumab (humira) next Tues #3. DEXA scan [id number] T-1.4 Denies any fever, chills, SOB, SANCHEZ, night sweats, or chest pain, high fever. No eye flares. Skin is good.     Adalimumab (humira) citrate free every 14 days, methotrexate  15 mg 0.6 mg injection every 7 days MOn, folic acid 3 mg day before and 2 mg other days, no prednisone since last summer. Tolerating no side-effects. Fibroscan 0-1. On new pain patch butran off fentanyl which was not working and is doing better.    Copy forward  July 1, 2020  Psoriatic arthritis is the same (still hammertoes which are worsening, \"claw hands\" the same, some dactylitis in toes, swelling in some MCPs, LBP chronic and a \"mess\", sides of hips tendons warm sore, bunions 1 and 5th toes), psoriasis in ears and eye brows (seen Dr. Conti over the phone). Function is good. Breathing is good. Occasional cough, no SANCHEZ or sweats or chills. No chest pain. Feels her toes and hands are curling. Denies neurologic red flags.     Seen Dr. Titus at [facility], told no inflammation noted \"maybe some swelling\" discussed MRI \"if we wanted it\" but that she felt did not need. Patient denies eye inflammation now or vision issues. We dont have the records.     Covid-19 (coronavirus) fatigue is improved, still some loss of taste and smell. Appetite it good.       Adalimumab (humira) 40 mg injection every 14 days (feels this works), methotrexate 0.6 ml subcutaneous 15 mg every 7 days (getting some nausea, diarrhea now day of but not losing hair or mouth sores, had tolerated this " "before), folic acid  3 mg day. Went down per Dr. Samayoa as her albumin was lower.     December 9, 2020  Seen Dr. Samayoa in Sept-titrated sulfasalazine to 1 GM BID, right leg numb after Covid-19 (coronavirus) was doing Physical Therapy with the pain clinic for right labrum tear.     Denies any fever, chills, SOB, SANCHEZ, night sweats, or chest pain, high fever, cough, travel in last 14 days or exposure to covid-19 (coronavirus). Reports healthy. Follows CDC guidelines.     No inflammatory eye. Bilateral 2nd MCP swelling red, some in bilateral 3rd MCP and end DIPs, nails splitting. Contractures the same, developed when off arthritis medications during her Covid-19 (coronavirus). This has not improved. Injections in back, right labrum tear very painful.   TSH 1.09 Free 1.13 now on higher synthyoid for 3 weeks-better. No more numb and tingling. Eats well. Bowels are good.  Psoriasis scalp and forehead    Adalimumab (humira) 40 mg injection every 14 days (feels this works but wears off after 10 days \"my joints feel lubricated\", \"amazing\" even sternal pain goes away), off methotrexate since Sept no longer having GI issues like nausea, diarrhea. Dr. Samayoa concerned as her albumin was lower, still is on sulfasalazine . Sulfasalazine  mg takes with daily omeprazole. Tolerating     PROBLEM LIST: Past medical history is notable for her being presumptively treated for Lyme with doxycycline after developing a targetoid lesion, for a history of Hashimoto's thyroiditis with subsequent hypothyroidism, for the diagnosis now of psoriasis, and for a history of depression.Neck pain, cervical stenosis-- MRI  +moderate central stenosis right C3-4, C5-6 degenerative changes 2nd mild-mod central stenosis. Past referral to physiatry-wishes to return to Dr. dEmond Sawant [facility] Left hip pain s/p tear needs THR she reports. B) Hx-Bilateral tendonitis hamstrings with right bursitis, partial tear per Dr. Gomez s/p injections. " Seen Dr. Arvin Xie at [facility] s/p ablation SI joint, tendon insertion site. Sees Dr. Maciel Dukes. C). Transient elevation LFT 2/2013 [ NL after held MTX and tramadol 2wk]; transient 4- [ AST 89]. NL . Other hx --Panceatic sphinctor dysfunction. On pancreatic enzymes. SBO 9-2015, now no doing well. Right thumb DJD, s/p surgery . Healed.  1. Diffuse degenerative joint disease that is both clinically apparent and obvious on multiple imaging modalities including bone scan, MRI 2/2013 or cervical. DJD L4-L5, L5-S1 per MRI 7/2012; MRI [id number]   2. Diffuse inflammatory arthritis with marked morning stiffness, no systemic inflammation by blood tests, but greater than 80% clinical improvement with a Medrol Dosepak.   3. Onset of the inflammatory joint symptoms including sacroiliitis with the development of psoriasis, suggesting that this represents psoriatic arthritis.   4. Development of episcleritis in the left eye with improvement with steroid eyedrops 12/14/2011 with recurrent episodes when wean oral prednisone (9/2012)  5. History of Hashimoto's thyroiditis.   6. History of presumptive treatment for Lyme disease with doxycycline after development of a targetoid lesion.   7. Poor tolerance of sulfasalazine and oral methotrexate.   8. Bilateral hamstring tendonosis, right partial tear, sacroilitis 7/2012. See MRI, repeat 2/2013 hamstring and right SI inflammation seeing Dr. Arvin Xie at [facility] specialized in SI. , left hamstring   9. DJD L4-L5, L5-S1 per MRI 7/2012  10. Intermittent prednisone exposure  11. Transient elevation LFT ALT 84/AST 58 2/2013 (nl after held MTX and tramadol, then increased folic acid 2mg day)  12. Past LUE burn developed into cellulitis resolved from keflex. Bronchitis now on zithromax irritating her costrochondritis. Improving after 1 day  13. 1-2014 Left knee meniscal tear with chrondomalacia per MRI (had Rt/Lt CMJ surgery)  14.  4- RUQ pain.   15.  Hx recurrent left episcleritis   16. Right thumb tendon surgery with Dr. [facility]  17. Pneumonia 3-2015; strept  and 4-2017    18. Covid-19 (coronavirus) 3-2020, partner passed from this Covid-19 (coronavirus)      Past Medical History:   Diagnosis Date     Acute meniscal tear of knee 1/2014    with chrondromalacia per MRI      Depression      DJD (degenerative joint disease), lumbar 7/2012    L4-5, L5-S1 per MRI      Episcleritis 12/14/2011     Hamstring tendonitis at origin 7/2012    Bilaterally with partial tear right, sacroilitis per MRI     Hypothyroid 9/7/2011     Hypothyroidism      PONV (postoperative nausea and vomiting)      Psoriatic arthritis (H) 9/7/2011     Psoriatic arthritis (H) 2/9/2016     Strep throat 04/2017       Surgical-She has a surgical history including appendectomy in 1980, cholecystectomy in approximately 1993, and hysterectomy without oophorectomy in 1996.  Ablation SI Right 4/25 and left Feb 26th 2017 -- pain clinic   PT for her hip--told by therapy could feel the bursitis. Told needs THR and injection of left hip Feb 10th 2017 -  Past Surgical History:   Procedure Laterality Date     APPENDECTOMY       ARTHROPLASTY CARPOMETACARPAL (THUMB JOINT)  11/8/2013    Procedure: ARTHROPLASTY CARPOMETACARPAL (THUMB JOINT);  Left First Carpometacarpal Trapezium Resection, Tendon Interposition  ;  Surgeon: Luiza Farrar MD;  Location:  OR     ARTHROPLASTY CARPOMETACARPAL (THUMB JOINT)  12/20/2013    Procedure: ARTHROPLASTY CARPOMETACARPAL (THUMB JOINT);  Right Thumb Trapezium Resection With Flexor Carpi Radialis Tendon Reconstruction       BLEPHAROPLASTY BILATERAL Bilateral 8/4/2020    Procedure: BILATERAL UPPER LID BLEPHAROPLASTY AND;  Surgeon: Xuan Back MD;  Location:  OR     CHOLECYSTECTOMY       COLONOSCOPY  5/6/2014    Procedure: COLONOSCOPY;  Surgeon: Adria San MD;  Location:  GI     ENDOSCOPIC RETROGRADE  CHOLANGIOPANCREATOGRAM  6/13/2014    Procedure: ENDOSCOPIC RETROGRADE CHOLANGIOPANCREATOGRAM;  Surgeon: Lianna Wheeler MD;  Location: [facility] OR     GALLBLADDER SURGERY       GYN SURGERY      hysterectomy and oophorectomy     [facility] ENDOSCOPY W EUS Left 6/10/2014    Procedure: COMBINED ENDOSCOPIC ULTRASOUND, ESOPHAGOSCOPY, GASTROSCOPY, DUODENOSCOPY (EGD);  Surgeon: Lianna Wheeler MD;  Location: [facility] GI     HYSTERECTOMY       REPAIR PTOSIS BROW BILATERAL Bilateral 8/4/2020    Procedure: BILATERAL BROW PTOSIS;  Surgeon: Xuan Back MD;  Location: [facility] OR     Right thumb surgery  7/2015     XR SACROILIAC THERAPEUTIC INJECTION BILATERAL  12/2011       FH:  No autoimmune disorders, psoriasis, UC, crohn's, SLE, RA, PsA, gout, autoimmune thyroid.  No MS, heart disease or cancer in family  Mother-endometrial cancer, RHEUMATOID ARTHRITIS (RA)   Father-psoriasis, lymphoma, colon and prostate cancer, inflammatory bowel   Siblings-sister rheumatoid arthritis (RA) and OSTEOARTHRITIS  And crohns   Brother autoimmune process sees Dr. Ni   [facility] [place] (RA)       SH:  No Alcohol. No Smoking. No IVDU. Partner on list for lung transplant     [facility] personally reviewed and updated by me.    ROS:  CONSTITUTIONAL: No fevers, night sweats or unintentional weight change. No acute distress, swollen glands  EYES: No vision change, diplopia, pain in eyes or red eyes   EARS, NOSE, MOUTH, THROAT: No tinnitus or hearing change, no epistaxis or nasal discharge, no oral lesions, throat clear. Normal saliva pool.  No drymouth. No thyroid Enlargement.   CARDIOVASCULAR: No chest pain, palpitations, or pain with walking, no orthopnea or PND   RESPIRATORY: No dyspnea, cough, shortness of breath or wheezing. No pleurisy.   GI: See above   : No change in urine, no dysuria or hematuria   MUSCKL: See above   INTEGUMENTARY: No concerning lesions or moles   NEURO: No loss of strength or sensation, no numbness or tingling, no  tremor, no dizziness, no headache. No falls   ENDO: No polyuria or polydipsia, no temperature intolerance   HEME/LYMPH:No concerning bumps, bleeding problems, or swollen lymph nodes. No recent infections, hospitalizations or new illnesses.   Otherwise 14 point ROS obtained, reviewed and found negative.     Assessment via video:    Constitutional: [id number] cooperative  Eyes: nl EOM, PERRLA, vision, conjunctiva, sclera  ENT: nl external ears, nose, hearing, lips, teeth, gums, throat  Neck: no mass or thyroid enlargement  RESP: No cough, no audible wheezing, able to talk in full sentences  MS:  2-4 PIP contractures not able to straighten, bilateral 2nd MPC 2+ swell with red, DIPS with swelling, hammertoes, left 1st bunion, bilateral 5th bunions. high arches. Reduced ROM neck laterally and extension. Kyphosis. Right finger contracture. Not able to assess her back and hips. Knees, elbows or shoulders not swelling--FROM.   Skin: no nail pitting, alopecia, rash +some in eye brows, reports in ears but cant see it due to limitations   Neuro: nl cranial nerves, strength, sensation  Psych: nl judgement, orientation, memory, affect.  PSYCH: Alert and oriented times 3; coherent speech, normal   rate and volume, able to articulate logical thoughts, able   to abstract reason, no tangential thoughts, no hallucinations   or delusions  His affect is normal and pleasant  Remainder of exam unable to be completed due to video visits    Discussed corovid-19 prevention, CDC guidelines/resources, MDH guidelines, to follow CDC/MDH for updates and what to do for exposure signs or symptoms and where to go. 6. Discussed corovid-19 prevention, CDC guidelines/resources including hand washing, social distance and what to do for exposure signs or symptoms and where to go, to follow CDC/MDH guidelines, and if any signs or symptoms of infection to stop immunosuppression and seek immediate medical att. That prednisone can increase change of serious  infections so should be avoided. We also agree that the benefits of continued immunosuppression outweigh the risks of COVID-19 infection. COVID-19 Precautions. Discussed the importance of good hand washing techniques with soap and water for at least 20 seconds, avoid touching eyes, nose, mouth, avoiding crowds, social distancing      Labs/Imaging:  Component      Latest Ref Rng & Units 9/11/2020 12/7/2020   WBC      4.0 - 11.0 10e9/L 5.7 7.6   RBC Count      3.8 - 5.2 10e12/L 4.45 4.30   Hemoglobin      11.7 - 15.7 g/dL 12.9 12.5   Hematocrit      35.0 - 47.0 % 40.3 39.0   MCV      78 - 100 fl 91 91   MCH      26.5 - 33.0 pg 29.0 29.1   MCHC      31.5 - 36.5 g/dL 32.0 32.1   RDW      10.0 - 15.0 % 14.7 14.2   Platelet Count      150 - 450 10e9/L 251 294   % Neutrophils      % 48.7 63.8   % Lymphocytes      % 41.7 27.2   % Monocytes      % 7.2 6.9   % Eosinophils      % 1.9 1.7   % Basophils      % 0.5 0.4   Absolute Neutrophil      1.6 - 8.3 10e9/L 2.8 4.8   Absolute Lymphocytes      0.8 - 5.3 10e9/L 2.4 2.1   Absolute Monocytes      0.0 - 1.3 10e9/L 0.4 0.5   Absolute Eosinophils      0.0 - 0.7 10e9/L 0.1 0.1   Absolute Basophils      0.0 - 0.2 10e9/L 0.0 0.0   Diff Method       Automated Method Automated Method   Creatinine      0.52 - 1.04 mg/dL 0.84 0.80   GFR Estimate      >60 mL/min/1.73:m2 74 78   GFR Estimate If Black      >60 mL/min/1.73:m2 86 >90   COVID-19 Virus PCR to [facility] - Source           COVID-19 Virus PCR to [facility] - Result           Albumin      3.4 - 5.0 g/dL 3.7 3.5   ALT      0 - 50 U/L 24 21   AST      0 - 45 U/L 20 20   CRP Inflammation      0.0 - 8.0 mg/L <2.9    Sed Rate      0 - 30 mm/h 12        Component      Latest Ref Rng & Units 5/18/2016 8/4/2016 11/1/2016 1/10/2017   Albumin      3.4 - 5.0 g/dL 3.5 4.1 3.9 3.6     Component      Latest Ref Rng & Units 2/15/2017 4/26/2017 5/31/2017 8/22/2017   Albumin      3.4 - 5.0 g/dL 3.7 3.8 3.6 3.9     Component      Latest Ref Rng & Units  10/25/2017 2/28/2018 6/12/2018 10/31/2018   Albumin      3.4 - 5.0 g/dL 3.7 3.8 3.5 3.7     Component      Latest Ref Rng & Units 1/7/2019 2/26/2019 4/10/2019 6/26/2019   Albumin      3.4 - 5.0 g/dL 3.8 3.7 3.8 3.8     Component      Latest Ref Rng & Units 10/22/2019 2/11/2020 3/16/2020 6/17/2020   Albumin      3.4 - 5.0 g/dL 3.6 3.8 3.6 3.6     Component      Latest Ref Rng & Units 9/11/2020 12/7/2020   Albumin      3.4 - 5.0 g/dL 3.7 3.5     Impression/Plan:    1.Psoriatic arthritis w/axial involvement activity with hx episcleritis/uveities, dactylitis, tendonitis, hamstring tear, contractures, loss ROM. With her Covid-19 (coronavirus)  flared her arthritis ( now bilateral 2-4 PIP are contracted and loss ROM hips, has labrum taer. I am not sure we will get the ROM back, had changes in toes now contractures, Needs THR).   Her psoriatic arthritis continues to active with synovitis mainly in bilateral 2-3 MCPs. New  contractures to her fingers and toes, and bunions when off medications. We discussed abatacept (orencia) vs Infliximab (remicade).  She chose to continue the plan for now.  Aprimilast (otezla) contraindicated due to depression and does have axial disease so I would not favor this, failed in past. Chart review I note she tried adalimumab (humira) every 10 days failed in the past, failed golimumab (simponi) and Infliximab (remicade) lost effectiveness before, abatacept (orencia) not proven effective in inflammatory eye disease, ustekinumab (Stelara) long half life and tofacitinib (xeljantz) concerns high risk of serious infections and shingles. Concern with infusions going into hospital. Methotrexate stopped due to lowered ALB.   She wishes to continue this for now. Recently abnl TSH, this may contribute to this as well   High risk of further deformities.  We will continue this plan otherwise    2. Xrbhtgy-[id number]-jm ophthalmologist.   No sx today.   3. Psoriasis, nails and skin changes. Follow-up   Gallito prn . He was considering Infliximab 5mg q4 weeks (tried before lost effectiveness, failed golimumab (simponi)), weekly Humira, IL23 inhibitors (limited data), or Toficitinab  4. High risk medications monitoring, labs every 12 weeks. Normal 12-7.normal CBC, liver and kidney tests. But lowered albumin. I will send Dr. Samayoa a message on this   5. Low bone density per DEXA  T-1.4, worsening. Given high risk osteoporosis DEXA every 2 year. Continue calcium with vitamin D   6. Chronic pain under care of [facility] Dr. Xie. Spine per [facility]   -Calcium and vitamin D. Complete physical due she wishes to come here schedule after Covid-19 (coronavirus) pandemic    -RTC 4 mth Dr. Samayoa  The patient understood the rationale for the diagnosis and treatment plan. Patient shared in the decision making. All questions were answered to best of my ability and the patient's satisfaction  Miguel CABRAL, CNP, MSN  [facility]  Department of Rheumatology & Autoimmune Disorders

## 2021-01-22 DIAGNOSIS — L40.50 PSORIATIC ARTHRITIS (H): ICD-10-CM

## 2021-01-22 DIAGNOSIS — L40.9 PSORIASIS: ICD-10-CM

## 2021-01-25 ENCOUNTER — MYC MEDICAL ADVICE (OUTPATIENT)
Dept: RHEUMATOLOGY | Facility: CLINIC | Age: 66
End: 2021-01-25

## 2021-01-25 DIAGNOSIS — R29.898 HAND DYSFUNCTION: ICD-10-CM

## 2021-01-25 DIAGNOSIS — L40.9 PSORIASIS: Primary | ICD-10-CM

## 2021-01-25 DIAGNOSIS — Z79.899 HIGH RISK MEDICATIONS (NOT ANTICOAGULANTS) LONG-TERM USE: ICD-10-CM

## 2021-01-25 DIAGNOSIS — L40.50 PSORIATIC ARTHRITIS (H): ICD-10-CM

## 2021-01-26 RX ORDER — ADALIMUMAB 40MG/0.4ML
40 KIT SUBCUTANEOUS
Qty: 2 EACH | Refills: 4 | Status: SHIPPED | OUTPATIENT
Start: 2021-01-26 | End: 2021-06-05

## 2021-01-26 NOTE — TELEPHONE ENCOUNTER
adalimumab (HUMIRA *CF*) 40 MG/0.4ML prefilled syringe kit   Last Written Prescription Date:  9/15/20  Last Fill Quantity: 2ea,   # refills: 4  Last Office Visit : 12/9/20  Future Office visit:  3/9/21

## 2021-01-27 NOTE — TELEPHONE ENCOUNTER
Stop the sulfasalazine if this is not working  Start methotrexate 0.4 ml or 10 mg every 7 days, folic acid  1 mg day  RN send in rx for these, syringes, needles,. Methotrexate    Labs in 1 month  STAT should talk to opthalmologist about inflammatory eye and steroid eye gtts.   Schedule her 2-04 with dr. Samayoa or 1st available to discuss complex care  I will copy him to see if we need to do more   We may need oral steroids until this works     Has not tried abatacept (orencia) or tofacitinib (xeljantz) --history bowel issues would need to make sure no diverticulitis? Risk perforated bowel.

## 2021-01-28 RX ORDER — METHOTREXATE 10 MG/.2ML
10 INJECTION, SOLUTION SUBCUTANEOUS
Qty: 0.8 ML | Refills: 1 | Status: SHIPPED | OUTPATIENT
Start: 2021-01-28 | End: 2021-03-15

## 2021-01-28 RX ORDER — FOLIC ACID 1 MG/1
1 TABLET ORAL DAILY
Qty: 90 TABLET | Refills: 3 | Status: SHIPPED | OUTPATIENT
Start: 2021-01-28 | End: 2021-03-18

## 2021-01-28 NOTE — TELEPHONE ENCOUNTER
I think it is fine to start methotrexate.    However methotrexate can safely be used together with sulfasalazine and Humira, which it looks like she is still taking.    We sometimes get additional benefit by adding the drugs together so unless she is having toxicity, given that the real problem here is inadequate efficacy, I guess I would be continuing the sulfasalazine while I started methotrexate.    Given that she also has physical findings that may need to be addressed 1 of us should probably see her in person.  So far I am only available on Thursday mornings to do that and I think were several weeks out.

## 2021-01-28 NOTE — TELEPHONE ENCOUNTER
Spoke to Krissy and offered appointment on site with Dr. Samayoa on 2/4/2021 at 8AM.  She will be able to make this appointment.     Also, Krissy says she is going to call [facility] to get records faxed to us.       She also received the [employer] message from Miguel and fully understands the medication changes.       Jeane Cavazos MSN, RN  Rheumatology RN Care Coordinator  [facility]

## 2021-02-04 ENCOUNTER — OFFICE VISIT (OUTPATIENT)
Dept: PULMONOLOGY | Facility: CLINIC | Age: 66
End: 2021-02-04
Attending: INTERNAL MEDICINE
Payer: COMMERCIAL

## 2021-02-04 VITALS
WEIGHT: 162 LBS | BODY MASS INDEX: 26.99 KG/M2 | HEIGHT: 65 IN | HEART RATE: 78 BPM | OXYGEN SATURATION: 98 % | SYSTOLIC BLOOD PRESSURE: 124 MMHG | DIASTOLIC BLOOD PRESSURE: 79 MMHG | RESPIRATION RATE: 17 BRPM

## 2021-02-04 DIAGNOSIS — L40.50 PSORIATIC ARTHRITIS (H): Primary | ICD-10-CM

## 2021-02-04 DIAGNOSIS — M46.99 INFLAMMATORY SPONDYLOPATHY OF MULTIPLE SITES IN SPINE (H): ICD-10-CM

## 2021-02-04 PROCEDURE — G0463 HOSPITAL OUTPT CLINIC VISIT: HCPCS

## 2021-02-04 PROCEDURE — 99215 OFFICE O/P EST HI 40 MIN: CPT | Performed by: INTERNAL MEDICINE

## 2021-02-04 RX ORDER — PREDNISONE 5 MG/1
15 TABLET ORAL DAILY
Qty: 90 TABLET | Refills: 1 | Status: SHIPPED | OUTPATIENT
Start: 2021-02-04 | End: 2022-01-25

## 2021-02-04 ASSESSMENT — MIFFLIN-ST. JEOR: SCORE: 1285.71

## 2021-02-04 ASSESSMENT — PAIN SCALES - GENERAL: PAINLEVEL: SEVERE PAIN (7)

## 2021-02-04 NOTE — LETTER
2021         RE: Krissy Weldon  [address] [facility] [address]        Dear Colleague,    Thank you for referring your patient, Krissy Weldon, to the [facility] LUNG SCIENCE AND [facility]. Please see a copy of my visit note below.    [facility]  Rheumatology Clinic  Jensen Samayoa MD  2021     Name: Krissy Weldon  MRN: [id number]  Age: 64 year old  : 1955  Referring provider: Jensen Samayoa    Problem List:  1. Psoriatic arthritis w/axial involvement activity with history episcleritis, tendonitis, left hamstring tear and uveitis  2. Polyarthropathy    3. Chronic pain management patient   4. Cervical DJD 2/2 underlying inflammatory arthritis, psoriatic   5. Bilateral foot pain   6. On prednisone therapy  7. Worsening right hip involvement with both inflammatory and DJD features.     Assessment and Plan:    I had a long discussion with the patient about her overall treatment options, including her getting potentially a right total hip replacement.    Based on her description of how she did with an injection and her orthopedic evaluation and MRI, this is probably reasonable to proceed with.  We did discuss that it would also be reasonable to give the addition of methotrexate to her regimen sometime and see whether it might bring things under control although the lack of a good response to a steroid injection is not an auspicious sign.    Alternatively she could try for another hip injection with steroid and/or PRP which she has considered, understanding that once she has that that she would not be a candidate for surgical intervention for another for 3 to 6 months.    With respect to her medications I urged her to go ahead and start the methotrexate.  I think if she is getting a partial effect and not significant problems with toxicity either on laboratory or a symptomatic basis we could move ahead with dosing up to 20 mg weekly to try to get an  adequate or helpful effect.    Given how much better she feels on sulfasalazine, which includes improvement in gut symptoms, I would not have her stop that but rather continue it while we add methotrexate, which is commonly done across inflammatory arthritis diagnoses and I do not think significantly increases toxicity risk.    In the meantime I have given her prescription for 15 mg daily prednisone so that her function can hopefully improve over the short-term.  Have asked her to try tapering that once weekly until she is off of it or at least down to 5 mg daily and then she can reach out to us if she has too much trouble going off so we can decide how to proceed with that.    I did encourage her to get a coronavirus vaccination when she can although she is already got antibodies because of being infected herself back in the spring.    She will continue to follow-up with Miguel on an every 3-month basis and see me back in 1 year sooner as needed.  This visit was 50 minutes in duration, 36 minutes of face-to-face time with counseling and an additional 14 minutes of chart review, , and documentation, all conducted on DOS .    Follow-up: 1 year or sooner if symptoms worsen.    10/22/2019 :   Krissy Weldon is a 64 year old female who presents for follow-up of undifferentiated polyarthropathy, psoriatic arthritis. Complicated by eye inflammation (uveitis and scleritis--sees [facility]), tendonitis, costochondritis, tendon tears, synovitis, dactylitis, OA/DJD jessica thumbs CMC, now toe contractures and laxity all hand/wrist joints, bowel changes (sister with Crohn's and dad with inflammatory bowel). The patient was last evaluated by Miguel Umana on 06/26/2019, at which time there was concern for worsening tendons and increased bowel symptoms despite continuation of secukinumab injections 150 mg every 30 days. A discussion was had about changing to Enbrel 50 mg injections every week starting 30 days after her last  injection with plans to stop Cosentyx.    Today, the patient reports that she has symptom exacerbation after around 10 days following her Humira injections. She states that she has been using prednisone off-and-on for these four days leading up to her next injection. She adds that she can live with this, but, as her wife had a double lung transplant, she has been care taking for her and needs to remain active for this. She notes that her last DEXA scan was completed around 5-10 years ago. She states that she continues methotrexate injections 20 mg weekly with 3 mg folic acid daily. She says that Dr. Conti recommended a liver scan due to long-term methotrexate use during their last visit, and she did not complete this as her wife has been very sick.    She states that her hands and feet have been swelling, and she has obtained larger shoes to treat this. She reports that she also has had trouble extending her fingers. She also reports psoriatic skin changes on her scalp, arms, and buttocks, but she explains that these have remained mostly unchanged.     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 She states that she could barely walk before this therapy, she did not start steroids following the procedure, and she had relief for around one year.    December 9, 2020( Miguel visit)   Seen Dr. Samayoa in Sept-titrated sulfasalazine to 1 GM BID, right leg numb after Covid-19 (coronavirus) was doing Physical Therapy with the pain clinic for right labrum tear.      Denies any fever, chills, SOB, SANCHEZ, night sweats, or chest pain, high fever, cough, travel in last 14 days or exposure to covid-19 (coronavirus). Reports healthy. Follows CDC guidelines.      No inflammatory eye. Bilateral 2nd MCP swelling red, some in bilateral 3rd MCP and end DIPs, nails splitting. Contractures the same, developed when off  "arthritis medications during her Covid-19 (coronavirus). This has not improved. Injections in back, right labrum tear very painful.   TSH 1.09 Free 1.13 now on higher synthyoid for 3 weeks-better. No more numb and tingling. Eats well. Bowels are good.  Psoriasis scalp and forehead     Adalimumab (humira) 40 mg injection every 14 days (feels this works but wears off after 10 days \"my joints feel lubricated\", \"amazing\" even sternal pain goes away), off methotrexate since Sept no longer having GI issues like nausea, diarrhea. Dr. Samayoa concerned as her albumin was lower, still is on sulfasalazine . Sulfasalazine  mg takes with daily omeprazole. Tolerating     February 4, 2021 interval history:    Since I last saw the patient Miguel has seen her back.  Her most recent note is above.  However the patient has had increasing problems with right hip pain over several months.  Because of this she had an injection in the pain clinic on December 17 and although she got a temporary response to the lidocaine, she had virtually no response longer-term to the steroid.    Because of that she recently saw Dr. Montesinos in [place].  He reviewed things and did an MRI of her hip which I do not have available to review.  He did feel that she was a candidate for hip replacement.  She saw him in particular because she would like to consider having an anterior approach done and he specializes in this.    He did ask her to consult with us about what she would want to do with her medicines with respect to a hip surgery.    She is up to a more therapeutic dose on sulfasalazine at 1 g twice daily and she feels that that has been quite helpful globally outside of the hip.  It helps her overall spine pain and stiffness including throughout her neck and lower back.  She feels much more mobile there.  She has less pain globally in the small joints of the hands and feet as well although her feet are still a problem particularly with metatarsal " pain.  She does have about an hour of morning stiffness.    As noted above in the prior notes that she feels that Humira is quite helpful but over the last year, it starts to wear off around day 10 of the injection.  As noted in Miguel's note above she has had trouble tolerating oral methotrexate,  So we just recently set up injectable methotrexate for her.  She has however not started using it yet.  It was prescribed a dose of 10 mg once weekly.    She is also wondering if she could at least temporarily use prednisone which is always been helpful for her.  [place] would like to go [facility] but that has been definitely painful for her recently and she does not feel she can do it as she is right now.  This is primarily because of the hip problems however.        February 4, 2021 interval history:      Background History:   (-SSa, -SSb, -CCP, HLA-B27 negative on outside workup with previous SI injections) with hx- inflammatory eye left eye episcleritis requiring prednisone gtt or oral with bone scan unrevealing. Previous medrol or steroid responsiveness. Past tried Humira, SSZ, Simponi SQ/IV, remicade and Otezla. Failed Humira EoW recurrent episcleritis, right wrist, right SI, bilateral hamstrings tendinosis with partial tear bilaterally. Failed Simponi sq/IV ineffective. SSZ and oral MTX. Past recurrent iritis (ER if eye pain, blurred vision, red eye). Remicade. Otezla. LLL pneumonia 3/2015. MRI L hip showed high grade tear gluteus minimus insertion site, effusion 4/2015. C/o neck issues with MRI cervical spine show DJD, EMG, paramedian osteophytes and disk protrusion at C3-C4 with some moderate central canal stenosis and moderate to severe right-sided foraminal stenosis as a consequence. C5-C6 also showed some mild to moderate central canal stenosis and moderate bilateral central foraminal stenosis. Seen Dr. Wheeler with significant improvement in GI issues pancreatic sphincter  "dysfunction requiring surgery now on pancreatic enzymes after pancreatic duct is open. Failed certolizumab pegol (Cimzia) lost effectiveness (more uveitis, arthritis, synovitis, bursitis and tendonitis, strept and CAP). Secukinumab (cosentyx) 2-2019 more laxity in joints hands/wrists, inflammatory eye, toe contractures, changes in skin stopped then changed to etanercept (Enbrel) 6-.     Review of Systems:   Pertinent items are noted in HPI or as below, remainder of complete ROS is negative.      No recent problems with hearing or vision. No swallowing problems.   No breathing difficulty, shortness of breath, coughing, or wheezing.  No chest pain or palpitations.  No heart burn, indigestion, abdominal pain, nausea, vomiting, diarrhea.  No urination problems, no bloody, cloudy urine, no dysuria.  No numbing, tingling, weakness.  No headaches or confusion.  No easy bleeding or bruising.     Active Medications:   Current Outpatient Medications:      adalimumab (HUMIRA *CF*) 40 MG/0.4ML prefilled syringe kit, Inject 0.4 mLs (40 mg) Subcutaneous every 14 days Hold for signs of infection, then seek medical attention., Disp: 1 kit, Rfl: 5     ammonium lactate (LAC-HYDRIN) 12 % external lotion, Apply topically 2 times daily (Patient taking differently: Apply topically daily ), Disp: 225 g, Rfl: 3     B-D SYRINGE LUER-FILI 1 ML MISC, USE TO INJECT METHOTREXATE EVERY 7 DAYS, Disp: 25 each, Rfl: 0     BD DISP NEEDLES 25G X 5/8\" MISC, USE WITH METHOTREXATE UNDER THE SKIN EVERY 7 DAYS, Disp: 25 each, Rfl: 0     fentaNYL (DURAGESIC) 12 mcg/hr 72 hr patch, APPLY 1 PATCH TO SKIN EVERY 48 HOURS FOR CHRONIC PAIN., Disp: , Rfl: 0     FLUoxetine (PROZAC) 40 MG capsule, Take 40 mg by mouth daily., Disp: , Rfl:      folic acid (FOLVITE) 1 MG tablet, Take 3 tablet a day few days before and day after methotrexate then the other day 2 mg day, Disp: 210 tablet, Rfl: 3     levothyroxine (SYNTHROID) 100 MCG tablet, Take  by mouth daily., " Disp: , Rfl:      methotrexate sodium, pres-free, 50 MG/2ML SOLN injection CHEMO, Inject 0.8 mLs (20 mg) Subcutaneous every 7 days *DISCARD REMAINDER** MONITORING LABS DUE EVERY 8 WEEKS, Disp: 8 mL, Rfl: 2     multivitamin, therapeutic (THERA-VIT) TABS, Take 1 tablet by mouth daily, Disp: , Rfl:      oxyCODONE IR (ROXICODONE) 15 MG tablet, Take 15 mg by mouth, Disp: , Rfl:      sennosides (SENOKOT) 8.6 MG tablet, Take 1 tablet by mouth daily , Disp: 1 tablet, Rfl: 0     Syringe Luer Slip (B-D SYRINGE LUER-FILI) 3 ML MISC, 1 Units by Device route every 7 days, Disp: 25 each, Rfl: 0     vitamin D3 (VITAMIN D3) 1000 units (25 mcg) tablet, Take 2 tablets (2,000 Units) by mouth daily, Disp: 180 tablet, Rfl: 3     cefdinir (OMNICEF) 300 MG capsule, Take 1 capsule (300 mg) by mouth 2 times daily (Patient not taking: Reported on 4/10/2019), Disp: 20 capsule, Rfl: 0     predniSONE (DELTASONE) 5 MG tablet, Take 10 mg once a day for 3 weeks then go to 5 mg day until seen. (Patient not taking: Reported on 10/22/2019), Disp: 60 tablet, Rfl: 2    Allergies:   No known drug allergies.     Past Medical History:  Past medical history is notable for her being presumptively treated for Lyme with doxycycline after developing a targetoid lesion, for a history of Hashimoto's thyroiditis with subsequent hypothyroidism, for the diagnosis now of psoriasis, and for a history of depression.Neck pain, cervical stenosis-- MRI  +moderate central stenosis right C3-4, C5-6 degenerative changes 2nd mild-mod central stenosis. Past referral to physiatry-wishes to return to Dr. Edmond [facility] Left hip pain s/p tear needs THR she reports. B) Hx-Bilateral tendonitis hamstrings with right bursitis, partial tear per Dr. Gomez s/p injections. Seen Dr. Arvin Xie at [facility] [place] s/p ablation SI joint, tendon insertion site. Sees Dr. Maciel Dukes. C). Transient elevation LFT 2/2013 [ NL after held MTX and tramadol 2wk]; transient 4-  [ AST 89]. NL . Other hx --pancreatic sphinctor dysfunction. On pancreatic enzymes. SBO 9-2015, now no doing well. Right thumb DJD, s/p surgery . Healed.  1. Diffuse degenerative joint disease that is both clinically apparent and obvious on multiple imaging modalities including bone scan, MRI 2/2013 or cervical. DJD L4-L5, L5-S1 per MRI 7/2012; MRI [id number]   2. Diffuse inflammatory arthritis with marked morning stiffness, no systemic inflammation by blood tests, but greater than 80% clinical improvement with a Medrol Dosepak.   3. Onset of the inflammatory joint symptoms including sacroiliitis with the development of psoriasis, suggesting that this represents psoriatic arthritis.   4. Development of episcleritis in the left eye with improvement with steroid eyedrops 12/14/2011 with recurrent episodes when wean oral prednisone (9/2012)  5. History of Hashimoto's thyroiditis.   6. History of presumptive treatment for Lyme disease with doxycycline after development of a targetoid lesion.   7. Poor tolerance of sulfasalazine and oral methotrexate.   8. Bilateral hamstring tendonosis, right partial tear, sacroilitis 7/2012. See MRI, repeat 2/2013 hamstring and right SI inflammation seeing Dr. Arvin Xie at [facility] specialized in SI. , left hamstring   9. DJD L4-L5, L5-S1 per MRI 7/2012  10. Intermittent prednisone exposure  11. Transient elevation LFT ALT 84/AST 58 2/2013 (nl after held MTX and tramadol, then increased folic acid 2mg day)  12. Past LUE burn developed into cellulitis resolved from keflex. Bronchitis now on zithromax irritating her costrochondritis. Improving after 1 day  13. 1-2014 Left knee meniscal tear with chrondomalacia per MRI (had Rt/Lt CMJ surgery)  14. 4- RUQ pain.   15.  Hx recurrent left episcleritis   16. Right thumb tendon surgery with Dr. [facility]  17. Pneumonia 3-2015; strept  and 4-2017      Past Surgical  "History:  Appendectomy    Arthroplasty carpometacarpal, left 11/8/2013  Arthroplasty carpometacarpal, right 12/20/2013  Cholecystectomy    Colonoscopy 5/6/2014  Endoscopic retrograde cholangiopancreatogram 6/13/2014  Gallbladder surgery      Hysterectomy and oophorectomy  Hc ugi endoscopy w eus, left 6/10/2014  Procedure: combined endoscopic ultrasound, esophagoscopy, gastroscopy, duodenoscopy  Right thumb surgery 7/2015  Xr sacroiliac therapeutic injection bilateral 12/2011    Family History:    No autoimmune disorders, psoriasis, UC, crohn's, SLE, RA, PsA, gout, autoimmune thyroid. No MS, heart disease or cancer in family  Mother- Endometrial cancer, RHEUMATOID ARTHRITIS (RA)   Father- Psoriasis, lymphoma, colon and prostate cancer, inflammatory bowel   Siblings- Sister rheumatoid arthritis (RA) and OSTEOARTHRITIS  And crohns   Brother autoimmune process sees Dr. Ni   Maternal grandmother- Rheumatoid arthritis     Social History:  The patient reports that she has never smoked. She has never used smokeless tobacco. She reports that she does not drink alcohol or use drugs.   PCP: Deshawn Burns  Marital Status:      Physical Exam:   /79   Pulse 78   Resp 17   Ht 1.651 m (5' 5\")   Wt 73.5 kg (162 lb)   LMP 03/06/2012   SpO2 98%   BMI 26.96 kg/m     Wt Readings from Last 4 Encounters:   02/04/21 73.5 kg (162 lb)   08/04/20 74.4 kg (164 lb 1.6 oz)   03/16/20 72.1 kg (159 lb)   10/22/19 75.4 kg (166 lb 4.8 oz)     Constitutional: Well-developed, appearing stated age; cooperative.  Eyes: Normal EOM, PERRLA, vision, conjunctiva, sclera.  ENT: Normal external ears, nose, hearing, lips, teeth, gums, throat. No mucous membrane lesions, normal saliva pool.  Neck: No mass or thyroid enlargement.  Resp: Lungs clear to auscultation, nl to palpation.  CV: RRR, no murmurs, rubs or gallops, no edema.  GI: No ABD mass or tenderness, no HSM.  : Not tested.  Lymph: No cervical, supraclavicular, inguinal or " epitrochlear nodes.  MS: The TMJ, neck, shoulder, elbow, wrist, spine, hip, knee, ankle, and foot MTP/IP joints were examined and found normal. No altered joint anatomy. Full joint ROM.  She has some restriction in range of motion in the neck bilaterally primarily in lateral bending left worse than right, but also in flexion.    Her right hip exam is significantly impaired in both internal and external rotation while in flexion and in internal rotation with logrolling as well.    I cannot find any synovitis anywhere throughout her MCP MTP wrist or ankles, but she does have quite significant metatarsal head protrusion particularly in bilateral first metatarsal heads where she is quite tender as well and has a rarefied meta tarsal fat pad.  Skin: No nail pitting, alopecia, rash, nodules or lesions  Neuro: Normal cranial nerves, strength, sensation, DTRs.   Psych: Normal judgement, orientation, memory, affect.     Laboratory:   RHEUM RESULTS Latest Ref Rng & Units 7/31/2020 9/11/2020 12/7/2020   SED RATE 0 - 30 mm/h - 12 -   CRP, INFLAMMATION 0.0 - 8.0 mg/L - <2.9 -   CK TOTAL 30 - 225 U/L - - -   RHEUMATOID FACTOR <20 IU/mL - - -   DESEAN SCREEN BY EIA <1.0 - - -   AST 0 - 45 U/L 20 20 20   ALT 0 - 50 U/L 13 24 21   ALBUMIN 3.4 - 5.0 g/dL - 3.7 3.5   WBC 4.0 - 11.0 10e9/L - 5.7 7.6   RBC 3.8 - 5.2 10e12/L - 4.45 4.30   HGB 11.7 - 15.7 g/dL - 12.9 12.5   HCT 35.0 - 47.0 % - 40.3 39.0   MCV 78 - 100 fl - 91 91   MCHC 31.5 - 36.5 g/dL - 32.0 32.1   RDW 10.0 - 15.0 % - 14.7 14.2    - 450 10e9/L - 251 294   CREATININE 0.52 - 1.04 mg/dL 0.92 0.84 0.80   GFR ESTIMATE, IF BLACK >60 mL/min/[1.73:m2] 76 86 >90   GFR ESTIMATE >60 mL/min/[1.73:m2] 66 74 78    - 1,620 mg/dL - - -   IGA 70 - 380 mg/dL - - -   IGM 60 - 265 mg/dL - - -   HEPATITIS C ANTIBODY NR:Nonreactive - - -     Rheumatoid Factor   Date Value Ref Range Status   05/31/2017 <20 <20 IU/mL Final      Cyclic Citrullinated Peptide Antibody, IgG   Date Value  Ref Range Status   05/31/2017 1 <7 U/mL Final     Comment:     Negative      Cyclic Cit Pept IgG/IgA   Date Value Ref Range Status   06/30/2011 <20  Interpretation:  Negative <20 UNITS Final     SSA (RO) Antibody IgG   Date Value Ref Range Status   06/30/2011 2  Final     Comment:     Reference range: 0 to 40  Unit: AU/mL  (Note)  REFERENCE INTERVALS: SSA (Ro) (BERTA) Ab, IgG   29 AU/mL or Less ............. Negative   30 - 40 AU/mL ................ Equivocal   41 AU/mL or Greater .......... Positive  SSA (Ro) antibody is seen in 70-75% of Sjogren syndrome  cases, 30-40% of systemic lupus erythematosus (SLE) and  5-10% of progressive systemic sclerosis (PSS).  Performed by [facility],  [address] [facility],[place] [id number] [phone]  [email or web address], Estella Huertas MD, Lab. Director     SSB (LA) Antibody IgG   Date Value Ref Range Status   06/30/2011 0  Final     Comment:     Reference range: 0 to 40  Unit: AU/mL  (Note)  REFERENCE INTERVALS: SSB (La) (BERTA) Ab, IgG   29 AU/mL or Less ............. Negative   30 - 40 AU/mL ................ Equivocal   41 AU/mL or Greater .......... Positive  SSB (La) antibody is seen in 50-60% of Sjogren syndrome  cases and is specific if it is the only BERTA antibody  present. 15-25% of patients with systemic lupus  erythematosus (SLE) and 5-10% of patients with progressive  systemic sclerosis (PSS) also have this antibody.  Performed by [facility],  [address] [facility],[place] [id number] [phone]  [email or web address], Estella Huertas MD, Lab. Director     DESEAN Screen by EIA   Date Value Ref Range Status   05/15/2014 <1.0  Interpretation:  Negative   <1.0 Final     Hepatitis B Core Emily   Date Value Ref Range Status   01/14/2019 Nonreactive NR^Nonreactive Final      Hep B Surface Agn   Date Value Ref Range Status   01/14/2019 Nonreactive NR^Nonreactive Final     Quantiferon-TB Gold Plus Result   Date Value Ref Range Status   06/26/2019 Negative NEG^Negative Final     Comment:      No interferon gamma response to M.tuberculosis antigens was detected.   Infection with M.tuberculosis is unlikely, however a single negative result   does not exclude infection. In patients at high risk for infection, a second   test should be considered  in accordance with the 2017 ATS/IDSA/CDC Clinical Practice Guidelines for   Diagnosis of Tuberculosis in Adults and Children [Rangelinsohn ELIZABETH et   al.Clin.Infect.Dis. 2017 64(2):111-115].       TB1 Ag minus Nil Value   Date Value Ref Range Status   06/26/2019 0.00 IU/mL Final     TB2 Ag minus Nil Value   Date Value Ref Range Status   06/26/2019 0.00 IU/mL Final     Mitogen minus Nil Result   Date Value Ref Range Status   06/26/2019 >10.00 IU/mL Final     Nil Result   Date Value Ref Range Status   06/26/2019 0.06 IU/mL Final     Albumin Fraction   Date Value Ref Range Status   05/07/2014 4.4 3.7 - 5.1 g/dL Final     Alpha 2 Fraction   Date Value Ref Range Status   05/07/2014 0.6 0.5 - 0.9 g/dL Final     Beta Fraction   Date Value Ref Range Status   05/07/2014 0.8 0.6 - 1.0 g/dL Final     Gamma Fraction   Date Value Ref Range Status   05/07/2014 1.1 0.7 - 1.6 g/dL Final     Monoclonal Peak   Date Value Ref Range Status   05/07/2014 0.0 0.0 g/dL Final     ELP Interpretation:   Date Value Ref Range Status   05/07/2014   Final    No monoclonal protein seen by capillarys electrophoresis, however a monoclonla   protein was seen in this sample by immunofixation which is a more sensitive   method for monoclonal dectection.  See immunofixation report on same sample.   Pathologic significance requires clinical correlation.  NORI Stauffer M.D.,   Ph.D., Pathologist       Immunofixation ELP   Date Value Ref Range Status   05/07/2014   Final    (Note)  Monoclonal IgG immunoglobulin of kappa light chain type.  Pathological significance requires clinical correlation.  JUAN PABLO Stauffer M.D., Ph.D., Pathologist         IGG   Date Value Ref Range Status   05/07/2014 8,570 933 - 8,356  mg/dL Final     IGA   Date Value Ref Range Status   05/07/2014 138 70 - 380 mg/dL Final     IGM   Date Value Ref Range Status   05/07/2014 123 60 - 265 mg/dL Final       J HALIMA Samayoa MD, PhD    Rheumatology

## 2021-02-04 NOTE — PROGRESS NOTES
[facility]  Rheumatology Clinic  Jensen Samayoa MD  2021     Name: Krissy Weldon  MRN: [id number]  Age: 64 year old  : 1955  Referring provider: Jensen Samayoa    Problem List:  1. Psoriatic arthritis w/axial involvement activity with history episcleritis, tendonitis, left hamstring tear and uveitis  2. Polyarthropathy    3. Chronic pain management patient   4. Cervical DJD 2/2 underlying inflammatory arthritis, psoriatic   5. Bilateral foot pain   6. On prednisone therapy  7. Worsening right hip involvement with both inflammatory and DJD features.     Assessment and Plan:    I had a long discussion with the patient about her overall treatment options, including her getting potentially a right total hip replacement.    Based on her description of how she did with an injection and her orthopedic evaluation and MRI, this is probably reasonable to proceed with.  We did discuss that it would also be reasonable to give the addition of methotrexate to her regimen sometime and see whether it might bring things under control although the lack of a good response to a steroid injection is not an auspicious sign.    Alternatively she could try for another hip injection with steroid and/or PRP which she has considered, understanding that once she has that that she would not be a candidate for surgical intervention for another for 3 to 6 months.    With respect to her medications I urged her to go ahead and start the methotrexate.  I think if she is getting a partial effect and not significant problems with toxicity either on laboratory or a symptomatic basis we could move ahead with dosing up to 20 mg weekly to try to get an adequate or helpful effect.    Given how much better she feels on sulfasalazine, which includes improvement in gut symptoms, I would not have her stop that but rather continue it while we add methotrexate, which is commonly done across inflammatory arthritis diagnoses and I do not think  significantly increases toxicity risk.    In the meantime I have given her prescription for 15 mg daily prednisone so that her function can hopefully improve over the short-term.  Have asked her to try tapering that once weekly until she is off of it or at least down to 5 mg daily and then she can reach out to us if she has too much trouble going off so we can decide how to proceed with that.    I did encourage her to get a coronavirus vaccination when she can although she is already got antibodies because of being infected herself back in the spring.    She will continue to follow-up with Miguel on an every 3-month basis and see me back in 1 year sooner as needed.  This visit was 50 minutes in duration, 36 minutes of face-to-face time with counseling and an additional 14 minutes of chart review, , and documentation, all conducted on DOS .    Follow-up: 1 year or sooner if symptoms worsen.    10/22/2019 :   Krissy Weldon is a 64 year old female who presents for follow-up of undifferentiated polyarthropathy, psoriatic arthritis. Complicated by eye inflammation (uveitis and scleritis--sees [facility]), tendonitis, costochondritis, tendon tears, synovitis, dactylitis, OA/DJD jessica thumbs CMC, now toe contractures and laxity all hand/wrist joints, bowel changes (sister with Crohn's and dad with inflammatory bowel). The patient was last evaluated by Miguel Umana on 06/26/2019, at which time there was concern for worsening tendons and increased bowel symptoms despite continuation of secukinumab injections 150 mg every 30 days. A discussion was had about changing to Enbrel 50 mg injections every week starting 30 days after her last injection with plans to stop Cosentyx.    Today, the patient reports that she has symptom exacerbation after around 10 days following her Humira injections. She states that she has been using prednisone off-and-on for these four days leading up to her next injection. She adds that she can  live with this, but, as her wife had a double lung transplant, she has been care taking for her and needs to remain active for this. She notes that her last DEXA scan was completed around 5-10 years ago. She states that she continues methotrexate injections 20 mg weekly with 3 mg folic acid daily. She says that Dr. Conti recommended a liver scan due to long-term methotrexate use during their last visit, and she did not complete this as her wife has been very sick.    She states that her hands and feet have been swelling, and she has obtained larger shoes to treat this. She reports that she also has had trouble extending her fingers. She also reports psoriatic skin changes on her scalp, arms, and buttocks, but she explains that these have remained mostly unchanged.     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 She states that she could barely walk before this therapy, she did not start steroids following the procedure, and she had relief for around one year.    December 9, 2020( Miguel visit)   Seen Dr. Samayoa in Sept-titrated sulfasalazine to 1 GM BID, right leg numb after Covid-19 (coronavirus) was doing Physical Therapy with the pain clinic for right labrum tear.      Denies any fever, chills, SOB, SANCHEZ, night sweats, or chest pain, high fever, cough, travel in last 14 days or exposure to covid-19 (coronavirus). Reports healthy. Follows CDC guidelines.      No inflammatory eye. Bilateral 2nd MCP swelling red, some in bilateral 3rd MCP and end DIPs, nails splitting. Contractures the same, developed when off arthritis medications during her Covid-19 (coronavirus). This has not improved. Injections in back, right labrum tear very painful.   TSH 1.09 Free 1.13 now on higher synthyoid for 3 weeks-better. No more numb and tingling. Eats well. Bowels are good.  Psoriasis scalp and  "forehead     Adalimumab (humira) 40 mg injection every 14 days (feels this works but wears off after 10 days \"my joints feel lubricated\", \"amazing\" even sternal pain goes away), off methotrexate since Sept no longer having GI issues like nausea, diarrhea. Dr. Samayoa concerned as her albumin was lower, still is on sulfasalazine . Sulfasalazine  mg takes with daily omeprazole. Tolerating     February 4, 2021 interval history:    Since I last saw the patient Miguel has seen her back.  Her most recent note is above.  However the patient has had increasing problems with right hip pain over several months.  Because of this she had an injection in the pain clinic on December 17 and although she got a temporary response to the lidocaine, she had virtually no response longer-term to the steroid.    Because of that she recently saw Dr. Montesinos in [address].  He reviewed things and did an MRI of her hip which I do not have available to review.  He did feel that she was a candidate for hip replacement.  She saw him in particular because she would like to consider having an anterior approach done and he specializes in this.    He did ask her to consult with us about what she would want to do with her medicines with respect to a hip surgery.    She is up to a more therapeutic dose on sulfasalazine at 1 g twice daily and she feels that that has been quite helpful globally outside of the hip.  It helps her overall spine pain and stiffness including throughout her neck and lower back.  She feels much more mobile there.  She has less pain globally in the small joints of the hands and feet as well although her feet are still a problem particularly with metatarsal pain.  She does have about an hour of morning stiffness.    As noted above in the prior notes that she feels that Humira is quite helpful but over the last year, it starts to wear off around day 10 of the injection.  As noted in Miguel's note above she has had trouble " tolerating oral methotrexate,  So we just recently set up injectable methotrexate for her.  She has however not started using it yet.  It was prescribed a dose of 10 mg once weekly.    She is also wondering if she could at least temporarily use prednisone which is always been helpful for her.  [place] go [place]shoeing but that has been definitely painful for her recently and she does not feel she can do it as she is right now.  This is primarily because of the hip problems however.        February 4, 2021 interval history:      Background History:   (-SSa, -SSb, -CCP, HLA-B27 negative on outside workup with previous SI injections) with hx- inflammatory eye left eye episcleritis requiring prednisone gtt or oral with bone scan unrevealing. Previous medrol or steroid responsiveness. Past tried Humira, SSZ, Simponi SQ/IV, remicade and Otezla. Failed Humira EoW recurrent episcleritis, right wrist, right SI, bilateral hamstrings tendinosis with partial tear bilaterally. Failed Simponi sq/IV ineffective. SSZ and oral MTX. Past recurrent iritis (ER if eye pain, blurred vision, red eye). Remicade. Otezla. LLL pneumonia 3/2015. MRI L hip showed high grade tear gluteus minimus insertion site, effusion 4/2015. C/o neck issues with MRI cervical spine show DJD, EMG, paramedian osteophytes and disk protrusion at C3-C4 with some moderate central canal stenosis and moderate to severe right-sided foraminal stenosis as a consequence. C5-C6 also showed some mild to moderate central canal stenosis and moderate bilateral central foraminal stenosis. Seen Dr. Wheeler with significant improvement in GI issues pancreatic sphincter dysfunction requiring surgery now on pancreatic enzymes after pancreatic duct is open. Failed certolizumab pegol (Cimzia) lost effectiveness (more uveitis, arthritis, synovitis, bursitis and tendonitis, strept and CAP). Secukinumab (cosentyx) 2-2019 more laxity in joints  "hands/wrists, inflammatory eye, toe contractures, changes in skin stopped then changed to etanercept (Enbrel) 6-.     Review of Systems:   Pertinent items are noted in HPI or as below, remainder of complete ROS is negative.      No recent problems with hearing or vision. No swallowing problems.   No breathing difficulty, shortness of breath, coughing, or wheezing.  No chest pain or palpitations.  No heart burn, indigestion, abdominal pain, nausea, vomiting, diarrhea.  No urination problems, no bloody, cloudy urine, no dysuria.  No numbing, tingling, weakness.  No headaches or confusion.  No easy bleeding or bruising.     Active Medications:   Current Outpatient Medications:      adalimumab (HUMIRA *CF*) 40 MG/0.4ML prefilled syringe kit, Inject 0.4 mLs (40 mg) Subcutaneous every 14 days Hold for signs of infection, then seek medical attention., Disp: 1 kit, Rfl: 5     ammonium lactate (LAC-HYDRIN) 12 % external lotion, Apply topically 2 times daily (Patient taking differently: Apply topically daily ), Disp: 225 g, Rfl: 3     B-D SYRINGE LUER-FILI 1 ML MISC, USE TO INJECT METHOTREXATE EVERY 7 DAYS, Disp: 25 each, Rfl: 0     BD DISP NEEDLES 25G X 5/8\" MISC, USE WITH METHOTREXATE UNDER THE SKIN EVERY 7 DAYS, Disp: 25 each, Rfl: 0     fentaNYL (DURAGESIC) 12 mcg/hr 72 hr patch, APPLY 1 PATCH TO SKIN EVERY 48 HOURS FOR CHRONIC PAIN., Disp: , Rfl: 0     FLUoxetine (PROZAC) 40 MG capsule, Take 40 mg by mouth daily., Disp: , Rfl:      folic acid (FOLVITE) 1 MG tablet, Take 3 tablet a day few days before and day after methotrexate then the other day 2 mg day, Disp: 210 tablet, Rfl: 3     levothyroxine (SYNTHROID) 100 MCG tablet, Take  by mouth daily., Disp: , Rfl:      methotrexate sodium, pres-free, 50 MG/2ML SOLN injection CHEMO, Inject 0.8 mLs (20 mg) Subcutaneous every 7 days *DISCARD REMAINDER** MONITORING LABS DUE EVERY 8 WEEKS, Disp: 8 mL, Rfl: 2     multivitamin, therapeutic (THERA-VIT) TABS, Take 1 tablet by " mouth daily, Disp: , Rfl:      oxyCODONE IR (ROXICODONE) 15 MG tablet, Take 15 mg by mouth, Disp: , Rfl:      sennosides (SENOKOT) 8.6 MG tablet, Take 1 tablet by mouth daily , Disp: 1 tablet, Rfl: 0     Syringe Luer Slip (B-D SYRINGE LUER-FILI) 3 ML MISC, 1 Units by Device route every 7 days, Disp: 25 each, Rfl: 0     vitamin D3 (VITAMIN D3) 1000 units (25 mcg) tablet, Take 2 tablets (2,000 Units) by mouth daily, Disp: 180 tablet, Rfl: 3     cefdinir (OMNICEF) 300 MG capsule, Take 1 capsule (300 mg) by mouth 2 times daily (Patient not taking: Reported on 4/10/2019), Disp: 20 capsule, Rfl: 0     predniSONE (DELTASONE) 5 MG tablet, Take 10 mg once a day for 3 weeks then go to 5 mg day until seen. (Patient not taking: Reported on 10/22/2019), Disp: 60 tablet, Rfl: 2    Allergies:   No known drug allergies.     Past Medical History:  Past medical history is notable for her being presumptively treated for Lyme with doxycycline after developing a targetoid lesion, for a history of Hashimoto's thyroiditis with subsequent hypothyroidism, for the diagnosis now of psoriasis, and for a history of depression.Neck pain, cervical stenosis-- MRI  +moderate central stenosis right C3-4, C5-6 degenerative changes 2nd mild-mod central stenosis. Past referral to physiatry-wishes to return to Dr. Edmond [facility] Left hip pain s/p tear needs THR she reports. B) Hx-Bilateral tendonitis hamstrings with right bursitis, partial tear per Dr. Gomez s/p injections. Seen Dr. Arvin Xie at [facility] [place] s/p ablation SI joint, tendon insertion site. Sees Dr. Maciel Dukes. C). Transient elevation LFT 2/2013 [ NL after held MTX and tramadol 2wk]; transient 4- [ AST 89]. NL . Other hx --pancreatic sphinctor dysfunction. On pancreatic enzymes. SBO 9-2015, now no doing well. Right thumb DJD, s/p surgery . Healed.  1. Diffuse degenerative joint disease that is both clinically apparent and obvious on multiple  imaging modalities including bone scan, MRI 2/2013 or cervical. DJD L4-L5, L5-S1 per MRI 7/2012; MRI 7430-1241   2. Diffuse inflammatory arthritis with marked morning stiffness, no systemic inflammation by blood tests, but greater than 80% clinical improvement with a Medrol Dosepak.   3. Onset of the inflammatory joint symptoms including sacroiliitis with the development of psoriasis, suggesting that this represents psoriatic arthritis.   4. Development of episcleritis in the left eye with improvement with steroid eyedrops 12/14/2011 with recurrent episodes when wean oral prednisone (9/2012)  5. History of Hashimoto's thyroiditis.   6. History of presumptive treatment for Lyme disease with doxycycline after development of a targetoid lesion.   7. Poor tolerance of sulfasalazine and oral methotrexate.   8. Bilateral hamstring tendonosis, right partial tear, sacroilitis 7/2012. See MRI, repeat 2/2013 hamstring and right SI inflammation seeing Dr. Arvin Xie at [facility] in [place]. , left hamstring   9. DJD L4-L5, L5-S1 per MRI 7/2012  10. Intermittent prednisone exposure  11. Transient elevation LFT ALT 84/AST 58 2/2013 (nl after held MTX and tramadol, then increased folic acid 2mg day)  12. Past LUE burn developed into cellulitis resolved from keflex. Bronchitis now on zithromax irritating her costrochondritis. Improving after 1 day  13. 1-2014 Left knee meniscal tear with chrondomalacia per MRI (had Rt/Lt CMJ surgery)  14. 4- RUQ pain.   15.  Hx recurrent left episcleritis   16. Right thumb tendon surgery with Dr. [facility]  17. Pneumonia 3-2015; strept  and 4-2017      Past Surgical History:  Appendectomy    Arthroplasty carpometacarpal, left 11/8/2013  Arthroplasty carpometacarpal, right 12/20/2013  Cholecystectomy    Colonoscopy 5/6/2014  Endoscopic retrograde cholangiopancreatogram 6/13/2014  Gallbladder surgery      Hysterectomy and oophorectomy   ugi endoscopy w eus,  "left 6/10/2014  Procedure: combined endoscopic ultrasound, esophagoscopy, gastroscopy, duodenoscopy  Right thumb surgery 7/2015  Xr sacroiliac therapeutic injection bilateral 12/2011    Family History:    No autoimmune disorders, psoriasis, UC, crohn's, SLE, RA, PsA, gout, autoimmune thyroid. No MS, heart disease or cancer in family  Mother- Endometrial cancer, RHEUMATOID ARTHRITIS (RA)   Father- Psoriasis, lymphoma, colon and prostate cancer, inflammatory bowel   Siblings- Sister rheumatoid arthritis (RA) and OSTEOARTHRITIS  And crohns   Brother autoimmune process sees Dr. Ni   Maternal grandmother- Rheumatoid arthritis     Social History:  The patient reports that she has never smoked. She has never used smokeless tobacco. She reports that she does not drink alcohol or use drugs.   PCP: Deshawn Burns  Marital Status:      Physical Exam:   /79   Pulse 78   Resp 17   Ht 1.651 m (5' 5\")   Wt 73.5 kg (162 lb)   LMP 03/06/2012   SpO2 98%   BMI 26.96 kg/m     Wt Readings from Last 4 Encounters:   02/04/21 73.5 kg (162 lb)   08/04/20 74.4 kg (164 lb 1.6 oz)   03/16/20 72.1 kg (159 lb)   10/22/19 75.4 kg (166 lb 4.8 oz)     Constitutional: Well-developed, appearing stated age; cooperative.  Eyes: Normal EOM, PERRLA, vision, conjunctiva, sclera.  ENT: Normal external ears, nose, hearing, lips, teeth, gums, throat. No mucous membrane lesions, normal saliva pool.  Neck: No mass or thyroid enlargement.  Resp: Lungs clear to auscultation, nl to palpation.  CV: RRR, no murmurs, rubs or gallops, no edema.  GI: No ABD mass or tenderness, no HSM.  : Not tested.  Lymph: No cervical, supraclavicular, inguinal or epitrochlear nodes.  MS: The TMJ, neck, shoulder, elbow, wrist, spine, hip, knee, ankle, and foot MTP/IP joints were examined and found normal. No altered joint anatomy. Full joint ROM.  She has some restriction in range of motion in the neck bilaterally primarily in lateral bending left worse " than right, but also in flexion.    Her right hip exam is significantly impaired in both internal and external rotation while in flexion and in internal rotation with logrolling as well.    I cannot find any synovitis anywhere throughout her MCP MTP wrist or ankles, but she does have quite significant metatarsal head protrusion particularly in bilateral first metatarsal heads where she is quite tender as well and has a rarefied meta tarsal fat pad.  Skin: No nail pitting, alopecia, rash, nodules or lesions  Neuro: Normal cranial nerves, strength, sensation, DTRs.   Psych: Normal judgement, orientation, memory, affect.     Laboratory:   RHEUM RESULTS Latest Ref Rng & Units 7/31/2020 9/11/2020 12/7/2020   SED RATE 0 - 30 mm/h - 12 -   CRP, INFLAMMATION 0.0 - 8.0 mg/L - <2.9 -   CK TOTAL 30 - 225 U/L - - -   RHEUMATOID FACTOR <20 IU/mL - - -   DESEAN SCREEN BY EIA <1.0 - - -   AST 0 - 45 U/L 20 20 20   ALT 0 - 50 U/L 13 24 21   ALBUMIN 3.4 - 5.0 g/dL - 3.7 3.5   WBC 4.0 - 11.0 10e9/L - 5.7 7.6   RBC 3.8 - 5.2 10e12/L - 4.45 4.30   HGB 11.7 - 15.7 g/dL - 12.9 12.5   HCT 35.0 - 47.0 % - 40.3 39.0   MCV 78 - 100 fl - 91 91   MCHC 31.5 - 36.5 g/dL - 32.0 32.1   RDW 10.0 - 15.0 % - 14.7 14.2    - 450 10e9/L - 251 294   CREATININE 0.52 - 1.04 mg/dL 0.92 0.84 0.80   GFR ESTIMATE, IF BLACK >60 mL/min/[1.73:m2] 76 86 >90   GFR ESTIMATE >60 mL/min/[1.73:m2] 66 74 78    - 1,620 mg/dL - - -   IGA 70 - 380 mg/dL - - -   IGM 60 - 265 mg/dL - - -   HEPATITIS C ANTIBODY NR:Nonreactive - - -     Rheumatoid Factor   Date Value Ref Range Status   05/31/2017 <20 <20 IU/mL Final      Cyclic Citrullinated Peptide Antibody, IgG   Date Value Ref Range Status   05/31/2017 1 <7 U/mL Final     Comment:     Negative      Cyclic Cit Pept IgG/IgA   Date Value Ref Range Status   06/30/2011 <20  Interpretation:  Negative <20 UNITS Final     SSA (RO) Antibody IgG   Date Value Ref Range Status   06/30/2011 2  Final     Comment:     Reference  range: 0 to 40  Unit: AU/mL  (Note)  REFERENCE INTERVALS: SSA (Ro) (BERTA) Ab, IgG   29 AU/mL or Less ............. Negative   30 - 40 AU/mL ................ Equivocal   41 AU/mL or Greater .......... Positive  SSA (Ro) antibody is seen in 70-75% of Sjogren syndrome  cases, 30-40% of systemic lupus erythematosus (SLE) and  5-10% of progressive systemic sclerosis (PSS).  Performed by [facility],  [address] [facility] [id number] [phone]  [email or web address], Estella Huertas MD, Lab. Director     SSB (LA) Antibody IgG   Date Value Ref Range Status   06/30/2011 0  Final     Comment:     Reference range: 0 to 40  Unit: AU/mL  (Note)  REFERENCE INTERVALS: SSB (La) (BERTA) Ab, IgG   29 AU/mL or Less ............. Negative   30 - 40 AU/mL ................ Equivocal   41 AU/mL or Greater .......... Positive  SSB (La) antibody is seen in 50-60% of Sjogren syndrome  cases and is specific if it is the only BERTA antibody  present. 15-25% of patients with systemic lupus  erythematosus (SLE) and 5-10% of patients with progressive  systemic sclerosis (PSS) also have this antibody.  Performed by [facility],  [address] [place] [address] [phone]  [email or web address], Estella Huertas MD, Lab. Director     DESEAN Screen by EIA   Date Value Ref Range Status   05/15/2014 <1.0  Interpretation:  Negative   <1.0 Final     Hepatitis B Core Emily   Date Value Ref Range Status   01/14/2019 Nonreactive NR^Nonreactive Final      Hep B Surface Agn   Date Value Ref Range Status   01/14/2019 Nonreactive NR^Nonreactive Final     Quantiferon-TB Gold Plus Result   Date Value Ref Range Status   06/26/2019 Negative NEG^Negative Final     Comment:     No interferon gamma response to M.tuberculosis antigens was detected.   Infection with M.tuberculosis is unlikely, however a single negative result   does not exclude infection. In patients at high risk for infection, a second   test should be considered  in accordance with the 2017  ATS/IDSA/CDC Clinical Practice Guidelines for   Diagnosis of Tuberculosis in Adults and Children [Wanda JOHNSON et   al.Clin.Infect.Dis. 2017 64(2):111-115].       TB1 Ag minus Nil Value   Date Value Ref Range Status   06/26/2019 0.00 IU/mL Final     TB2 Ag minus Nil Value   Date Value Ref Range Status   06/26/2019 0.00 IU/mL Final     Mitogen minus Nil Result   Date Value Ref Range Status   06/26/2019 >10.00 IU/mL Final     Nil Result   Date Value Ref Range Status   06/26/2019 0.06 IU/mL Final     Albumin Fraction   Date Value Ref Range Status   05/07/2014 4.4 3.7 - 5.1 g/dL Final     Alpha 2 Fraction   Date Value Ref Range Status   05/07/2014 0.6 0.5 - 0.9 g/dL Final     Beta Fraction   Date Value Ref Range Status   05/07/2014 0.8 0.6 - 1.0 g/dL Final     Gamma Fraction   Date Value Ref Range Status   05/07/2014 1.1 0.7 - 1.6 g/dL Final     Monoclonal Peak   Date Value Ref Range Status   05/07/2014 0.0 0.0 g/dL Final     ELP Interpretation:   Date Value Ref Range Status   05/07/2014   Final    No monoclonal protein seen by capillarys electrophoresis, however a monoclonla   protein was seen in this sample by immunofixation which is a more sensitive   method for monoclonal dectection.  See immunofixation report on same sample.   Pathologic significance requires clinical correlation.  NORI Stauffer M.D.,   Ph.D., Pathologist       Immunofixation ELP   Date Value Ref Range Status   05/07/2014   Final    (Note)  Monoclonal IgG immunoglobulin of kappa light chain type.  Pathological significance requires clinical correlation.  JUAN PABLO Stauffer M.D., Ph.D., Pathologist         IGG   Date Value Ref Range Status   05/07/2014 1,060 695 - 1,620 mg/dL Final     IGA   Date Value Ref Range Status   05/07/2014 138 70 - 380 mg/dL Final     IGM   Date Value Ref Range Status   05/07/2014 123 60 - 265 mg/dL Final       ZAKIA Samayoa MD, PhD    Rheumatology

## 2021-02-04 NOTE — NURSING NOTE
Chief Complaint   Patient presents with     RECHECK     Psoriatic arthritis    Medications reviewed and vital signs taken.   Danielle Rios CMA

## 2021-02-05 DIAGNOSIS — L40.50 PSORIATIC ARTHRITIS (H): ICD-10-CM

## 2021-02-05 DIAGNOSIS — Z79.899 HIGH RISK MEDICATIONS (NOT ANTICOAGULANTS) LONG-TERM USE: ICD-10-CM

## 2021-02-05 DIAGNOSIS — R29.898 HAND DYSFUNCTION: ICD-10-CM

## 2021-02-05 DIAGNOSIS — L40.9 PSORIASIS: ICD-10-CM

## 2021-02-05 LAB
ALBUMIN SERPL-MCNC: 3.6 G/DL (ref 3.4–5)
ALT SERPL W P-5'-P-CCNC: 22 U/L (ref 0–50)
AST SERPL W P-5'-P-CCNC: 18 U/L (ref 0–45)
BASOPHILS # BLD AUTO: 0 10E9/L (ref 0–0.2)
BASOPHILS NFR BLD AUTO: 0.6 %
CREAT SERPL-MCNC: 0.78 MG/DL (ref 0.52–1.04)
CRP SERPL-MCNC: <2.9 MG/L (ref 0–8)
DIFFERENTIAL METHOD BLD: NORMAL
EOSINOPHIL # BLD AUTO: 0 10E9/L (ref 0–0.7)
EOSINOPHIL NFR BLD AUTO: 0.6 %
ERYTHROCYTE [DISTWIDTH] IN BLOOD BY AUTOMATED COUNT: 14.8 % (ref 10–15)
ERYTHROCYTE [SEDIMENTATION RATE] IN BLOOD BY WESTERGREN METHOD: 9 MM/H (ref 0–30)
GFR SERPL CREATININE-BSD FRML MDRD: 80 ML/MIN/{1.73_M2}
HCT VFR BLD AUTO: 39.6 % (ref 35–47)
HGB BLD-MCNC: 12.7 G/DL (ref 11.7–15.7)
LYMPHOCYTES # BLD AUTO: 1.2 10E9/L (ref 0.8–5.3)
LYMPHOCYTES NFR BLD AUTO: 19.4 %
MCH RBC QN AUTO: 28.8 PG (ref 26.5–33)
MCHC RBC AUTO-ENTMCNC: 32.1 G/DL (ref 31.5–36.5)
MCV RBC AUTO: 90 FL (ref 78–100)
MONOCYTES # BLD AUTO: 0.2 10E9/L (ref 0–1.3)
MONOCYTES NFR BLD AUTO: 3.1 %
NEUTROPHILS # BLD AUTO: 4.7 10E9/L (ref 1.6–8.3)
NEUTROPHILS NFR BLD AUTO: 76.3 %
PLATELET # BLD AUTO: 287 10E9/L (ref 150–450)
RBC # BLD AUTO: 4.41 10E12/L (ref 3.8–5.2)
WBC # BLD AUTO: 6.2 10E9/L (ref 4–11)

## 2021-02-05 PROCEDURE — 82565 ASSAY OF CREATININE: CPT | Performed by: NURSE PRACTITIONER

## 2021-02-05 PROCEDURE — 36415 COLL VENOUS BLD VENIPUNCTURE: CPT | Performed by: NURSE PRACTITIONER

## 2021-02-05 PROCEDURE — 85652 RBC SED RATE AUTOMATED: CPT | Performed by: NURSE PRACTITIONER

## 2021-02-05 PROCEDURE — 84460 ALANINE AMINO (ALT) (SGPT): CPT | Performed by: NURSE PRACTITIONER

## 2021-02-05 PROCEDURE — 84450 TRANSFERASE (AST) (SGOT): CPT | Performed by: NURSE PRACTITIONER

## 2021-02-05 PROCEDURE — 86140 C-REACTIVE PROTEIN: CPT | Performed by: NURSE PRACTITIONER

## 2021-02-05 PROCEDURE — 82040 ASSAY OF SERUM ALBUMIN: CPT | Performed by: NURSE PRACTITIONER

## 2021-02-05 PROCEDURE — 85025 COMPLETE CBC W/AUTO DIFF WBC: CPT | Performed by: NURSE PRACTITIONER

## 2021-02-08 NOTE — RESULT ENCOUNTER NOTE
The blood counts, liver, kidney and CRP inflammation labs are normal.     Miguel CABRAL, CNP, MSN  2/8/2021  7:55 AM

## 2021-02-10 ENCOUNTER — DOCUMENTATION ONLY (OUTPATIENT)
Dept: CARE COORDINATION | Facility: CLINIC | Age: 66
End: 2021-02-10

## 2021-03-09 ENCOUNTER — VIRTUAL VISIT (OUTPATIENT)
Dept: RHEUMATOLOGY | Facility: CLINIC | Age: 66
End: 2021-03-09
Attending: INTERNAL MEDICINE
Payer: COMMERCIAL

## 2021-03-09 DIAGNOSIS — L40.9 PSORIASIS: Primary | ICD-10-CM

## 2021-03-09 DIAGNOSIS — M46.99 INFLAMMATORY SPONDYLOPATHY OF MULTIPLE SITES IN SPINE (H): ICD-10-CM

## 2021-03-09 DIAGNOSIS — M06.4 INFLAMMATORY POLYARTHROPATHY (H): ICD-10-CM

## 2021-03-09 DIAGNOSIS — Z79.899 HIGH RISK MEDICATIONS (NOT ANTICOAGULANTS) LONG-TERM USE: ICD-10-CM

## 2021-03-09 DIAGNOSIS — L40.50 PSORIATIC ARTHRITIS (H): ICD-10-CM

## 2021-03-09 PROCEDURE — 99214 OFFICE O/P EST MOD 30 MIN: CPT | Mod: 95 | Performed by: INTERNAL MEDICINE

## 2021-03-09 NOTE — PROGRESS NOTES
Krissy is a 65 year old who is being evaluated via a billable video visit.      How would you like to obtain your AVS? MyChart  If the video visit is dropped, the invitation should be resent by: Text to cell phone: [phone]  Will anyone else be joining your video visit? No    Video-Visit Details    Type of service:  Video Visit    Originating Location (pt. Location): Home    Distant Location (provider location):  [facility] [place]     Platform used for Video Visit: [facility]  Jensen Samayoa MD    Name: Krissy Weldon  MRN: [id number]  Age: 64 year old  : 1955  Referring provider: Jensen Samayoa    Problem List:  1. Psoriatic arthritis w/axial involvement activity with history episcleritis, tendonitis, left hamstring tear and uveitis  2. Polyarthropathy    3. Chronic pain management patient   4. Cervical DJD 2/2 underlying inflammatory arthritis, psoriatic   5. Bilateral foot pain   6. On prednisone therapy  7. Worsening right hip involvement with both inflammatory and DJD features.       10/22/2019 :   Krissy Weldon is a 64 year old female who presents for follow-up of undifferentiated polyarthropathy, psoriatic arthritis. Complicated by eye inflammation (uveitis and scleritis--sees [facility]), tendonitis, costochondritis, tendon tears, synovitis, dactylitis, OA/DJD jessica thumbs [facility], now toe contractures and laxity all hand/wrist joints, bowel changes (sister with Crohn's and dad with inflammatory bowel). The patient was last evaluated by Miguel Umana on 2019, at which time there was concern for worsening tendons and increased bowel symptoms despite continuation of secukinumab injections 150 mg every 30 days. A discussion was had about changing to Enbrel 50 mg injections every week starting 30 days after her last injection with plans to stop Cosentyx.    Today, the patient reports that she has symptom exacerbation after around 10 days  following her Humira injections. She states that she has been using prednisone off-and-on for these four days leading up to her next injection. She adds that she can live with this, but, as her wife had a double lung transplant, she has been care taking for her and needs to remain active for this. She notes that her last DEXA scan was completed around 5-10 years ago. She states that she continues methotrexate injections 20 mg weekly with 3 mg folic acid daily. She says that Dr. Conti recommended a liver scan due to long-term methotrexate use during their last visit, and she did not complete this as her wife has been very sick.    She states that her hands and feet have been swelling, and she has obtained larger shoes to treat this. She reports that she also has had trouble extending her fingers. She also reports psoriatic skin changes on her scalp, arms, and buttocks, but she explains that these have remained mostly unchanged.     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 She states that she could barely walk before this therapy, she did not start steroids following the procedure, and she had relief for around one year.    December 9, 2020( Miguel visit)   Seen Dr. Samayoa in Sept-titrated sulfasalazine to 1 GM BID, right leg numb after Covid-19 (coronavirus) was doing Physical Therapy with the pain clinic for right labrum tear.      Denies any fever, chills, SOB, SANCHEZ, night sweats, or chest pain, high fever, cough, travel in last 14 days or exposure to covid-19 (coronavirus). Reports healthy. Follows CDC guidelines.      No inflammatory eye. Bilateral 2nd MCP swelling red, some in bilateral 3rd MCP and end DIPs, nails splitting. Contractures the same, developed when off arthritis medications during her Covid-19 (coronavirus). This has not improved. Injections in back, right labrum tear very  "painful.   TSH 1.09 Free 1.13 now on higher synthyoid for 3 weeks-better. No more numb and tingling. Eats well. Bowels are good.  Psoriasis scalp and forehead     Adalimumab (humira) 40 mg injection every 14 days (feels this works but wears off after 10 days \"my joints feel lubricated\", \"amazing\" even sternal pain goes away), off methotrexate since Sept no longer having GI issues like nausea, diarrhea. Dr. Samayoa concerned as her albumin was lower, still is on sulfasalazine . Sulfasalazine  mg takes with daily omeprazole. Tolerating     February 4, 2021 interval history:    Since I last saw the patient Miguel has seen her back.  Her most recent note is above.  However the patient has had increasing problems with right hip pain over several months.  Because of this she had an injection in the pain clinic on December 17 and although she got a temporary response to the lidocaine, she had virtually no response longer-term to the steroid.    Because of that she recently saw Dr. Montesinos in [place].  He reviewed things and did an MRI of her hip which I do not have available to review.  He did feel that she was a candidate for hip replacement.  She saw him in particular because she would like to consider having an anterior approach done and he specializes in this.    He did ask her to consult with us about what she would want to do with her medicines with respect to a hip surgery.    She is up to a more therapeutic dose on sulfasalazine at 1 g twice daily and she feels that that has been quite helpful globally outside of the hip.  It helps her overall spine pain and stiffness including throughout her neck and lower back.  She feels much more mobile there.  She has less pain globally in the small joints of the hands and feet as well although her feet are still a problem particularly with metatarsal pain.  She does have about an hour of morning stiffness.    As noted above in the prior notes that she feels that Humira " is quite helpful but over the last year, it starts to wear off around day 10 of the injection.  As noted in Miguel's note above she has had trouble tolerating oral methotrexate,  So we just recently set up injectable methotrexate for her.  She has however not started using it yet.  It was prescribed a dose of 10 mg once weekly.    She is also wondering if she could at least temporarily use prednisone which is always been helpful for her.  [place] and would like to go [facility] but that has been definitely painful for her recently and she does not feel she can do it as she is right now.  This is primarily because of the hip problems however.        February 4, 2021 interval history:      Background History:   (-SSa, -SSb, -CCP, HLA-B27 negative on outside workup with previous SI injections) with hx- inflammatory eye left eye episcleritis requiring prednisone gtt or oral with bone scan unrevealing. Previous medrol or steroid responsiveness. Past tried Humira, SSZ, Simponi SQ/IV, remicade and Otezla. Failed Humira EoW recurrent episcleritis, right wrist, right SI, bilateral hamstrings tendinosis with partial tear bilaterally. Failed Simponi sq/IV ineffective. SSZ and oral MTX. Past recurrent iritis (ER if eye pain, blurred vision, red eye). Remicade. Otezla. LLL pneumonia 3/2015. MRI L hip showed high grade tear gluteus minimus insertion site, effusion 4/2015. C/o neck issues with MRI cervical spine show DJD, EMG, paramedian osteophytes and disk protrusion at C3-C4 with some moderate central canal stenosis and moderate to severe right-sided foraminal stenosis as a consequence. C5-C6 also showed some mild to moderate central canal stenosis and moderate bilateral central foraminal stenosis. Seen Dr. Wheeler with significant improvement in GI issues pancreatic sphincter dysfunction requiring surgery now on pancreatic enzymes after pancreatic duct is open. Failed certolizumab pegol (Cimzia) lost  "effectiveness (more uveitis, arthritis, synovitis, bursitis and tendonitis, strept and CAP). Secukinumab (cosentyx) 2-2019 more laxity in joints hands/wrists, inflammatory eye, toe contractures, changes in skin stopped then changed to etanercept (Enbrel) 6-.     Review of Systems:   Pertinent items are noted in HPI or as below, remainder of complete ROS is negative.      No recent problems with hearing or vision. No swallowing problems.   No breathing difficulty, shortness of breath, coughing, or wheezing.  No chest pain or palpitations.  No heart burn, indigestion, abdominal pain, nausea, vomiting, diarrhea.  No urination problems, no bloody, cloudy urine, no dysuria.  No numbing, tingling, weakness.  No headaches or confusion.  No easy bleeding or bruising.     Active Medications:   Current Outpatient Medications:      adalimumab (HUMIRA *CF*) 40 MG/0.4ML prefilled syringe kit, Inject 0.4 mLs (40 mg) Subcutaneous every 14 days Hold for signs of infection, then seek medical attention., Disp: 1 kit, Rfl: 5     ammonium lactate (LAC-HYDRIN) 12 % external lotion, Apply topically 2 times daily (Patient taking differently: Apply topically daily ), Disp: 225 g, Rfl: 3     B-D SYRINGE LUER-FILI 1 ML MISC, USE TO INJECT METHOTREXATE EVERY 7 DAYS, Disp: 25 each, Rfl: 0     BD DISP NEEDLES 25G X 5/8\" MISC, USE WITH METHOTREXATE UNDER THE SKIN EVERY 7 DAYS, Disp: 25 each, Rfl: 0     fentaNYL (DURAGESIC) 12 mcg/hr 72 hr patch, APPLY 1 PATCH TO SKIN EVERY 48 HOURS FOR CHRONIC PAIN., Disp: , Rfl: 0     FLUoxetine (PROZAC) 40 MG capsule, Take 40 mg by mouth daily., Disp: , Rfl:      folic acid (FOLVITE) 1 MG tablet, Take 3 tablet a day few days before and day after methotrexate then the other day 2 mg day, Disp: 210 tablet, Rfl: 3     levothyroxine (SYNTHROID) 100 MCG tablet, Take  by mouth daily., Disp: , Rfl:      methotrexate sodium, pres-free, 50 MG/2ML SOLN injection CHEMO, Inject 0.8 mLs (20 mg) Subcutaneous every 7 " days *DISCARD REMAINDER** MONITORING LABS DUE EVERY 8 WEEKS, Disp: 8 mL, Rfl: 2     multivitamin, therapeutic (THERA-VIT) TABS, Take 1 tablet by mouth daily, Disp: , Rfl:      oxyCODONE IR (ROXICODONE) 15 MG tablet, Take 15 mg by mouth, Disp: , Rfl:      sennosides (SENOKOT) 8.6 MG tablet, Take 1 tablet by mouth daily , Disp: 1 tablet, Rfl: 0     Syringe Luer Slip (B-D SYRINGE LUER-FILI) 3 ML MISC, 1 Units by Device route every 7 days, Disp: 25 each, Rfl: 0     vitamin D3 (VITAMIN D3) 1000 units (25 mcg) tablet, Take 2 tablets (2,000 Units) by mouth daily, Disp: 180 tablet, Rfl: 3     cefdinir (OMNICEF) 300 MG capsule, Take 1 capsule (300 mg) by mouth 2 times daily (Patient not taking: Reported on 4/10/2019), Disp: 20 capsule, Rfl: 0     predniSONE (DELTASONE) 5 MG tablet, Take 10 mg once a day for 3 weeks then go to 5 mg day until seen. (Patient not taking: Reported on 10/22/2019), Disp: 60 tablet, Rfl: 2    Allergies:   No known drug allergies.     Past Medical History:  Past medical history is notable for her being presumptively treated for Lyme with doxycycline after developing a targetoid lesion, for a history of Hashimoto's thyroiditis with subsequent hypothyroidism, for the diagnosis now of psoriasis, and for a history of depression.Neck pain, cervical stenosis-- MRI  +moderate central stenosis right C3-4, C5-6 degenerative changes 2nd mild-mod central stenosis. Past referral to physiatry-wishes to return to Dr. Edmond [facility] Left hip pain s/p tear needs THR she reports. B) Hx-Bilateral tendonitis hamstrings with right bursitis, partial tear per Dr. Gomez s/p injections. Seen Dr. Arvin Xie at [facility] [place] s/p ablation SI joint, tendon insertion site. Sees Dr. Maciel Dukes. C). Transient elevation LFT 2/2013 [ NL after held MTX and tramadol 2wk]; transient 4- [ AST 89]. NL . Other hx --pancreatic sphinctor dysfunction. On pancreatic enzymes. SBO 9-2015, now no doing well.  Right thumb DJD, s/p surgery . Healed.  1. Diffuse degenerative joint disease that is both clinically apparent and obvious on multiple imaging modalities including bone scan, MRI 2/2013 or cervical. DJD L4-L5, L5-S1 per MRI 7/2012; MRI [id number]   2. Diffuse inflammatory arthritis with marked morning stiffness, no systemic inflammation by blood tests, but greater than 80% clinical improvement with a Medrol Dosepak.   3. Onset of the inflammatory joint symptoms including sacroiliitis with the development of psoriasis, suggesting that this represents psoriatic arthritis.   4. Development of episcleritis in the left eye with improvement with steroid eyedrops 12/14/2011 with recurrent episodes when wean oral prednisone (9/2012)  5. History of Hashimoto's thyroiditis.   6. History of presumptive treatment for Lyme disease with doxycycline after development of a targetoid lesion.   7. Poor tolerance of sulfasalazine and oral methotrexate.   8. Bilateral hamstring tendonosis, right partial tear, sacroilitis 7/2012. See MRI, repeat 2/2013 hamstring and right SI inflammation seeing [place]. Arvin X[place] [facility] [place] in [place]. , left hamstring   9. DJD L4-L5, L5-S1 per MRI 7/2012  10. Intermittent prednisone exposure  11. Transient elevation LFT ALT 84/AST 58 2/2013 (nl after held MTX and tramadol, then increased folic acid 2mg day)  12. Past LUE burn developed into cellulitis resolved from keflex. Bronchitis now on zithromax irritating her costrochondritis. Improving after 1 day  13. 1-2014 Left knee meniscal tear with chrondomalacia per MRI (had Rt/Lt CMJ surgery)  14. 4- RUQ pain.   15.  Hx recurrent left episcleritis   16. Right thumb tendon surgery with Dr. [facility]  17. Pneumonia 3-2015; strept  and 4-2017      Past Surgical History:  Appendectomy    Arthroplasty carpometacarpal, left 11/8/2013  Arthroplasty carpometacarpal, right 12/20/2013  Cholecystectomy    Colonoscopy  5/6/2014  Endoscopic retrograde cholangiopancreatogram 6/13/2014  Gallbladder surgery      Hysterectomy and oophorectomy  Hc ugi endoscopy w eus, left 6/10/2014  Procedure: combined endoscopic ultrasound, esophagoscopy, gastroscopy, duodenoscopy  Right thumb surgery 7/2015  Xr sacroiliac therapeutic injection bilateral 12/2011    Family History:    No autoimmune disorders, psoriasis, UC, crohn's, SLE, RA, PsA, gout, autoimmune thyroid. No MS, heart disease or cancer in family  Mother- Endometrial cancer, RHEUMATOID ARTHRITIS (RA)   Father- Psoriasis, lymphoma, colon and prostate cancer, inflammatory bowel   Siblings- Sister rheumatoid arthritis (RA) and OSTEOARTHRITIS  And crohns   Brother autoimmune process sees Dr. Ni   Maternal grandmother- Rheumatoid arthritis     Social History:  The patient reports that she has never smoked. She has never used smokeless tobacco. She reports that she does not drink alcohol or use drugs.   PCP: Deshawn Burns  Marital Status:      Physical Exam:   LMP 03/06/2012    Wt Readings from Last 4 Encounters:   02/04/21 73.5 kg (162 lb)   08/04/20 74.4 kg (164 lb 1.6 oz)   03/16/20 72.1 kg (159 lb)   10/22/19 75.4 kg (166 lb 4.8 oz)     Constitutional: Well-developed, appearing stated age; cooperative.  Eyes: Normal EOM, PERRLA, vision, conjunctiva, sclera.  ENT: Normal external ears, nose, hearing, lips, teeth, gums, throat. No mucous membrane lesions, normal saliva pool.  Neck: No mass or thyroid enlargement.  Resp: Lungs clear to auscultation, nl to palpation.  CV: RRR, no murmurs, rubs or gallops, no edema.  GI: No ABD mass or tenderness, no HSM.  : Not tested.  Lymph: No cervical, supraclavicular, inguinal or epitrochlear nodes.  MS: The TMJ, neck, shoulder, elbow, wrist, spine, hip, knee, ankle, and foot MTP/IP joints were examined and found normal. No altered joint anatomy. Full joint ROM.  She has some restriction in range of motion in the neck bilaterally primarily  in lateral bending left worse than right, but also in flexion.    Her right hip exam is significantly impaired in both internal and external rotation while in flexion and in internal rotation with logrolling as well.    I cannot find any synovitis anywhere throughout her MCP MTP wrist or ankles, but she does have quite significant metatarsal head protrusion particularly in bilateral first metatarsal heads where she is quite tender as well and has a rarefied meta tarsal fat pad.  Skin: No nail pitting, alopecia, rash, nodules or lesions  Neuro: Normal cranial nerves, strength, sensation, DTRs.   Psych: Normal judgement, orientation, memory, affect.     Laboratory:   RHEUM RESULTS Latest Ref Rng & Units 9/11/2020 12/7/2020 2/5/2021   SED RATE 0 - 30 mm/h 12 - 9   CRP, INFLAMMATION 0.0 - 8.0 mg/L <2.9 - <2.9   CK TOTAL 30 - 225 U/L - - -   RHEUMATOID FACTOR <20 IU/mL - - -   DESEAN SCREEN BY EIA <1.0 - - -   AST 0 - 45 U/L 20 20 18   ALT 0 - 50 U/L 24 21 22   ALBUMIN 3.4 - 5.0 g/dL 3.7 3.5 3.6   WBC 4.0 - 11.0 10e9/L 5.7 7.6 6.2   RBC 3.8 - 5.2 10e12/L 4.45 4.30 4.41   HGB 11.7 - 15.7 g/dL 12.9 12.5 12.7   HCT 35.0 - 47.0 % 40.3 39.0 39.6   MCV 78 - 100 fl 91 91 90   MCHC 31.5 - 36.5 g/dL 32.0 32.1 32.1   RDW 10.0 - 15.0 % 14.7 14.2 14.8    - 450 10e9/L 251 294 287   CREATININE 0.52 - 1.04 mg/dL 0.84 0.80 0.78   GFR ESTIMATE, IF BLACK >60 mL/min/[1.73:m2] 86 >90 >90   GFR ESTIMATE >60 mL/min/[1.73:m2] 74 78 80    - 1,620 mg/dL - - -   IGA 70 - 380 mg/dL - - -   IGM 60 - 265 mg/dL - - -   HEPATITIS C ANTIBODY NR:Nonreactive - - -     Rheumatoid Factor   Date Value Ref Range Status   05/31/2017 <20 <20 IU/mL Final      Cyclic Citrullinated Peptide Antibody, IgG   Date Value Ref Range Status   05/31/2017 1 <7 U/mL Final     Comment:     Negative      Cyclic Cit Pept IgG/IgA   Date Value Ref Range Status   06/30/2011 <20  Interpretation:  Negative <20 UNITS Final     SSA (RO) Antibody IgG   Date Value Ref Range  Status   06/30/2011 2  Final     Comment:     Reference range: 0 to 40  Unit: AU/mL  (Note)  REFERENCE INTERVALS: SSA (Ro) (BERTA) Ab, IgG   29 AU/mL or Less ............. Negative   30 - 40 AU/mL ................ Equivocal   41 AU/mL or Greater .......... Positive  SSA (Ro) antibody is seen in 70-75% of Sjogren syndrome  cases, 30-40% of systemic lupus erythematosus (SLE) and  5-10% of progressive systemic sclerosis (PSS).  Performed by [employer],  [address] [id number] [phone]  [email or web address], Estella Huertas MD, Lab. Director     SSB (LA) Antibody IgG   Date Value Ref Range Status   06/30/2011 0  Final     Comment:     Reference range: 0 to 40  Unit: AU/mL  (Note)  REFERENCE INTERVALS: SSB (La) (BERTA) Ab, IgG   29 AU/mL or Less ............. Negative   30 - 40 AU/mL ................ Equivocal   41 AU/mL or Greater .......... Positive  SSB (La) antibody is seen in 50-60% of Sjogren syndrome  cases and is specific if it is the only BERTA antibody  present. 15-25% of patients with systemic lupus  erythematosus (SLE) and 5-10% of patients with progressive  systemic sclerosis (PSS) also have this antibody.  Performed by [employer],  [address] [address] [phone]  [email or web address], Estella Huertas MD, Lab. Director     DESEAN Screen by EIA   Date Value Ref Range Status   05/15/2014 <1.0  Interpretation:  Negative   <1.0 Final     Hepatitis B Core Emily   Date Value Ref Range Status   01/14/2019 Nonreactive NR^Nonreactive Final      Hep B Surface Agn   Date Value Ref Range Status   01/14/2019 Nonreactive NR^Nonreactive Final     Quantiferon-TB Gold Plus Result   Date Value Ref Range Status   06/26/2019 Negative NEG^Negative Final     Comment:     No interferon gamma response to M.tuberculosis antigens was detected.   Infection with M.tuberculosis is unlikely, however a single negative result   does not exclude infection. In patients at high risk for infection, a second   test  should be considered  in accordance with the 2017 ATS/IDSA/CDC Clinical Practice Guidelines for   Diagnosis of Tuberculosis in Adults and Children [Lewinsohn DM et   al.Clin.Infect.Dis. 2017 64(2):111-115].       TB1 Ag minus Nil Value   Date Value Ref Range Status   06/26/2019 0.00 IU/mL Final     TB2 Ag minus Nil Value   Date Value Ref Range Status   06/26/2019 0.00 IU/mL Final     Mitogen minus Nil Result   Date Value Ref Range Status   06/26/2019 >10.00 IU/mL Final     Nil Result   Date Value Ref Range Status   06/26/2019 0.06 IU/mL Final     Albumin Fraction   Date Value Ref Range Status   05/07/2014 4.4 3.7 - 5.1 g/dL Final     Alpha 2 Fraction   Date Value Ref Range Status   05/07/2014 0.6 0.5 - 0.9 g/dL Final     Beta Fraction   Date Value Ref Range Status   05/07/2014 0.8 0.6 - 1.0 g/dL Final     Gamma Fraction   Date Value Ref Range Status   05/07/2014 1.1 0.7 - 1.6 g/dL Final     Monoclonal Peak   Date Value Ref Range Status   05/07/2014 0.0 0.0 g/dL Final     ELP Interpretation:   Date Value Ref Range Status   05/07/2014   Final    No monoclonal protein seen by capillarys electrophoresis, however a monoclonla   protein was seen in this sample by immunofixation which is a more sensitive   method for monoclonal dectection.  See immunofixation report on same sample.   Pathologic significance requires clinical correlation.  NORI Stauffer M.D.,   Ph.D., Pathologist       Immunofixation ELP   Date Value Ref Range Status   05/07/2014   Final    (Note)  Monoclonal IgG immunoglobulin of kappa light chain type.  Pathological significance requires clinical correlation.  JUAN PABLO Stauffer M.D., Ph.D., Pathologist         IGG   Date Value Ref Range Status   05/07/2014 1,060 695 - 1,620 mg/dL Final     IGA   Date Value Ref Range Status   05/07/2014 138 70 - 380 mg/dL Final     IGM   Date Value Ref Range Status   05/07/2014 123 60 - 265 mg/dL Final       ZAKIA Samayoa MD, PhD    Rheumatology       March  9, 2021 interval history:    The patient is seeing me today just a little over a month since she saw me previously.  This was the originally scheduled appointment but because she was doing poorly we saw her then.    She actually has now tapered her prednisone down to 5 mg a day with a weekly taper going from 15 to 10-7.5 and now to 5 mg daily.  She is much better after the taper though she is feeling a little worse particularly in her hips than she was at 10 mg daily.  She is able to walk about a half a mile now but is still limited by pain.    She is also been dose escalating methotrexate to 0.6 mg once weekly.  We did discuss with her that further dose escalation to 0.8 mg once weekly is probably reasonable based on her historical tolerance of the drug and good laboratory values.    Physical examination by video today shows her to be in no acute distress.  She has good range of motion in her hands.  The main remainder of the joint examination is deferred however.    Impression:    Per the problem list.  Plan:    I think given her overall magnitude of symptoms she is probably better off to stay on low-dose prednisone.    She will however try to further dose escalate methotrexate, and if feeling very good she can try cutting down to 2.5 mg daily prednisone.    I did discuss with her that she should also have a conversation with her orthopedic surgeon about what their feelings are about having her on prednisone as it comes closer to the time of surgery which they are tentatively planning on in the fall.    In the meantime she can see Miguel again in 2 to 3 months as needed sooner.    This video visit commenced at 5:38 PM was completed at 5:49 PM.    ZAKIA Samayoa MD, PhD    Rheumatology          Ekta WEATHERS CMA

## 2021-03-09 NOTE — LETTER
3/9/2021       RE: Krissy Weldon  [address] [facility] [address]     Dear Colleague,    Thank you for referring your patient, Krissy Weldon, to the [facility] RHEUMATOLOGY CLINIC [place] at [facility]. Please see a copy of my visit note below.    Krissy is a 65 year old who is being evaluated via a billable video visit.      How would you like to obtain your AVS? MyChart  If the video visit is dropped, the invitation should be resent by: Text to cell phone: [phone]  Will anyone else be joining your video visit? No    Video-Visit Details    Type of service:  Video Visit    Originating Location (pt. Location): Home    Distant Location (provider location):  [facility] RHEUMATOLOGY CLINIC [place]     Platform used for Video Visit: [facility]  Rheumatology Clinic  Jensen Samayoa MD    Name: Krissy Weldon  MRN: [id number]  Age: 64 year old  : 1955  Referring provider: Jensen Samayoa    Problem List:  1. Psoriatic arthritis w/axial involvement activity with history episcleritis, tendonitis, left hamstring tear and uveitis  2. Polyarthropathy    3. Chronic pain management patient   4. Cervical DJD 2/2 underlying inflammatory arthritis, psoriatic   5. Bilateral foot pain   6. On prednisone therapy  7. Worsening right hip involvement with both inflammatory and DJD features.       10/22/2019 :   Krissy Weldon is a 64 year old female who presents for follow-up of undifferentiated polyarthropathy, psoriatic arthritis. Complicated by eye inflammation (uveitis and scleritis--sees [facility]), tendonitis, costochondritis, tendon tears, synovitis, dactylitis, OA/DJD jessica thumbs CMC, now toe contractures and laxity all hand/wrist joints, bowel changes (sister with Crohn's and dad with inflammatory bowel). The patient was last evaluated by Miguel Umana on 2019, at which time there was concern for worsening tendons and increased  bowel symptoms despite continuation of secukinumab injections 150 mg every 30 days. A discussion was had about changing to Enbrel 50 mg injections every week starting 30 days after her last injection with plans to stop Cosentyx.    Today, the patient reports that she has symptom exacerbation after around 10 days following her Humira injections. She states that she has been using prednisone off-and-on for these four days leading up to her next injection. She adds that she can live with this, but, as her wife had a double lung transplant, she has been care taking for her and needs to remain active for this. She notes that her last DEXA scan was completed around 5-10 years ago. She states that she continues methotrexate injections 20 mg weekly with 3 mg folic acid daily. She says that Dr. Conti recommended a liver scan due to long-term methotrexate use during their last visit, and she did not complete this as her wife has been very sick.    She states that her hands and feet have been swelling, and she has obtained larger shoes to treat this. She reports that she also has had trouble extending her fingers. She also reports psoriatic skin changes on her scalp, arms, and buttocks, but she explains that these have remained mostly unchanged.     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 She states that she could barely walk before this therapy, she did not start steroids following the procedure, and she had relief for around one year.    December 9, 2020( Miguel visit)   Seen Dr. Samayoa in Sept-titrated sulfasalazine to 1 GM BID, right leg numb after Covid-19 (coronavirus) was doing Physical Therapy with the pain clinic for right labrum tear.      Denies any fever, chills, SOB, SANCHEZ, night sweats, or chest pain, high fever, cough, travel in last 14 days or exposure to covid-19 (coronavirus). Reports  "healthy. Follows CDC guidelines.      No inflammatory eye. Bilateral 2nd MCP swelling red, some in bilateral 3rd MCP and end DIPs, nails splitting. Contractures the same, developed when off arthritis medications during her Covid-19 (coronavirus). This has not improved. Injections in back, right labrum tear very painful.   TSH 1.09 Free 1.13 now on higher synthyoid for 3 weeks-better. No more numb and tingling. Eats well. Bowels are good.  Psoriasis scalp and forehead     Adalimumab (humira) 40 mg injection every 14 days (feels this works but wears off after 10 days \"my joints feel lubricated\", \"amazing\" even sternal pain goes away), off methotrexate since Sept no longer having GI issues like nausea, diarrhea. Dr. Samayoa concerned as her albumin was lower, still is on sulfasalazine . Sulfasalazine  mg takes with daily omeprazole. Tolerating     February 4, 2021 interval history:    Since I last saw the patient Miguel has seen her back.  Her most recent note is above.  However the patient has had increasing problems with right hip pain over several months.  Because of this she had an injection in the pain clinic on December 17 and although she got a temporary response to the lidocaine, she had virtually no response longer-term to the steroid.    Because of that she recently saw Dr. Montesinos in [place].  He reviewed things and did an MRI of her hip which I do not have available to review.  He did feel that she was a candidate for hip replacement.  She saw him in particular because she would like to consider having an anterior approach done and he specializes in this.    He did ask her to consult with us about what she would want to do with her medicines with respect to a hip surgery.    She is up to a more therapeutic dose on sulfasalazine at 1 g twice daily and she feels that that has been quite helpful globally outside of the hip.  It helps her overall spine pain and stiffness including throughout her neck and " lower back.  She feels much more mobile there.  She has less pain globally in the small joints of the hands and feet as well although her feet are still a problem particularly with metatarsal pain.  She does have about an hour of morning stiffness.    As noted above in the prior notes that she feels that Humira is quite helpful but over the last year, it starts to wear off around day 10 of the injection.  As noted in Miguel's note above she has had trouble tolerating oral methotrexate,  So we just recently set up injectable methotrexate for her.  She has however not started using it yet.  It was prescribed a dose of 10 mg once weekly.    She is also wondering if she could at least temporarily use prednisone which is always been helpful for her.  [place] but that has been definitely painful for her recently and she does not feel she can do it as she is right now.  This is primarily because of the hip problems however.        February 4, 2021 interval history:      Background History:   (-SSa, -SSb, -CCP, HLA-B27 negative on outside workup with previous SI injections) with hx- inflammatory eye left eye episcleritis requiring prednisone gtt or oral with bone scan unrevealing. Previous medrol or steroid responsiveness. Past tried Humira, SSZ, Simponi SQ/IV, remicade and Otezla. Failed Humira EoW recurrent episcleritis, right wrist, right SI, bilateral hamstrings tendinosis with partial tear bilaterally. Failed Simponi sq/IV ineffective. SSZ and oral MTX. Past recurrent iritis (ER if eye pain, blurred vision, red eye). Remicade. Otezla. LLL pneumonia 3/2015. MRI L hip showed high grade tear gluteus minimus insertion site, effusion 4/2015. C/o neck issues with MRI cervical spine show DJD, EMG, paramedian osteophytes and disk protrusion at C3-C4 with some moderate central canal stenosis and moderate to severe right-sided foraminal stenosis as a consequence. C5-C6  "also showed some mild to moderate central canal stenosis and moderate bilateral central foraminal stenosis. Seen Dr. Wheeler with significant improvement in GI issues pancreatic sphincter dysfunction requiring surgery now on pancreatic enzymes after pancreatic duct is open. Failed certolizumab pegol (Cimzia) lost effectiveness (more uveitis, arthritis, synovitis, bursitis and tendonitis, strept and CAP). Secukinumab (cosentyx) 2-2019 more laxity in joints hands/wrists, inflammatory eye, toe contractures, changes in skin stopped then changed to etanercept (Enbrel) 6-.     Review of Systems:   Pertinent items are noted in HPI or as below, remainder of complete ROS is negative.      No recent problems with hearing or vision. No swallowing problems.   No breathing difficulty, shortness of breath, coughing, or wheezing.  No chest pain or palpitations.  No heart burn, indigestion, abdominal pain, nausea, vomiting, diarrhea.  No urination problems, no bloody, cloudy urine, no dysuria.  No numbing, tingling, weakness.  No headaches or confusion.  No easy bleeding or bruising.     Active Medications:   Current Outpatient Medications:      adalimumab (HUMIRA *CF*) 40 MG/0.4ML prefilled syringe kit, Inject 0.4 mLs (40 mg) Subcutaneous every 14 days Hold for signs of infection, then seek medical attention., Disp: 1 kit, Rfl: 5     ammonium lactate (LAC-HYDRIN) 12 % external lotion, Apply topically 2 times daily (Patient taking differently: Apply topically daily ), Disp: 225 g, Rfl: 3     B-D SYRINGE LUER-FILI 1 ML MISC, USE TO INJECT METHOTREXATE EVERY 7 DAYS, Disp: 25 each, Rfl: 0     BD DISP NEEDLES 25G X 5/8\" MISC, USE WITH METHOTREXATE UNDER THE SKIN EVERY 7 DAYS, Disp: 25 each, Rfl: 0     fentaNYL (DURAGESIC) 12 mcg/hr 72 hr patch, APPLY 1 PATCH TO SKIN EVERY 48 HOURS FOR CHRONIC PAIN., Disp: , Rfl: 0     FLUoxetine (PROZAC) 40 MG capsule, Take 40 mg by mouth daily., Disp: , Rfl:      folic acid (FOLVITE) 1 MG tablet, " Take 3 tablet a day few days before and day after methotrexate then the other day 2 mg day, Disp: 210 tablet, Rfl: 3     levothyroxine (SYNTHROID) 100 MCG tablet, Take  by mouth daily., Disp: , Rfl:      methotrexate sodium, pres-free, 50 MG/2ML SOLN injection CHEMO, Inject 0.8 mLs (20 mg) Subcutaneous every 7 days *DISCARD REMAINDER** MONITORING LABS DUE EVERY 8 WEEKS, Disp: 8 mL, Rfl: 2     multivitamin, therapeutic (THERA-VIT) TABS, Take 1 tablet by mouth daily, Disp: , Rfl:      oxyCODONE IR (ROXICODONE) 15 MG tablet, Take 15 mg by mouth, Disp: , Rfl:      sennosides (SENOKOT) 8.6 MG tablet, Take 1 tablet by mouth daily , Disp: 1 tablet, Rfl: 0     Syringe Luer Slip (B-D SYRINGE LUER-FILI) 3 ML MISC, 1 Units by Device route every 7 days, Disp: 25 each, Rfl: 0     vitamin D3 (VITAMIN D3) 1000 units (25 mcg) tablet, Take 2 tablets (2,000 Units) by mouth daily, Disp: 180 tablet, Rfl: 3     cefdinir (OMNICEF) 300 MG capsule, Take 1 capsule (300 mg) by mouth 2 times daily (Patient not taking: Reported on 4/10/2019), Disp: 20 capsule, Rfl: 0     predniSONE (DELTASONE) 5 MG tablet, Take 10 mg once a day for 3 weeks then go to 5 mg day until seen. (Patient not taking: Reported on 10/22/2019), Disp: 60 tablet, Rfl: 2    Allergies:   No known drug allergies.     Past Medical History:  Past medical history is notable for her being presumptively treated for Lyme with doxycycline after developing a targetoid lesion, for a history of Hashimoto's thyroiditis with subsequent hypothyroidism, for the diagnosis now of psoriasis, and for a history of depression.Neck pain, cervical stenosis-- MRI  +moderate central stenosis right C3-4, C5-6 degenerative changes 2nd mild-mod central stenosis. Past referral to physiatry-wishes to return to Dr. Edmond [facility]ant [facility] Left hip pain s/p tear needs THR she reports. B) Hx-Bilateral tendonitis hamstrings with right bursitis, partial tear per Dr. Gomez s/p injections. Seen   Arvin Xie at [facility] s/p ablation SI joint, tendon insertion site. Sees Dr. Maciel Dukes. C). Transient elevation LFT 2/2013 [ NL after held MTX and tramadol 2wk]; transient 4- [ AST 89]. NL . Other hx --pancreatic sphinctor dysfunction. On pancreatic enzymes. SBO 9-2015, now no doing well. Right thumb DJD, s/p surgery . Healed.  1. Diffuse degenerative joint disease that is both clinically apparent and obvious on multiple imaging modalities including bone scan, MRI 2/2013 or cervical. DJD L4-L5, L5-S1 per MRI 7/2012; MRI [id number]   2. Diffuse inflammatory arthritis with marked morning stiffness, no systemic inflammation by blood tests, but greater than 80% clinical improvement with a Medrol Dosepak.   3. Onset of the inflammatory joint symptoms including sacroiliitis with the development of psoriasis, suggesting that this represents psoriatic arthritis.   4. Development of episcleritis in the left eye with improvement with steroid eyedrops 12/14/2011 with recurrent episodes when wean oral prednisone (9/2012)  5. History of Hashimoto's thyroiditis.   6. History of presumptive treatment for Lyme disease with doxycycline after development of a targetoid lesion.   7. Poor tolerance of sulfasalazine and oral methotrexate.   8. Bilateral hamstring tendonosis, right partial tear, sacroilitis 7/2012. See MRI, repeat 2/2013 hamstring and right SI inflammation seeing Dr. Arvin Xie at [facility] specialized in SI. , left hamstring   9. DJD L4-L5, L5-S1 per MRI 7/2012  10. Intermittent prednisone exposure  11. Transient elevation LFT ALT 84/AST 58 2/2013 (nl after held MTX and tramadol, then increased folic acid 2mg day)  12. Past LUE burn developed into cellulitis resolved from keflex. Bronchitis now on zithromax irritating her costrochondritis. Improving after 1 day  13. 1-2014 Left knee meniscal tear with chrondomalacia per MRI (had Rt/Lt CMJ surgery)  14. 4- RUQ  pain.   15.  Hx recurrent left episcleritis   16. Right thumb tendon surgery with Dr. [facility]  17. Pneumonia 3-2015; strept  and 4-2017      Past Surgical History:  Appendectomy    Arthroplasty carpometacarpal, left 11/8/2013  Arthroplasty carpometacarpal, right 12/20/2013  Cholecystectomy    Colonoscopy 5/6/2014  Endoscopic retrograde cholangiopancreatogram 6/13/2014  Gallbladder surgery      Hysterectomy and oophorectomy  Hc ugi endoscopy w eus, left 6/10/2014  Procedure: combined endoscopic ultrasound, esophagoscopy, gastroscopy, duodenoscopy  Right thumb surgery 7/2015  Xr sacroiliac therapeutic injection bilateral 12/2011    Family History:    No autoimmune disorders, psoriasis, UC, crohn's, SLE, RA, PsA, gout, autoimmune thyroid. No MS, heart disease or cancer in family  Mother- Endometrial cancer, RHEUMATOID ARTHRITIS (RA)   Father- Psoriasis, lymphoma, colon and prostate cancer, inflammatory bowel   Siblings- Sister rheumatoid arthritis (RA) and OSTEOARTHRITIS  And crohns   Brother autoimmune process sees Dr. Ni   Maternal grandmother- Rheumatoid arthritis     Social History:  The patient reports that she has never smoked. She has never used smokeless tobacco. She reports that she does not drink alcohol or use drugs.   PCP: Deshawn Burns  Marital Status:      Physical Exam:   LMP 03/06/2012    Wt Readings from Last 4 Encounters:   02/04/21 73.5 kg (162 lb)   08/04/20 74.4 kg (164 lb 1.6 oz)   03/16/20 72.1 kg (159 lb)   10/22/19 75.4 kg (166 lb 4.8 oz)     Constitutional: Well-developed, appearing stated age; cooperative.  Eyes: Normal EOM, PERRLA, vision, conjunctiva, sclera.  ENT: Normal external ears, nose, hearing, lips, teeth, gums, throat. No mucous membrane lesions, normal saliva pool.  Neck: No mass or thyroid enlargement.  Resp: Lungs clear to auscultation, nl to palpation.  CV: RRR, no murmurs, rubs or gallops, no edema.  GI: No ABD mass or tenderness, no HSM.  : Not  tested.  Lymph: No cervical, supraclavicular, inguinal or epitrochlear nodes.  MS: The TMJ, neck, shoulder, elbow, wrist, spine, hip, knee, ankle, and foot MTP/IP joints were examined and found normal. No altered joint anatomy. Full joint ROM.  She has some restriction in range of motion in the neck bilaterally primarily in lateral bending left worse than right, but also in flexion.    Her right hip exam is significantly impaired in both internal and external rotation while in flexion and in internal rotation with logrolling as well.    I cannot find any synovitis anywhere throughout her MCP MTP wrist or ankles, but she does have quite significant metatarsal head protrusion particularly in bilateral first metatarsal heads where she is quite tender as well and has a rarefied meta tarsal fat pad.  Skin: No nail pitting, alopecia, rash, nodules or lesions  Neuro: Normal cranial nerves, strength, sensation, DTRs.   Psych: Normal judgement, orientation, memory, affect.     Laboratory:   RHEUM RESULTS Latest Ref Rng & Units 9/11/2020 12/7/2020 2/5/2021   SED RATE 0 - 30 mm/h 12 - 9   CRP, INFLAMMATION 0.0 - 8.0 mg/L <2.9 - <2.9   CK TOTAL 30 - 225 U/L - - -   RHEUMATOID FACTOR <20 IU/mL - - -   DESEAN SCREEN BY EIA <1.0 - - -   AST 0 - 45 U/L 20 20 18   ALT 0 - 50 U/L 24 21 22   ALBUMIN 3.4 - 5.0 g/dL 3.7 3.5 3.6   WBC 4.0 - 11.0 10e9/L 5.7 7.6 6.2   RBC 3.8 - 5.2 10e12/L 4.45 4.30 4.41   HGB 11.7 - 15.7 g/dL 12.9 12.5 12.7   HCT 35.0 - 47.0 % 40.3 39.0 39.6   MCV 78 - 100 fl 91 91 90   MCHC 31.5 - 36.5 g/dL 32.0 32.1 32.1   RDW 10.0 - 15.0 % 14.7 14.2 14.8    - 450 10e9/L 251 294 287   CREATININE 0.52 - 1.04 mg/dL 0.84 0.80 0.78   GFR ESTIMATE, IF BLACK >60 mL/min/[1.73:m2] 86 >90 >90   GFR ESTIMATE >60 mL/min/[1.73:m2] 74 78 80    - 1,620 mg/dL - - -   IGA 70 - 380 mg/dL - - -   IGM 60 - 265 mg/dL - - -   HEPATITIS C ANTIBODY NR:Nonreactive - - -     Rheumatoid Factor   Date Value Ref Range Status    05/31/2017 <20 <20 IU/mL Final      Cyclic Citrullinated Peptide Antibody, IgG   Date Value Ref Range Status   05/31/2017 1 <7 U/mL Final     Comment:     Negative      Cyclic Cit Pept IgG/IgA   Date Value Ref Range Status   06/30/2011 <20  Interpretation:  Negative <20 UNITS Final     SSA (RO) Antibody IgG   Date Value Ref Range Status   06/30/2011 2  Final     Comment:     Reference range: 0 to 40  Unit: AU/mL  (Note)  REFERENCE INTERVALS: SSA (Ro) (BERTA) Ab, IgG   29 AU/mL or Less ............. Negative   30 - 40 AU/mL ................ Equivocal   41 AU/mL or Greater .......... Positive  SSA (Ro) antibody is seen in 70-75% of Sjogren syndrome  cases, 30-40% of systemic lupus erythematosus (SLE) and  5-10% of progressive systemic sclerosis (PSS).  Performed by [employer],  [address],[place] [id number] [phone]  [email or web address], Estella Huertas MD, Lab. Director     SSB (LA) Antibody IgG   Date Value Ref Range Status   06/30/2011 0  Final     Comment:     Reference range: 0 to 40  Unit: AU/mL  (Note)  REFERENCE INTERVALS: SSB (La) (BERTA) Ab, IgG   29 AU/mL or Less ............. Negative   30 - 40 AU/mL ................ Equivocal   41 AU/mL or Greater .......... Positive  SSB (La) antibody is seen in 50-60% of Sjogren syndrome  cases and is specific if it is the only BERTA antibody  present. 15-25% of patients with systemic lupus  erythematosus (SLE) and 5-10% of patients with progressive  systemic sclerosis (PSS) also have this antibody.  Performed by [facility],  [address] [facility],[place] [id number] [phone]  [email or web address], Estella Huertas MD, Lab. Director     DESEAN Screen by EIA   Date Value Ref Range Status   05/15/2014 <1.0  Interpretation:  Negative   <1.0 Final     Hepatitis B Core Emily   Date Value Ref Range Status   01/14/2019 Nonreactive NR^Nonreactive Final      Hep B Surface Agn   Date Value Ref Range Status   01/14/2019 Nonreactive NR^Nonreactive Final     Quantiferon-TB Gold Plus  Result   Date Value Ref Range Status   06/26/2019 Negative NEG^Negative Final     Comment:     No interferon gamma response to M.tuberculosis antigens was detected.   Infection with M.tuberculosis is unlikely, however a single negative result   does not exclude infection. In patients at high risk for infection, a second   test should be considered  in accordance with the 2017 ATS/IDSA/CDC Clinical Practice Guidelines for   Diagnosis of Tuberculosis in Adults and Children [Rangelinsaureliano JOHNSON et   al.Clin.Infect.Dis. 2017 64(2):111-115].       TB1 Ag minus Nil Value   Date Value Ref Range Status   06/26/2019 0.00 IU/mL Final     TB2 Ag minus Nil Value   Date Value Ref Range Status   06/26/2019 0.00 IU/mL Final     Mitogen minus Nil Result   Date Value Ref Range Status   06/26/2019 >10.00 IU/mL Final     Nil Result   Date Value Ref Range Status   06/26/2019 0.06 IU/mL Final     Albumin Fraction   Date Value Ref Range Status   05/07/2014 4.4 3.7 - 5.1 g/dL Final     Alpha 2 Fraction   Date Value Ref Range Status   05/07/2014 0.6 0.5 - 0.9 g/dL Final     Beta Fraction   Date Value Ref Range Status   05/07/2014 0.8 0.6 - 1.0 g/dL Final     Gamma Fraction   Date Value Ref Range Status   05/07/2014 1.1 0.7 - 1.6 g/dL Final     Monoclonal Peak   Date Value Ref Range Status   05/07/2014 0.0 0.0 g/dL Final     ELP Interpretation:   Date Value Ref Range Status   05/07/2014   Final    No monoclonal protein seen by capillarys electrophoresis, however a monoclonla   protein was seen in this sample by immunofixation which is a more sensitive   method for monoclonal dectection.  See immunofixation report on same sample.   Pathologic significance requires clinical correlation.  NORI Stauffer M.D.,   Ph.D., Pathologist       Immunofixation ELP   Date Value Ref Range Status   05/07/2014   Final    (Note)  Monoclonal IgG immunoglobulin of kappa light chain type.  Pathological significance requires clinical correlation.  JUAN PABLO Stauffer M.D.,  Ph.D., Pathologist         IGG   Date Value Ref Range Status   05/07/2014 1,060 695 - 1,620 mg/dL Final     IGA   Date Value Ref Range Status   05/07/2014 138 70 - 380 mg/dL Final     IGM   Date Value Ref Range Status   05/07/2014 123 60 - 265 mg/dL Final       J HALIMA Samayoa MD, PhD    Rheumatology       March 9, 2021 interval history:    The patient is seeing me today just a little over a month since she saw me previously.  This was the originally scheduled appointment but because she was doing poorly we saw her then.    She actually has now tapered her prednisone down to 5 mg a day with a weekly taper going from 15 to 10-7.5 and now to 5 mg daily.  She is much better after the taper though she is feeling a little worse particularly in her hips than she was at 10 mg daily.  She is able to walk about a half a mile now but is still limited by pain.    She is also been dose escalating methotrexate to 0.6 mg once weekly.  We did discuss with her that further dose escalation to 0.8 mg once weekly is probably reasonable based on her historical tolerance of the drug and good laboratory values.    Physical examination by video today shows her to be in no acute distress.  She has good range of motion in her hands.  The main remainder of the joint examination is deferred however.    Impression:    Per the problem list.  Plan:    I think given her overall magnitude of symptoms she is probably better off to stay on low-dose prednisone.    She will however try to further dose escalate methotrexate, and if feeling very good she can try cutting down to 2.5 mg daily prednisone.    I did discuss with her that she should also have a conversation with her orthopedic surgeon about what their feelings are about having her on prednisone as it comes closer to the time of surgery which they are tentatively planning on in the fall.    In the meantime she can see Miguel again in 2 to 3 months as needed sooner.    This video  visit commenced at 5:38 PM was completed at 5:49 PM.    ZAKIA Samayoa MD, PhD    Rheumatology

## 2021-03-10 DIAGNOSIS — M06.4 INFLAMMATORY POLYARTHROPATHY (H): ICD-10-CM

## 2021-03-12 RX ORDER — METHOTREXATE 25 MG/ML
INJECTION INTRA-ARTERIAL; INTRAMUSCULAR; INTRATHECAL; INTRAVENOUS
Refills: 2 | OUTPATIENT
Start: 2021-03-12

## 2021-03-15 DIAGNOSIS — R29.898 HAND DYSFUNCTION: ICD-10-CM

## 2021-03-15 DIAGNOSIS — L40.50 PSORIATIC ARTHRITIS (H): ICD-10-CM

## 2021-03-15 DIAGNOSIS — L40.9 PSORIASIS: ICD-10-CM

## 2021-03-15 RX ORDER — METHOTREXATE 10 MG/.2ML
10 INJECTION, SOLUTION SUBCUTANEOUS
Qty: 0.8 ML | Refills: 1 | Status: SHIPPED | OUTPATIENT
Start: 2021-03-15 | End: 2021-03-24 | Stop reason: ALTCHOICE

## 2021-03-15 NOTE — TELEPHONE ENCOUNTER
[facility] Call Center    Phone Message    May a detailed message be left on voicemail: yes     Reason for Call: Medication Refill Request  Patient is calling for a methotrexate refill.  Has the patient contacted the pharmacy for the refill? Yes     Action Taken: Other: UMP    Travel Screening: Not Applicable

## 2021-03-15 NOTE — TELEPHONE ENCOUNTER
methotrexate      Last Written Prescription Date:  1-  Last Fill Quantity: 0.8ml,   # refills: 1  Last Office Visit: 3-9-2021  Future Office visit:  none    CBC RESULTS:   Recent Labs   Lab Test 02/05/21  1322   WBC 6.2   RBC 4.41   HGB 12.7   HCT 39.6   MCV 90   MCH 28.8   MCHC 32.1   RDW 14.8          Creatinine   Date Value Ref Range Status   02/05/2021 0.78 0.52 - 1.04 mg/dL Final   ]    Liver Function Studies -   Recent Labs   Lab Test 02/05/21  1322 03/16/20  1739 03/16/20  1739   PROTTOTAL  --   --  7.6   ALBUMIN 3.6   < > 3.6   BILITOTAL  --   --  0.1*   ALKPHOS  --   --  90   AST 18   < > 33   ALT 22   < > 48    < > = values in this interval not displayed.       Routing refill request to provider for review/approval because:  Not on protocol.        Kathleen M Doege RN

## 2021-03-17 ENCOUNTER — TELEPHONE (OUTPATIENT)
Dept: RHEUMATOLOGY | Facility: CLINIC | Age: 66
End: 2021-03-17

## 2021-03-17 DIAGNOSIS — Z79.899 HIGH RISK MEDICATIONS (NOT ANTICOAGULANTS) LONG-TERM USE: ICD-10-CM

## 2021-03-17 DIAGNOSIS — L40.50 PSORIATIC ARTHRITIS (H): ICD-10-CM

## 2021-03-17 DIAGNOSIS — L40.9 PSORIASIS: ICD-10-CM

## 2021-03-17 DIAGNOSIS — R29.898 HAND DYSFUNCTION: ICD-10-CM

## 2021-03-17 NOTE — TELEPHONE ENCOUNTER
[facility] Call Center    Phone Message    May a detailed message be left on voicemail: yes     Reason for Call: Medication Question or concern regarding medication   Prescription Clarification  Name of Medication: Folic Acid  Prescribing Provider: Miguel Umana   Pharmacy:  [facility]   What on the order needs clarification? The pharmacy needs to know how much the patient should be taking. It states 1 a day but the patient is stating it should be 3 a vanessa.          Action Taken: Message routed to:  [facility] Rheum    Travel Screening: Not Applicable

## 2021-03-18 RX ORDER — FOLIC ACID 1 MG/1
3 TABLET ORAL DAILY
Qty: 270 TABLET | Refills: 3 | Status: SHIPPED | OUTPATIENT
Start: 2021-03-18 | End: 2021-07-02

## 2021-03-24 ENCOUNTER — TELEPHONE (OUTPATIENT)
Dept: RHEUMATOLOGY | Facility: CLINIC | Age: 66
End: 2021-03-24

## 2021-03-24 DIAGNOSIS — L40.9 PSORIASIS: ICD-10-CM

## 2021-03-24 DIAGNOSIS — R29.898 HAND DYSFUNCTION: ICD-10-CM

## 2021-03-24 DIAGNOSIS — L40.50 PSORIATIC ARTHRITIS (H): ICD-10-CM

## 2021-03-24 RX ORDER — METHOTREXATE 25 MG/ML
20 INJECTION, SOLUTION INTRA-ARTERIAL; INTRAMUSCULAR; INTRAVENOUS
Qty: 4 ML | Refills: 2 | Status: SHIPPED | OUTPATIENT
Start: 2021-03-24 | End: 2022-04-14

## 2021-03-24 NOTE — TELEPHONE ENCOUNTER
[facility] Call Center    Phone Message    May a detailed message be left on voicemail: yes     Reason for Call: [facility] would like to discuss pt's Methotrexate dosage. They would like to send it to pt tomorrow. [phone]    Action Taken: routed to [facility]    Travel Screening: NOT APPLICABLE

## 2021-03-24 NOTE — CONFIDENTIAL NOTE
[facility] pharmacy calling with questions regarding pt's methotrexate prescription.  Pt prefers to use a vial and draw up the methotrexate to give self due to the cost. Reviewed with Dr Samayoa, pt is to get 20 mg of methotrexate once every 7 days. Pharmacy has the PF 25 mg/ml vials.     Rx changed to this, ok per Dr Samayoa.    CLEMENT HaasN, RN  Rheumatology Care Coordinator  [facility]

## 2021-03-25 NOTE — TELEPHONE ENCOUNTER
This was addressed in order encounter yesterday.    CLEMENT HaasN, RN  Rheumatology Care Coordinator  [facility]

## 2021-04-05 ENCOUNTER — TELEPHONE (OUTPATIENT)
Dept: RHEUMATOLOGY | Facility: CLINIC | Age: 66
End: 2021-04-05

## 2021-04-05 ENCOUNTER — MYC MEDICAL ADVICE (OUTPATIENT)
Dept: RHEUMATOLOGY | Facility: CLINIC | Age: 66
End: 2021-04-05

## 2021-04-05 NOTE — TELEPHONE ENCOUNTER
Form Request Documentation    Type of form: Disability form - [employer]  Date form received: 4/5/2021  Date form is due: 4/14/2021  Provider: Dong  Status of form: in process of review     Susan Valle CMA   4/5/2021 11:56 AM

## 2021-04-12 DIAGNOSIS — M46.99 INFLAMMATORY SPONDYLOPATHY OF MULTIPLE SITES IN SPINE (H): ICD-10-CM

## 2021-04-12 DIAGNOSIS — M06.4 INFLAMMATORY POLYARTHROPATHY (H): ICD-10-CM

## 2021-04-12 DIAGNOSIS — L40.50 PSORIATIC ARTHRITIS (H): ICD-10-CM

## 2021-04-12 DIAGNOSIS — L40.9 PSORIASIS: ICD-10-CM

## 2021-04-14 RX ORDER — SULFASALAZINE 500 MG/1
1000 TABLET, DELAYED RELEASE ORAL 2 TIMES DAILY
Qty: 120 TABLET | Refills: 1 | Status: SHIPPED | OUTPATIENT
Start: 2021-04-14 | End: 2021-07-02

## 2021-04-14 NOTE — TELEPHONE ENCOUNTER
SULFASALAZIN E 500MG TBEC     TAKE 2 TABLETS (1,000 MG) BY MOUTH 2 TIMES DAILY LABS IN 1 MONTH  Last Written Prescription Date:  9-15-20  Last Fill Quantity: 180,   # refills: 1  Last Office Visit: 3-9-21  Future Office visit:  5-20-21    CBC RESULTS:   Recent Labs   Lab Test 02/05/21  1322   WBC 6.2   RBC 4.41   HGB 12.7   HCT 39.6   MCV 90   MCH 28.8   MCHC 32.1   RDW 14.8          Creatinine   Date Value Ref Range Status   02/05/2021 0.78 0.52 - 1.04 mg/dL Final   ]    Liver Function Studies -   Recent Labs   Lab Test 02/05/21  1322 03/16/20  1739 03/16/20  1739   PROTTOTAL  --   --  7.6   ALBUMIN 3.6   < > 3.6   BILITOTAL  --   --  0.1*   ALKPHOS  --   --  90   AST 18   < > 33   ALT 22   < > 48    < > = values in this interval not displayed.       Routing refill request to provider for review/approval because:  Per protocol

## 2021-04-29 NOTE — TELEPHONE ENCOUNTER
Disability forms have been faxed to:  [employer]    [phone] FAX      Jeane Cavazos MSN, RN  Rheumatology RN Care Coordinator  [facility]

## 2021-06-01 DIAGNOSIS — L40.50 PSORIATIC ARTHRITIS (H): ICD-10-CM

## 2021-06-01 DIAGNOSIS — L40.9 PSORIASIS: ICD-10-CM

## 2021-06-05 RX ORDER — ADALIMUMAB 40MG/0.4ML
KIT SUBCUTANEOUS
Qty: 2 EACH | Refills: 0 | Status: SHIPPED | OUTPATIENT
Start: 2021-06-05 | End: 2021-06-27

## 2021-06-07 ENCOUNTER — TELEPHONE (OUTPATIENT)
Dept: PULMONOLOGY | Facility: CLINIC | Age: 66
End: 2021-06-07

## 2021-06-07 DIAGNOSIS — M46.99 INFLAMMATORY SPONDYLOPATHY OF MULTIPLE SITES IN SPINE (H): Primary | ICD-10-CM

## 2021-06-08 NOTE — TELEPHONE ENCOUNTER
methotrexate sodium, pres-free, 50 MG/2ML SOLN injection      Last Written Prescription Date:  3-  Last Fill Quantity: 4ml,   # refills: 2  Last Office Visit: 3-9-2021  Future Office visit:  6-    CBC RESULTS:   Recent Labs   Lab Test 02/05/21  1322   WBC 6.2   RBC 4.41   HGB 12.7   HCT 39.6   MCV 90   MCH 28.8   MCHC 32.1   RDW 14.8          Creatinine   Date Value Ref Range Status   02/05/2021 0.78 0.52 - 1.04 mg/dL Final   ]    Liver Function Studies -   Recent Labs   Lab Test 02/05/21  1322 03/16/20  1739 03/16/20  1739   PROTTOTAL  --   --  7.6   ALBUMIN 3.6   < > 3.6   BILITOTAL  --   --  0.1*   ALKPHOS  --   --  90   AST 18   < > 33   ALT 22   < > 48    < > = values in this interval not displayed.       Routing refill request to provider for review/approval because:  Not on protocol.  Labs overdue - has appointment this month.    Kathleen M Doege RN

## 2021-06-09 RX ORDER — METHOTREXATE 25 MG/ML
25 INJECTION INTRA-ARTERIAL; INTRAMUSCULAR; INTRATHECAL; INTRAVENOUS
Qty: 4 ML | Refills: 0 | Status: SHIPPED | OUTPATIENT
Start: 2021-06-09 | End: 2021-07-02

## 2021-06-09 NOTE — TELEPHONE ENCOUNTER
Contacted pharmacy. Since pt is getting PF, they are sending her four 2 ml vials, so pt will be waiting 1 ml out of each vial. They had just wanted to let this clinic know that is how they are doing it. No changes needed at this time.    CLEMENT HaasN, RN  Rheumatology Care Coordinator  [facility]

## 2021-06-09 NOTE — TELEPHONE ENCOUNTER
[facility] Call Center    Phone Message    May a detailed message be left on voicemail: yes     Reason for Call: Medication Question or concern regarding medication   Prescription Clarification  Name of Medication: Methotrexate , pres-free  Prescribing Provider: Dr. Samayoa   Pharmacy:  [facility]   What on the order needs clarification? Prescription was sent to pharmacy with 4ml but the pharmacy is calling to notify Dr. Samayoa's team that they will be sending the Pt 8ml (4 doses) instead.    If there is any issues, feel free to call pharmacy back at  [phone]          Action Taken: Message routed to:  [facility] Adult Rheumatology    Travel Screening: Not Applicable

## 2021-06-24 DIAGNOSIS — L40.9 PSORIASIS: ICD-10-CM

## 2021-06-24 DIAGNOSIS — L40.50 PSORIATIC ARTHRITIS (H): ICD-10-CM

## 2021-06-24 LAB
ALBUMIN SERPL-MCNC: 4.2 G/DL
ALP SERPL-CCNC: 85 U/L
ALT SERPL W/O P-5'-P-CCNC: 19 IU/L
ANION GAP SERPL CALCULATED.3IONS-SCNC: ABNORMAL MMOL/L
AST SERPL-CCNC: 19 IU/L
BILIRUB SERPL-MCNC: <0.2 MG/DL
BILIRUBIN, DIRECT: 0.08 MG/DL (ref 0–0.5)
BUN SERPL-MCNC: 19 MG/DL
BUN/CREATININE RATIO: 26
CALCIUM SERPL-MCNC: 9.2 MG/DL
CHLORIDE SERPLBLD-SCNC: 102 MMOL/L
CO2 SERPL-SCNC: 30 MMOL/L
CREAT SERPL-MCNC: 0.72 MG/DL
GFR SERPL CREATININE-BSD FRML MDRD: 88 ML/MIN/1.73M2
GLUCOSE SERPL-MCNC: 68 MG/DL (ref 70–99)
POTASSIUM SERPL-SCNC: 5 MMOL/L
PROTEIN TOTAL (EXTERNAL): 6.7
SODIUM SERPL-SCNC: 138 MMOL/L

## 2021-06-27 RX ORDER — ADALIMUMAB 40MG/0.4ML
KIT SUBCUTANEOUS
Qty: 2 EACH | Refills: 0 | Status: SHIPPED | OUTPATIENT
Start: 2021-06-27 | End: 2021-07-22

## 2021-06-27 NOTE — TELEPHONE ENCOUNTER
"    adalimumab (HUMIRA *CF*) 40 MG/0.4ML prefilled syringe kit  Last Written Prescription Date:  6/5/21  Last Fill Quantity: 2ea,   # refills: 0  Last Office Visit : 3/9/21  Future Office visit:  none     \"Return 2-3 mo with Miguel., for video or in person\"   cancelled appt 6/24/21.    Scheduling has been notified to contact the pt for appointment.       Routing refill request to provider for review/approval because: appt cancelled 6/24/21.    "

## 2021-07-02 DIAGNOSIS — K29.60 GASTRITIS MEDICAMENTOSA: ICD-10-CM

## 2021-07-02 DIAGNOSIS — M46.99 INFLAMMATORY SPONDYLOPATHY OF MULTIPLE SITES IN SPINE (H): ICD-10-CM

## 2021-07-02 DIAGNOSIS — L40.50 PSORIATIC ARTHRITIS (H): ICD-10-CM

## 2021-07-02 DIAGNOSIS — L40.9 PSORIASIS: ICD-10-CM

## 2021-07-02 DIAGNOSIS — M06.4 INFLAMMATORY POLYARTHROPATHY (H): ICD-10-CM

## 2021-07-02 DIAGNOSIS — R29.898 HAND DYSFUNCTION: ICD-10-CM

## 2021-07-02 DIAGNOSIS — Z79.899 HIGH RISK MEDICATIONS (NOT ANTICOAGULANTS) LONG-TERM USE: ICD-10-CM

## 2021-07-02 RX ORDER — FOLIC ACID 1 MG/1
3 TABLET ORAL DAILY
Qty: 270 TABLET | Refills: 2 | Status: SHIPPED | OUTPATIENT
Start: 2021-07-02 | End: 2022-04-14

## 2021-07-02 NOTE — TELEPHONE ENCOUNTER
[facility] Call Center    Phone Message    May a detailed message be left on voicemail: yes     Reason for Call: Medication Refill Request    Has the patient contacted the pharmacy for the refill? Yes   Name of medication being requested: Methotrexate, folic acid, omeprazole, sulfasalazine 90 day supply   Provider who prescribed the medication: Dr Samayoa (Was with Miguel Umana)  Pharmacy: [facility]   Date medication is needed: 7/2/21     Action Taken: Message routed to:  [facility] Med Refill Team     Travel Screening: Not Applicable

## 2021-07-02 NOTE — TELEPHONE ENCOUNTER
Monitoring labs required every 8-12 weeks for medication refills.  methotrexate sodium, pres-free, 50 MG/2ML SOLN injection    Last Written Prescription Date:  6/9/21  Last Fill Quantity: 4 ml,   # refills: 0  Last Office Visit: 3/9/21  Future Office visit:  10/7/21    CBC RESULTS:   Recent Labs   Lab Test 02/05/21  1322   WBC 6.2   RBC 4.41   HGB 12.7   HCT 39.6   MCV 90   MCH 28.8   MCHC 32.1   RDW 14.8          Creatinine   Date Value Ref Range Status   06/22/2021 0.72 0.57 - 100 mg/dL Final   ]    Liver Function Studies -   Recent Labs   Lab Test 06/22/21  1138 02/05/21  1322 03/16/20 1739 03/16/20 1739   PROTTOTAL  --   --   --  7.6   ALBUMIN 4.2 3.6   < > 3.6   BILITOTAL <0.2  --   --  0.1*   ALKPHOS  --   --   --  90   AST  --  18   < > 33   ALT  --  22   < > 48   BILIDIRECT 0.08  --   --   --     < > = values in this interval not displayed.       Routing refill request to provider for review/approval because:  Drug not on rheumatology refill protocol   Overdue for CBC  Nothing more recent in care everywhere nor media  Standing orders in que under Miguel Umana      sulfaSALAzine ER (AZULFIDINE EN) 500 MG EC tablet      Last Written Prescription Date:  4/14/21  Last Fill Quantity: 20,   # refills: 1  Last Office Visit: 3/9/21  Future Office visit:  10/7/21    CBC RESULTS:   Recent Labs   Lab Test 02/05/21  1322   WBC 6.2   RBC 4.41   HGB 12.7   HCT 39.6   MCV 90   MCH 28.8   MCHC 32.1   RDW 14.8          Creatinine   Date Value Ref Range Status   06/22/2021 0.72 0.57 - 100 mg/dL Final   ]    Liver Function Studies -   Recent Labs   Lab Test 06/22/21  1138 02/05/21  1322 03/16/20  1739 03/16/20  1739   PROTTOTAL  --   --   --  7.6   ALBUMIN 4.2 3.6   < > 3.6   BILITOTAL <0.2  --   --  0.1*   ALKPHOS  --   --   --  90   AST  --  18   < > 33   ALT  --  22   < > 48   BILIDIRECT 0.08  --   --   --     < > = values in this interval not displayed.       Routing refill request to provider for  review/approval because:  Drug not on rheumatology refill protocol   Overdue for CBC  Nothing more recent in care everywhere nor media  Standing orders in que under Miguel Umana

## 2021-07-06 RX ORDER — SULFASALAZINE 500 MG/1
1000 TABLET, DELAYED RELEASE ORAL 2 TIMES DAILY
Qty: 120 TABLET | Refills: 3 | Status: SHIPPED | OUTPATIENT
Start: 2021-07-06 | End: 2021-10-19

## 2021-07-06 RX ORDER — METHOTREXATE 25 MG/ML
25 INJECTION INTRA-ARTERIAL; INTRAMUSCULAR; INTRATHECAL; INTRAVENOUS
Qty: 4 ML | Refills: 4 | Status: SHIPPED | OUTPATIENT
Start: 2021-07-06 | End: 2022-02-17

## 2021-07-11 ENCOUNTER — HEALTH MAINTENANCE LETTER (OUTPATIENT)
Age: 66
End: 2021-07-11

## 2021-07-20 DIAGNOSIS — L40.9 PSORIASIS: ICD-10-CM

## 2021-07-20 DIAGNOSIS — L40.50 PSORIATIC ARTHRITIS (H): ICD-10-CM

## 2021-07-22 RX ORDER — ADALIMUMAB 40MG/0.4ML
40 KIT SUBCUTANEOUS
Qty: 2 EACH | Refills: 2 | Status: SHIPPED | OUTPATIENT
Start: 2021-07-22 | End: 2021-12-02

## 2021-07-22 NOTE — TELEPHONE ENCOUNTER
HUMIRA *CF* 40MG/0.4ML SYR  Last Written Prescription Date:  *6/27/2021  Last Fill Quantity: 2,   # refills: 0  Last Office Visit : 3/9/2021  Future Office visit:  10/7/2021  2 each, 2 Refills sent to pharm 7/22/2021      Kayley Go RN  Central Triage Red Flags/Med Refills

## 2021-09-05 ENCOUNTER — HEALTH MAINTENANCE LETTER (OUTPATIENT)
Age: 66
End: 2021-09-05

## 2021-10-04 ASSESSMENT — ENCOUNTER SYMPTOMS
NUMBNESS: 0
NECK PAIN: 1
TREMORS: 0
EYE WATERING: 0
SEIZURES: 0
MEMORY LOSS: 0
MUSCLE CRAMPS: 1
DIZZINESS: 0
EYE IRRITATION: 1
JOINT SWELLING: 1
STIFFNESS: 1
NAIL CHANGES: 1
PARALYSIS: 0
MUSCLE WEAKNESS: 0
BACK PAIN: 1
EYE PAIN: 1
DOUBLE VISION: 0
SKIN CHANGES: 0
WEAKNESS: 0
MYALGIAS: 1
POOR WOUND HEALING: 1
ARTHRALGIAS: 1
SPEECH CHANGE: 0
EYE REDNESS: 0
HEADACHES: 0
DISTURBANCES IN COORDINATION: 0
LOSS OF CONSCIOUSNESS: 0
TINGLING: 0

## 2021-10-07 ENCOUNTER — OFFICE VISIT (OUTPATIENT)
Dept: PULMONOLOGY | Facility: CLINIC | Age: 66
End: 2021-10-07
Attending: INTERNAL MEDICINE
Payer: COMMERCIAL

## 2021-10-07 VITALS
BODY MASS INDEX: 25.39 KG/M2 | WEIGHT: 158 LBS | SYSTOLIC BLOOD PRESSURE: 124 MMHG | OXYGEN SATURATION: 97 % | HEIGHT: 66 IN | DIASTOLIC BLOOD PRESSURE: 67 MMHG | HEART RATE: 82 BPM

## 2021-10-07 DIAGNOSIS — M25.559 PAIN IN JOINT, PELVIC REGION AND THIGH, UNSPECIFIED LATERALITY: ICD-10-CM

## 2021-10-07 DIAGNOSIS — M46.99 INFLAMMATORY SPONDYLOPATHY OF MULTIPLE SITES IN SPINE (H): Primary | ICD-10-CM

## 2021-10-07 DIAGNOSIS — Z79.631 METHOTREXATE, LONG TERM, CURRENT USE: ICD-10-CM

## 2021-10-07 DIAGNOSIS — Z79.620 ADALIMUMAB (HUMIRA) LONG-TERM USE: ICD-10-CM

## 2021-10-07 DIAGNOSIS — Z79.899 HIGH RISK MEDICATION USE: ICD-10-CM

## 2021-10-07 DIAGNOSIS — L40.50 PSORIATIC ARTHRITIS (H): ICD-10-CM

## 2021-10-07 PROCEDURE — G0463 HOSPITAL OUTPT CLINIC VISIT: HCPCS | Mod: 25

## 2021-10-07 PROCEDURE — 99214 OFFICE O/P EST MOD 30 MIN: CPT | Mod: GC | Performed by: INTERNAL MEDICINE

## 2021-10-07 RX ORDER — NEEDLES, DISPOSABLE 25GX5/8"
NEEDLE, DISPOSABLE MISCELLANEOUS
Qty: 50 EACH | Refills: 1 | Status: SHIPPED | OUTPATIENT
Start: 2021-10-07 | End: 2022-12-22

## 2021-10-07 ASSESSMENT — MIFFLIN-ST. JEOR: SCORE: 1270.49

## 2021-10-07 NOTE — LETTER
10/7/2021         RE: Krissy Weldon  [id number] [facility] [address]        Dear Colleague,    Thank you for referring your patient, Krissy Weldon, to the [facility] FOR LUNG SCIENCE AND [facility]. Please see a copy of my visit note below.    [facility] - Rheumatology Clinic Visit    Krissy Weldon  is a 66 year old here for follow-up.    HPI:  Krissy Weldon is a 66 year old female who presents for follow-up of undifferentiated polyarthropathy, psoriatic arthritis. Complicated by eye inflammation (uveitis and scleritis--sees [facility]), tendonitis, costochondritis, tendon tears, synovitis, dactylitis, OA/DJD jessica thumbs CMC, now toe contractures and laxity all hand/wrist joints, bowel changes (sister with Crohn's and dad with inflammatory bowel), R hip OA s/p R hip replacement (9/1/21).    She was last seen on 3/9/21 and was recommended to taper off prednisone while continuing on methotrexate, SSZ and Humira. In the interim, she had R hip replacement on 9/1/21 without infectious, healing, rehabilitation complications thus far. She reports that the pain is far improved and that she continues to work with PT. Currently, is able to walk ~1.5 miles/day.    She notes that she held Humira about mid July and has continued to hold it until today to make sure that Rheumatology was ok resuming it after her hip surgery. She has continued her methotrexate 20mg weekly on fridays and SSZ for 1 gr/day (she reduced from 1 gr bid due to GI discomfort) and has not noted side effects from these. She notes that now feels that she misses the effect of Humira as she has developed worsened arthralgias especially in bilateral feet, hands, low back with noted swelling, redness, warmth of MCPs, ankles associated with morning stiffness that would last at least 2 hours and may occasionally not entirely resolve throughout the day.    Also reported L eye irritation for the past  month as she reports having a scratchy sensation, but no pain, no changes in vision or pain to extraocular movements. She has been in contact with her eye clinic and is currently applying topical prednisone. Otherwise, notes presence of occasional mouth ulcers and noted a new one today and she is adherent to daily folic acid and will add 1 mg/day to her daily 2mg/day when she develops oral ulcers. Denies new skin rashes, muscle weakness, shortness of breath, cough, chest pain, changes in urination or other symptoms. She has received two doses of COVID vaccine (3/24-4/11/21).      10/22/2019 interval history:   Krissy Weldon is a 64 year old female who presents for follow-up of undifferentiated polyarthropathy, psoriatic arthritis. Complicated by eye inflammation (uveitis and scleritis--sees [facility]), tendonitis, costochondritis, tendon tears, synovitis, dactylitis, OA/DJD jessica thumbs CMC, now toe contractures and laxity all hand/wrist joints, bowel changes (sister with Crohn's and dad with inflammatory bowel). The patient was last evaluated by Miguel Umana on 06/26/2019, at which time there was concern for worsening tendons and increased bowel symptoms despite continuation of secukinumab injections 150 mg every 30 days. A discussion was had about changing to Enbrel 50 mg injections every week starting 30 days after her last injection with plans to stop Cosentyx.     Today, the patient reports that she has symptom exacerbation after around 10 days following her Humira injections. She states that she has been using prednisone off-and-on for these four days leading up to her next injection. She adds that she can live with this, but, as her wife had a double lung transplant, she has been care taking for her and needs to remain active for this. She notes that her last DEXA scan was completed around 5-10 years ago. She states that she continues methotrexate injections 20 mg weekly with 3 mg folic acid daily. She says  "that Dr. Conti recommended a liver scan due to long-term methotrexate use during their last visit, and she did not complete this as her wife has been very sick.     She states that her hands and feet have been swelling, and she has obtained larger shoes to treat this. She reports that she also has had trouble extending her fingers. She also reports psoriatic skin changes on her scalp, arms, and buttocks, but she explains that these have remained mostly unchanged.      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 She states that she could barely walk before this therapy, she did not start steroids following the procedure, and she had relief for around one year.     12/9/2020 (Miguel visit):   Seen Dr. Samayoa in Sept-titrated sulfasalazine to 1 GM BID, right leg numb after Covid-19 (coronavirus) was doing Physical Therapy with the pain clinic for right labrum tear.      Denies any fever, chills, SOB, SANCHEZ, night sweats, or chest pain, high fever, cough, travel in last 14 days or exposure to covid-19 (coronavirus). Reports healthy. Follows CDC guidelines.      No inflammatory eye. Bilateral 2nd MCP swelling red, some in bilateral 3rd MCP and end DIPs, nails splitting. Contractures the same, developed when off arthritis medications during her Covid-19 (coronavirus). This has not improved. Injections in back, right labrum tear very painful.   TSH 1.09 Free 1.13 now on higher synthyoid for 3 weeks-better. No more numb and tingling. Eats well. Bowels are good.  Psoriasis scalp and forehead     Adalimumab (humira) 40 mg injection every 14 days (feels this works but wears off after 10 days \"my joints feel lubricated\", \"amazing\" even sternal pain goes away), off methotrexate since Sept no longer having GI issues like nausea, diarrhea. Dr. Samayoa concerned as her albumin was lower, still is on " sulfasalazine . Sulfasalazine  mg takes with daily omeprazole. Tolerating      2/4/2021 interval history:     Since I last saw the patient Miguel has seen her back.  Her most recent note is above.  However the patient has had increasing problems with right hip pain over several months.  Because of this she had an injection in the pain clinic on December 17 and although she got a temporary response to the lidocaine, she had virtually no response longer-term to the steroid.     Because of that she recently saw Dr. Montesinos in [place].  He reviewed things and did an MRI of her hip which I do not have available to review.  He did feel that she was a candidate for hip replacement.  She saw him in particular because she would like to consider having an anterior approach done and he specializes in this.     He did ask her to consult with us about what she would want to do with her medicines with respect to a hip surgery.     She is up to a more therapeutic dose on sulfasalazine at 1 g twice daily and she feels that that has been quite helpful globally outside of the hip.  It helps her overall spine pain and stiffness including throughout her neck and lower back.  She feels much more mobile there.  She has less pain globally in the small joints of the hands and feet as well although her feet are still a problem particularly with metatarsal pain.  She does have about an hour of morning stiffness.     As noted above in the prior notes that she feels that Humira is quite helpful but over the last year, it starts to wear off around day 10 of the injection.  As noted in Miguel's note above she has had trouble tolerating oral methotrexate,  So we just recently set up injectable methotrexate for her.  She has however not started using it yet.  It was prescribed a dose of 10 mg once weekly.     She is also wondering if she could at least temporarily use prednisone which is always been helpful for her.  [place]ing but that has been definitely painful for her recently and she does not feel she can do it as she is right now.  This is primarily because of the hip problems however.    3/9/2021 interval history:  The patient is seeing me today just a little over a month since she saw me previously.  This was the originally scheduled appointment but because she was doing poorly we saw her then.     She actually has now tapered her prednisone down to 5 mg a day with a weekly taper going from 15 to 10-7.5 and now to 5 mg daily.  She is much better after the taper though she is feeling a little worse particularly in her hips than she was at 10 mg daily.  She is able to walk about a half a mile now but is still limited by pain.     She is also been dose escalating methotrexate to 0.6 mg once weekly.  We did discuss with her that further dose escalation to 0.8 mg once weekly is probably reasonable based on her historical tolerance of the drug and good laboratory values.     Physical examination by video today shows her to be in no acute distress.  She has good range of motion in her hands.  The main remainder of the joint examination is deferred however.    Background History:   (-SSa, -SSb, -CCP, HLA-B27 negative on outside workup with previous SI injections) with hx- inflammatory eye left eye episcleritis requiring prednisone gtt or oral with bone scan unrevealing. Previous medrol or steroid responsiveness. Past tried Humira, SSZ, Simponi SQ/IV, remicade and Otezla. Failed Humira EoW recurrent episcleritis, right wrist, right SI, bilateral hamstrings tendinosis with partial tear bilaterally. Failed Simponi sq/IV ineffective. SSZ and oral MTX. Past recurrent iritis (ER if eye pain, blurred vision, red eye). Remicade. Otezla. LLL pneumonia 3/2015. MRI L hip showed high grade tear gluteus minimus insertion site, effusion 4/2015. C/o neck issues with MRI cervical spine show DJD, EMG, paramedian  osteophytes and disk protrusion at C3-C4 with some moderate central canal stenosis and moderate to severe right-sided foraminal stenosis as a consequence. C5-C6 also showed some mild to moderate central canal stenosis and moderate bilateral central foraminal stenosis. Seen Dr. Wheeler with significant improvement in GI issues pancreatic sphincter dysfunction requiring surgery now on pancreatic enzymes after pancreatic duct is open. Failed certolizumab pegol (Cimzia) lost effectiveness (more uveitis, arthritis, synovitis, bursitis and tendonitis, strept and CAP). Secukinumab (cosentyx) 2-2019 more laxity in joints hands/wrists, inflammatory eye, toe contractures, changes in skin stopped then changed to etanercept (Enbrel) 6-. Switched to adalimumab (Humira) and switched from PO to subcutaneous methotrexate and SSZ during 2021. Had R hip replacement and held Humira from mid July to 10/7/21 with worsening eye and articular symptoms.        PMH:  Past Medical History:   Diagnosis Date     Acute meniscal tear of knee 1/2014    with chrondromalacia per MRI      Depression      DJD (degenerative joint disease), lumbar 7/2012    L4-5, L5-S1 per MRI      Episcleritis 12/14/2011     Hamstring tendonitis at origin 7/2012    Bilaterally with partial tear right, sacroilitis per MRI     Hypothyroid 9/7/2011     Hypothyroidism      PONV (postoperative nausea and vomiting)      Psoriatic arthritis (H) 9/7/2011     Psoriatic arthritis (H) 2/9/2016     Strep throat 04/2017     Patient Active Problem List    Diagnosis Date Noted     Neck pain, chronic 03/25/2019     Priority: Medium     Inflammatory spondylopathy of multiple sites in spine (H) 02/28/2018     Priority: Medium     Other chronic pain 09/10/2017     Priority: Medium     Psoriatic arthritis (HCC) 02/09/2016     Priority: Medium     Small bowel obstruction (H) 09/23/2015     Priority: Medium     Nausea with vomiting 06/02/2014     Priority: Medium     RUQ abdominal  pain 04/11/2014     Priority: Medium     Elevated LFTs 04/11/2014     Priority: Medium     RUQ pain 04/11/2014     Priority: Medium     CMC DJD(carpometacarpal degenerative joint disease), localized primary 10/18/2013     Priority: Medium     Costochondritis, acute 07/25/2012     Priority: Medium     High risk medications (not anticoagulants) long-term use 10/31/2011     Priority: Medium     Hypothyroid 09/07/2011     Priority: Medium     Surgical:  Past Surgical History:   Procedure Laterality Date     APPENDECTOMY       ARTHROPLASTY CARPOMETACARPAL (THUMB JOINT)  11/8/2013    Procedure: ARTHROPLASTY CARPOMETACARPAL (THUMB JOINT);  Left First Carpometacarpal Trapezium Resection, Tendon Interposition  ;  Surgeon: Luiza Farrar MD;  Location:  OR     ARTHROPLASTY CARPOMETACARPAL (THUMB JOINT)  12/20/2013    Procedure: ARTHROPLASTY CARPOMETACARPAL (THUMB JOINT);  Right Thumb Trapezium Resection With Flexor Carpi Radialis Tendon Reconstruction       BLEPHAROPLASTY BILATERAL Bilateral 8/4/2020    Procedure: BILATERAL UPPER LID BLEPHAROPLASTY AND;  Surgeon: Xuan Back MD;  Location:  OR     CHOLECYSTECTOMY       COLONOSCOPY  5/6/2014    Procedure: COLONOSCOPY;  Surgeon: Adria San MD;  Location:  GI     ENDOSCOPIC RETROGRADE CHOLANGIOPANCREATOGRAM  6/13/2014    Procedure: ENDOSCOPIC RETROGRADE CHOLANGIOPANCREATOGRAM;  Surgeon: Lianna Wheeler MD;  Location: [facility] OR     GALLBLADDER SURGERY       GYN SURGERY      hysterectomy and oophorectomy     [facility]GI ENDOSCOPY W EUS Left 6/10/2014    Procedure: COMBINED ENDOSCOPIC ULTRASOUND, ESOPHAGOSCOPY, GASTROSCOPY, DUODENOSCOPY (EGD);  Surgeon: Lianna Wheeler MD;  Location:  GI     HYSTERECTOMY       REPAIR PTOSIS BROW BILATERAL Bilateral 8/4/2020    Procedure: BILATERAL BROW PTOSIS;  Surgeon: Xuan Back MD;  Location:  OR     Right thumb surgery  7/2015     XR SACROILIAC THERAPEUTIC INJECTION BILATERAL  12/2011  "      FH:  Family History   Problem Relation Age of Onset     Arthritis Father         Psoriatic, psoriasis, lymphoma, colon and prostate (prostate primary site)     Cancer Father         Prostate     Arthritis Sister         Rheumatoid     Arthritis Sister         OA, crohns     Arthritis Mother         RA     Cancer Mother         Endometrial     Arthritis Maternal Grandmother         RA       SH:  Social History     Tobacco Use     Smoking status: Never Smoker     Smokeless tobacco: Never Used   Substance Use Topics     Alcohol use: No     Drug use: No        Medications:  Current Outpatient Medications   Medication Instructions     ammonium lactate (LAC-HYDRIN) 12 % external lotion Topical, 2 TIMES DAILY     buprenorphine (BUTRANS) 15 MCG/HR Wk patch Apply 1 patch to skin every week for 28 days. Indications: Moderate to Severe Chronic Pain.     buPROPion (WELLBUTRIN XL) 150 MG 24 hr tablet No dose, route, or frequency recorded.     FLUoxetine (PROZAC) 40 mg, DAILY     folic acid (FOLVITE) 3 mg, Oral, DAILY     HUMIRA *CF* 40 mg, Subcutaneous, EVERY 14 DAYS, (Please keep visit for Oct 7, 2021 for further refills) Thank you     levothyroxine (SYNTHROID/LEVOTHROID) 125 mcg, Oral, DAILY     methotrexate sodium (pres-free) 25 mg, Subcutaneous, EVERY 7 DAYS     methotrexate 20 mg, Subcutaneous, EVERY 7 DAYS, Labs monitoring required every 8-12 weeks for refills     multivitamin, therapeutic (THERA-VIT) TABS 1 tablet, DAILY     Needle, Disp, (BD DISP NEEDLES) 25G X 5/8\" MISC USE to administer  METHOTREXATE UNDER THE SKIN EVERY 7 DAYS     omeprazole (PRILOSEC) 20 mg, Oral, 2 TIMES DAILY     predniSONE (DELTASONE) 15 mg, Oral, DAILY     sennosides (SENOKOT) 8.6 MG tablet 1 tablet, Oral, DAILY     sulfaSALAzine ER (AZULFIDINE EN) 1,000 mg, Oral, 2 TIMES DAILY     ROS:  Otherwise 14 point ROS obtained, reviewed and found negative as otherwise mentioned in HPI.    Answers for HPI/ROS submitted by the patient on " "10/4/2021  General Symptoms: No  Skin Symptoms: Yes  HENT Symptoms: No  EYE SYMPTOMS: Yes  HEART SYMPTOMS: No  LUNG SYMPTOMS: No  INTESTINAL SYMPTOMS: No  URINARY SYMPTOMS: No  GYNECOLOGIC SYMPTOMS: No  BREAST SYMPTOMS: No  SKELETAL SYMPTOMS: Yes  BLOOD SYMPTOMS: No  NERVOUS SYSTEM SYMPTOMS: Yes  MENTAL HEALTH SYMPTOMS: No  Changes in hair: No  Changes in moles/birth marks: No  Itching: No  Rashes: Yes  Changes in nails: Yes  Acne: No  Hair in places you don't want it: No  Change in facial hair: No  Warts: No  Non-healing sores: Yes  Scarring: No  Flaking of skin: Yes  Color changes of hands/feet in cold : No  Sun sensitivity: No  Skin thickening: No  Eye pain: Yes  Vision loss: No  Dry eyes: Yes  Watery eyes: No  Eye bulging: No  Double vision: No  Flashing of lights: No  Spots: No  Floaters: Yes  Redness: No  Crossed eyes: No  Tunnel Vision: No  Yellowing of eyes: No  Eye irritation: Yes  Back pain: Yes  Muscle aches: Yes  Neck pain: Yes  Swollen joints: Yes  Joint pain: Yes  Bone pain: Yes  Muscle cramps: Yes  Muscle weakness: No  Joint stiffness: Yes  Bone fracture: No  Trouble with coordination: No  Dizziness or trouble with balance: No  Fainting or black-out spells: No  Memory loss: No  Headache: No  Seizures: No  Speech problems: No  Tingling: No  Tremor: No  Weakness: No  Difficulty walking: No  Paralysis: No  Numbness: No      Physical exam:  Vitals: Blood pressure 124/67, pulse 82, height 1.664 m (5' 5.5\"), weight 71.7 kg (158 lb), last menstrual period 03/06/2012, SpO2 97 %, not currently breastfeeding.  Wt Readings from Last 4 Encounters:   10/07/21 71.7 kg (158 lb)   02/04/21 73.5 kg (162 lb)   08/04/20 74.4 kg (164 lb 1.6 oz)   03/16/20 72.1 kg (159 lb)     -General: Alert, NAD, answers to questions appropriately.  -HEENT: NC/AT, EOMI, non-icteric sclerae, normochromic conjunctivae. Moist mucous membranes, no oropharyngeal erythema.  -Neck: No masses, no LAD, supple.  -CV: RRR, no murmurs, rubs or " gallops. No JVD.  -Resp: CTAB, no crackles, rhonchi or wheezing.  -Abdomen: Soft, nontender, nondistended.  -Extremities: No leg edema. DP 2+ bilaterally.  -Neuro: No gross motor or sensory deficit.  -Psych: Oriented x3.  -Skin: No nail pitting, alopecia, rash, nodules or lesions.  -MS: The TMJ, neck, shoulder, elbow, wrist, spine, hip, knee, ankle, and foot MTP/IP joints were examined and found normal. No altered joint anatomy other than hammertoes in bilateral feet and subluxation of 2nd-3rd MCPs bilaterally. Full joint ROM.  She has some restriction in range of motion in the neck bilaterally primarily in lateral bending left worse than right, but not in flexion.  -Lymph: no cervical or epitrochlear nodes    Labs/Imaging:  No results found for any visits on 10/07/21.    Problem List:  1. Psoriatic arthritis w/axial involvement activity with history episcleritis, tendonitis, left hamstring tear and uveitis  2. Polyarthropathy    3. Chronic pain management patient   4. Cervical DJD 2/2 underlying inflammatory arthritis, psoriatic   5. Bilateral foot pain   6. R hip OA s/p R hip replacement (9/1/21)    The constellation of articular symptoms, morning stiffness, and eye symptoms suggests active psoriatic arthritis with extraarticular manifestations in the setting of holding anti TNF therapy. In light of absence of infectious/healing complications, we recommend she resumes Humira as soon as today and we reinforced that there is no need to postpone her Humira even if receiving COVID 3rd shot today, which we recommended and also discussed about postponing influenza vaccination for a month at least. She should continue methotrexate and sulfasalazine. She had monitoring labs in 9/1/21 including CBC and BMP, but no LFTs. We will plan to obtain those in 1 month. If her eye symptoms worsen should immediately go to the ER and contact her eye providers.    RTC in 6 months.    Case was discussed with supervising staff attending,  Dr. Samayoa.     Cliff Thomas  Internal Medicine Resident PG-Y3    I saw the patient with the resident.  My exam and recomendations are as described.      Jensen Samayoa MD

## 2021-10-07 NOTE — NURSING NOTE
Chief Complaint   Patient presents with     Follow Up     6 month follow up      Vitals were taken and medications were reconciled.     Jerilyn Bond RMA  9:14 AM

## 2021-10-07 NOTE — PROGRESS NOTES
[facility] - Rheumatology Clinic Visit    Krissy Weldon  is a 66 year old here for follow-up.    HPI:  Krissy Weldon is a 66 year old female who presents for follow-up of undifferentiated polyarthropathy, psoriatic arthritis. Complicated by eye inflammation (uveitis and scleritis--sees [facility]), tendonitis, costochondritis, tendon tears, synovitis, dactylitis, OA/DJD jessica thumbs CMC, now toe contractures and laxity all hand/wrist joints, bowel changes (sister with Crohn's and dad with inflammatory bowel), R hip OA s/p R hip replacement (9/1/21).    She was last seen on 3/9/21 and was recommended to taper off prednisone while continuing on methotrexate, SSZ and Humira. In the interim, she had R hip replacement on 9/1/21 without infectious, healing, rehabilitation complications thus far. She reports that the pain is far improved and that she continues to work with PT. Currently, is able to walk ~1.5 miles/day.    She notes that she held Humira about mid July and has continued to hold it until today to make sure that Rheumatology was ok resuming it after her hip surgery. She has continued her methotrexate 20mg weekly on fridays and SSZ for 1 gr/day (she reduced from 1 gr bid due to GI discomfort) and has not noted side effects from these. She notes that now feels that she misses the effect of Humira as she has developed worsened arthralgias especially in bilateral feet, hands, low back with noted swelling, redness, warmth of MCPs, ankles associated with morning stiffness that would last at least 2 hours and may occasionally not entirely resolve throughout the day.    Also reported L eye irritation for the past month as she reports having a scratchy sensation, but no pain, no changes in vision or pain to extraocular movements. She has been in contact with her eye clinic and is currently applying topical prednisone. Otherwise, notes presence of occasional mouth ulcers and noted a new one today and  she is adherent to daily folic acid and will add 1 mg/day to her daily 2mg/day when she develops oral ulcers. Denies new skin rashes, muscle weakness, shortness of breath, cough, chest pain, changes in urination or other symptoms. She has received two doses of COVID vaccine (3/24-4/11/21).      10/22/2019 interval history:   Krissy Weldon is a 64 year old female who presents for follow-up of undifferentiated polyarthropathy, psoriatic arthritis. Complicated by eye inflammation (uveitis and scleritis--sees [facility]), tendonitis, costochondritis, tendon tears, synovitis, dactylitis, OA/DJD jessica thumbs CMC, now toe contractures and laxity all hand/wrist joints, bowel changes (sister with Crohn's and dad with inflammatory bowel). The patient was last evaluated by Miguel Umana on 06/26/2019, at which time there was concern for worsening tendons and increased bowel symptoms despite continuation of secukinumab injections 150 mg every 30 days. A discussion was had about changing to Enbrel 50 mg injections every week starting 30 days after her last injection with plans to stop Cosentyx.     Today, the patient reports that she has symptom exacerbation after around 10 days following her Humira injections. She states that she has been using prednisone off-and-on for these four days leading up to her next injection. She adds that she can live with this, but, as her wife had a double lung transplant, she has been care taking for her and needs to remain active for this. She notes that her last DEXA scan was completed around 5-10 years ago. She states that she continues methotrexate injections 20 mg weekly with 3 mg folic acid daily. She says that Dr. Conti recommended a liver scan due to long-term methotrexate use during their last visit, and she did not complete this as her wife has been very sick.     She states that her hands and feet have been swelling, and she has obtained larger shoes to treat this. She reports that she  "also has had trouble extending her fingers. She also reports psoriatic skin changes on her scalp, arms, and buttocks, but she explains that these have remained mostly unchanged.      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 She states that she could barely walk before this therapy, she did not start steroids following the procedure, and she had relief for around one year.     12/9/2020 (Miguel visit):   Seen Dr. Samayoa in Sept-titrated sulfasalazine to 1 GM BID, right leg numb after Covid-19 (coronavirus) was doing Physical Therapy with the pain clinic for right labrum tear.      Denies any fever, chills, SOB, SANCHEZ, night sweats, or chest pain, high fever, cough, travel in last 14 days or exposure to covid-19 (coronavirus). Reports healthy. Follows CDC guidelines.      No inflammatory eye. Bilateral 2nd MCP swelling red, some in bilateral 3rd MCP and end DIPs, nails splitting. Contractures the same, developed when off arthritis medications during her Covid-19 (coronavirus). This has not improved. Injections in back, right labrum tear very painful.   TSH 1.09 Free 1.13 now on higher synthyoid for 3 weeks-better. No more numb and tingling. Eats well. Bowels are good.  Psoriasis scalp and forehead     Adalimumab (humira) 40 mg injection every 14 days (feels this works but wears off after 10 days \"my joints feel lubricated\", \"amazing\" even sternal pain goes away), off methotrexate since Sept no longer having GI issues like nausea, diarrhea. Dr. Samayoa concerned as her albumin was lower, still is on sulfasalazine . Sulfasalazine  mg takes with daily omeprazole. Tolerating      2/4/2021 interval history:     Since I last saw the patient Miguel has seen her back.  Her most recent note is above.  However the patient has had increasing problems with right hip pain over several months.  Because of this " she had an injection in the pain clinic on December 17 and although she got a temporary response to the lidocaine, she had virtually no response longer-term to the steroid.     Because of that she recently saw Dr. Montesinos in [place].  He reviewed things and did an MRI of her hip which I do not have available to review.  He did feel that she was a candidate for hip replacement.  She saw him in particular because she would like to consider having an anterior approach done and he specializes in this.     He did ask her to consult with us about what she would want to do with her medicines with respect to a hip surgery.     She is up to a more therapeutic dose on sulfasalazine at 1 g twice daily and she feels that that has been quite helpful globally outside of the hip.  It helps her overall spine pain and stiffness including throughout her neck and lower back.  She feels much more mobile there.  She has less pain globally in the small joints of the hands and feet as well although her feet are still a problem particularly with metatarsal pain.  She does have about an hour of morning stiffness.     As noted above in the prior notes that she feels that Humira is quite helpful but over the last year, it starts to wear off around day 10 of the injection.  As noted in Miguel's note above she has had trouble tolerating oral methotrexate,  So we just recently set up injectable methotrexate for her.  She has however not started using it yet.  It was prescribed a dose of 10 mg once weekly.     She is also wondering if she could at least temporarily use prednisone which is always been helpful for her.  [place] and [place] like to go [facility] but that has been definitely painful for her recently and she does not feel she can do it as she is right now.  This is primarily because of the hip problems however.    3/9/2021 interval history:  The patient is seeing me today just a little over a month since she  saw me previously.  This was the originally scheduled appointment but because she was doing poorly we saw her then.     She actually has now tapered her prednisone down to 5 mg a day with a weekly taper going from 15 to 10-7.5 and now to 5 mg daily.  She is much better after the taper though she is feeling a little worse particularly in her hips than she was at 10 mg daily.  She is able to walk about a half a mile now but is still limited by pain.     She is also been dose escalating methotrexate to 0.6 mg once weekly.  We did discuss with her that further dose escalation to 0.8 mg once weekly is probably reasonable based on her historical tolerance of the drug and good laboratory values.     Physical examination by video today shows her to be in no acute distress.  She has good range of motion in her hands.  The main remainder of the joint examination is deferred however.    Background History:   (-SSa, -SSb, -CCP, HLA-B27 negative on outside workup with previous SI injections) with hx- inflammatory eye left eye episcleritis requiring prednisone gtt or oral with bone scan unrevealing. Previous medrol or steroid responsiveness. Past tried Humira, SSZ, Simponi SQ/IV, remicade and Otezla. Failed Humira EoW recurrent episcleritis, right wrist, right SI, bilateral hamstrings tendinosis with partial tear bilaterally. Failed Simponi sq/IV ineffective. SSZ and oral MTX. Past recurrent iritis (ER if eye pain, blurred vision, red eye). Remicade. Otezla. LLL pneumonia 3/2015. MRI L hip showed high grade tear gluteus minimus insertion site, effusion 4/2015. C/o neck issues with MRI cervical spine show DJD, EMG, paramedian osteophytes and disk protrusion at C3-C4 with some moderate central canal stenosis and moderate to severe right-sided foraminal stenosis as a consequence. C5-C6 also showed some mild to moderate central canal stenosis and moderate bilateral central foraminal stenosis. Seen Dr. Wheeler with significant  improvement in GI issues pancreatic sphincter dysfunction requiring surgery now on pancreatic enzymes after pancreatic duct is open. Failed certolizumab pegol (Cimzia) lost effectiveness (more uveitis, arthritis, synovitis, bursitis and tendonitis, strept and CAP). Secukinumab (cosentyx) 2-2019 more laxity in joints hands/wrists, inflammatory eye, toe contractures, changes in skin stopped then changed to etanercept (Enbrel) 6-. Switched to adalimumab (Humira) and switched from PO to subcutaneous methotrexate and SSZ during 2021. Had R hip replacement and held Humira from mid July to 10/7/21 with worsening eye and articular symptoms.        PMH:  Past Medical History:   Diagnosis Date     Acute meniscal tear of knee 1/2014    with chrondromalacia per MRI      Depression      DJD (degenerative joint disease), lumbar 7/2012    L4-5, L5-S1 per MRI      Episcleritis 12/14/2011     Hamstring tendonitis at origin 7/2012    Bilaterally with partial tear right, sacroilitis per MRI     Hypothyroid 9/7/2011     Hypothyroidism      PONV (postoperative nausea and vomiting)      Psoriatic arthritis (H) 9/7/2011     Psoriatic arthritis (H) 2/9/2016     Strep throat 04/2017     Patient Active Problem List    Diagnosis Date Noted     Neck pain, chronic 03/25/2019     Priority: Medium     Inflammatory spondylopathy of multiple sites in spine (H) 02/28/2018     Priority: Medium     Other chronic pain 09/10/2017     Priority: Medium     Psoriatic arthritis (HCC) 02/09/2016     Priority: Medium     Small bowel obstruction (H) 09/23/2015     Priority: Medium     Nausea with vomiting 06/02/2014     Priority: Medium     RUQ abdominal pain 04/11/2014     Priority: Medium     Elevated LFTs 04/11/2014     Priority: Medium     RUQ pain 04/11/2014     Priority: Medium     CMC DJD(carpometacarpal degenerative joint disease), localized primary 10/18/2013     Priority: Medium     Costochondritis, acute 07/25/2012     Priority: Medium      High risk medications (not anticoagulants) long-term use 10/31/2011     Priority: Medium     Hypothyroid 09/07/2011     Priority: Medium     Surgical:  Past Surgical History:   Procedure Laterality Date     APPENDECTOMY       ARTHROPLASTY CARPOMETACARPAL (THUMB JOINT)  11/8/2013    Procedure: ARTHROPLASTY CARPOMETACARPAL (THUMB JOINT);  Left First Carpometacarpal Trapezium Resection, Tendon Interposition  ;  Surgeon: Luiza Farrar MD;  [facility]     ARTHROPLASTY CARPOMETACARPAL (THUMB JOINT)  12/20/2013    Procedure: ARTHROPLASTY CARPOMETACARPAL (THUMB JOINT);  Right Thumb Trapezium Resection With Flexor Carpi Radialis Tendon Reconstruction       BLEPHAROPLASTY BILATERAL Bilateral 8/4/2020    Procedure: BILATERAL UPPER LID BLEPHAROPLASTY AND;  Surgeon: uXan Back MD;  Location:  OR     CHOLECYSTECTOMY       COLONOSCOPY  5/6/2014    Procedure: COLONOSCOPY;  Surgeon: Adria San MD;  Location:  GI     ENDOSCOPIC RETROGRADE CHOLANGIOPANCREATOGRAM  6/13/2014    Procedure: ENDOSCOPIC RETROGRADE CHOLANGIOPANCREATOGRAM;  Surgeon: Lianna Wheeler MD;  Location: [facility]     GALLBLADDER SURGERY       GYN SURGERY      hysterectomy and oophorectomy     [facility] ENDOSCOPY W EUS Left 6/10/2014    Procedure: COMBINED ENDOSCOPIC ULTRASOUND, ESOPHAGOSCOPY, GASTROSCOPY, DUODENOSCOPY (EGD);  Surgeon: Lianna Wheeler MD;  Location:  GI     HYSTERECTOMY       REPAIR PTOSIS BROW BILATERAL Bilateral 8/4/2020    Procedure: BILATERAL BROW PTOSIS;  Surgeon: Xuan Back MD;  Location:  OR     Right thumb surgery  7/2015     XR SACROILIAC THERAPEUTIC INJECTION BILATERAL  12/2011       FH:  Family History   Problem Relation Age of Onset     Arthritis Father         Psoriatic, psoriasis, lymphoma, colon and prostate (prostate primary site)     Cancer Father         Prostate     Arthritis Sister         Rheumatoid     Arthritis Sister         OA, crohns     Arthritis Mother       "   RA     Cancer Mother         Endometrial     Arthritis Maternal Grandmother         RA       SH:  Social History     Tobacco Use     Smoking status: Never Smoker     Smokeless tobacco: Never Used   Substance Use Topics     Alcohol use: No     Drug use: No        Medications:  Current Outpatient Medications   Medication Instructions     ammonium lactate (LAC-HYDRIN) 12 % external lotion Topical, 2 TIMES DAILY     buprenorphine (BUTRANS) 15 MCG/HR Wk patch Apply 1 patch to skin every week for 28 days. Indications: Moderate to Severe Chronic Pain.     buPROPion (WELLBUTRIN XL) 150 MG 24 hr tablet No dose, route, or frequency recorded.     FLUoxetine (PROZAC) 40 mg, DAILY     folic acid (FOLVITE) 3 mg, Oral, DAILY     HUMIRA *CF* 40 mg, Subcutaneous, EVERY 14 DAYS, (Please keep visit for Oct 7, 2021 for further refills) Thank you     levothyroxine (SYNTHROID/LEVOTHROID) 125 mcg, Oral, DAILY     methotrexate sodium (pres-free) 25 mg, Subcutaneous, EVERY 7 DAYS     methotrexate 20 mg, Subcutaneous, EVERY 7 DAYS, Labs monitoring required every 8-12 weeks for refills     multivitamin, therapeutic (THERA-VIT) TABS 1 tablet, DAILY     Needle, Disp, (BD DISP NEEDLES) 25G X 5/8\" MISC USE to administer  METHOTREXATE UNDER THE SKIN EVERY 7 DAYS     omeprazole (PRILOSEC) 20 mg, Oral, 2 TIMES DAILY     predniSONE (DELTASONE) 15 mg, Oral, DAILY     sennosides (SENOKOT) 8.6 MG tablet 1 tablet, Oral, DAILY     sulfaSALAzine ER (AZULFIDINE EN) 1,000 mg, Oral, 2 TIMES DAILY     ROS:  Otherwise 14 point ROS obtained, reviewed and found negative as otherwise mentioned in HPI.    Answers for HPI/ROS submitted by the patient on 10/4/2021  General Symptoms: No  Skin Symptoms: Yes  HENT Symptoms: No  EYE SYMPTOMS: Yes  HEART SYMPTOMS: No  LUNG SYMPTOMS: No  INTESTINAL SYMPTOMS: No  URINARY SYMPTOMS: No  GYNECOLOGIC SYMPTOMS: No  BREAST SYMPTOMS: No  SKELETAL SYMPTOMS: Yes  BLOOD SYMPTOMS: No  NERVOUS SYSTEM SYMPTOMS: Yes  MENTAL HEALTH " "SYMPTOMS: No  Changes in hair: No  Changes in moles/birth marks: No  Itching: No  Rashes: Yes  Changes in nails: Yes  Acne: No  Hair in places you don't want it: No  Change in facial hair: No  Warts: No  Non-healing sores: Yes  Scarring: No  Flaking of skin: Yes  Color changes of hands/feet in cold : No  Sun sensitivity: No  Skin thickening: No  Eye pain: Yes  Vision loss: No  Dry eyes: Yes  Watery eyes: No  Eye bulging: No  Double vision: No  Flashing of lights: No  Spots: No  Floaters: Yes  Redness: No  Crossed eyes: No  Tunnel Vision: No  Yellowing of eyes: No  Eye irritation: Yes  Back pain: Yes  Muscle aches: Yes  Neck pain: Yes  Swollen joints: Yes  Joint pain: Yes  Bone pain: Yes  Muscle cramps: Yes  Muscle weakness: No  Joint stiffness: Yes  Bone fracture: No  Trouble with coordination: No  Dizziness or trouble with balance: No  Fainting or black-out spells: No  Memory loss: No  Headache: No  Seizures: No  Speech problems: No  Tingling: No  Tremor: No  Weakness: No  Difficulty walking: No  Paralysis: No  Numbness: No      Physical exam:  Vitals: Blood pressure 124/67, pulse 82, height 1.664 m (5' 5.5\"), weight 71.7 kg (158 lb), last menstrual period 03/06/2012, SpO2 97 %, not currently breastfeeding.  Wt Readings from Last 4 Encounters:   10/07/21 71.7 kg (158 lb)   02/04/21 73.5 kg (162 lb)   08/04/20 74.4 kg (164 lb 1.6 oz)   03/16/20 72.1 kg (159 lb)     -General: Alert, NAD, answers to questions appropriately.  -HEENT: NC/AT, EOMI, non-icteric sclerae, normochromic conjunctivae. Moist mucous membranes, no oropharyngeal erythema.  -Neck: No masses, no LAD, supple.  -CV: RRR, no murmurs, rubs or gallops. No JVD.  -Resp: CTAB, no crackles, rhonchi or wheezing.  -Abdomen: Soft, nontender, nondistended.  -Extremities: No leg edema. DP 2+ bilaterally.  -Neuro: No gross motor or sensory deficit.  -Psych: Oriented x3.  -Skin: No nail pitting, alopecia, rash, nodules or lesions.  -MS: The TMJ, neck, shoulder, " elbow, wrist, spine, hip, knee, ankle, and foot MTP/IP joints were examined and found normal. No altered joint anatomy other than hammertoes in bilateral feet and subluxation of 2nd-3rd MCPs bilaterally. Full joint ROM.  She has some restriction in range of motion in the neck bilaterally primarily in lateral bending left worse than right, but not in flexion.  -Lymph: no cervical or epitrochlear nodes    Labs/Imaging:  No results found for any visits on 10/07/21.    Problem List:  1. Psoriatic arthritis w/axial involvement activity with history episcleritis, tendonitis, left hamstring tear and uveitis  2. Polyarthropathy    3. Chronic pain management patient   4. Cervical DJD 2/2 underlying inflammatory arthritis, psoriatic   5. Bilateral foot pain   6. R hip OA s/p R hip replacement (9/1/21)    The constellation of articular symptoms, morning stiffness, and eye symptoms suggests active psoriatic arthritis with extraarticular manifestations in the setting of holding anti TNF therapy. In light of absence of infectious/healing complications, we recommend she resumes Humira as soon as today and we reinforced that there is no need to postpone her Humira even if receiving COVID 3rd shot today, which we recommended and also discussed about postponing influenza vaccination for a month at least. She should continue methotrexate and sulfasalazine. She had monitoring labs in 9/1/21 including CBC and BMP, but no LFTs. We will plan to obtain those in 1 month. If her eye symptoms worsen should immediately go to the ER and contact her eye providers.    RTC in 6 months.    Case was discussed with supervising staff attending, Dr. Samayoa.     Cliff Thomas  Internal Medicine Resident PG-Y3    I saw the patient with the resident.  My exam and recomendations are as described.      Jensen Samayoa MD

## 2021-10-11 ENCOUNTER — TELEPHONE (OUTPATIENT)
Dept: PULMONOLOGY | Facility: CLINIC | Age: 66
End: 2021-10-11

## 2021-10-11 NOTE — TELEPHONE ENCOUNTER
Left voicemail for patient to contact me (direct number provided) or the call center (number provided) to discuss scheduling an appointment as records indicate they are due for an in-person follow up visit with Dr. Samayoa for some time in April 2022. As they do have a [employer] account, informed them that I would send them a message detailing the same information.

## 2021-10-11 NOTE — TELEPHONE ENCOUNTER
Pt returned call and able to arrange a 6 month follow up appt with Dr. Samayoa in 6 months, but also a lab appt for Dr. Samayoa in one month. Appt details confirmed with pt

## 2021-10-15 DIAGNOSIS — L40.9 PSORIASIS: ICD-10-CM

## 2021-10-15 DIAGNOSIS — L40.50 PSORIATIC ARTHRITIS (H): ICD-10-CM

## 2021-10-15 DIAGNOSIS — M06.4 INFLAMMATORY POLYARTHROPATHY (H): ICD-10-CM

## 2021-10-15 DIAGNOSIS — M46.99 INFLAMMATORY SPONDYLOPATHY OF MULTIPLE SITES IN SPINE (H): ICD-10-CM

## 2021-10-19 RX ORDER — SULFASALAZINE 500 MG/1
1000 TABLET, DELAYED RELEASE ORAL 2 TIMES DAILY
Qty: 360 TABLET | Refills: 1 | Status: SHIPPED | OUTPATIENT
Start: 2021-10-19 | End: 2023-04-10

## 2021-10-19 NOTE — TELEPHONE ENCOUNTER
SULFASALAZINE 500MG TBEC  Last Written Prescription Date:  7/6/2021  Last Fill Quantity: 120,   # refills: 3  Last Office Visit: 10/7/2021  Future Office visit:  4/14/2022    CBC RESULTS: Recent Labs   Lab Test 02/05/21  1322   WBC 6.2   RBC 4.41   HGB 12.7   HCT 39.6   MCV 90   MCH 28.8   MCHC 32.1   RDW 14.8          Creatinine   Date Value Ref Range Status   06/22/2021 0.72 0.57 - 100 mg/dL Final   ]    Liver Function Studies -   Recent Labs   Lab Test 06/22/21  1138 02/05/21  1322 02/05/21  1322 06/17/20  1559 03/16/20  1739   PROTTOTAL  --   --   --   --  7.6   ALBUMIN 4.2   < > 3.6   < > 3.6   BILITOTAL <0.2  --   --   --  0.1*   ALKPHOS  --   --   --   --  90   AST  --   --  18   < > 33   ALT  --   --  22   < > 48   BILIDIRECT 0.08  --   --   --   --     < > = values in this interval not displayed.       Routing refill request to provider for review/approval because:  Drug not on the G, P or [facility] refill protocol or controlled substance      Kayley Go RN  Central Triage Red Flags/Med Refills

## 2021-10-20 PROBLEM — Z79.620 ADALIMUMAB (HUMIRA) LONG-TERM USE: Status: ACTIVE | Noted: 2021-10-20

## 2021-10-20 PROBLEM — M25.559 PAIN IN JOINT, PELVIC REGION AND THIGH, UNSPECIFIED LATERALITY: Status: ACTIVE | Noted: 2021-10-20

## 2021-10-20 PROBLEM — Z79.631 METHOTREXATE, LONG TERM, CURRENT USE: Status: ACTIVE | Noted: 2021-10-20

## 2021-10-31 ENCOUNTER — HEALTH MAINTENANCE LETTER (OUTPATIENT)
Age: 66
End: 2021-10-31

## 2021-11-08 ENCOUNTER — LAB (OUTPATIENT)
Dept: LAB | Facility: CLINIC | Age: 66
End: 2021-11-08
Attending: INTERNAL MEDICINE
Payer: COMMERCIAL

## 2021-11-08 DIAGNOSIS — M46.99 INFLAMMATORY SPONDYLOPATHY OF MULTIPLE SITES IN SPINE (H): ICD-10-CM

## 2021-11-08 DIAGNOSIS — Z79.899 HIGH RISK MEDICATION USE: ICD-10-CM

## 2021-11-08 DIAGNOSIS — L40.50 PSORIATIC ARTHRITIS (H): ICD-10-CM

## 2021-11-08 LAB
ALBUMIN SERPL-MCNC: 3.6 G/DL (ref 3.4–5)
ALT SERPL W P-5'-P-CCNC: 31 U/L (ref 0–50)
AST SERPL W P-5'-P-CCNC: 24 U/L (ref 0–45)
BASOPHILS # BLD AUTO: 0.1 10E3/UL (ref 0–0.2)
BASOPHILS NFR BLD AUTO: 1 %
CREAT SERPL-MCNC: 0.78 MG/DL (ref 0.52–1.04)
CRP SERPL-MCNC: 7 MG/L (ref 0–8)
EOSINOPHIL # BLD AUTO: 0.3 10E3/UL (ref 0–0.7)
EOSINOPHIL NFR BLD AUTO: 3 %
ERYTHROCYTE [DISTWIDTH] IN BLOOD BY AUTOMATED COUNT: 15.7 % (ref 10–15)
ERYTHROCYTE [SEDIMENTATION RATE] IN BLOOD BY WESTERGREN METHOD: 19 MM/HR (ref 0–30)
GFR SERPL CREATININE-BSD FRML MDRD: 80 ML/MIN/1.73M2
HCT VFR BLD AUTO: 37.4 % (ref 35–47)
HGB BLD-MCNC: 11.4 G/DL (ref 11.7–15.7)
IMM GRANULOCYTES # BLD: 0 10E3/UL
IMM GRANULOCYTES NFR BLD: 0 %
LYMPHOCYTES # BLD AUTO: 2.6 10E3/UL (ref 0.8–5.3)
LYMPHOCYTES NFR BLD AUTO: 32 %
MCH RBC QN AUTO: 26 PG (ref 26.5–33)
MCHC RBC AUTO-ENTMCNC: 30.5 G/DL (ref 31.5–36.5)
MCV RBC AUTO: 85 FL (ref 78–100)
MONOCYTES # BLD AUTO: 0.5 10E3/UL (ref 0–1.3)
MONOCYTES NFR BLD AUTO: 7 %
NEUTROPHILS # BLD AUTO: 4.8 10E3/UL (ref 1.6–8.3)
NEUTROPHILS NFR BLD AUTO: 57 %
NRBC # BLD AUTO: 0 10E3/UL
NRBC BLD AUTO-RTO: 0 /100
PLATELET # BLD AUTO: 342 10E3/UL (ref 150–450)
RBC # BLD AUTO: 4.39 10E6/UL (ref 3.8–5.2)
WBC # BLD AUTO: 8.3 10E3/UL (ref 4–11)

## 2021-11-08 PROCEDURE — 82565 ASSAY OF CREATININE: CPT | Performed by: PATHOLOGY

## 2021-11-08 PROCEDURE — 82040 ASSAY OF SERUM ALBUMIN: CPT | Performed by: PATHOLOGY

## 2021-11-08 PROCEDURE — 84450 TRANSFERASE (AST) (SGOT): CPT | Performed by: PATHOLOGY

## 2021-11-08 PROCEDURE — 36415 COLL VENOUS BLD VENIPUNCTURE: CPT | Performed by: PATHOLOGY

## 2021-11-08 PROCEDURE — 86140 C-REACTIVE PROTEIN: CPT | Performed by: PATHOLOGY

## 2021-11-08 PROCEDURE — 85025 COMPLETE CBC W/AUTO DIFF WBC: CPT | Performed by: PATHOLOGY

## 2021-11-08 PROCEDURE — 84460 ALANINE AMINO (ALT) (SGPT): CPT | Performed by: PATHOLOGY

## 2021-11-08 PROCEDURE — 85652 RBC SED RATE AUTOMATED: CPT | Performed by: PATHOLOGY

## 2021-11-09 ENCOUNTER — TELEPHONE (OUTPATIENT)
Dept: RHEUMATOLOGY | Facility: CLINIC | Age: 66
End: 2021-11-09
Payer: COMMERCIAL

## 2021-11-09 DIAGNOSIS — D58.2 ABNORMAL HEMOGLOBIN (HGB) (H): Primary | ICD-10-CM

## 2021-11-09 DIAGNOSIS — Z79.899 HIGH RISK MEDICATIONS (NOT ANTICOAGULANTS) LONG-TERM USE: ICD-10-CM

## 2021-11-09 DIAGNOSIS — D64.9 ANEMIA: ICD-10-CM

## 2021-11-09 NOTE — TELEPHONE ENCOUNTER
----- Message from Jensen Samayoa MD sent at 11/8/2021  9:51 PM CST -----  She has a little drop in her H/H. Please make sure she has no known source of bleeding, and have her repeat in 1 mo with iron studies.

## 2021-11-09 NOTE — TELEPHONE ENCOUNTER
Called pt and relayed Dr Samayoa's message below. Krissy did reporting having hip surgery 6 weeks ago, but thought her labs would have recovered from that, otherwise has not noted any sources of bleeding. She is agreeable to repeat her CBC with iron studies in 1 month.    Orders placed per note below.    CLEMENT HaasN, RN  Rheumatology Care Coordinator  [facility]

## 2021-11-29 DIAGNOSIS — L40.50 PSORIATIC ARTHRITIS (H): ICD-10-CM

## 2021-11-29 DIAGNOSIS — L40.9 PSORIASIS: ICD-10-CM

## 2021-12-02 RX ORDER — ADALIMUMAB 40MG/0.4ML
40 KIT SUBCUTANEOUS
Qty: 2 EACH | Refills: 4 | Status: SHIPPED | OUTPATIENT
Start: 2021-12-02 | End: 2022-04-08

## 2021-12-02 NOTE — TELEPHONE ENCOUNTER
HUMIRA *CF* 40MG/0.4ML SYR  Last Written Prescription Date:  7/22/2021  Last Fill Quantity: 2,   # refills: 2  Last Office Visit :  10/7/2021  Future Office visit:  4/14/2022  2 each, 4 Refills sent to pharm 12/2/2021      Kayley Go RN  Central Triage Red Flags/Med Refills

## 2021-12-13 ENCOUNTER — TELEPHONE (OUTPATIENT)
Dept: RHEUMATOLOGY | Facility: CLINIC | Age: 66
End: 2021-12-13

## 2021-12-13 ENCOUNTER — LAB (OUTPATIENT)
Dept: LAB | Facility: CLINIC | Age: 66
End: 2021-12-13
Payer: COMMERCIAL

## 2021-12-13 DIAGNOSIS — D58.2 ABNORMAL HEMOGLOBIN (HGB) (H): ICD-10-CM

## 2021-12-13 DIAGNOSIS — Z79.899 HIGH RISK MEDICATIONS (NOT ANTICOAGULANTS) LONG-TERM USE: ICD-10-CM

## 2021-12-13 DIAGNOSIS — L40.50 PSORIATIC ARTHRITIS (H): ICD-10-CM

## 2021-12-13 DIAGNOSIS — D64.9 ANEMIA: ICD-10-CM

## 2021-12-13 DIAGNOSIS — Z79.899 HIGH RISK MEDICATION USE: ICD-10-CM

## 2021-12-13 DIAGNOSIS — M46.99 INFLAMMATORY SPONDYLOPATHY OF MULTIPLE SITES IN SPINE (H): ICD-10-CM

## 2021-12-13 LAB
ALBUMIN SERPL-MCNC: 3.5 G/DL (ref 3.4–5)
ALT SERPL W P-5'-P-CCNC: 25 U/L (ref 0–50)
AST SERPL W P-5'-P-CCNC: 15 U/L (ref 0–45)
BASOPHILS # BLD AUTO: 0.1 10E3/UL (ref 0–0.2)
BASOPHILS NFR BLD AUTO: 1 %
CREAT SERPL-MCNC: 0.86 MG/DL (ref 0.52–1.04)
CRP SERPL-MCNC: <2.9 MG/L (ref 0–8)
EOSINOPHIL # BLD AUTO: 0.1 10E3/UL (ref 0–0.7)
EOSINOPHIL NFR BLD AUTO: 2 %
ERYTHROCYTE [DISTWIDTH] IN BLOOD BY AUTOMATED COUNT: 16.7 % (ref 10–15)
ERYTHROCYTE [SEDIMENTATION RATE] IN BLOOD BY WESTERGREN METHOD: 15 MM/HR (ref 0–30)
GFR SERPL CREATININE-BSD FRML MDRD: 71 ML/MIN/1.73M2
HCT VFR BLD AUTO: 37.7 % (ref 35–47)
HGB BLD-MCNC: 11.4 G/DL (ref 11.7–15.7)
IRON SATN MFR SERPL: 5 % (ref 15–46)
IRON SERPL-MCNC: 23 UG/DL (ref 35–180)
LYMPHOCYTES # BLD AUTO: 3 10E3/UL (ref 0.8–5.3)
LYMPHOCYTES NFR BLD AUTO: 42 %
MCH RBC QN AUTO: 25.1 PG (ref 26.5–33)
MCHC RBC AUTO-ENTMCNC: 30.2 G/DL (ref 31.5–36.5)
MCV RBC AUTO: 83 FL (ref 78–100)
MONOCYTES # BLD AUTO: 0.4 10E3/UL (ref 0–1.3)
MONOCYTES NFR BLD AUTO: 6 %
NEUTROPHILS # BLD AUTO: 3.5 10E3/UL (ref 1.6–8.3)
NEUTROPHILS NFR BLD AUTO: 49 %
PLATELET # BLD AUTO: 427 10E3/UL (ref 150–450)
RBC # BLD AUTO: 4.55 10E6/UL (ref 3.8–5.2)
TIBC SERPL-MCNC: 429 UG/DL (ref 240–430)
WBC # BLD AUTO: 7 10E3/UL (ref 4–11)

## 2021-12-13 PROCEDURE — 83550 IRON BINDING TEST: CPT

## 2021-12-13 PROCEDURE — 85025 COMPLETE CBC W/AUTO DIFF WBC: CPT

## 2021-12-13 PROCEDURE — 82565 ASSAY OF CREATININE: CPT

## 2021-12-13 PROCEDURE — 84460 ALANINE AMINO (ALT) (SGPT): CPT

## 2021-12-13 PROCEDURE — 36415 COLL VENOUS BLD VENIPUNCTURE: CPT

## 2021-12-13 PROCEDURE — 85652 RBC SED RATE AUTOMATED: CPT

## 2021-12-13 PROCEDURE — 82040 ASSAY OF SERUM ALBUMIN: CPT

## 2021-12-13 PROCEDURE — 86140 C-REACTIVE PROTEIN: CPT

## 2021-12-13 PROCEDURE — 84450 TRANSFERASE (AST) (SGOT): CPT

## 2021-12-13 NOTE — TELEPHONE ENCOUNTER
Pt is calling to schedule appointment. Pt was told to call to schedule appointment to see Dr Samayoa about lab orders. Pt said she should be seen before next available and thinks Dr Samayoa prefers to see her in person. Pt also says that she has tender area on her right groin area.   Please call pt back at [phone]

## 2021-12-14 NOTE — TELEPHONE ENCOUNTER
Returned call to pt. We reviewed the results from the repeat CBC and Iron studies, along with Dr Samayoa's interpretation of them.  It is noted that the Iron and Iron Sat were abnormally low. I did recommend that pt talk with her primary care provider about this, along with the swelling in her groin. This is the side pt had her hip surgery on.    I will check with Dr Samayoa about his recommendations and she will check with primary care provider. I will follow up with pt when I get Dr Samayoa's response.    CLEMENT HaasN, RN  Rheumatology Care Coordinator  [facility]

## 2021-12-14 NOTE — TELEPHONE ENCOUNTER
Pt is calling back, Pt states her lab results from yesterday came back abnormal, Pt would like to speak with Dr. Samayoa or his nurse regarding the lab results.    Also Pt as told by Matt that Pt should schedule an appointment sooner if the repeat labs came back abnormal.     Please call the Pt back to discuss this, okay to leave a detailed message if it goes to voicemail.

## 2021-12-14 NOTE — TELEPHONE ENCOUNTER
I agree.    Not entirely clear why she is she should be low on iron.  She is minimally anemic.    She may need some further work-up to understand why she has low iron.    In the meantime she certainly can take supplemental iron pills, most effectively ferrous gluconate together with vitamin C.

## 2021-12-20 NOTE — TELEPHONE ENCOUNTER
Called Krissy on 12/20/21 2:57 PM and left a message asking that pt return call. If patient calls back please, I ask that the Call center contact RN Care Coordinator at [phone] so that I can connect with pt.    Would like to relay Dr Samayoa's response below.    CLEMENT HaasN, RN  Rheumatology Care Coordinator  [facility]

## 2021-12-21 NOTE — TELEPHONE ENCOUNTER
Krissy returned call and Dr Samayoa's response was relayed to her. Pt verbalized understanding and reported she is seeing her primary care provider tomorrow.     CLEMENT HaasN, RN  Rheumatology Care Coordinator  [facility]

## 2022-01-04 ENCOUNTER — HOSPITAL ENCOUNTER (OUTPATIENT)
Dept: MAMMOGRAPHY | Facility: CLINIC | Age: 67
Discharge: HOME | End: 2022-01-04
Attending: FAMILY MEDICINE | Admitting: FAMILY MEDICINE
Payer: COMMERCIAL

## 2022-01-04 DIAGNOSIS — Z12.31 VISIT FOR SCREENING MAMMOGRAM: ICD-10-CM

## 2022-01-04 PROCEDURE — 77067 SCR MAMMO BI INCL CAD: CPT

## 2022-01-25 ENCOUNTER — OFFICE VISIT (OUTPATIENT)
Dept: FAMILY MEDICINE | Facility: CLINIC | Age: 67
End: 2022-01-25
Payer: COMMERCIAL

## 2022-01-25 VITALS
OXYGEN SATURATION: 99 % | SYSTOLIC BLOOD PRESSURE: 103 MMHG | DIASTOLIC BLOOD PRESSURE: 67 MMHG | HEIGHT: 66 IN | TEMPERATURE: 96.8 F | RESPIRATION RATE: 16 BRPM | BODY MASS INDEX: 27 KG/M2 | HEART RATE: 75 BPM | WEIGHT: 168 LBS

## 2022-01-25 DIAGNOSIS — D50.0 IRON DEFICIENCY ANEMIA DUE TO CHRONIC BLOOD LOSS: Primary | ICD-10-CM

## 2022-01-25 DIAGNOSIS — L40.50 PSORIATIC ARTHRITIS (H): ICD-10-CM

## 2022-01-25 DIAGNOSIS — E03.9 HYPOTHYROIDISM, UNSPECIFIED TYPE: ICD-10-CM

## 2022-01-25 LAB
ERYTHROCYTE [DISTWIDTH] IN BLOOD BY AUTOMATED COUNT: 20.7 % (ref 10–15)
FERRITIN SERPL-MCNC: 6 NG/ML (ref 8–252)
FOLATE SERPL-MCNC: 38.3 NG/ML
HCT VFR BLD AUTO: 38.6 % (ref 35–47)
HGB BLD-MCNC: 12.2 G/DL (ref 11.7–15.7)
IRON SATN MFR SERPL: 10 % (ref 15–46)
IRON SERPL-MCNC: 42 UG/DL (ref 35–180)
MCH RBC QN AUTO: 25.5 PG (ref 26.5–33)
MCHC RBC AUTO-ENTMCNC: 31.6 G/DL (ref 31.5–36.5)
MCV RBC AUTO: 81 FL (ref 78–100)
PLATELET # BLD AUTO: 315 10E3/UL (ref 150–450)
RBC # BLD AUTO: 4.78 10E6/UL (ref 3.8–5.2)
T4 FREE SERPL-MCNC: 1.2 NG/DL (ref 0.76–1.46)
TIBC SERPL-MCNC: 422 UG/DL (ref 240–430)
TSH SERPL DL<=0.005 MIU/L-ACNC: 0.05 MU/L (ref 0.4–4)
VIT B12 SERPL-MCNC: 421 PG/ML (ref 193–986)
WBC # BLD AUTO: 6.3 10E3/UL (ref 4–11)

## 2022-01-25 PROCEDURE — 36415 COLL VENOUS BLD VENIPUNCTURE: CPT | Performed by: INTERNAL MEDICINE

## 2022-01-25 PROCEDURE — 99204 OFFICE O/P NEW MOD 45 MIN: CPT | Performed by: INTERNAL MEDICINE

## 2022-01-25 PROCEDURE — 84439 ASSAY OF FREE THYROXINE: CPT | Performed by: INTERNAL MEDICINE

## 2022-01-25 PROCEDURE — 82728 ASSAY OF FERRITIN: CPT | Performed by: INTERNAL MEDICINE

## 2022-01-25 PROCEDURE — 82746 ASSAY OF FOLIC ACID SERUM: CPT | Performed by: INTERNAL MEDICINE

## 2022-01-25 PROCEDURE — 84443 ASSAY THYROID STIM HORMONE: CPT | Performed by: INTERNAL MEDICINE

## 2022-01-25 PROCEDURE — 82607 VITAMIN B-12: CPT | Performed by: INTERNAL MEDICINE

## 2022-01-25 PROCEDURE — 83550 IRON BINDING TEST: CPT | Performed by: INTERNAL MEDICINE

## 2022-01-25 PROCEDURE — 85027 COMPLETE CBC AUTOMATED: CPT | Performed by: INTERNAL MEDICINE

## 2022-01-25 RX ORDER — FENTANYL 12.5 UG/1
12 PATCH TRANSDERMAL
COMMUNITY
Start: 2022-01-07 | End: 2022-04-15

## 2022-01-25 RX ORDER — OXYCODONE HYDROCHLORIDE 5 MG/1
5 TABLET ORAL EVERY 4 HOURS PRN
COMMUNITY
Start: 2021-09-22

## 2022-01-25 ASSESSMENT — PAIN SCALES - GENERAL: PAINLEVEL: MODERATE PAIN (4)

## 2022-01-25 ASSESSMENT — MIFFLIN-ST. JEOR: SCORE: 1315.85

## 2022-01-25 NOTE — PATIENT INSTRUCTIONS
(D50.0) Iron deficiency anemia due to chronic blood loss  (primary encounter diagnosis)  Comment: We will recheck iron, vitamin b12 and folate levels and treat accordingly. Continue oral iron replacement   Plan: Vitamin B12, Ferritin, Folate, Iron & Iron         Binding Capacity, TSH with free T4 reflex, CBC         with platelets            (L40.50) Psoriatic arthritis (HCC)  Comment: Continue current medications as prescribed.  No changes.  Follow up in rheumatology  Plan: Vitamin B12, Ferritin, Folate, Iron & Iron         Binding Capacity, TSH with free T4 reflex, CBC         with platelets            (E03.9) Hypothyroidism, unspecified type  Comment: Recheck TSH today  Plan: TSH with free T4 reflex

## 2022-01-25 NOTE — PROGRESS NOTES
"Subjective   Krissy is a 66 year old who presents for the following health issues     HPI     Chief Complaint   Patient presents with     Follow Up     anemia     Psoriatic arthritis (H)   Anemia   Krissy Weldon hads been following in rheumatology for evaluation of recent identified anemia.  She did start oral iron supplement about 1 month.  She is taking vitamin C/oral iron supplement.  Not blood loss appreciated.  She is up to date with regards to colonoscopy.  Last colonoscopy 05/2014.  Also has upper endoscopy in 04/2014 that was essentially normal.  Currently treating psoriatic arthritis with methotrexate and humira.  Has been taking folic acid to help replenish this as well.  Diet has been healthy, but does not eat a ton of red meat.      Review of Systems   Constitutional, HEENT, cardiovascular, pulmonary, GI, , musculoskeletal, neuro, skin, endocrine - has had some tingling in her hands and feet and improved with increased dose of levothyroxine and psych systems are negative, except as otherwise noted.      Objective    /67 (BP Location: Left arm, Patient Position: Sitting, Cuff Size: Adult Large)   Pulse 75   Temp 96.8  F (36  C) (Temporal)   Resp 16   Ht 1.664 m (5' 5.5\")   Wt 76.2 kg (168 lb)   LMP 03/06/2012   SpO2 99%   BMI 27.53 kg/m    Body mass index is 27.53 kg/m .  Physical Exam   GENERAL: healthy, alert and no distress  EYES: Eyes grossly normal to inspection,  NECK: no adenopathy, no asymmetry, masses,   RESP: lungs clear to auscultation - no rales, rhonchi or wheezes  CV: regular rate and rhythm, normal S1 S2, no S3 or S4, no murmur,   ABDOMEN: soft, nontender, no hepatosplenomegaly  MS: no gross musculoskeletal defects noted, no edema  SKIN: no suspicious lesions or rashes  NEURO: Normal strength and tone, mentation intact and speech normal  PSYCH: mentation appears normal, affect normal/bright      Patient Instructions   (D50.0) Iron deficiency anemia due to chronic blood loss  " (primary encounter diagnosis)  Comment: We will recheck iron, vitamin b12 and folate levels and treat accordingly. Continue oral iron replacement   Plan: Vitamin B12, Ferritin, Folate, Iron & Iron         Binding Capacity, TSH with free T4 reflex, CBC         with platelets            (L40.50) Psoriatic arthritis (HCC)  Comment: Continue current medications as prescribed.  No changes.  Follow up in rheumatology  Plan: Vitamin B12, Ferritin, Folate, Iron & Iron         Binding Capacity, TSH with free T4 reflex, CBC         with platelets            (E03.9) Hypothyroidism, unspecified type  Comment: Recheck TSH today  Plan: TSH with free T4 reflex

## 2022-01-27 NOTE — RESULT ENCOUNTER NOTE
Sheldon Rey,    I have had the opportunity to review your recent results and an interpretation is as follows:  Your follow up labs show improved hemoglobin,but still low ferritin.  We could either continue oral iron and recheck iron studies again in 2-3 months or refer for iv iron replacement  Your TSH is also suppressed, and  you should follow up in endocrinology to discuss options to reduce your dose of levothyroxine     Let me know your thoughts    Sincerely,  Thomas Perez MD

## 2022-01-28 ENCOUNTER — TELEPHONE (OUTPATIENT)
Dept: FAMILY MEDICINE | Facility: CLINIC | Age: 67
End: 2022-01-28
Payer: COMMERCIAL

## 2022-01-28 DIAGNOSIS — D50.8 IRON DEFICIENCY ANEMIA SECONDARY TO INADEQUATE DIETARY IRON INTAKE: ICD-10-CM

## 2022-01-28 DIAGNOSIS — D50.9 ANEMIA, IRON DEFICIENCY: Primary | ICD-10-CM

## 2022-01-28 NOTE — TELEPHONE ENCOUNTER
Pended IV iron infusion per MyChart message (unable to pend therapy order in MyChart message)     Jeanna ALMEIDA, Triage RN  [facility]

## 2022-01-30 RX ORDER — METHYLPREDNISOLONE SODIUM SUCCINATE 125 MG/2ML
125 INJECTION, POWDER, LYOPHILIZED, FOR SOLUTION INTRAMUSCULAR; INTRAVENOUS
Status: CANCELLED
Start: 2022-01-30

## 2022-01-30 RX ORDER — DIPHENHYDRAMINE HYDROCHLORIDE 50 MG/ML
50 INJECTION INTRAMUSCULAR; INTRAVENOUS
Status: CANCELLED
Start: 2022-01-30

## 2022-01-30 RX ORDER — EPINEPHRINE 1 MG/ML
0.3 INJECTION, SOLUTION, CONCENTRATE INTRAVENOUS EVERY 5 MIN PRN
Status: CANCELLED | OUTPATIENT
Start: 2022-01-30

## 2022-01-30 RX ORDER — HEPARIN SODIUM (PORCINE) LOCK FLUSH IV SOLN 100 UNIT/ML 100 UNIT/ML
5 SOLUTION INTRAVENOUS
Status: CANCELLED | OUTPATIENT
Start: 2022-01-30

## 2022-01-30 RX ORDER — ALBUTEROL SULFATE 0.83 MG/ML
2.5 SOLUTION RESPIRATORY (INHALATION)
Status: CANCELLED | OUTPATIENT
Start: 2022-01-30

## 2022-01-30 RX ORDER — MEPERIDINE HYDROCHLORIDE 25 MG/ML
25 INJECTION INTRAMUSCULAR; INTRAVENOUS; SUBCUTANEOUS EVERY 30 MIN PRN
Status: CANCELLED | OUTPATIENT
Start: 2022-01-30

## 2022-01-30 RX ORDER — ALBUTEROL SULFATE 90 UG/1
1-2 AEROSOL, METERED RESPIRATORY (INHALATION)
Status: CANCELLED
Start: 2022-01-30

## 2022-01-30 RX ORDER — NALOXONE HYDROCHLORIDE 0.4 MG/ML
0.2 INJECTION, SOLUTION INTRAMUSCULAR; INTRAVENOUS; SUBCUTANEOUS
Status: CANCELLED | OUTPATIENT
Start: 2022-01-30

## 2022-01-30 RX ORDER — HEPARIN SODIUM,PORCINE 10 UNIT/ML
5 VIAL (ML) INTRAVENOUS
Status: CANCELLED | OUTPATIENT
Start: 2022-01-30

## 2022-02-02 ENCOUNTER — TELEPHONE (OUTPATIENT)
Dept: ONCOLOGY | Facility: CLINIC | Age: 67
End: 2022-02-02
Payer: COMMERCIAL

## 2022-02-02 ENCOUNTER — LAB (OUTPATIENT)
Dept: LAB | Facility: CLINIC | Age: 67
End: 2022-02-02
Payer: COMMERCIAL

## 2022-02-02 DIAGNOSIS — D50.8 IRON DEFICIENCY ANEMIA SECONDARY TO INADEQUATE DIETARY IRON INTAKE: ICD-10-CM

## 2022-02-02 DIAGNOSIS — D50.8 IRON DEFICIENCY ANEMIA SECONDARY TO INADEQUATE DIETARY IRON INTAKE: Primary | ICD-10-CM

## 2022-02-02 PROCEDURE — U0003 INFECTIOUS AGENT DETECTION BY NUCLEIC ACID (DNA OR RNA); SEVERE ACUTE RESPIRATORY SYNDROME CORONAVIRUS 2 (SARS-COV-2) (CORONAVIRUS DISEASE [COVID-19]), AMPLIFIED PROBE TECHNIQUE, MAKING USE OF HIGH THROUGHPUT TECHNOLOGIES AS DESCRIBED BY CMS-2020-01-R: HCPCS

## 2022-02-02 PROCEDURE — U0005 INFEC AGEN DETEC AMPLI PROBE: HCPCS

## 2022-02-02 NOTE — TELEPHONE ENCOUNTER
April is calling looking for lab orders for Covid testing. Patient is scheduled for pre-procedure testing today at 3.     Routing to care team for review and order placement.    Rachel Boone RN, BSN, PHN

## 2022-02-04 LAB — SARS-COV-2 RNA RESP QL NAA+PROBE: NEGATIVE

## 2022-02-07 ENCOUNTER — INFUSION THERAPY VISIT (OUTPATIENT)
Dept: INFUSION THERAPY | Facility: CLINIC | Age: 67
End: 2022-02-07
Attending: INTERNAL MEDICINE
Payer: COMMERCIAL

## 2022-02-07 VITALS
TEMPERATURE: 97.9 F | OXYGEN SATURATION: 99 % | DIASTOLIC BLOOD PRESSURE: 80 MMHG | RESPIRATION RATE: 16 BRPM | HEART RATE: 62 BPM | SYSTOLIC BLOOD PRESSURE: 131 MMHG

## 2022-02-07 DIAGNOSIS — D50.8 IRON DEFICIENCY ANEMIA SECONDARY TO INADEQUATE DIETARY IRON INTAKE: Primary | ICD-10-CM

## 2022-02-07 PROCEDURE — 258N000003 HC RX IP 258 OP 636: Performed by: INTERNAL MEDICINE

## 2022-02-07 PROCEDURE — 250N000011 HC RX IP 250 OP 636: Performed by: INTERNAL MEDICINE

## 2022-02-07 PROCEDURE — 96374 THER/PROPH/DIAG INJ IV PUSH: CPT

## 2022-02-07 RX ORDER — EPINEPHRINE 1 MG/ML
0.3 INJECTION, SOLUTION INTRAMUSCULAR; SUBCUTANEOUS EVERY 5 MIN PRN
Status: CANCELLED | OUTPATIENT
Start: 2022-02-14

## 2022-02-07 RX ORDER — HEPARIN SODIUM (PORCINE) LOCK FLUSH IV SOLN 100 UNIT/ML 100 UNIT/ML
5 SOLUTION INTRAVENOUS
Status: CANCELLED | OUTPATIENT
Start: 2022-02-14

## 2022-02-07 RX ORDER — METHYLPREDNISOLONE SODIUM SUCCINATE 125 MG/2ML
125 INJECTION, POWDER, LYOPHILIZED, FOR SOLUTION INTRAMUSCULAR; INTRAVENOUS
Status: CANCELLED
Start: 2022-02-14

## 2022-02-07 RX ORDER — MEPERIDINE HYDROCHLORIDE 25 MG/ML
25 INJECTION INTRAMUSCULAR; INTRAVENOUS; SUBCUTANEOUS EVERY 30 MIN PRN
Status: CANCELLED | OUTPATIENT
Start: 2022-02-14

## 2022-02-07 RX ORDER — HEPARIN SODIUM,PORCINE 10 UNIT/ML
5 VIAL (ML) INTRAVENOUS
Status: CANCELLED | OUTPATIENT
Start: 2022-02-14

## 2022-02-07 RX ORDER — DIPHENHYDRAMINE HYDROCHLORIDE 50 MG/ML
50 INJECTION INTRAMUSCULAR; INTRAVENOUS
Status: CANCELLED
Start: 2022-02-14

## 2022-02-07 RX ORDER — NALOXONE HYDROCHLORIDE 0.4 MG/ML
0.2 INJECTION, SOLUTION INTRAMUSCULAR; INTRAVENOUS; SUBCUTANEOUS
Status: CANCELLED | OUTPATIENT
Start: 2022-02-14

## 2022-02-07 RX ORDER — ALBUTEROL SULFATE 0.83 MG/ML
2.5 SOLUTION RESPIRATORY (INHALATION)
Status: CANCELLED | OUTPATIENT
Start: 2022-02-14

## 2022-02-07 RX ORDER — ALBUTEROL SULFATE 90 UG/1
1-2 AEROSOL, METERED RESPIRATORY (INHALATION)
Status: CANCELLED
Start: 2022-02-14

## 2022-02-07 RX ADMIN — SODIUM CHLORIDE 250 ML: 9 INJECTION, SOLUTION INTRAVENOUS at 12:51

## 2022-02-07 RX ADMIN — FERRIC CARBOXYMALTOSE INJECTION 750 MG: 50 INJECTION, SOLUTION INTRAVENOUS at 12:51

## 2022-02-07 NOTE — PROGRESS NOTES
Infusion Nursing Note:  Krissy LOCKHART Adityadevin presents today for Injectafer.    Patient seen by provider today: No   present during visit today: Not Applicable.    Note: First time getting Injectafer today. Oriented to infusion center. Injectafer teaching, side effects, and schedule reviewed with patient.  Pt stated understanding of plan. Injectafer tolerated well today without issue.      Intravenous Access:  Peripheral IV placed.    Treatment Conditions:  Not Applicable.      Post Infusion Assessment:  Patient tolerated injection without incident.  Patient observed for 30 minutes post Injectafer per protocol.  Blood return noted pre and post infusion.  Site patent and intact, free from redness, edema or discomfort.  No evidence of extravasations.  Access discontinued per protocol.       Discharge Plan:   Patient declined prescription refills.  Discharge instructions reviewed with: Patient.  Patient and/or family verbalized understanding of discharge instructions and all questions answered.  AVS to patient via [employer].  Patient will return 2/16/22 for next appointment.   Patient discharged in stable condition accompanied by: self.  Departure Mode: Ambulatory.      Sangita Sy RN

## 2022-02-14 DIAGNOSIS — M46.99 INFLAMMATORY SPONDYLOPATHY OF MULTIPLE SITES IN SPINE (H): ICD-10-CM

## 2022-02-16 ENCOUNTER — INFUSION THERAPY VISIT (OUTPATIENT)
Dept: INFUSION THERAPY | Facility: CLINIC | Age: 67
End: 2022-02-16
Attending: INTERNAL MEDICINE
Payer: COMMERCIAL

## 2022-02-16 VITALS
RESPIRATION RATE: 18 BRPM | HEART RATE: 64 BPM | OXYGEN SATURATION: 99 % | TEMPERATURE: 97.4 F | DIASTOLIC BLOOD PRESSURE: 79 MMHG | SYSTOLIC BLOOD PRESSURE: 123 MMHG

## 2022-02-16 DIAGNOSIS — D50.8 IRON DEFICIENCY ANEMIA SECONDARY TO INADEQUATE DIETARY IRON INTAKE: Primary | ICD-10-CM

## 2022-02-16 PROCEDURE — 96365 THER/PROPH/DIAG IV INF INIT: CPT

## 2022-02-16 PROCEDURE — 258N000003 HC RX IP 258 OP 636: Performed by: INTERNAL MEDICINE

## 2022-02-16 PROCEDURE — 250N000011 HC RX IP 250 OP 636: Performed by: INTERNAL MEDICINE

## 2022-02-16 RX ORDER — DIPHENHYDRAMINE HYDROCHLORIDE 50 MG/ML
50 INJECTION INTRAMUSCULAR; INTRAVENOUS
Status: CANCELLED
Start: 2022-02-21

## 2022-02-16 RX ORDER — NALOXONE HYDROCHLORIDE 0.4 MG/ML
0.2 INJECTION, SOLUTION INTRAMUSCULAR; INTRAVENOUS; SUBCUTANEOUS
Status: CANCELLED | OUTPATIENT
Start: 2022-02-21

## 2022-02-16 RX ORDER — HEPARIN SODIUM,PORCINE 10 UNIT/ML
5 VIAL (ML) INTRAVENOUS
Status: CANCELLED | OUTPATIENT
Start: 2022-02-21

## 2022-02-16 RX ORDER — HEPARIN SODIUM (PORCINE) LOCK FLUSH IV SOLN 100 UNIT/ML 100 UNIT/ML
5 SOLUTION INTRAVENOUS
Status: CANCELLED | OUTPATIENT
Start: 2022-02-21

## 2022-02-16 RX ORDER — ALBUTEROL SULFATE 0.83 MG/ML
2.5 SOLUTION RESPIRATORY (INHALATION)
Status: CANCELLED | OUTPATIENT
Start: 2022-02-21

## 2022-02-16 RX ORDER — MEPERIDINE HYDROCHLORIDE 25 MG/ML
25 INJECTION INTRAMUSCULAR; INTRAVENOUS; SUBCUTANEOUS EVERY 30 MIN PRN
Status: CANCELLED | OUTPATIENT
Start: 2022-02-21

## 2022-02-16 RX ORDER — EPINEPHRINE 1 MG/ML
0.3 INJECTION, SOLUTION INTRAMUSCULAR; SUBCUTANEOUS EVERY 5 MIN PRN
Status: CANCELLED | OUTPATIENT
Start: 2022-02-21

## 2022-02-16 RX ORDER — METHYLPREDNISOLONE SODIUM SUCCINATE 125 MG/2ML
125 INJECTION, POWDER, LYOPHILIZED, FOR SOLUTION INTRAMUSCULAR; INTRAVENOUS
Status: CANCELLED
Start: 2022-02-21

## 2022-02-16 RX ORDER — ALBUTEROL SULFATE 90 UG/1
1-2 AEROSOL, METERED RESPIRATORY (INHALATION)
Status: CANCELLED
Start: 2022-02-21

## 2022-02-16 RX ADMIN — FERRIC CARBOXYMALTOSE INJECTION 750 MG: 50 INJECTION, SOLUTION INTRAVENOUS at 14:29

## 2022-02-16 RX ADMIN — SODIUM CHLORIDE 250 ML: 9 INJECTION, SOLUTION INTRAVENOUS at 14:28

## 2022-02-16 ASSESSMENT — PAIN SCALES - GENERAL: PAINLEVEL: NO PAIN (0)

## 2022-02-16 NOTE — PROGRESS NOTES
Infusion Nursing Note:  Krissy Weldon presents today for Injectafer 2/2.    Patient seen by provider today: No   present during visit today: Not Applicable.    Note: no new concerns.      Intravenous Access:  Peripheral IV placed.    Treatment Conditions:  Not Applicable.      Post Infusion Assessment:  Patient tolerated infusion without incident.  Blood return noted pre and post infusion.  Site patent and intact, free from redness, edema or discomfort.  No evidence of extravasations.  Access discontinued per protocol.       Discharge Plan:   Patient and/or family verbalized understanding of discharge instructions and all questions answered.  AVS to patient via [employer].  Patient will return PRN for next appointment.   Patient discharged in stable condition accompanied by: self.  Departure Mode: Ambulatory.      Angela Gutierrez RN

## 2022-02-17 RX ORDER — METHOTREXATE 25 MG/ML
INJECTION INTRA-ARTERIAL; INTRAMUSCULAR; INTRATHECAL; INTRAVENOUS
Qty: 8 ML | Refills: 2 | Status: SHIPPED | OUTPATIENT
Start: 2022-02-17 | End: 2022-04-15 | Stop reason: DRUGHIGH

## 2022-02-17 NOTE — TELEPHONE ENCOUNTER
methotrexate sodium, pres-free, 50 MG/2ML SOLN injection      Sig - Route: Inject 1 mL (25 mg) Subcutaneous every 7 days - Subcutaneous  Last Written Prescription Date:  7-6-21  Last Fill Quantity: 4 ML,   # refills: 4  Last Office Visit: 10-7-21  Future Office visit:  4-14-22    CBC RESULTS: Recent Labs   Lab Test 01/25/22  0824   WBC 6.3   RBC 4.78   HGB 12.2   HCT 38.6   MCV 81   MCH 25.5*   MCHC 31.6   RDW 20.7*          Creatinine   Date Value Ref Range Status   12/13/2021 0.86 0.52 - 1.04 mg/dL Final   06/22/2021 0.72 0.57 - 100 mg/dL Final   ]    Liver Function Studies -   Recent Labs   Lab Test 12/13/21  1247 11/08/21  1038 06/22/21  1138 06/17/20  1559 03/16/20  1739   PROTTOTAL  --   --   --   --  7.6   ALBUMIN 3.5   < > 4.2   < > 3.6   BILITOTAL  --   --  <0.2  --  0.1*   ALKPHOS  --   --   --   --  90   AST 15   < >  --    < > 33   ALT 25   < >  --    < > 48   BILIDIRECT  --   --  0.08  --   --     < > = values in this interval not displayed.       Routing refill request to provider for review/approval because:  Per protocol        Also on current med list  methotrexate 50 MG/2ML injection   4 mL 2 3/24/2021  --  Sig - Route: Inject 0.8 mLs (20 mg) Subcutaneous every 7 days Labs monitoring

## 2022-03-20 ENCOUNTER — TRANSFERRED RECORDS (OUTPATIENT)
Dept: HEALTH INFORMATION MANAGEMENT | Facility: CLINIC | Age: 67
End: 2022-03-20

## 2022-04-06 DIAGNOSIS — L40.50 PSORIATIC ARTHRITIS (H): ICD-10-CM

## 2022-04-06 DIAGNOSIS — L40.9 PSORIASIS: ICD-10-CM

## 2022-04-08 RX ORDER — ADALIMUMAB 40MG/0.4ML
KIT SUBCUTANEOUS
Qty: 2 EACH | Refills: 6 | Status: SHIPPED | OUTPATIENT
Start: 2022-04-08 | End: 2022-04-14

## 2022-04-11 ENCOUNTER — DOCUMENTATION ONLY (OUTPATIENT)
Dept: LAB | Facility: CLINIC | Age: 67
End: 2022-04-11

## 2022-04-11 ENCOUNTER — LAB (OUTPATIENT)
Dept: LAB | Facility: CLINIC | Age: 67
End: 2022-04-11
Payer: COMMERCIAL

## 2022-04-11 DIAGNOSIS — D50.8 IRON DEFICIENCY ANEMIA SECONDARY TO INADEQUATE DIETARY IRON INTAKE: Primary | ICD-10-CM

## 2022-04-11 DIAGNOSIS — M46.99 INFLAMMATORY SPONDYLOPATHY OF MULTIPLE SITES IN SPINE (H): ICD-10-CM

## 2022-04-11 DIAGNOSIS — Z79.899 HIGH RISK MEDICATION USE: ICD-10-CM

## 2022-04-11 DIAGNOSIS — L40.50 PSORIATIC ARTHRITIS (H): ICD-10-CM

## 2022-04-11 DIAGNOSIS — D50.8 IRON DEFICIENCY ANEMIA SECONDARY TO INADEQUATE DIETARY IRON INTAKE: ICD-10-CM

## 2022-04-11 LAB
ALBUMIN SERPL-MCNC: 3.7 G/DL (ref 3.4–5)
ALT SERPL W P-5'-P-CCNC: 48 U/L (ref 0–50)
AST SERPL W P-5'-P-CCNC: 26 U/L (ref 0–45)
BASOPHILS # BLD AUTO: 0.1 10E3/UL (ref 0–0.2)
BASOPHILS NFR BLD AUTO: 1 %
CREAT SERPL-MCNC: 0.7 MG/DL (ref 0.52–1.04)
CRP SERPL-MCNC: 7.7 MG/L (ref 0–8)
EOSINOPHIL # BLD AUTO: 0.1 10E3/UL (ref 0–0.7)
EOSINOPHIL NFR BLD AUTO: 3 %
ERYTHROCYTE [DISTWIDTH] IN BLOOD BY AUTOMATED COUNT: 21.2 % (ref 10–15)
ERYTHROCYTE [SEDIMENTATION RATE] IN BLOOD BY WESTERGREN METHOD: 11 MM/HR (ref 0–30)
FERRITIN SERPL-MCNC: 231 NG/ML (ref 8–252)
GFR SERPL CREATININE-BSD FRML MDRD: >90 ML/MIN/1.73M2
HCT VFR BLD AUTO: 39.9 % (ref 35–47)
HGB BLD-MCNC: 12.8 G/DL (ref 11.7–15.7)
HOLD SPECIMEN: NORMAL
IRON SATN MFR SERPL: 30 % (ref 15–46)
IRON SERPL-MCNC: 88 UG/DL (ref 35–180)
LYMPHOCYTES # BLD AUTO: 1.9 10E3/UL (ref 0.8–5.3)
LYMPHOCYTES NFR BLD AUTO: 39 %
MCH RBC QN AUTO: 28.4 PG (ref 26.5–33)
MCHC RBC AUTO-ENTMCNC: 32.1 G/DL (ref 31.5–36.5)
MCV RBC AUTO: 89 FL (ref 78–100)
MONOCYTES # BLD AUTO: 0.5 10E3/UL (ref 0–1.3)
MONOCYTES NFR BLD AUTO: 10 %
NEUTROPHILS # BLD AUTO: 2.2 10E3/UL (ref 1.6–8.3)
NEUTROPHILS NFR BLD AUTO: 46 %
PLATELET # BLD AUTO: 266 10E3/UL (ref 150–450)
RBC # BLD AUTO: 4.51 10E6/UL (ref 3.8–5.2)
TIBC SERPL-MCNC: 290 UG/DL (ref 240–430)
WBC # BLD AUTO: 4.7 10E3/UL (ref 4–11)

## 2022-04-11 PROCEDURE — 85025 COMPLETE CBC W/AUTO DIFF WBC: CPT

## 2022-04-11 PROCEDURE — 85652 RBC SED RATE AUTOMATED: CPT

## 2022-04-11 PROCEDURE — 83550 IRON BINDING TEST: CPT

## 2022-04-11 PROCEDURE — 82565 ASSAY OF CREATININE: CPT

## 2022-04-11 PROCEDURE — 82728 ASSAY OF FERRITIN: CPT

## 2022-04-11 PROCEDURE — 84450 TRANSFERASE (AST) (SGOT): CPT

## 2022-04-11 PROCEDURE — 84460 ALANINE AMINO (ALT) (SGPT): CPT

## 2022-04-11 PROCEDURE — 36415 COLL VENOUS BLD VENIPUNCTURE: CPT

## 2022-04-11 PROCEDURE — 86140 C-REACTIVE PROTEIN: CPT

## 2022-04-11 PROCEDURE — 82040 ASSAY OF SERUM ALBUMIN: CPT

## 2022-04-11 NOTE — PROGRESS NOTES
Please place or confirm orders for upcoming lab appointment on 04/11/2022. Thank you.    Patient came in today for labs per Danita. She mentioned she also needed her Iron levels checked. Please order labs if needed. Have blood for test. Thank you.

## 2022-04-14 ENCOUNTER — OFFICE VISIT (OUTPATIENT)
Dept: PULMONOLOGY | Facility: CLINIC | Age: 67
End: 2022-04-14
Attending: INTERNAL MEDICINE
Payer: COMMERCIAL

## 2022-04-14 VITALS
HEIGHT: 66 IN | HEART RATE: 72 BPM | OXYGEN SATURATION: 95 % | WEIGHT: 168 LBS | SYSTOLIC BLOOD PRESSURE: 127 MMHG | DIASTOLIC BLOOD PRESSURE: 73 MMHG | BODY MASS INDEX: 27 KG/M2

## 2022-04-14 DIAGNOSIS — M25.559 PAIN IN JOINT, PELVIC REGION AND THIGH, UNSPECIFIED LATERALITY: ICD-10-CM

## 2022-04-14 DIAGNOSIS — Z79.631 METHOTREXATE, LONG TERM, CURRENT USE: ICD-10-CM

## 2022-04-14 DIAGNOSIS — Z79.899 HIGH RISK MEDICATIONS (NOT ANTICOAGULANTS) LONG-TERM USE: ICD-10-CM

## 2022-04-14 DIAGNOSIS — Z79.620 ADALIMUMAB (HUMIRA) LONG-TERM USE: ICD-10-CM

## 2022-04-14 DIAGNOSIS — L40.9 PSORIASIS: Primary | ICD-10-CM

## 2022-04-14 DIAGNOSIS — M06.4 INFLAMMATORY POLYARTHROPATHY (H): ICD-10-CM

## 2022-04-14 DIAGNOSIS — L40.50 PSORIATIC ARTHRITIS (H): ICD-10-CM

## 2022-04-14 DIAGNOSIS — M46.99 INFLAMMATORY SPONDYLOPATHY OF MULTIPLE SITES IN SPINE (H): ICD-10-CM

## 2022-04-14 DIAGNOSIS — R29.898 HAND DYSFUNCTION: ICD-10-CM

## 2022-04-14 DIAGNOSIS — Z79.899 HIGH RISK MEDICATION USE: ICD-10-CM

## 2022-04-14 PROCEDURE — G0463 HOSPITAL OUTPT CLINIC VISIT: HCPCS

## 2022-04-14 PROCEDURE — 99214 OFFICE O/P EST MOD 30 MIN: CPT | Performed by: INTERNAL MEDICINE

## 2022-04-14 RX ORDER — FOLIC ACID 1 MG/1
4 TABLET ORAL DAILY
Qty: 360 TABLET | Refills: 3 | Status: SHIPPED | OUTPATIENT
Start: 2022-04-14 | End: 2023-08-08

## 2022-04-14 RX ORDER — ADALIMUMAB 40MG/0.4ML
KIT SUBCUTANEOUS
Qty: 2 EACH | Refills: 6 | Status: SHIPPED | OUTPATIENT
Start: 2022-04-14 | End: 2022-11-07

## 2022-04-14 RX ORDER — METHOTREXATE 25 MG/ML
17.5 INJECTION, SOLUTION INTRA-ARTERIAL; INTRAMUSCULAR; INTRAVENOUS
Qty: 5 ML | Refills: 2 | Status: SHIPPED | OUTPATIENT
Start: 2022-04-14 | End: 2022-08-02

## 2022-04-14 ASSESSMENT — PAIN SCALES - GENERAL: PAINLEVEL: NO PAIN (0)

## 2022-04-14 NOTE — LETTER
4/14/2022         RE: Krissy Weldon  [id number] [facility] [address]        Dear Colleague,    Thank you for referring your patient, Krissy Weldon, to the [facility] FOR LUNG SCIENCE AND [facility] [place]. Please see a copy of my visit note below.    [facility] - Rheumatology Clinic Visit    Krissy Weldon  is a 66 year old here for follow-up.    HPI:  Krissy Weldon is a 66 year old female who presents for follow-up of undifferentiated polyarthropathy, psoriatic arthritis. Complicated by eye inflammation (uveitis and scleritis--sees [facility]), tendonitis, costochondritis, tendon tears, synovitis, dactylitis, OA/DJD jessica thumbs CMC, now toe contractures and laxity all hand/wrist joints, bowel changes (sister with Crohn's and dad with inflammatory bowel), R hip OA s/p R hip replacement (9/1/21).    She was last seen on 3/9/21 and was recommended to taper off prednisone while continuing on methotrexate, SSZ and Humira. In the interim, she had R hip replacement on 9/1/21 without infectious, healing, rehabilitation complications thus far. She reports that the pain is far improved and that she continues to work with PT. Currently, is able to walk ~1.5 miles/day.    She notes that she held Humira about mid July and has continued to hold it until today to make sure that Rheumatology was ok resuming it after her hip surgery. She has continued her methotrexate 20mg weekly on fridays and SSZ for 1 gr/day (she reduced from 1 gr bid due to GI discomfort) and has not noted side effects from these. She notes that now feels that she misses the effect of Humira as she has developed worsened arthralgias especially in bilateral feet, hands, low back with noted swelling, redness, warmth of MCPs, ankles associated with morning stiffness that would last at least 2 hours and may occasionally not entirely resolve throughout the day.    Also reported L eye irritation for the past  month as she reports having a scratchy sensation, but no pain, no changes in vision or pain to extraocular movements. She has been in contact with her eye clinic and is currently applying topical prednisone. Otherwise, notes presence of occasional mouth ulcers and noted a new one today and she is adherent to daily folic acid and will add 1 mg/day to her daily 2mg/day when she develops oral ulcers. Denies new skin rashes, muscle weakness, shortness of breath, cough, chest pain, changes in urination or other symptoms. She has received two doses of COVID vaccine (3/24-4/11/21).      10/22/2019 interval history:   Krissy Weldon is a 64 year old female who presents for follow-up of undifferentiated polyarthropathy, psoriatic arthritis. Complicated by eye inflammation (uveitis and scleritis--sees [facility]), tendonitis, costochondritis, tendon tears, synovitis, dactylitis, OA/DJD jessica thumbs CMC, now toe contractures and laxity all hand/wrist joints, bowel changes (sister with Crohn's and dad with inflammatory bowel). The patient was last evaluated by Miguel Umana on 06/26/2019, at which time there was concern for worsening tendons and increased bowel symptoms despite continuation of secukinumab injections 150 mg every 30 days. A discussion was had about changing to Enbrel 50 mg injections every week starting 30 days after her last injection with plans to stop Cosentyx.     Today, the patient reports that she has symptom exacerbation after around 10 days following her Humira injections. She states that she has been using prednisone off-and-on for these four days leading up to her next injection. She adds that she can live with this, but, as her wife had a double lung transplant, she has been care taking for her and needs to remain active for this. She notes that her last DEXA scan was completed around 5-10 years ago. She states that she continues methotrexate injections 20 mg weekly with 3 mg folic acid daily. She says  "that Dr. Conti recommended a liver scan due to long-term methotrexate use during their last visit, and she did not complete this as her wife has been very sick.     She states that her hands and feet have been swelling, and she has obtained larger shoes to treat this. She reports that she also has had trouble extending her fingers. She also reports psoriatic skin changes on her scalp, arms, and buttocks, but she explains that these have remained mostly unchanged.      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 She states that she could barely walk before this therapy, she did not start steroids following the procedure, and she had relief for around one year.     12/9/2020 (Miguel visit):   Seen Dr. Samayoa in Sept-titrated sulfasalazine to 1 GM BID, right leg numb after Covid-19 (coronavirus) was doing Physical Therapy with the pain clinic for right labrum tear.      Denies any fever, chills, SOB, SANCHEZ, night sweats, or chest pain, high fever, cough, travel in last 14 days or exposure to covid-19 (coronavirus). Reports healthy. Follows CDC guidelines.      No inflammatory eye. Bilateral 2nd MCP swelling red, some in bilateral 3rd MCP and end DIPs, nails splitting. Contractures the same, developed when off arthritis medications during her Covid-19 (coronavirus). This has not improved. Injections in back, right labrum tear very painful.   TSH 1.09 Free 1.13 now on higher synthyoid for 3 weeks-better. No more numb and tingling. Eats well. Bowels are good.  Psoriasis scalp and forehead     Adalimumab (humira) 40 mg injection every 14 days (feels this works but wears off after 10 days \"my joints feel lubricated\", \"amazing\" even sternal pain goes away), off methotrexate since Sept no longer having GI issues like nausea, diarrhea. Dr. Samayoa concerned as her albumin was lower, still is on " sulfasalazine . Sulfasalazine  mg takes with daily omeprazole. Tolerating      2/4/2021 interval history:     Since I last saw the patient Miguel has seen her back.  Her most recent note is above.  However the patient has had increasing problems with right hip pain over several months.  Because of this she had an injection in the pain clinic on December 17 and although she got a temporary response to the lidocaine, she had virtually no response longer-term to the steroid.     Because of that she recently saw Dr. Montesinos in [place].  He reviewed things and did an MRI of her hip which I do not have available to review.  He did feel that she was a candidate for hip replacement.  She saw him in particular because she would like to consider having an anterior approach done and he specializes in this.     He did ask her to consult with us about what she would want to do with her medicines with respect to a hip surgery.     She is up to a more therapeutic dose on sulfasalazine at 1 g twice daily and she feels that that has been quite helpful globally outside of the hip.  It helps her overall spine pain and stiffness including throughout her neck and lower back.  She feels much more mobile there.  She has less pain globally in the small joints of the hands and feet as well although her feet are still a problem particularly with metatarsal pain.  She does have about an hour of morning stiffness.     As noted above in the prior notes that she feels that Humira is quite helpful but over the last year, it starts to wear off around day 10 of the injection.  As noted in Miguel's note above she has had trouble tolerating oral methotrexate,  So we just recently set up injectable methotrexate for her.  She has however not started using it yet.  It was prescribed a dose of 10 mg once weekly.     She is also wondering if she could at least temporarily use prednisone which is always been helpful for her.  [place] [facility] that has been definitely painful for her recently and she does not feel she can do it as she is right now.  This is primarily because of the hip problems however.    3/9/2021 interval history:  The patient is seeing me today just a little over a month since she saw me previously.  This was the originally scheduled appointment but because she was doing poorly we saw her then.     She actually has now tapered her prednisone down to 5 mg a day with a weekly taper going from 15 to 10-7.5 and now to 5 mg daily.  She is much better after the taper though she is feeling a little worse particularly in her hips than she was at 10 mg daily.  She is able to walk about a half a mile now but is still limited by pain.     She is also been dose escalating methotrexate to 0.6 mg once weekly.  We did discuss with her that further dose escalation to 0.8 mg once weekly is probably reasonable based on her historical tolerance of the drug and good laboratory values.     Physical examination by video today shows her to be in no acute distress.  She has good range of motion in her hands.  The main remainder of the joint examination is deferred however.    April 14, 2022 interval history:    Since his last seen the patient she has been trying to taper off of her narcotic.  She is completely off of fentanyl patches and has her oxycodone tablets down to 4 daily.  Unfortunately that has increased her pain and at 1 point her back pain was bad enough she went on a brief prednisone taper going from 10 mg down by 2.5 mg every few days over about 2 weeks.  She finished that about 6 weeks ago and that really helped her a lot.  Things have not been as bad since, but she is having somewhat more pain.    She is convinced however that the Humira continues to help her although she is worse for the last 2 to 4 days of each dosing cycle.  She was off of it for about 2 months in September when she had a total hip  replacement and she was much worse with respect to all of her axial pain and to a lesser extent her peripheral joints as well.    She has been noticing a lot of cracking over the skin of her fingertips over the winter.  Her hands to get really cold but not a lot of color change consistent with Raynauds phenomenon.    The patient does raise the issue that her sister has been tested for alpha-1 antitrypsin deficiency and found to have that.  Her sister has had a lot of lung disease.  The patient herself has not although she has had pancreatitis several times but the last time was many years ago.  We discussed this in some detail and for the time being she decided that she would prefer to defer on testing.        Background History:   (-SSa, -SSb, -CCP, HLA-B27 negative on outside workup with previous SI injections) with hx- inflammatory eye left eye episcleritis requiring prednisone gtt or oral with bone scan unrevealing. Previous medrol or steroid responsiveness. Past tried Humira, SSZ, Simponi SQ/IV, remicade and Otezla. Failed Humira EoW recurrent episcleritis, right wrist, right SI, bilateral hamstrings tendinosis with partial tear bilaterally. Failed Simponi sq/IV ineffective. SSZ and oral MTX. Past recurrent iritis (ER if eye pain, blurred vision, red eye). Remicade. Otezla. LLL pneumonia 3/2015. MRI L hip showed high grade tear gluteus minimus insertion site, effusion 4/2015. C/o neck issues with MRI cervical spine show DJD, EMG, paramedian osteophytes and disk protrusion at C3-C4 with some moderate central canal stenosis and moderate to severe right-sided foraminal stenosis as a consequence. C5-C6 also showed some mild to moderate central canal stenosis and moderate bilateral central foraminal stenosis. Seen Dr. Wheeler with significant improvement in GI issues pancreatic sphincter dysfunction requiring surgery now on pancreatic enzymes after pancreatic duct is open. Failed certolizumab pegol (Cimzia) lost  effectiveness (more uveitis, arthritis, synovitis, bursitis and tendonitis, strept and CAP). Secukinumab (cosentyx) 2-2019 more laxity in joints hands/wrists, inflammatory eye, toe contractures, changes in skin stopped then changed to etanercept (Enbrel) 6-. Switched to adalimumab (Humira) and switched from PO to subcutaneous methotrexate and SSZ during 2021. Had R hip replacement and held Humira from mid July to 10/7/21 with worsening eye and articular symptoms.        PMH:  Past Medical History:   Diagnosis Date     Acute meniscal tear of knee 1/2014    with chrondromalacia per MRI      Depression      DJD (degenerative joint disease), lumbar 7/2012    L4-5, L5-S1 per MRI      Episcleritis 12/14/2011     Hamstring tendonitis at origin 7/2012    Bilaterally with partial tear right, sacroilitis per MRI     Hypothyroid 9/7/2011     Hypothyroidism      PONV (postoperative nausea and vomiting)      Psoriatic arthritis (H) 9/7/2011     Psoriatic arthritis (H) 2/9/2016     Strep throat 04/2017     Patient Active Problem List    Diagnosis Date Noted     Anemia, iron deficiency 01/28/2022     Priority: Medium     Methotrexate, long term, current use 10/20/2021     Priority: Medium     Adalimumab (Humira) long-term use 10/20/2021     Priority: Medium     Pain in joint, pelvic region and thigh, unspecified laterality 10/20/2021     Priority: Medium     Neck pain, chronic 03/25/2019     Priority: Medium     Inflammatory spondylopathy of multiple sites in spine (H) 02/28/2018     Priority: Medium     Other chronic pain 09/10/2017     Priority: Medium     Psoriatic arthritis (HCC) 02/09/2016     Priority: Medium     Dr. Samayoa       Small bowel obstruction (H) 09/23/2015     Priority: Medium     Nausea with vomiting 06/02/2014     Priority: Medium     RUQ abdominal pain 04/11/2014     Priority: Medium     Elevated LFTs 04/11/2014     Priority: Medium     RUQ pain 04/11/2014     Priority: Medium     CMC  DJD(carpometacarpal degenerative joint disease), localized primary 10/18/2013     Priority: Medium     Costochondritis, acute 07/25/2012     Priority: Medium     High risk medications (not anticoagulants) long-term use 10/31/2011     Priority: Medium     Hypothyroid 09/07/2011     Priority: Medium     Surgical:  Past Surgical History:   Procedure Laterality Date     APPENDECTOMY       ARTHROPLASTY CARPOMETACARPAL (THUMB JOINT)  11/8/2013    Procedure: ARTHROPLASTY CARPOMETACARPAL (THUMB JOINT);  Left First Carpometacarpal Trapezium Resection, Tendon Interposition  ;  Surgeon: Luiza Farrar MD;  Location: [facility]     ARTHROPLASTY CARPOMETACARPAL (THUMB JOINT)  12/20/2013    Procedure: ARTHROPLASTY CARPOMETACARPAL (THUMB JOINT);  Right Thumb Trapezium Resection With Flexor Carpi Radialis Tendon Reconstruction       BLEPHAROPLASTY BILATERAL Bilateral 8/4/2020    Procedure: BILATERAL UPPER LID BLEPHAROPLASTY AND;  Surgeon: Xuan Back MD;  Location:  OR     CHOLECYSTECTOMY       COLONOSCOPY  5/6/2014    Procedure: COLONOSCOPY;  Surgeon: Adria San MD;  Location:  GI     ENDOSCOPIC RETROGRADE CHOLANGIOPANCREATOGRAM  6/13/2014    Procedure: ENDOSCOPIC RETROGRADE CHOLANGIOPANCREATOGRAM;  Surgeon: Lianna Wheeler MD;  Location: [facility] OR     GALLBLADDER SURGERY       GYN SURGERY      hysterectomy and oophorectomy     [facility] ENDOSCOPY W EUS Left 6/10/2014    Procedure: COMBINED ENDOSCOPIC ULTRASOUND, ESOPHAGOSCOPY, GASTROSCOPY, DUODENOSCOPY (EGD);  Surgeon: Lianna Wheeler MD;  Location:  GI     HYSTERECTOMY       REPAIR PTOSIS BROW BILATERAL Bilateral 8/4/2020    Procedure: BILATERAL BROW PTOSIS;  Surgeon: Xuan Back MD;  Location:  OR     Right thumb surgery  7/2015     XR SACROILIAC THERAPEUTIC INJECTION BILATERAL  12/2011       FH:  Family History   Problem Relation Age of Onset     Arthritis Father         Psoriatic, psoriasis, lymphoma, colon and prostate  "(prostate primary site)     Cancer Father         Prostate     Arthritis Sister         Rheumatoid     Arthritis Sister         OA, crohns     Arthritis Mother         RA     Cancer Mother         Endometrial     Arthritis Maternal Grandmother         RA       SH:  Social History     Tobacco Use     Smoking status: Never Smoker     Smokeless tobacco: Never Used   Substance Use Topics     Alcohol use: No     Drug use: No        Medications:  Current Outpatient Medications   Medication Instructions     ammonium lactate (LAC-HYDRIN) 12 % external lotion Topical, 2 TIMES DAILY     buprenorphine (BUTRANS) 15 MCG/HR Wk patch Apply 1 patch to skin every week for 28 days. Indications: Moderate to Severe Chronic Pain.     buPROPion (WELLBUTRIN XL) 150 MG 24 hr tablet No dose, route, or frequency recorded.     FLUoxetine (PROZAC) 40 mg, DAILY     folic acid (FOLVITE) 3 mg, Oral, DAILY     HUMIRA *CF* 40 mg, Subcutaneous, EVERY 14 DAYS, (Please keep visit for Oct 7, 2021 for further refills) Thank you     levothyroxine (SYNTHROID/LEVOTHROID) 125 mcg, Oral, DAILY     methotrexate sodium (pres-free) 25 mg, Subcutaneous, EVERY 7 DAYS     methotrexate 20 mg, Subcutaneous, EVERY 7 DAYS, Labs monitoring required every 8-12 weeks for refills     multivitamin, therapeutic (THERA-VIT) TABS 1 tablet, DAILY     Needle, Disp, (BD DISP NEEDLES) 25G X 5/8\" MISC USE to administer  METHOTREXATE UNDER THE SKIN EVERY 7 DAYS     omeprazole (PRILOSEC) 20 mg, Oral, 2 TIMES DAILY     predniSONE (DELTASONE) 15 mg, Oral, DAILY     sennosides (SENOKOT) 8.6 MG tablet 1 tablet, Oral, DAILY     sulfaSALAzine ER (AZULFIDINE EN) 1,000 mg, Oral, 2 TIMES DAILY     ROS:  Otherwise 14 point ROS obtained, reviewed and found negative as otherwise mentioned in HPI.    Answers for HPI/ROS submitted by the patient on 10/4/2021  General Symptoms: No  Skin Symptoms: Yes  HENT Symptoms: No  EYE SYMPTOMS: Yes  HEART SYMPTOMS: No  LUNG SYMPTOMS: No  INTESTINAL SYMPTOMS: " "No  URINARY SYMPTOMS: No  GYNECOLOGIC SYMPTOMS: No  BREAST SYMPTOMS: No  SKELETAL SYMPTOMS: Yes  BLOOD SYMPTOMS: No  NERVOUS SYSTEM SYMPTOMS: Yes  MENTAL HEALTH SYMPTOMS: No  Changes in hair: No  Changes in moles/birth marks: No  Itching: No  Rashes: Yes  Changes in nails: Yes  Acne: No  Hair in places you don't want it: No  Change in facial hair: No  Warts: No  Non-healing sores: Yes  Scarring: No  Flaking of skin: Yes  Color changes of hands/feet in cold : No  Sun sensitivity: No  Skin thickening: No  Eye pain: Yes  Vision loss: No  Dry eyes: Yes  Watery eyes: No  Eye bulging: No  Double vision: No  Flashing of lights: No  Spots: No  Floaters: Yes  Redness: No  Crossed eyes: No  Tunnel Vision: No  Yellowing of eyes: No  Eye irritation: Yes  Back pain: Yes  Muscle aches: Yes  Neck pain: Yes  Swollen joints: Yes  Joint pain: Yes  Bone pain: Yes  Muscle cramps: Yes  Muscle weakness: No  Joint stiffness: Yes  Bone fracture: No  Trouble with coordination: No  Dizziness or trouble with balance: No  Fainting or black-out spells: No  Memory loss: No  Headache: No  Seizures: No  Speech problems: No  Tingling: No  Tremor: No  Weakness: No  Difficulty walking: No  Paralysis: No  Numbness: No      Physical exam:  Vitals: Blood pressure 127/73, pulse 72, height 1.664 m (5' 5.5\"), weight 76.2 kg (168 lb), last menstrual period 03/06/2012, SpO2 95 %, not currently breastfeeding.  Wt Readings from Last 4 Encounters:   04/14/22 76.2 kg (168 lb)   01/25/22 76.2 kg (168 lb)   10/07/21 71.7 kg (158 lb)   02/04/21 73.5 kg (162 lb)     -General: Alert, NAD, answers to questions appropriately.  -HEENT: NC/AT, EOMI, non-icteric sclerae, normochromic conjunctivae. Moist mucous membranes, no oropharyngeal erythema.  -Neck: No masses, no LAD, supple.  -CV: RRR, no murmurs, rubs or gallops. No JVD.  -Resp: CTAB, no crackles, rhonchi or wheezing.  -Abdomen: Soft, nontender, nondistended.  -Extremities: No leg edema. DP 2+ " bilaterally.  -Neuro: No gross motor or sensory deficit.  -Psych: Oriented x3.  -Skin: No nail pitting, alopecia, rash, nodules or lesions.  -MS: The TMJ, neck, shoulder, elbow, wrist, spine, hip, knee, ankle, and foot MTP/IP joints were examined and found normal. No altered joint anatomy other than hammertoes in bilateral feet and subluxation of 2nd-3rd MCPs bilaterally. Full joint ROM.  She has some restriction in range of motion in the neck bilaterally primarily in lateral bending left worse than right, but not as much in flexion, though she may be a little worse than I recall..  -Lymph: no cervical or epitrochlear nodes    Labs/Imaging:  No results found for any visits on 04/14/22.    Problem List:  1. Psoriatic arthritis w/axial involvement activity with history episcleritis, tendonitis, left hamstring tear and uveitis  2. Polyarthropathy    3. Chronic pain management patient   4. Cervical DJD 2/2 underlying inflammatory arthritis, psoriatic   5. Bilateral foot pain   6. R hip OA s/p R hip replacement (9/1/21)    Plan/recommendation:    We had a long discussion about her therapy.  She has been through a great many medicines and she believes that she has done the best with Remicade and with Humira, though her Humira benefit does not last fully through each dosing cycle, and she continues to have enough inflammation that a course of prednisone seems to help her particularly in her axial skeleton.    She however has really relatively few treatment options that she has not already tried.  We did talk about the possible use of Ronaldo kinase inhibition.  The problem she has there is that there is a strong family history of blood clots in her mother.  On balance she has decided to stick with what she is doing as she feels like she can function at this level and she was having difficulty with that on some of the other therapies.    I will get her established with our new nurse practitioner, Giovanna, in the next 4 to 6  months.    ZAKIA Samayoa MD, PhD    Rheumatology

## 2022-04-14 NOTE — NURSING NOTE
Chief Complaint   Patient presents with     Follow Up     6 month f/u     Vitals were taken and medications were reconciled.     Jerilyn Bond RMA  9:29 AM

## 2022-04-14 NOTE — RESULT ENCOUNTER NOTE
Sheldon Rey,    I have had the opportunity to review your recent results and an interpretation is as follows:  Your labs show a significant improvement in your ferritin and iron saturation and seems to responded nicely to your recent infusion    Sincerely,  Thomas Perez MD

## 2022-04-15 NOTE — PROGRESS NOTES
[facility] - Rheumatology Clinic Visit    Krissy Weldon  is a 66 year old here for follow-up.    HPI:  Krissy Weldon is a 66 year old female who presents for follow-up of undifferentiated polyarthropathy, psoriatic arthritis. Complicated by eye inflammation (uveitis and scleritis--sees [facility]), tendonitis, costochondritis, tendon tears, synovitis, dactylitis, OA/DJD jessica thumbs CMC, now toe contractures and laxity all hand/wrist joints, bowel changes (sister with Crohn's and dad with inflammatory bowel), R hip OA s/p R hip replacement (9/1/21).    She was last seen on 3/9/21 and was recommended to taper off prednisone while continuing on methotrexate, SSZ and Humira. In the interim, she had R hip replacement on 9/1/21 without infectious, healing, rehabilitation complications thus far. She reports that the pain is far improved and that she continues to work with PT. Currently, is able to walk ~1.5 miles/day.    She notes that she held Humira about mid July and has continued to hold it until today to make sure that Rheumatology was ok resuming it after her hip surgery. She has continued her methotrexate 20mg weekly on fridays and SSZ for 1 gr/day (she reduced from 1 gr bid due to GI discomfort) and has not noted side effects from these. She notes that now feels that she misses the effect of Humira as she has developed worsened arthralgias especially in bilateral feet, hands, low back with noted swelling, redness, warmth of MCPs, ankles associated with morning stiffness that would last at least 2 hours and may occasionally not entirely resolve throughout the day.    Also reported L eye irritation for the past month as she reports having a scratchy sensation, but no pain, no changes in vision or pain to extraocular movements. She has been in contact with her eye clinic and is currently applying topical prednisone. Otherwise, notes presence of occasional mouth ulcers and noted a new one today and  she is adherent to daily folic acid and will add 1 mg/day to her daily 2mg/day when she develops oral ulcers. Denies new skin rashes, muscle weakness, shortness of breath, cough, chest pain, changes in urination or other symptoms. She has received two doses of COVID vaccine (3/24-4/11/21).      10/22/2019 interval history:   Krissy Weldon is a 64 year old female who presents for follow-up of undifferentiated polyarthropathy, psoriatic arthritis. Complicated by eye inflammation (uveitis and scleritis--sees [place] [facility]), tendonitis, costochondritis, tendon tears, synovitis, dactylitis, OA/DJD jessica thumbs CMC, now toe contractures and laxity all hand/wrist joints, bowel changes (sister with Crohn's and dad with inflammatory bowel). The patient was last evaluated by Miguel Umana on 06/26/2019, at which time there was concern for worsening tendons and increased bowel symptoms despite continuation of secukinumab injections 150 mg every 30 days. A discussion was had about changing to Enbrel 50 mg injections every week starting 30 days after her last injection with plans to stop Cosentyx.     Today, the patient reports that she has symptom exacerbation after around 10 days following her Humira injections. She states that she has been using prednisone off-and-on for these four days leading up to her next injection. She adds that she can live with this, but, as her wife had a double lung transplant, she has been care taking for her and needs to remain active for this. She notes that her last DEXA scan was completed around 5-10 years ago. She states that she continues methotrexate injections 20 mg weekly with 3 mg folic acid daily. She says that Dr. Conti recommended a liver scan due to long-term methotrexate use during their last visit, and she did not complete this as her wife has been very sick.     She states that her hands and feet have been swelling, and she has obtained larger shoes to treat this. She reports that she  "also has had trouble extending her fingers. She also reports psoriatic skin changes on her scalp, arms, and buttocks, but she explains that these have remained mostly unchanged.      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 She states that she could barely walk before this therapy, she did not start steroids following the procedure, and she had relief for around one year.     12/9/2020 (Miguel visit):   Seen Dr. Samayoa in Sept-titrated sulfasalazine to 1 GM BID, right leg numb after Covid-19 (coronavirus) was doing Physical Therapy with the pain clinic for right labrum tear.      Denies any fever, chills, SOB, SANCHEZ, night sweats, or chest pain, high fever, cough, travel in last 14 days or exposure to covid-19 (coronavirus). Reports healthy. Follows CDC guidelines.      No inflammatory eye. Bilateral 2nd MCP swelling red, some in bilateral 3rd MCP and end DIPs, nails splitting. Contractures the same, developed when off arthritis medications during her Covid-19 (coronavirus). This has not improved. Injections in back, right labrum tear very painful.   TSH 1.09 Free 1.13 now on higher synthyoid for 3 weeks-better. No more numb and tingling. Eats well. Bowels are good.  Psoriasis scalp and forehead     Adalimumab (humira) 40 mg injection every 14 days (feels this works but wears off after 10 days \"my joints feel lubricated\", \"amazing\" even sternal pain goes away), off methotrexate since Sept no longer having GI issues like nausea, diarrhea. Dr. Samayoa concerned as her albumin was lower, still is on sulfasalazine . Sulfasalazine  mg takes with daily omeprazole. Tolerating      2/4/2021 interval history:     Since I last saw the patient Miguel has seen her back.  Her most recent note is above.  However the patient has had increasing problems with right hip pain over several months.  Because of this " she had an injection in the pain clinic on December 17 and although she got a temporary response to the lidocaine, she had virtually no response longer-term to the steroid.     Because of that she recently saw Dr. Montesinos in [place].  He reviewed things and did an MRI of her hip which I do not have available to review.  He did feel that she was a candidate for hip replacement.  She saw him in particular because she would like to consider having an anterior approach done and he specializes in this.     He did ask her to consult with us about what she would want to do with her medicines with respect to a hip surgery.     She is up to a more therapeutic dose on sulfasalazine at 1 g twice daily and she feels that that has been quite helpful globally outside of the hip.  It helps her overall spine pain and stiffness including throughout her neck and lower back.  She feels much more mobile there.  She has less pain globally in the small joints of the hands and feet as well although her feet are still a problem particularly with metatarsal pain.  She does have about an hour of morning stiffness.     As noted above in the prior notes that she feels that Humira is quite helpful but over the last year, it starts to wear off around day 10 of the injection.  As noted in Miguel's note above she has had trouble tolerating oral methotrexate,  So we just recently set up injectable methotrexate for her.  She has however not started using it yet.  It was prescribed a dose of 10 mg once weekly.     She is also wondering if she could at least temporarily use prednisone which is always been helpful for her.  [place] like to go [facility] but that has been definitely painful for her recently and she does not feel she can do it as she is right now.  This is primarily because of the hip problems however.    3/9/2021 interval history:  The patient is seeing me today just a little over a month since she  saw me previously.  This was the originally scheduled appointment but because she was doing poorly we saw her then.     She actually has now tapered her prednisone down to 5 mg a day with a weekly taper going from 15 to 10-7.5 and now to 5 mg daily.  She is much better after the taper though she is feeling a little worse particularly in her hips than she was at 10 mg daily.  She is able to walk about a half a mile now but is still limited by pain.     She is also been dose escalating methotrexate to 0.6 mg once weekly.  We did discuss with her that further dose escalation to 0.8 mg once weekly is probably reasonable based on her historical tolerance of the drug and good laboratory values.     Physical examination by video today shows her to be in no acute distress.  She has good range of motion in her hands.  The main remainder of the joint examination is deferred however.    April 14, 2022 interval history:    Since his last seen the patient she has been trying to taper off of her narcotic.  She is completely off of fentanyl patches and has her oxycodone tablets down to 4 daily.  Unfortunately that has increased her pain and at 1 point her back pain was bad enough she went on a brief prednisone taper going from 10 mg down by 2.5 mg every few days over about 2 weeks.  She finished that about 6 weeks ago and that really helped her a lot.  Things have not been as bad since, but she is having somewhat more pain.    She is convinced however that the Humira continues to help her although she is worse for the last 2 to 4 days of each dosing cycle.  She was off of it for about 2 months in September when she had a total hip replacement and she was much worse with respect to all of her axial pain and to a lesser extent her peripheral joints as well.    She has been noticing a lot of cracking over the skin of her fingertips over the winter.  Her hands to get really cold but not a lot of color change consistent with Raynauds  phenomenon.    The patient does raise the issue that her sister has been tested for alpha-1 antitrypsin deficiency and found to have that.  Her sister has had a lot of lung disease.  The patient herself has not although she has had pancreatitis several times but the last time was many years ago.  We discussed this in some detail and for the time being she decided that she would prefer to defer on testing.        Background History:   (-SSa, -SSb, -CCP, HLA-B27 negative on outside workup with previous SI injections) with hx- inflammatory eye left eye episcleritis requiring prednisone gtt or oral with bone scan unrevealing. Previous medrol or steroid responsiveness. Past tried Humira, SSZ, Simponi SQ/IV, remicade and Otezla. Failed Humira EoW recurrent episcleritis, right wrist, right SI, bilateral hamstrings tendinosis with partial tear bilaterally. Failed Simponi sq/IV ineffective. SSZ and oral MTX. Past recurrent iritis (ER if eye pain, blurred vision, red eye). Remicade. Otezla. LLL pneumonia 3/2015. MRI L hip showed high grade tear gluteus minimus insertion site, effusion 4/2015. C/o neck issues with MRI cervical spine show DJD, EMG, paramedian osteophytes and disk protrusion at C3-C4 with some moderate central canal stenosis and moderate to severe right-sided foraminal stenosis as a consequence. C5-C6 also showed some mild to moderate central canal stenosis and moderate bilateral central foraminal stenosis. Seen Dr. Wheeler with significant improvement in GI issues pancreatic sphincter dysfunction requiring surgery now on pancreatic enzymes after pancreatic duct is open. Failed certolizumab pegol (Cimzia) lost effectiveness (more uveitis, arthritis, synovitis, bursitis and tendonitis, strept and CAP). Secukinumab (cosentyx) 2-2019 more laxity in joints hands/wrists, inflammatory eye, toe contractures, changes in skin stopped then changed to etanercept (Enbrel) 6-. Switched to adalimumab (Humira) and  switched from PO to subcutaneous methotrexate and SSZ during 2021. Had R hip replacement and held Humira from mid July to 10/7/21 with worsening eye and articular symptoms.        PMH:  Past Medical History:   Diagnosis Date     Acute meniscal tear of knee 1/2014    with chrondromalacia per MRI      Depression      DJD (degenerative joint disease), lumbar 7/2012    L4-5, L5-S1 per MRI      Episcleritis 12/14/2011     Hamstring tendonitis at origin 7/2012    Bilaterally with partial tear right, sacroilitis per MRI     Hypothyroid 9/7/2011     Hypothyroidism      PONV (postoperative nausea and vomiting)      Psoriatic arthritis (H) 9/7/2011     Psoriatic arthritis (H) 2/9/2016     Strep throat 04/2017     Patient Active Problem List    Diagnosis Date Noted     Anemia, iron deficiency 01/28/2022     Priority: Medium     Methotrexate, long term, current use 10/20/2021     Priority: Medium     Adalimumab (Humira) long-term use 10/20/2021     Priority: Medium     Pain in joint, pelvic region and thigh, unspecified laterality 10/20/2021     Priority: Medium     Neck pain, chronic 03/25/2019     Priority: Medium     Inflammatory spondylopathy of multiple sites in spine (H) 02/28/2018     Priority: Medium     Other chronic pain 09/10/2017     Priority: Medium     Psoriatic arthritis (HCC) 02/09/2016     Priority: Medium     Dr. Samayoa       Small bowel obstruction (H) 09/23/2015     Priority: Medium     Nausea with vomiting 06/02/2014     Priority: Medium     RUQ abdominal pain 04/11/2014     Priority: Medium     Elevated LFTs 04/11/2014     Priority: Medium     RUQ pain 04/11/2014     Priority: Medium     CMC DJD(carpometacarpal degenerative joint disease), localized primary 10/18/2013     Priority: Medium     Costochondritis, acute 07/25/2012     Priority: Medium     High risk medications (not anticoagulants) long-term use 10/31/2011     Priority: Medium     Hypothyroid 09/07/2011     Priority: Medium     Surgical:  Past  Surgical History:   Procedure Laterality Date     APPENDECTOMY       ARTHROPLASTY CARPOMETACARPAL (THUMB JOINT)  11/8/2013    Procedure: ARTHROPLASTY CARPOMETACARPAL (THUMB JOINT);  Left First Carpometacarpal Trapezium Resection, Tendon Interposition  ;  Surgeon: Luiza Farrar MD;  [facility]: [facility]     ARTHROPLASTY CARPOMETACARPAL (THUMB JOINT)  12/20/2013    Procedure: ARTHROPLASTY CARPOMETACARPAL (THUMB JOINT);  Right Thumb Trapezium Resection With Flexor Carpi Radialis Tendon Reconstruction       BLEPHAROPLASTY BILATERAL Bilateral 8/4/2020    Procedure: BILATERAL UPPER LID BLEPHAROPLASTY AND;  Surgeon: Xuan Back MD;  Location:  OR     CHOLECYSTECTOMY       COLONOSCOPY  5/6/2014    Procedure: COLONOSCOPY;  Surgeon: Adria San MD;  Location:  GI     ENDOSCOPIC RETROGRADE CHOLANGIOPANCREATOGRAM  6/13/2014    Procedure: ENDOSCOPIC RETROGRADE CHOLANGIOPANCREATOGRAM;  Surgeon: Lianna Wheeler MD;  Location: [facility] OR     GALLBLADDER SURGERY       GYN SURGERY      hysterectomy and oophorectomy     [facility] ENDOSCOPY W EUS Left 6/10/2014    Procedure: COMBINED ENDOSCOPIC ULTRASOUND, ESOPHAGOSCOPY, GASTROSCOPY, DUODENOSCOPY (EGD);  Surgeon: Lianna Wheeler MD;  Location: [facility] GI     HYSTERECTOMY       REPAIR PTOSIS BROW BILATERAL Bilateral 8/4/2020    Procedure: BILATERAL BROW PTOSIS;  Surgeon: Xuan Back MD;  Location:  OR     Right thumb surgery  7/2015     XR SACROILIAC THERAPEUTIC INJECTION BILATERAL  12/2011       FH:  Family History   Problem Relation Age of Onset     Arthritis Father         Psoriatic, psoriasis, lymphoma, colon and prostate (prostate primary site)     Cancer Father         Prostate     Arthritis Sister         Rheumatoid     Arthritis Sister         OA, crohns     Arthritis Mother         RA     Cancer Mother         Endometrial     Arthritis Maternal Grandmother         RA       SH:  Social History     Tobacco Use     Smoking status:  "Never Smoker     Smokeless tobacco: Never Used   Substance Use Topics     Alcohol use: No     Drug use: No        Medications:  Current Outpatient Medications   Medication Instructions     ammonium lactate (LAC-HYDRIN) 12 % external lotion Topical, 2 TIMES DAILY     buprenorphine (BUTRANS) 15 MCG/HR Wk patch Apply 1 patch to skin every week for 28 days. Indications: Moderate to Severe Chronic Pain.     buPROPion (WELLBUTRIN XL) 150 MG 24 hr tablet No dose, route, or frequency recorded.     FLUoxetine (PROZAC) 40 mg, DAILY     folic acid (FOLVITE) 3 mg, Oral, DAILY     HUMIRA *CF* 40 mg, Subcutaneous, EVERY 14 DAYS, (Please keep visit for Oct 7, 2021 for further refills) Thank you     levothyroxine (SYNTHROID/LEVOTHROID) 125 mcg, Oral, DAILY     methotrexate sodium (pres-free) 25 mg, Subcutaneous, EVERY 7 DAYS     methotrexate 20 mg, Subcutaneous, EVERY 7 DAYS, Labs monitoring required every 8-12 weeks for refills     multivitamin, therapeutic (THERA-VIT) TABS 1 tablet, DAILY     Needle, Disp, (BD DISP NEEDLES) 25G X 5/8\" MISC USE to administer  METHOTREXATE UNDER THE SKIN EVERY 7 DAYS     omeprazole (PRILOSEC) 20 mg, Oral, 2 TIMES DAILY     predniSONE (DELTASONE) 15 mg, Oral, DAILY     sennosides (SENOKOT) 8.6 MG tablet 1 tablet, Oral, DAILY     sulfaSALAzine ER (AZULFIDINE EN) 1,000 mg, Oral, 2 TIMES DAILY     ROS:  Otherwise 14 point ROS obtained, reviewed and found negative as otherwise mentioned in HPI.    Answers for HPI/ROS submitted by the patient on 10/4/2021  General Symptoms: No  Skin Symptoms: Yes  HENT Symptoms: No  EYE SYMPTOMS: Yes  HEART SYMPTOMS: No  LUNG SYMPTOMS: No  INTESTINAL SYMPTOMS: No  URINARY SYMPTOMS: No  GYNECOLOGIC SYMPTOMS: No  BREAST SYMPTOMS: No  SKELETAL SYMPTOMS: Yes  BLOOD SYMPTOMS: No  NERVOUS SYSTEM SYMPTOMS: Yes  MENTAL HEALTH SYMPTOMS: No  Changes in hair: No  Changes in moles/birth marks: No  Itching: No  Rashes: Yes  Changes in nails: Yes  Acne: No  Hair in places you don't " "want it: No  Change in facial hair: No  Warts: No  Non-healing sores: Yes  Scarring: No  Flaking of skin: Yes  Color changes of hands/feet in cold : No  Sun sensitivity: No  Skin thickening: No  Eye pain: Yes  Vision loss: No  Dry eyes: Yes  Watery eyes: No  Eye bulging: No  Double vision: No  Flashing of lights: No  Spots: No  Floaters: Yes  Redness: No  Crossed eyes: No  Tunnel Vision: No  Yellowing of eyes: No  Eye irritation: Yes  Back pain: Yes  Muscle aches: Yes  Neck pain: Yes  Swollen joints: Yes  Joint pain: Yes  Bone pain: Yes  Muscle cramps: Yes  Muscle weakness: No  Joint stiffness: Yes  Bone fracture: No  Trouble with coordination: No  Dizziness or trouble with balance: No  Fainting or black-out spells: No  Memory loss: No  Headache: No  Seizures: No  Speech problems: No  Tingling: No  Tremor: No  Weakness: No  Difficulty walking: No  Paralysis: No  Numbness: No      Physical exam:  Vitals: Blood pressure 127/73, pulse 72, height 1.664 m (5' 5.5\"), weight 76.2 kg (168 lb), last menstrual period 03/06/2012, SpO2 95 %, not currently breastfeeding.  Wt Readings from Last 4 Encounters:   04/14/22 76.2 kg (168 lb)   01/25/22 76.2 kg (168 lb)   10/07/21 71.7 kg (158 lb)   02/04/21 73.5 kg (162 lb)     -General: Alert, NAD, answers to questions appropriately.  -HEENT: NC/AT, EOMI, non-icteric sclerae, normochromic conjunctivae. Moist mucous membranes, no oropharyngeal erythema.  -Neck: No masses, no LAD, supple.  -CV: RRR, no murmurs, rubs or gallops. No JVD.  -Resp: CTAB, no crackles, rhonchi or wheezing.  -Abdomen: Soft, nontender, nondistended.  -Extremities: No leg edema. DP 2+ bilaterally.  -Neuro: No gross motor or sensory deficit.  -Psych: Oriented x3.  -Skin: No nail pitting, alopecia, rash, nodules or lesions.  -MS: The TMJ, neck, shoulder, elbow, wrist, spine, hip, knee, ankle, and foot MTP/IP joints were examined and found normal. No altered joint anatomy other than hammertoes in bilateral feet and " subluxation of 2nd-3rd MCPs bilaterally. Full joint ROM.  She has some restriction in range of motion in the neck bilaterally primarily in lateral bending left worse than right, but not as much in flexion, though she may be a little worse than I recall..  -Lymph: no cervical or epitrochlear nodes    Labs/Imaging:  No results found for any visits on 04/14/22.    Problem List:  1. Psoriatic arthritis w/axial involvement activity with history episcleritis, tendonitis, left hamstring tear and uveitis  2. Polyarthropathy    3. Chronic pain management patient   4. Cervical DJD 2/2 underlying inflammatory arthritis, psoriatic   5. Bilateral foot pain   6. R hip OA s/p R hip replacement (9/1/21)    Plan/recommendation:    We had a long discussion about her therapy.  She has been through a great many medicines and she believes that she has done the best with Remicade and with Humira, though her Humira benefit does not last fully through each dosing cycle, and she continues to have enough inflammation that a course of prednisone seems to help her particularly in her axial skeleton.    She however has really relatively few treatment options that she has not already tried.  We did talk about the possible use of Ronaldo kinase inhibition.  The problem she has there is that there is a strong family history of blood clots in her mother.  On balance she has decided to stick with what she is doing as she feels like she can function at this level and she was having difficulty with that on some of the other therapies.    I will get her established with our new nurse practitioner, Giovanna, in the next 4 to 6 months.    ZAKIA Samayoa MD, PhD    Rheumatology

## 2022-06-03 ENCOUNTER — TRANSFERRED RECORDS (OUTPATIENT)
Dept: HEALTH INFORMATION MANAGEMENT | Facility: CLINIC | Age: 67
End: 2022-06-03
Payer: COMMERCIAL

## 2022-07-13 ENCOUNTER — HOSPITAL ENCOUNTER (OUTPATIENT)
Facility: CLINIC | Age: 67
Discharge: HOME | End: 2022-07-13
Attending: COLON & RECTAL SURGERY | Admitting: COLON & RECTAL SURGERY
Payer: COMMERCIAL

## 2022-07-13 VITALS
RESPIRATION RATE: 18 BRPM | DIASTOLIC BLOOD PRESSURE: 63 MMHG | HEIGHT: 65 IN | BODY MASS INDEX: 26.99 KG/M2 | WEIGHT: 162 LBS | SYSTOLIC BLOOD PRESSURE: 103 MMHG | HEART RATE: 82 BPM | OXYGEN SATURATION: 98 %

## 2022-07-13 LAB — COLONOSCOPY: NORMAL

## 2022-07-13 PROCEDURE — 250N000011 HC RX IP 250 OP 636: Performed by: COLON & RECTAL SURGERY

## 2022-07-13 PROCEDURE — 99153 MOD SED SAME PHYS/QHP EA: CPT | Performed by: COLON & RECTAL SURGERY

## 2022-07-13 PROCEDURE — G0500 MOD SEDAT ENDO SERVICE >5YRS: HCPCS | Performed by: COLON & RECTAL SURGERY

## 2022-07-13 PROCEDURE — 45378 DIAGNOSTIC COLONOSCOPY: CPT | Performed by: COLON & RECTAL SURGERY

## 2022-07-13 RX ORDER — NALOXONE HYDROCHLORIDE 0.4 MG/ML
0.4 INJECTION, SOLUTION INTRAMUSCULAR; INTRAVENOUS; SUBCUTANEOUS
Status: DISCONTINUED | OUTPATIENT
Start: 2022-07-13 | End: 2022-07-13 | Stop reason: HOSPADM

## 2022-07-13 RX ORDER — ATROPINE SULFATE 0.1 MG/ML
1 INJECTION INTRAVENOUS
Status: DISCONTINUED | OUTPATIENT
Start: 2022-07-13 | End: 2022-07-13 | Stop reason: HOSPADM

## 2022-07-13 RX ORDER — EPINEPHRINE 1 MG/ML
0.1 INJECTION, SOLUTION INTRAMUSCULAR; SUBCUTANEOUS
Status: DISCONTINUED | OUTPATIENT
Start: 2022-07-13 | End: 2022-07-13 | Stop reason: HOSPADM

## 2022-07-13 RX ORDER — NALOXONE HYDROCHLORIDE 0.4 MG/ML
0.2 INJECTION, SOLUTION INTRAMUSCULAR; INTRAVENOUS; SUBCUTANEOUS
Status: DISCONTINUED | OUTPATIENT
Start: 2022-07-13 | End: 2022-07-13 | Stop reason: HOSPADM

## 2022-07-13 RX ORDER — PROCHLORPERAZINE MALEATE 5 MG
5 TABLET ORAL EVERY 6 HOURS PRN
Status: DISCONTINUED | OUTPATIENT
Start: 2022-07-13 | End: 2022-07-13 | Stop reason: HOSPADM

## 2022-07-13 RX ORDER — ONDANSETRON 2 MG/ML
4 INJECTION INTRAMUSCULAR; INTRAVENOUS EVERY 6 HOURS PRN
Status: DISCONTINUED | OUTPATIENT
Start: 2022-07-13 | End: 2022-07-13 | Stop reason: HOSPADM

## 2022-07-13 RX ORDER — FLUMAZENIL 0.1 MG/ML
0.2 INJECTION, SOLUTION INTRAVENOUS
Status: DISCONTINUED | OUTPATIENT
Start: 2022-07-13 | End: 2022-07-13 | Stop reason: HOSPADM

## 2022-07-13 RX ORDER — DIPHENHYDRAMINE HYDROCHLORIDE 50 MG/ML
25-50 INJECTION INTRAMUSCULAR; INTRAVENOUS
Status: DISCONTINUED | OUTPATIENT
Start: 2022-07-13 | End: 2022-07-13 | Stop reason: HOSPADM

## 2022-07-13 RX ORDER — LIDOCAINE 40 MG/G
CREAM TOPICAL
Status: DISCONTINUED | OUTPATIENT
Start: 2022-07-13 | End: 2022-07-13 | Stop reason: HOSPADM

## 2022-07-13 RX ORDER — FENTANYL CITRATE 0.05 MG/ML
50-100 INJECTION, SOLUTION INTRAMUSCULAR; INTRAVENOUS EVERY 5 MIN PRN
Status: DISCONTINUED | OUTPATIENT
Start: 2022-07-13 | End: 2022-07-13 | Stop reason: HOSPADM

## 2022-07-13 RX ORDER — SIMETHICONE 40MG/0.6ML
133 SUSPENSION, DROPS(FINAL DOSAGE FORM)(ML) ORAL
Status: DISCONTINUED | OUTPATIENT
Start: 2022-07-13 | End: 2022-07-13 | Stop reason: HOSPADM

## 2022-07-13 RX ORDER — ONDANSETRON 4 MG/1
4 TABLET, ORALLY DISINTEGRATING ORAL EVERY 6 HOURS PRN
Status: DISCONTINUED | OUTPATIENT
Start: 2022-07-13 | End: 2022-07-13 | Stop reason: HOSPADM

## 2022-07-13 RX ORDER — ONDANSETRON 2 MG/ML
4 INJECTION INTRAMUSCULAR; INTRAVENOUS
Status: DISCONTINUED | OUTPATIENT
Start: 2022-07-13 | End: 2022-07-13 | Stop reason: HOSPADM

## 2022-07-13 RX ADMIN — MIDAZOLAM HYDROCHLORIDE 2 MG: 1 INJECTION, SOLUTION INTRAMUSCULAR; INTRAVENOUS at 14:55

## 2022-07-13 RX ADMIN — FENTANYL CITRATE 100 MCG: 50 INJECTION INTRAMUSCULAR; INTRAVENOUS at 14:55

## 2022-07-13 RX ADMIN — FENTANYL CITRATE 50 MCG: 50 INJECTION INTRAMUSCULAR; INTRAVENOUS at 15:06

## 2022-07-13 RX ADMIN — MIDAZOLAM HYDROCHLORIDE 1 MG: 1 INJECTION, SOLUTION INTRAMUSCULAR; INTRAVENOUS at 15:10

## 2022-07-13 RX ADMIN — MIDAZOLAM HYDROCHLORIDE 1 MG: 1 INJECTION, SOLUTION INTRAMUSCULAR; INTRAVENOUS at 15:06

## 2022-07-13 NOTE — DISCHARGE INSTRUCTIONS

## 2022-07-13 NOTE — H&P
"Pre-Endoscopy History and Physical   Krissy Weldon MRN# [id number]   YOB: 1955 Age: 67 year old   Date of Procedure: 7/13/2022   Primary care provider: Deshawn Burns   Type of Endoscopy: colonoscopy   Reason for Procedure: fecal incontinence, anemia   Type of Anesthesia Anticipated: Moderate Sedation   HPI:   Krissy is a 67 year old female with new fecal incontinence and anemia.  She last had a colonoscopy in 2014 that was normal.  She denies BRBPR, abdominal pain, nausea/vomiting or unintentional weight loss.  Her father had colon cancer in his 70s.    No Known Allergies   Prior to Admission Medications   Prescriptions Last Dose Informant Patient Reported? Taking?   FLUoxetine (PROZAC) 40 MG capsule 7/13/2022 at Unknown time Self Yes Yes   Sig: Take 40 mg by mouth daily.   Needle, Disp, ([employer] DISP NEEDLES) 25G X 5/8\" MISC   No No   Sig: USE to administer  METHOTREXATE UNDER THE SKIN EVERY 7 DAYS   adalimumab (HUMIRA *CF*) 40 MG/0.4ML prefilled syringe kit   No No   Sig: INJECT THE CONTENTS OF 1 SYRINGE UNDER THE SKIN EVERY 14 DAYS   ammonium lactate (LAC-HYDRIN) 12 % external lotion   No No   Sig: Apply topically 2 times daily   Patient taking differently: Apply topically daily   buPROPion (WELLBUTRIN XL) 150 MG 24 hr tablet 7/13/2022 at Unknown time  Yes Yes   folic acid (FOLVITE) 1 MG tablet 7/13/2022 at Unknown time  No Yes   Sig: Take 4 tablets (4 mg) by mouth daily   levothyroxine (SYNTHROID/LEVOTHROID) 125 MCG tablet 7/13/2022 at Unknown time  Yes Yes   Sig: Take 125 mcg by mouth daily   methotrexate 50 MG/2ML injection   No No   Sig: Inject 0.7 mLs (17.5 mg) Subcutaneous every 7 days Labs monitoring required every 8-12 weeks for refills   multivitamin, therapeutic (THERA-VIT) TABS  Self Yes No   Sig: Take 1 tablet by mouth daily   omeprazole (PRILOSEC) 20 MG DR capsule Not Taking at Unknown time  No No   Sig: Take 1 capsule (20 mg) by mouth 2 times daily   Patient not taking: Reported on " "7/13/2022   oxyCODONE (ROXICODONE) 5 MG tablet   Yes No   Sig: Take 5 mg by mouth every 4 hours as needed   sennosides (SENOKOT) 8.6 MG tablet   Yes No   Sig: Take 1 tablet by mouth daily    sulfaSALAzine ER (AZULFIDINE EN) 500 MG EC tablet Not Taking at Unknown time  No No   Sig: Take 2 tablets (1,000 mg) by mouth 2 times daily   Patient not taking: Reported on 7/13/2022   syringe/needle, sisp, 25G X 5/8\" 1 ML MISC   No No   Sig: Inject 1 Syringe Subcutaneous every 7 days Use with Methotrexate      Facility-Administered Medications: None      Patient Active Problem List   Diagnosis     Hypothyroid     High risk medications (not anticoagulants) long-term use     Costochondritis, acute     CMC DJD(carpometacarpal degenerative joint disease), localized primary     RUQ abdominal pain     Elevated LFTs     RUQ pain     Nausea with vomiting     Small bowel obstruction (H)     Psoriatic arthritis (HCC)     Other chronic pain     Inflammatory spondylopathy of multiple sites in spine (H)     Neck pain, chronic     Methotrexate, long term, current use     Adalimumab (Humira) long-term use     Pain in joint, pelvic region and thigh, unspecified laterality     Anemia, iron deficiency      Past Medical History:   Diagnosis Date     Acute meniscal tear of knee 1/2014    with chrondromalacia per MRI      Depression      DJD (degenerative joint disease), lumbar 7/2012    L4-5, L5-S1 per MRI      Episcleritis 12/14/2011     Hamstring tendonitis at origin 7/2012    Bilaterally with partial tear right, sacroilitis per MRI     Hypothyroid 9/7/2011     Hypothyroidism      PONV (postoperative nausea and vomiting)      Psoriatic arthritis (H) 9/7/2011     Psoriatic arthritis (H) 2/9/2016     Strep throat 04/2017      Past Surgical History:   Procedure Laterality Date     APPENDECTOMY       ARTHROPLASTY CARPOMETACARPAL (THUMB JOINT)  11/8/2013    Procedure: ARTHROPLASTY CARPOMETACARPAL (THUMB JOINT);  Left First Carpometacarpal Trapezium " "Resection, Tendon Interposition  ;  Surgeon: Luiza Farrar MD;  [facility]     ARTHROPLASTY CARPOMETACARPAL (THUMB JOINT)  12/20/2013    Procedure: ARTHROPLASTY CARPOMETACARPAL (THUMB JOINT);  Right Thumb Trapezium Resection With Flexor Carpi Radialis Tendon Reconstruction       BLEPHAROPLASTY BILATERAL Bilateral 8/4/2020    Procedure: BILATERAL UPPER LID BLEPHAROPLASTY AND;  Surgeon: Xuan Back MD;  Location:  OR     CHOLECYSTECTOMY       COLONOSCOPY  5/6/2014    Procedure: COLONOSCOPY;  Surgeon: Adria San MD;  Location:  GI     ENDOSCOPIC RETROGRADE CHOLANGIOPANCREATOGRAM  6/13/2014    Procedure: ENDOSCOPIC RETROGRADE CHOLANGIOPANCREATOGRAM;  Surgeon: Lianna Wheeler MD;  Location: [facility]     GALLBLADDER SURGERY       GYN SURGERY      hysterectomy and oophorectomy     [facility] ENDOSCOPY W EUS Left 6/10/2014    Procedure: COMBINED ENDOSCOPIC ULTRASOUND, ESOPHAGOSCOPY, GASTROSCOPY, DUODENOSCOPY (EGD);  Surgeon: Lianna Wheeler MD;  Location: [facility] GI     HYSTERECTOMY       REPAIR PTOSIS BROW BILATERAL Bilateral 8/4/2020    Procedure: BILATERAL BROW PTOSIS;  Surgeon: Xuan Back MD;  Location:  OR     Right thumb surgery  7/2015     XR SACROILIAC THERAPEUTIC INJECTION BILATERAL  12/2011      Social History     Tobacco Use     Smoking status: Never Smoker     Smokeless tobacco: Never Used   Substance Use Topics     Alcohol use: No      Family History   Problem Relation Age of Onset     Arthritis Father         Psoriatic, psoriasis, lymphoma, colon and prostate (prostate primary site)     Cancer Father         Prostate     Arthritis Sister         Rheumatoid     Arthritis Sister         OA, crohns     Arthritis Mother         RA     Cancer Mother         Endometrial     Arthritis Maternal Grandmother         RA      PHYSICAL EXAM:   /63   Pulse 81   Resp 12   Ht 1.651 m (5' 5\")   Wt 73.5 kg (162 lb)   LMP 03/06/2012   SpO2 99%   BMI 26.96 " "kg/m   Estimated body mass index is 26.96 kg/m  as calculated from the following:    Height as of this encounter: 1.651 m (5' 5\").    Weight as of this encounter: 73.5 kg (162 lb).   RESP: lungs clear to auscultation - no rales, rhonchi or wheezes   CV: regular rates and rhythm   ASA Class 2 - Mild systemic disease    Assessment: 66 y/o woman with fecal incontinence and anemia    Plan: Colonoscopy with moderate sedation.  Risks of the procedure were discussed including, but not limited to, bleeding, perforation and missed lesions.  Patient understands and is willing to proceed.    Dean Bro MD ....................  7/13/2022   2:55 PM  Colon and Rectal Surgery Staff  [phone]    "

## 2022-08-02 DIAGNOSIS — R29.898 HAND DYSFUNCTION: ICD-10-CM

## 2022-08-02 DIAGNOSIS — L40.9 PSORIASIS: ICD-10-CM

## 2022-08-02 DIAGNOSIS — L40.50 PSORIATIC ARTHRITIS (H): ICD-10-CM

## 2022-08-02 NOTE — TELEPHONE ENCOUNTER
Medication/Dose: methotrexate 50 MG/2ML injection    Last Written : 4/14/22  Last Quantity: 5 mL, # refills: 2  Last Office Visit :  4/14/22  Pending appointment: None       WBC   Date Value Ref Range Status   02/05/2021 6.2 4.0 - 11.0 10e9/L Final     WBC Count   Date Value Ref Range Status   04/11/2022 4.7 4.0 - 11.0 10e3/uL Final     RBC Count   Date Value Ref Range Status   04/11/2022 4.51 3.80 - 5.20 10e6/uL Final   02/05/2021 4.41 3.8 - 5.2 10e12/L Final     Hemoglobin   Date Value Ref Range Status   04/11/2022 12.8 11.7 - 15.7 g/dL Final   02/05/2021 12.7 11.7 - 15.7 g/dL Final     Hematocrit   Date Value Ref Range Status   04/11/2022 39.9 35.0 - 47.0 % Final   02/05/2021 39.6 35.0 - 47.0 % Final     MCV   Date Value Ref Range Status   04/11/2022 89 78 - 100 fL Final   02/05/2021 90 78 - 100 fl Final     MCH   Date Value Ref Range Status   04/11/2022 28.4 26.5 - 33.0 pg Final   02/05/2021 28.8 26.5 - 33.0 pg Final     MCHC   Date Value Ref Range Status   04/11/2022 32.1 31.5 - 36.5 g/dL Final   02/05/2021 32.1 31.5 - 36.5 g/dL Final     RDW   Date Value Ref Range Status   04/11/2022 21.2 (H) 10.0 - 15.0 % Final   02/05/2021 14.8 10.0 - 15.0 % Final     Platelet Count   Date Value Ref Range Status   04/11/2022 266 150 - 450 10e3/uL Final   02/05/2021 287 150 - 450 10e9/L Final     AST   Date Value Ref Range Status   04/11/2022 26 0 - 45 U/L Final   02/05/2021 18 0 - 45 U/L Final     ALT   Date Value Ref Range Status   04/11/2022 48 0 - 50 U/L Final   02/05/2021 22 0 - 50 U/L Final     Creatinine   Date Value Ref Range Status   04/11/2022 0.70 0.52 - 1.04 mg/dL Final   06/22/2021 0.72 0.57 - 100 mg/dL Final     Albumin   Date Value Ref Range Status   04/11/2022 3.7 3.4 - 5.0 g/dL Final   06/22/2021 4.2 3.8 - 4.8 g/dL Final     CRP Inflammation   Date Value Ref Range Status   04/11/2022 7.7 0.0 - 8.0 mg/L Final   02/05/2021 <2.9 0.0 - 8.0 mg/L Final     No components found for: ESR      Prescription set up and  routed to provider per Refill Protocol.    Matt Hogan, CLEMENTN, RN

## 2022-08-03 RX ORDER — METHOTREXATE 25 MG/ML
17.5 INJECTION, SOLUTION INTRA-ARTERIAL; INTRAMUSCULAR; INTRAVENOUS
Qty: 5 ML | Refills: 2 | Status: SHIPPED | OUTPATIENT
Start: 2022-08-03 | End: 2022-12-22

## 2022-08-07 ENCOUNTER — HEALTH MAINTENANCE LETTER (OUTPATIENT)
Age: 67
End: 2022-08-07

## 2022-08-24 NOTE — PROGRESS NOTES
"Krissy Weldon is a 65 year old female who is being evaluated via a billable video visit.      The patient has been notified of following:     \"This video visit will be conducted via a call between you and your physician/provider. We have found that certain health care needs can be provided without the need for an in-person physical exam.  This service lets us provide the care you need with a video conversation.  If a prescription is necessary we can send it directly to your pharmacy.  If lab work is needed we can place an order for that and you can then stop by our lab to have the test done at a later time.    Video visits are billed at different rates depending on your insurance coverage.  Please reach out to your insurance provider with any questions.    If during the course of the call the physician/provider feels a video visit is not appropriate, you will not be charged for this service.\"    Patient has given verbal consent for Video visit? Yes  How would you like to obtain your AVS? [employer]  Patient would like the video invitation sent by: Using [employer]  Will anyone else be joining your video visit? No        Video-Visit Details    Type of service:  Video Visit    Video Start Time: 11:10 PM  Video End Time: 11:30 PM  Total time 20 min, face to face counseling on options for her psoriatic arthritis, risks, benefits and concern long-term loss function with new deformities.     Originating Location (pt. Location): Home    Distant Location (provider location):  [facility] RHEUMATOLOG[facility]     Platform used for Video Visit: MARIANNA Hernandez CNP    " Fundus up and diplaced to right with fundal check. Large clot out and chux pad
75% saturated. Weighed blood loss 487. Dr. Kovacs notified, see physician
notification.

## 2022-10-19 ENCOUNTER — OFFICE VISIT (OUTPATIENT)
Dept: FAMILY MEDICINE | Facility: CLINIC | Age: 67
End: 2022-10-19
Payer: COMMERCIAL

## 2022-10-19 VITALS
BODY MASS INDEX: 27.72 KG/M2 | OXYGEN SATURATION: 96 % | WEIGHT: 166.4 LBS | HEIGHT: 65 IN | HEART RATE: 84 BPM | DIASTOLIC BLOOD PRESSURE: 79 MMHG | RESPIRATION RATE: 12 BRPM | SYSTOLIC BLOOD PRESSURE: 121 MMHG | TEMPERATURE: 96.9 F

## 2022-10-19 DIAGNOSIS — E03.9 HYPOTHYROIDISM, UNSPECIFIED TYPE: ICD-10-CM

## 2022-10-19 DIAGNOSIS — D50.8 IRON DEFICIENCY ANEMIA SECONDARY TO INADEQUATE DIETARY IRON INTAKE: Primary | ICD-10-CM

## 2022-10-19 DIAGNOSIS — Z13.6 CARDIOVASCULAR SCREENING; LDL GOAL LESS THAN 160: ICD-10-CM

## 2022-10-19 LAB
ERYTHROCYTE [DISTWIDTH] IN BLOOD BY AUTOMATED COUNT: 14.2 % (ref 10–15)
HCT VFR BLD AUTO: 43.3 % (ref 35–47)
HGB BLD-MCNC: 14.3 G/DL (ref 11.7–15.7)
IRON SATN MFR SERPL: 22 % (ref 15–46)
IRON SERPL-MCNC: 65 UG/DL (ref 35–180)
MCH RBC QN AUTO: 30.8 PG (ref 26.5–33)
MCHC RBC AUTO-ENTMCNC: 33 G/DL (ref 31.5–36.5)
MCV RBC AUTO: 93 FL (ref 78–100)
PLATELET # BLD AUTO: 217 10E3/UL (ref 150–450)
RBC # BLD AUTO: 4.65 10E6/UL (ref 3.8–5.2)
TIBC SERPL-MCNC: 298 UG/DL (ref 240–430)
WBC # BLD AUTO: 4.5 10E3/UL (ref 4–11)

## 2022-10-19 PROCEDURE — 82274 ASSAY TEST FOR BLOOD FECAL: CPT | Performed by: INTERNAL MEDICINE

## 2022-10-19 PROCEDURE — 84443 ASSAY THYROID STIM HORMONE: CPT | Performed by: INTERNAL MEDICINE

## 2022-10-19 PROCEDURE — 36415 COLL VENOUS BLD VENIPUNCTURE: CPT | Performed by: INTERNAL MEDICINE

## 2022-10-19 PROCEDURE — 85027 COMPLETE CBC AUTOMATED: CPT | Performed by: INTERNAL MEDICINE

## 2022-10-19 PROCEDURE — 82728 ASSAY OF FERRITIN: CPT | Performed by: INTERNAL MEDICINE

## 2022-10-19 PROCEDURE — 99214 OFFICE O/P EST MOD 30 MIN: CPT | Performed by: INTERNAL MEDICINE

## 2022-10-19 PROCEDURE — 83550 IRON BINDING TEST: CPT | Performed by: INTERNAL MEDICINE

## 2022-10-19 PROCEDURE — 80061 LIPID PANEL: CPT | Performed by: INTERNAL MEDICINE

## 2022-10-19 PROCEDURE — 83540 ASSAY OF IRON: CPT | Performed by: INTERNAL MEDICINE

## 2022-10-19 RX ORDER — LEVOTHYROXINE SODIUM 100 UG/1
100 TABLET ORAL DAILY
Status: CANCELLED | OUTPATIENT
Start: 2022-10-19

## 2022-10-19 RX ORDER — BUPROPION HYDROCHLORIDE 300 MG/1
TABLET ORAL
COMMUNITY
Start: 2022-07-23 | End: 2023-10-30

## 2022-10-19 RX ORDER — LEVOTHYROXINE SODIUM 100 UG/1
100 TABLET ORAL DAILY
COMMUNITY
End: 2022-12-09

## 2022-10-19 ASSESSMENT — PAIN SCALES - GENERAL: PAINLEVEL: SEVERE PAIN (6)

## 2022-10-19 NOTE — PROGRESS NOTES
"      Subjective   Krissy is a 67 year old, presenting for the following health issues:  RECHECK (Anemia and Thyroid check )      HPI     Iron deficiency anemia secondary to inadequate dietary iron intake    Krissy Weldon presents to the clinic today.  We never had explanation for cause of anemia.  She is taking some iron within multivitamin.  She eats some iron rich foods, but not too much.  Normal bowel movements.  No bloating.  She had some loose stools, but improved with fiber.  Sister has celiac's disease.  She has not donate blood recently.  No dyspepsia.  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      Objective    /79 (BP Location: Left arm)   Pulse 84   Temp 96.9  F (36.1  C) (Temporal)   Resp 12   Ht 1.651 m (5' 5\")   Wt 75.5 kg (166 lb 6.4 oz)   LMP 03/06/2012   SpO2 96%   BMI 27.69 kg/m    Body mass index is 27.69 kg/m .  Physical Exam   GENERAL: healthy, alert and no distress  EYES: Eyes grossly normal to inspection  HENT: ear canals and TM's normal,   NECK: no adenopathy, no asymmetry,    RESP: lungs clear to auscultation - no rales, rhonchi or wheezes  CV: regular rate and rhythm, normal S1 S2, no S3 or S4, no murmur   ABDOMEN: soft, nontender, no hepatosplenomegaly   MS: no gross musculoskeletal defects noted, no edema  SKIN: no suspicious lesions or rashes  NEURO: Normal strength and tone,    PSYCH: mentation appears normal, affect normal/bright    Recent Results (from the past 240 hour(s))   CBC with platelets    Collection Time: 10/19/22 11:46 AM   Result Value Ref Range    WBC Count 4.5 4.0 - 11.0 10e3/uL    RBC Count 4.65 3.80 - 5.20 10e6/uL    Hemoglobin 14.3 11.7 - 15.7 g/dL    Hematocrit 43.3 35.0 - 47.0 %    MCV 93 78 - 100 fL    MCH 30.8 26.5 - 33.0 pg    MCHC 33.0 31.5 - 36.5 g/dL    RDW " 14.2 10.0 - 15.0 %    Platelet Count 217 150 - 450 10e3/uL         Patient Instructions   (D50.8) Iron deficiency anemia secondary to inadequate dietary iron intake  (primary encounter diagnosis)  Comment: We will recheck iron studies today and check FIT test.  If iron deficiency anemia found again, we may wish to check celiac antibodies as well as consider upper endoscopy and capsule endoscopy.   Plan: REVIEW OF HEALTH MAINTENANCE PROTOCOL ORDERS,         CBC with platelets, Ferritin, Iron & Iron         Binding Capacity, Fecal colorectal cancer         screen (FIT)            Hypothyroidism  COmment; Recheck TSH today and adjust dose accordingly

## 2022-10-19 NOTE — PATIENT INSTRUCTIONS
(D50.8) Iron deficiency anemia secondary to inadequate dietary iron intake  (primary encounter diagnosis)  Comment: We will recheck iron studies today and check FIT test.  If iron deficiency anemia found again, we may wish to check celiac antibodies as well as consider upper endoscopy and capsule endoscopy.   Plan: REVIEW OF HEALTH MAINTENANCE PROTOCOL ORDERS,         CBC with platelets, Ferritin, Iron & Iron         Binding Capacity, Fecal colorectal cancer         screen (FIT)            Hypothyroidism  COmment; Recheck TSH today and adjust dose accordingly

## 2022-10-20 LAB
CHOLEST SERPL-MCNC: 209 MG/DL
FASTING STATUS PATIENT QL REPORTED: NO
FERRITIN SERPL-MCNC: 236 NG/ML (ref 8–252)
HDLC SERPL-MCNC: 74 MG/DL
LDLC SERPL CALC-MCNC: 103 MG/DL
NONHDLC SERPL-MCNC: 135 MG/DL
TRIGL SERPL-MCNC: 162 MG/DL
TSH SERPL DL<=0.005 MIU/L-ACNC: 1.07 MU/L (ref 0.4–4)

## 2022-10-20 NOTE — RESULT ENCOUNTER NOTE
Sheldon Rey,    I had the opportunity to review your recent labs and a summary of your labs reads as follows:    Your complete blood counts show no sign of anemia, normal white blood cell count and platelets.  Your thyroid function is alsostable on your current dose of levothyroxine   Your fasting lipid panel show  - normal HDL (good) cholesterol -as your goal is greater than 40  - low LDL (bad) cholesterol as your goal is less than 130 - no need for statin at this time  - stable triglyceride levels    The 10-year ASCVD risk score (Kem GARCIA, et al., 2019) is: 5.1%    Values used to calculate the score:      Age: 67 years      Sex: Female      Is Non- : No      Diabetic: No      Tobacco smoker: No      Systolic Blood Pressure: 121 mmHg      Is BP treated: No      HDL Cholesterol: 74 mg/dL      Total Cholesterol: 209 mg/dL    We can continue to monitor and follow up if symptoms progress or don't improve    Sincerely,  Thomas Perez MD

## 2022-10-24 LAB — HEMOCCULT STL QL IA: NEGATIVE

## 2022-10-24 NOTE — RESULT ENCOUNTER NOTE
Sheldon Rey,    I have had the opportunity to review your recent results and an interpretation is as follows:  A negative FIT test indicates no evidence of colon cancer, but it should be repeated every year to have comparable sensitivity to that of a colonoscopy.      Sincerely,  Thomas Perez MD

## 2022-11-01 DIAGNOSIS — L40.50 PSORIATIC ARTHRITIS (H): ICD-10-CM

## 2022-11-01 DIAGNOSIS — L40.9 PSORIASIS: ICD-10-CM

## 2022-11-07 RX ORDER — ADALIMUMAB 40MG/0.4ML
40 KIT SUBCUTANEOUS
Qty: 2 EACH | Refills: 6 | Status: SHIPPED | OUTPATIENT
Start: 2022-11-07 | End: 2023-04-20

## 2022-11-07 NOTE — TELEPHONE ENCOUNTER
HUMIRA *CF* 40MG/0.4ML SYR   Last Written Prescription Date:   4/14/2022  Last Fill Quantity: 2,   # refills: 6  Last Office Visit :  4/14/2022  Future Office visit:  None  2 Each, 6 Refills sent to lorin Go RN  Central Triage Red Flags/Med Refills

## 2022-12-06 ENCOUNTER — MYC MEDICAL ADVICE (OUTPATIENT)
Dept: FAMILY MEDICINE | Facility: CLINIC | Age: 67
End: 2022-12-06

## 2022-12-06 RX ORDER — LEVOTHYROXINE SODIUM 125 UG/1
125 TABLET ORAL DAILY
Qty: 90 TABLET | Refills: 1 | Status: CANCELLED | OUTPATIENT
Start: 2022-12-06

## 2022-12-06 NOTE — TELEPHONE ENCOUNTER
"Patient needs refill of levothyroxine  We have two doses listed in chart  Patient states she \"feels better at 125mcg\" would like to stay at that dose  TSH   Date Value Ref Range Status   10/19/2022 1.07 0.40 - 4.00 mU/L Final   05/07/2014 0.12 (L) 0.4 - 5.0 mU/L Final         Pended 125mcg per patient request.  If appropriate, please approve.  Please discontinue the 100mcg dose from chart.    LOV 10- [facility] follow up 6 months  No future OV scheduled    Called out to patient to verify pharmacy ([facility] mail order) and to advise patient to schedule office visit for April, she will call later this week.    Senait Herrera, RT (R)    "

## 2022-12-09 ENCOUNTER — VIRTUAL VISIT (OUTPATIENT)
Dept: FAMILY MEDICINE | Facility: CLINIC | Age: 67
End: 2022-12-09
Payer: COMMERCIAL

## 2022-12-09 DIAGNOSIS — E03.9 HYPOTHYROIDISM, UNSPECIFIED TYPE: ICD-10-CM

## 2022-12-09 DIAGNOSIS — J06.9 UPPER RESPIRATORY TRACT INFECTION, UNSPECIFIED TYPE: Primary | ICD-10-CM

## 2022-12-09 DIAGNOSIS — Z79.899 ENCOUNTER FOR LONG-TERM (CURRENT) USE OF MEDICATIONS: ICD-10-CM

## 2022-12-09 PROCEDURE — 99213 OFFICE O/P EST LOW 20 MIN: CPT | Mod: 95 | Performed by: INTERNAL MEDICINE

## 2022-12-09 RX ORDER — LEVOTHYROXINE SODIUM 125 UG/1
125 TABLET ORAL DAILY
Qty: 90 TABLET | Refills: 3 | Status: SHIPPED | OUTPATIENT
Start: 2022-12-09 | End: 2023-01-10

## 2022-12-09 RX ORDER — AZITHROMYCIN 250 MG/1
TABLET, FILM COATED ORAL
Qty: 6 TABLET | Refills: 0 | Status: SHIPPED | OUTPATIENT
Start: 2022-12-09 | End: 2022-12-14

## 2022-12-09 NOTE — PROGRESS NOTES
Krissy is a 67 year old who is being evaluated via a billable video visit.      How would you like to obtain your AVS? Dennisharduane  If the video visit is dropped, the invitation should be resent by: Leo   Will anyone else be joining your video visit? No            Subjective   Krissy is a 67 year old, presenting for the following health issues:  Thyroid Problem      History of Present Illness       Hypothyroidism:     Since last visit, patient describes the following symptoms::  None    She eats 4 or more servings of fruits and vegetables daily.She consumes 0 sweetened beverage(s) daily.She exercises with enough effort to increase her heart rate 30 to 60 minutes per day.  She exercises with enough effort to increase her heart rate 7 days per week.   She is taking medications regularly.     Upper respiratory infection   Krissy Weldon has had 7-10 days of upper respiratory infection.  She is known to be immune compromised with taking methotrexate and Humira for psoriatic arthritis.  She has no fever, but does have worsening cough and congestion with dark greenish sputum.  She otherwise has had slight chest discomfort with cough.  She has not taken an antibiotic in the past 5 years.    Review of Systems   Constitutional, HEENT, cardiovascular, pulmonary, GI, , musculoskeletal, neuro, skin, endocrine and psych systems are negative, except as otherwise noted.      Objective           Vitals:  No vitals were obtained today due to virtual visit.    Physical Exam   GENERAL: Healthy, alert and no distress  EYES: Eyes grossly normal to inspection.  No discharge or erythema, or obvious scleral/conjunctival abnormalities.  RESP: +cough,    No visible retractions or increased work of breathing.    SKIN: Visible skin clear. No significant rash, abnormal pigmentation or lesions.  NEURO: Cranial nerves grossly intact.  Mentation and speech appropriate for age.  PSYCH: Mentation appears normal, affect normal/bright, judgement and  insight intact, normal speech and appearance well-groomed.      (J06.9) Upper respiratory tract infection, unspecified type  (primary encounter diagnosis)  Comment: We will treat empirically with azithromycin.  Also recommend use of over-the-counter cough medication  Plan: azithromycin (ZITHROMAX) 250 MG tablet            (Z79.899) Encounter for long-term (current) use of medications  Comment:   Plan:     (E03.9) Hypothyroidism, unspecified type  Comment: Okay to refill prescription for levothyroxine 100 mcg, and we will recheck a TSH  Plan: levothyroxine (SYNTHROID/LEVOTHROID) 125 MCG         tablet, TSH with free T4 reflex                   Video-Visit Details    Video Start Time: 7:51 AM    Type of service:  Video Visit    Video End Time:8:01 AM    Originating Location (pt. Location): Home    Distant Location (provider location):  On-site    Platform used for Video Visit: [employer]ocy

## 2022-12-19 DIAGNOSIS — L40.50 PSORIATIC ARTHRITIS (H): ICD-10-CM

## 2022-12-19 DIAGNOSIS — R29.898 HAND DYSFUNCTION: ICD-10-CM

## 2022-12-19 DIAGNOSIS — L40.9 PSORIASIS: ICD-10-CM

## 2022-12-19 DIAGNOSIS — M25.559 PAIN IN JOINT, PELVIC REGION AND THIGH, UNSPECIFIED LATERALITY: ICD-10-CM

## 2022-12-22 RX ORDER — METHOTREXATE 25 MG/ML
INJECTION, SOLUTION INTRA-ARTERIAL; INTRAMUSCULAR; INTRAVENOUS
Qty: 5 ML | Refills: 1 | Status: SHIPPED | OUTPATIENT
Start: 2022-12-22 | End: 2023-02-15

## 2022-12-22 RX ORDER — NEEDLES, DISPOSABLE 25GX5/8"
NEEDLE, DISPOSABLE MISCELLANEOUS
Qty: 50 EACH | Refills: 0 | Status: SHIPPED | OUTPATIENT
Start: 2022-12-22

## 2022-12-22 NOTE — TELEPHONE ENCOUNTER
METHOTREXATE SODIUM 50MG/2ML SOLN     Last Written Prescription Date:  8/3/22  Last Fill Quantity: 5ml,   # refills: 2  Last Office Visit: 4/14/22  Future Office visit:  None    Care Everywhere and Media checked for most recent labs    CBC RESULTS: Recent Labs   Lab Test 10/19/22  1146   WBC 4.5   RBC 4.65   HGB 14.3   HCT 43.3   MCV 93   MCH 30.8   MCHC 33.0   RDW 14.2          Creatinine   Date Value Ref Range Status   04/11/2022 0.70 0.52 - 1.04 mg/dL Final   06/22/2021 0.72 0.57 - 100 mg/dL Final   ]    Liver Function Studies -   Recent Labs   Lab Test 04/11/22  1243 11/08/21  1038 06/22/21  1138 06/17/20  1559 03/16/20  1739   PROTTOTAL  --   --   --   --  7.6   ALBUMIN 3.7   < > 4.2   < > 3.6   BILITOTAL  --   --  <0.2  --  0.1*   ALKPHOS  --   --   --   --  90   AST 26   < >  --    < > 33   ALT 48   < >  --    < > 48   BILIDIRECT  --   --  0.08  --   --     < > = values in this interval not displayed.       Routing refill request to provider for review/approval because:  Drug not on the G, P or [facility] refill protocol

## 2023-01-09 ENCOUNTER — LAB (OUTPATIENT)
Dept: LAB | Facility: CLINIC | Age: 68
End: 2023-01-09
Payer: COMMERCIAL

## 2023-01-09 DIAGNOSIS — M46.99 INFLAMMATORY SPONDYLOPATHY OF MULTIPLE SITES IN SPINE (H): ICD-10-CM

## 2023-01-09 DIAGNOSIS — Z79.899 HIGH RISK MEDICATION USE: ICD-10-CM

## 2023-01-09 DIAGNOSIS — E03.9 HYPOTHYROIDISM, UNSPECIFIED TYPE: ICD-10-CM

## 2023-01-09 DIAGNOSIS — L40.50 PSORIATIC ARTHRITIS (H): ICD-10-CM

## 2023-01-09 LAB
ALBUMIN SERPL BCG-MCNC: 4.4 G/DL (ref 3.5–5.2)
ALT SERPL W P-5'-P-CCNC: 32 U/L (ref 10–35)
AST SERPL W P-5'-P-CCNC: 26 U/L (ref 10–35)
BASOPHILS # BLD AUTO: 0 10E3/UL (ref 0–0.2)
BASOPHILS NFR BLD AUTO: 1 %
CREAT SERPL-MCNC: 0.95 MG/DL (ref 0.51–0.95)
CRP SERPL-MCNC: <3 MG/L
EOSINOPHIL # BLD AUTO: 0.1 10E3/UL (ref 0–0.7)
EOSINOPHIL NFR BLD AUTO: 1 %
ERYTHROCYTE [DISTWIDTH] IN BLOOD BY AUTOMATED COUNT: 14.3 % (ref 10–15)
ERYTHROCYTE [SEDIMENTATION RATE] IN BLOOD BY WESTERGREN METHOD: 8 MM/HR (ref 0–30)
GFR SERPL CREATININE-BSD FRML MDRD: 66 ML/MIN/1.73M2
HCT VFR BLD AUTO: 39.9 % (ref 35–47)
HGB BLD-MCNC: 13 G/DL (ref 11.7–15.7)
LYMPHOCYTES # BLD AUTO: 2.7 10E3/UL (ref 0.8–5.3)
LYMPHOCYTES NFR BLD AUTO: 37 %
MCH RBC QN AUTO: 31 PG (ref 26.5–33)
MCHC RBC AUTO-ENTMCNC: 32.6 G/DL (ref 31.5–36.5)
MCV RBC AUTO: 95 FL (ref 78–100)
MONOCYTES # BLD AUTO: 0.4 10E3/UL (ref 0–1.3)
MONOCYTES NFR BLD AUTO: 6 %
NEUTROPHILS # BLD AUTO: 4.1 10E3/UL (ref 1.6–8.3)
NEUTROPHILS NFR BLD AUTO: 55 %
PLATELET # BLD AUTO: 299 10E3/UL (ref 150–450)
RBC # BLD AUTO: 4.19 10E6/UL (ref 3.8–5.2)
T4 FREE SERPL-MCNC: 1.39 NG/DL (ref 0.9–1.7)
TSH SERPL DL<=0.005 MIU/L-ACNC: 0.21 UIU/ML (ref 0.3–4.2)
WBC # BLD AUTO: 7.3 10E3/UL (ref 4–11)

## 2023-01-09 PROCEDURE — 84450 TRANSFERASE (AST) (SGOT): CPT

## 2023-01-09 PROCEDURE — 86140 C-REACTIVE PROTEIN: CPT

## 2023-01-09 PROCEDURE — 84443 ASSAY THYROID STIM HORMONE: CPT

## 2023-01-09 PROCEDURE — 85025 COMPLETE CBC W/AUTO DIFF WBC: CPT

## 2023-01-09 PROCEDURE — 84439 ASSAY OF FREE THYROXINE: CPT

## 2023-01-09 PROCEDURE — 36415 COLL VENOUS BLD VENIPUNCTURE: CPT

## 2023-01-09 PROCEDURE — 82565 ASSAY OF CREATININE: CPT

## 2023-01-09 PROCEDURE — 84460 ALANINE AMINO (ALT) (SGPT): CPT

## 2023-01-09 PROCEDURE — 85652 RBC SED RATE AUTOMATED: CPT

## 2023-01-09 PROCEDURE — 82040 ASSAY OF SERUM ALBUMIN: CPT

## 2023-01-10 ENCOUNTER — TELEPHONE (OUTPATIENT)
Dept: FAMILY MEDICINE | Facility: CLINIC | Age: 68
End: 2023-01-10

## 2023-01-10 DIAGNOSIS — E03.9 HYPOTHYROIDISM, UNSPECIFIED TYPE: ICD-10-CM

## 2023-01-10 RX ORDER — LEVOTHYROXINE SODIUM 112 UG/1
112 TABLET ORAL DAILY
Qty: 90 TABLET | Refills: 3 | Status: SHIPPED | OUTPATIENT
Start: 2023-01-10 | End: 2024-01-15

## 2023-01-10 NOTE — TELEPHONE ENCOUNTER
Can we call Krissy Weldon and let her know that     Sheldon Rey,    I have had the opportunity to review your recent results and an interpretation is as follows:  It appears that your current dose of levothyroxine may be a bit too high.  I would recommend reducing your dose from 125 down to 112 mcg and recheck again in 6 weeks    Sincerely,  Thomas Perez MD

## 2023-01-10 NOTE — RESULT ENCOUNTER NOTE
Sheldon Rey,    I have had the opportunity to review your recent results and an interpretation is as follows:  It appears that your current dose of levothyroxine may be a bit too high.  I would recommend reducing your dose from 125 down to 112 mcg and recheck again in 6 weeks    Sincerely,  Thomas Perez MD

## 2023-01-10 NOTE — TELEPHONE ENCOUNTER
Patient Contact    Attempt # 1    Was call answered?  No.  Left message on voicemail with information to call back.    Jeanna ALMEIDA, Triage RN  [facility]

## 2023-01-12 NOTE — TELEPHONE ENCOUNTER
Patient Contact    Attempt # 2    Was call answered? No.    Left message for patient to call triage back.    Nohelia Ovalles RN

## 2023-01-12 NOTE — TELEPHONE ENCOUNTER
Patient returned call she had already reviewed result note on mychart no questions or concerns    Jatin Godfrey RN

## 2023-02-12 DIAGNOSIS — L40.9 PSORIASIS: ICD-10-CM

## 2023-02-12 DIAGNOSIS — R29.898 HAND DYSFUNCTION: ICD-10-CM

## 2023-02-12 DIAGNOSIS — L40.50 PSORIATIC ARTHRITIS (H): ICD-10-CM

## 2023-02-15 NOTE — TELEPHONE ENCOUNTER
METHOTREXATE SODIUM 50MG/2ML SOLN      Last Written Prescription Date:  12-22-22  Last Fill Quantity: 5ML,   # refills: 0  Last Office Visit: 4-14-22  Future Office visit:  6-28-23 ( New w/ Rosanna)    CBC RESULTS: Recent Labs   Lab Test 01/09/23  1449   WBC 7.3   RBC 4.19   HGB 13.0   HCT 39.9   MCV 95   MCH 31.0   MCHC 32.6   RDW 14.3          Creatinine   Date Value Ref Range Status   01/09/2023 0.95 0.51 - 0.95 mg/dL Final   06/22/2021 0.72 0.57 - 100 mg/dL Final   ]    Liver Function Studies -   Recent Labs   Lab Test 01/09/23  1449 11/08/21  1038 06/22/21  1138 06/17/20  1559 03/16/20  1739   PROTTOTAL  --   --   --   --  7.6   ALBUMIN 4.4   < > 4.2   < > 3.6   BILITOTAL  --   --  <0.2  --  0.1*   ALKPHOS  --   --   --   --  90   AST 26   < >  --    < > 33   ALT 32   < >  --    < > 48   BILIDIRECT  --   --  0.08  --   --     < > = values in this interval not displayed.       Routing refill request to provider for review/approval because:  Per protocol

## 2023-02-16 RX ORDER — METHOTREXATE 25 MG/ML
INJECTION, SOLUTION INTRA-ARTERIAL; INTRAMUSCULAR; INTRAVENOUS
Qty: 5 ML | Refills: 2 | Status: SHIPPED | OUTPATIENT
Start: 2023-02-16 | End: 2023-05-09

## 2023-04-10 ENCOUNTER — OFFICE VISIT (OUTPATIENT)
Dept: FAMILY MEDICINE | Facility: CLINIC | Age: 68
End: 2023-04-10
Payer: COMMERCIAL

## 2023-04-10 ENCOUNTER — DOCUMENTATION ONLY (OUTPATIENT)
Dept: LAB | Facility: CLINIC | Age: 68
End: 2023-04-10

## 2023-04-10 VITALS
HEIGHT: 65 IN | BODY MASS INDEX: 27.61 KG/M2 | WEIGHT: 165.7 LBS | TEMPERATURE: 96.9 F | RESPIRATION RATE: 17 BRPM | SYSTOLIC BLOOD PRESSURE: 127 MMHG | DIASTOLIC BLOOD PRESSURE: 83 MMHG | HEART RATE: 74 BPM | OXYGEN SATURATION: 97 %

## 2023-04-10 DIAGNOSIS — E03.9 HYPOTHYROIDISM, UNSPECIFIED TYPE: ICD-10-CM

## 2023-04-10 DIAGNOSIS — Z23 NEED FOR VACCINATION AGAINST STREPTOCOCCUS PNEUMONIAE: ICD-10-CM

## 2023-04-10 DIAGNOSIS — Z12.31 ENCOUNTER FOR SCREENING MAMMOGRAM FOR BREAST CANCER: ICD-10-CM

## 2023-04-10 DIAGNOSIS — L98.9 SKIN LESION: ICD-10-CM

## 2023-04-10 DIAGNOSIS — Z23 NEED FOR DIPHTHERIA-TETANUS-PERTUSSIS (TDAP) VACCINE: ICD-10-CM

## 2023-04-10 DIAGNOSIS — D50.8 IRON DEFICIENCY ANEMIA SECONDARY TO INADEQUATE DIETARY IRON INTAKE: ICD-10-CM

## 2023-04-10 DIAGNOSIS — L40.50 PSORIATIC ARTHRITIS (H): ICD-10-CM

## 2023-04-10 DIAGNOSIS — Z78.0 POST-MENOPAUSAL: ICD-10-CM

## 2023-04-10 DIAGNOSIS — Z00.00 ROUTINE GENERAL MEDICAL EXAMINATION AT A HEALTH CARE FACILITY: Primary | ICD-10-CM

## 2023-04-10 DIAGNOSIS — E55.9 VITAMIN D DEFICIENCY: ICD-10-CM

## 2023-04-10 DIAGNOSIS — M85.80 OSTEOPENIA, UNSPECIFIED LOCATION: ICD-10-CM

## 2023-04-10 DIAGNOSIS — L40.9 PSORIASIS: ICD-10-CM

## 2023-04-10 LAB
ALBUMIN SERPL BCG-MCNC: 4.4 G/DL (ref 3.5–5.2)
ALP SERPL-CCNC: 65 U/L (ref 35–104)
ALT SERPL W P-5'-P-CCNC: 50 U/L (ref 10–35)
ANION GAP SERPL CALCULATED.3IONS-SCNC: 11 MMOL/L (ref 7–15)
AST SERPL W P-5'-P-CCNC: 42 U/L (ref 10–35)
BILIRUB SERPL-MCNC: 0.3 MG/DL
BUN SERPL-MCNC: 15.7 MG/DL (ref 8–23)
CALCIUM SERPL-MCNC: 10 MG/DL (ref 8.8–10.2)
CHLORIDE SERPL-SCNC: 103 MMOL/L (ref 98–107)
CHOLEST SERPL-MCNC: 194 MG/DL
CREAT SERPL-MCNC: 0.77 MG/DL (ref 0.51–0.95)
DEPRECATED CALCIDIOL+CALCIFEROL SERPL-MC: 53 UG/L (ref 20–75)
DEPRECATED HCO3 PLAS-SCNC: 26 MMOL/L (ref 22–29)
ERYTHROCYTE [DISTWIDTH] IN BLOOD BY AUTOMATED COUNT: 14 % (ref 10–15)
GFR SERPL CREATININE-BSD FRML MDRD: 85 ML/MIN/1.73M2
GLUCOSE SERPL-MCNC: 88 MG/DL (ref 70–99)
HCT VFR BLD AUTO: 39.6 % (ref 35–47)
HDLC SERPL-MCNC: 71 MG/DL
HGB BLD-MCNC: 13.3 G/DL (ref 11.7–15.7)
HOLD SPECIMEN: NORMAL
LDLC SERPL CALC-MCNC: 105 MG/DL
MCH RBC QN AUTO: 31 PG (ref 26.5–33)
MCHC RBC AUTO-ENTMCNC: 33.6 G/DL (ref 31.5–36.5)
MCV RBC AUTO: 92 FL (ref 78–100)
NONHDLC SERPL-MCNC: 123 MG/DL
PLATELET # BLD AUTO: 221 10E3/UL (ref 150–450)
POTASSIUM SERPL-SCNC: 4.4 MMOL/L (ref 3.4–5.3)
PROT SERPL-MCNC: 7.5 G/DL (ref 6.4–8.3)
RBC # BLD AUTO: 4.29 10E6/UL (ref 3.8–5.2)
SODIUM SERPL-SCNC: 140 MMOL/L (ref 136–145)
T4 FREE SERPL-MCNC: 1.26 NG/DL (ref 0.9–1.7)
TRIGL SERPL-MCNC: 92 MG/DL
TSH SERPL DL<=0.005 MIU/L-ACNC: 0.2 UIU/ML (ref 0.3–4.2)
WBC # BLD AUTO: 4.4 10E3/UL (ref 4–11)

## 2023-04-10 PROCEDURE — 84439 ASSAY OF FREE THYROXINE: CPT | Performed by: INTERNAL MEDICINE

## 2023-04-10 PROCEDURE — 85027 COMPLETE CBC AUTOMATED: CPT | Performed by: INTERNAL MEDICINE

## 2023-04-10 PROCEDURE — 80053 COMPREHEN METABOLIC PANEL: CPT | Performed by: INTERNAL MEDICINE

## 2023-04-10 PROCEDURE — 99214 OFFICE O/P EST MOD 30 MIN: CPT | Mod: 25 | Performed by: INTERNAL MEDICINE

## 2023-04-10 PROCEDURE — 83540 ASSAY OF IRON: CPT | Performed by: INTERNAL MEDICINE

## 2023-04-10 PROCEDURE — 80061 LIPID PANEL: CPT | Performed by: INTERNAL MEDICINE

## 2023-04-10 PROCEDURE — 84443 ASSAY THYROID STIM HORMONE: CPT | Performed by: INTERNAL MEDICINE

## 2023-04-10 PROCEDURE — 83550 IRON BINDING TEST: CPT | Performed by: INTERNAL MEDICINE

## 2023-04-10 PROCEDURE — 36415 COLL VENOUS BLD VENIPUNCTURE: CPT | Performed by: INTERNAL MEDICINE

## 2023-04-10 PROCEDURE — 82306 VITAMIN D 25 HYDROXY: CPT | Performed by: INTERNAL MEDICINE

## 2023-04-10 PROCEDURE — G0438 PPPS, INITIAL VISIT: HCPCS | Performed by: INTERNAL MEDICINE

## 2023-04-10 PROCEDURE — 82728 ASSAY OF FERRITIN: CPT | Performed by: INTERNAL MEDICINE

## 2023-04-10 RX ORDER — BUPRENORPHINE HYDROCHLORIDE 600 UG/1
FILM, SOLUBLE BUCCAL
COMMUNITY
Start: 2023-03-24 | End: 2024-04-16

## 2023-04-10 ASSESSMENT — PAIN SCALES - GENERAL: PAINLEVEL: SEVERE PAIN (7)

## 2023-04-10 ASSESSMENT — ACTIVITIES OF DAILY LIVING (ADL): CURRENT_FUNCTION: NO ASSISTANCE NEEDED

## 2023-04-10 NOTE — PROGRESS NOTES
"SUBJECTIVE:   Krissy is a 67 year old who presents for Preventive Visit.               Patient has been advised of split billing requirements and indicates understanding: Yes  Are you in the first 12 months of your Medicare coverage?  No    Healthy Habits:     In general, how would you rate your overall health?  Fair    Frequency of exercise:  6-7 days/week    Duration of exercise:  45-60 minutes    Do you usually eat at least 4 servings of fruit and vegetables a day, include whole grains    & fiber and avoid regularly eating high fat or \"junk\" foods?  Yes    Taking medications regularly:  Yes    Barriers to taking medications:  None    Medication side effects:  None    Ability to successfully perform activities of daily living:  No assistance needed    Home Safety:  No safety concerns identified    Hearing Impairment:  No hearing concerns    In the past 6 months, have you been bothered by leaking of urine?  No    In general, how would you rate your overall mental or emotional health?  Fair      PHQ-2 Total Score: 0    Additional concerns today:  No  History of Present Illness       Hypothyroidism:     Since last visit, patient describes the following symptoms::  Dry skin    Reason for visit:  Follow up    She eats 4 or more servings of fruits and vegetables daily.She consumes 1 sweetened beverage(s) daily.She exercises with enough effort to increase her heart rate 30 to 60 minutes per day.  She exercises with enough effort to increase her heart rate 6 days per week.   She is not taking prescribed medications regularly due to None.      Have you ever done Advance Care Planning? (For example, a Health Directive, POLST, or a discussion with a medical provider or your loved ones about your wishes): No, advance care planning information given to patient to review.  Patient declined advance care planning discussion at this time.      Fall risk  Fallen 2 or more times in the past year?: Yes  Any fall with injury in the past " year?: No  click delete button to remove this line now  Cognitive Screening   1) Repeat 3 items (Leader, Season, Table)    2) Clock draw: NORMAL  3) 3 item recall: Recalls 2 objects   Results: 3 items recalled: COGNITIVE IMPAIRMENT LESS LIKELY    Mini-CogTM Copyright [employer]. Licensed by the author for use in [facility]; reprinted with permission (angy@[facility]). All rights reserved.      Do you have sleep apnea, excessive snoring or daytime drowsiness?: no    Reviewed and updated as needed this visit by clinical staff                  Reviewed and updated as needed this visit by Provider                 Social History     Tobacco Use     Smoking status: Never     Smokeless tobacco: Never   Vaping Use     Vaping status: Not on file   Substance Use Topics     Alcohol use: No              View : No data to display.                   View : No data to display.              Do you have a current opioid prescription? No  Do you use any other controlled substances or medications that are not prescribed by a provider? None    Current providers sharing in care for this patient include:   Patient Care Team:  Thomas Perez MD as PCP - General (Internal Medicine)  Jensen Samayoa MD as Assigned Rheumatology Provider  Thomas Perez MD as Assigned PCP    The following health maintenance items are reviewed in Epic and correct as of today:  Health Maintenance   Topic Date Due     URINE DRUG SCREEN  Never done     ADVANCE CARE PLANNING  Never done     DTAP/TDAP/TD IMMUNIZATION (2 - Td or Tdap) 03/11/2019     MEDICARE ANNUAL WELLNESS VISIT  Never done     Pneumococcal Vaccine: 65+ Years (4 - PPSV23 if available, else PCV20) 02/28/2023     ANNUAL REVIEW OF HM ORDERS  10/19/2023     FALL RISK ASSESSMENT  12/09/2023     MAMMO SCREENING  01/04/2024     TSH W/FREE T4 REFLEX  01/09/2024     LIPID  10/19/2027     COLORECTAL CANCER SCREENING  07/13/2032     DEXA  10/30/2034     HEPATITIS C SCREENING   "Completed     PHQ-2 (once per calendar year)  Completed     INFLUENZA VACCINE  Completed     ZOSTER IMMUNIZATION  Completed     COVID-19 Vaccine  Completed     IPV IMMUNIZATION  Aged Out     MENINGITIS IMMUNIZATION  Aged Out     Lab work is in process  Labs reviewed in EPIC    Any new diagnosis of family breast, ovarian, or bowel cancer? No    FHS-7:       1/4/2022    10:23 AM   Breast CA Risk Assessment (FHS-7)   Did any of your first-degree relatives have breast or ovarian cancer? No   Did any of your relatives have bilateral breast cancer? No   Did any man in your family have breast cancer? No   Did any woman in your family have breast and ovarian cancer? No   Did any woman in your family have breast cancer before age 50 y? No   Do you have 2 or more relatives with breast and/or ovarian cancer? No   Do you have 2 or more relatives with breast and/or bowel cancer? No     click delete button to remove this line now  Pertinent mammograms are reviewed under the imaging tab.      Psoriasis  Psoriatic arthritis (H)  Hypothyroidism, unspecified type   Did feel better with higher dose of levothyroxine 125 mcg with less constipation and less dry skin.  Would like to resume 125 mcg.  Otherwise doing relatively well with 112 mcg dose.        Review of Systems  Constitutional, HEENT, cardiovascular, pulmonary, GI + constipation, , musculoskeletal - has need for referral to new rehumatologist as prior has left the clinic, neuro, skin - using prednisone drops for episcleritis, endocrine and psychiatry - tolerating bupropion and fluoxetine with stable management of depression. systems are negative, except as otherwise noted.    OBJECTIVE:   /83 (BP Location: Right arm, Patient Position: Sitting, Cuff Size: Adult Regular)   Pulse 74   Temp 96.9  F (36.1  C) (Tympanic)   Resp 17   Ht 1.651 m (5' 5\")   Wt 75.2 kg (165 lb 11.2 oz)   LMP 03/06/2012   SpO2 97%   BMI 27.57 kg/m   Estimated body mass index is 27.57 " "kg/m  as calculated from the following:    Height as of this encounter: 1.651 m (5' 5\").    Weight as of this encounter: 75.2 kg (165 lb 11.2 oz).  Physical Exam  GENERAL: healthy, alert and no distress  EYES: Eyes grossly normal to inspection,   HENT: ear canals and TM's normal, nose and mouth without ulcers or lesions  NECK: no adenopathy, no asymmetry, masses, or scars and thyroid normal to palpation  RESP: lungs clear to auscultation - no rales, rhonchi or wheezes  CV: regular rate and rhythm, normal S1 S2, no S3 or S4, no murmur, click or rub, no peripheral edema and peripheral pulses strong  ABDOMEN: soft, nontender, no hepatosplenomegaly, no masses and bowel sounds normal  MS: no gross musculoskeletal defects noted, no edema  SKIN: no suspicious lesions or rashes  NEURO: Normal strength and tone, mentation intact and speech normal  PSYCH: mentation appears normal, affect normal/bright        ASSESSMENT / PLAN:     Patient Instructions   (Z00.00) Routine general medical examination at a health care facility  (primary encounter diagnosis)  Comment: For routine exam, we will draw labs as ordered, cholesterol, diabetes mellitus check, liver function, renal function, and request mammogram and bone density scan  [facility] (also performs diagnostic mammogram, ultrasound and biopsy) [phone]. We will also update vaccination history.  Plan: Lipid panel reflex to direct LDL Fasting,         Comprehensive metabolic panel, CBC with         platelets, TSH with free T4 reflex, Vitamin D         Deficiency, *MA Screening Digital Bilateral            (Z23) Need for diphtheria-tetanus-pertussis (Tdap) vaccine  Comment: TDAP   Plan:     (L40.9) Psoriasis  Comment: Referral to rheumatology placed today - no changes in methotrexate and Humira dose  Plan: Adult Rheumatology  Referral            (L40.50) Psoriatic arthritis (HCC)  Comment: Continue medications as above   Plan: Adult Rheumatology " " Referral            (E03.9) Hypothyroidism, unspecified type  Comment: Check TSH today and continue current dose of levothyroxine   Plan: TSH with free T4 reflex, Vitamin D Deficiency            (M85.80) Osteopenia, unspecified location  Comment: Check vitamin D and refer for bone density scan   Plan: Vitamin D Deficiency, DX Hip/Pelvis/Spine            (Z78.0) Post-menopausal  Comment:  [facility] (also performs diagnostic mammogram, ultrasound and biopsy) [phone].   Plan: DX Hip/Pelvis/Spine            (E55.9) Vitamin D deficiency  Comment:   Plan: Vitamin D Deficiency            (Z12.31) Encounter for screening mammogram for breast cancer  Comment: Plan: *MA Screening Digital Bilateral            (Z23) Need for vaccination against Streptococcus pneumoniae  Comment:   Plan: Pneumococcal 20 Valent Conjugate (PCV20)                  Patient has been advised of split billing requirements and indicates understanding: Yes      COUNSELING:  Reviewed preventive health counseling, as reflected in patient instructions      BMI:   Estimated body mass index is 27.69 kg/m  as calculated from the following:    Height as of 10/19/22: 1.651 m (5' 5\").    Weight as of 10/19/22: 75.5 kg (166 lb 6.4 oz).         She reports that she has never smoked. She has never used smokeless tobacco.      Appropriate preventive services were discussed with this patient, including applicable screening as appropriate for cardiovascular disease, diabetes, osteopenia/osteoporosis, and glaucoma.  As appropriate for age/gender, discussed screening for colorectal cancer, prostate cancer, breast cancer, and cervical cancer. Checklist reviewing preventive services available has been given to the patient.    Reviewed patients plan of care and provided an AVS. The Basic Care Plan (routine screening as documented in Health Maintenance) for Krissy meets the Care Plan requirement. This Care Plan has been established and " reviewed with the Patient.      Thomas Perez MD, MD  [facility]    Identified Health Risks:    I have reviewed Opioid Use Disorder and Substance Use Disorder risk factors and made any needed referrals.

## 2023-04-10 NOTE — NURSING NOTE
I called pt, she will get next time in rather than coming back today. Deleted the ordered vaccines of TDAP & Pneumo 20

## 2023-04-10 NOTE — PROGRESS NOTES
Pt stated she was expecting Iron test.  Lab ursula extra tube please order ADD-ON if needed, Thank you!     Ekta Bryan.

## 2023-04-10 NOTE — PATIENT INSTRUCTIONS
(Z00.00) Routine general medical examination at a health care facility  (primary encounter diagnosis)  Comment: For routine exam, we will draw labs as ordered, cholesterol, diabetes mellitus check, liver function, renal function, and request mammogram and bone density scan  [facility] (also performs diagnostic mammogram, ultrasound and biopsy) [phone]. We will also update vaccination history.  Plan: Lipid panel reflex to direct LDL Fasting,         Comprehensive metabolic panel, CBC with         platelets, TSH with free T4 reflex, Vitamin D         Deficiency, *MA Screening Digital Bilateral            (Z23) Need for diphtheria-tetanus-pertussis (Tdap) vaccine  Comment: TDAP   Plan:     (L40.9) Psoriasis  Comment: Referral to rheumatology placed today - no changes in methotrexate and Humira dose  Plan: Adult Rheumatology  Referral            (L40.50) Psoriatic arthritis (HCC)  Comment: Continue medications as above   Plan: Adult Rheumatology  Referral            (E03.9) Hypothyroidism, unspecified type  Comment: Check TSH today and continue current dose of levothyroxine   Plan: TSH with free T4 reflex, Vitamin D Deficiency            (M85.80) Osteopenia, unspecified location  Comment: Check vitamin D and refer for bone density scan   Plan: Vitamin D Deficiency, DX Hip/Pelvis/Spine            (Z78.0) Post-menopausal  Comment:  [facility] (also performs diagnostic mammogram, ultrasound and biopsy) [phone].   Plan: DX Hip/Pelvis/Spine            (E55.9) Vitamin D deficiency  Comment:   Plan: Vitamin D Deficiency            (Z12.31) Encounter for screening mammogram for breast cancer  Comment: Plan: *MA Screening Digital Bilateral            (Z23) Need for vaccination against Streptococcus pneumoniae  Comment:   Plan: Pneumococcal 20 Valent Conjugate (PCV20)

## 2023-04-11 LAB
FERRITIN SERPL-MCNC: 270 NG/ML (ref 11–328)
IRON BINDING CAPACITY (ROCHE): 283 UG/DL (ref 240–430)
IRON SATN MFR SERPL: 33 % (ref 15–46)
IRON SERPL-MCNC: 92 UG/DL (ref 37–145)

## 2023-04-12 NOTE — RESULT ENCOUNTER NOTE
Sheldon Rey,    I had the opportunity to review your recent labs and a summary of your labs reads as follows:    Your complete blood counts show no sign of anemia, normal white blood cell count and platelets.  Your iron studies returned within normal limits   Your comprehensive metabolic panel showed normal renal function, and normal fasting blood glucose indicating no evidence of diabetes mellitus.  Your liver function tests returned slightly elevated which may be secondary to methotrexate toxicity and we can recheck this at your convenience   Your fasting lipid panel show  - normal HDL (good) cholesterol -as your goal is greater than 40  - low LDL (bad) cholesterol as your goal is less than 130  - normal triglyceride levels  Your thyroid function shows again suppressed TSH and I would recommend that you continue your current dose of levothyroxine and we can recheck in 3 months    Sincerely,  Thomas Perez MD

## 2023-04-13 ENCOUNTER — ALLIED HEALTH/NURSE VISIT (OUTPATIENT)
Dept: FAMILY MEDICINE | Facility: CLINIC | Age: 68
End: 2023-04-13
Payer: COMMERCIAL

## 2023-04-13 DIAGNOSIS — Z23 NEED FOR VACCINATION: Primary | ICD-10-CM

## 2023-04-13 DIAGNOSIS — Z23 NEED FOR DIPHTHERIA-TETANUS-PERTUSSIS (TDAP) VACCINE: ICD-10-CM

## 2023-04-13 PROCEDURE — 90472 IMMUNIZATION ADMIN EACH ADD: CPT

## 2023-04-13 PROCEDURE — G0009 ADMIN PNEUMOCOCCAL VACCINE: HCPCS

## 2023-04-13 PROCEDURE — 90715 TDAP VACCINE 7 YRS/> IM: CPT

## 2023-04-13 PROCEDURE — 90677 PCV20 VACCINE IM: CPT

## 2023-04-13 PROCEDURE — 99207 PR NO CHARGE NURSE ONLY: CPT

## 2023-04-13 NOTE — PROGRESS NOTES
Prior to immunization administration, verified patients identity using patient s name and date of birth. Please see Immunization Activity for additional information.     Screening Questionnaire for Adult Immunization    Are you sick today?   No     Do you have allergies to medications, food, a vaccine component or latex?   No   Have you ever had a serious reaction after receiving a vaccination?   No   Do you have a long-term health problem with heart, lung, kidney, or metabolic disease (e.g., diabetes), asthma, a blood disorder, no spleen, complement component deficiency, a cochlear implant, or a spinal fluid leak?  Are you on long-term aspirin therapy?   No   Do you have cancer, leukemia, HIV/AIDS, or any other immune system problem?   No   Do you have a parent, brother, or sister with an immune system problem?   No   In the past 3 months, have you taken medications that affect  your immune system, such as prednisone, other steroids, or anticancer drugs; drugs for the treatment of rheumatoid arthritis, Crohn s disease, or psoriasis; or have you had radiation treatments?   Yes   Have you had a seizure, or a brain or other nervous system problem?   No   During the past year, have you received a transfusion of blood or blood    products, or been given immune (gamma) globulin or antiviral drug?   No   For women: Are you pregnant or is there a chance you could become       pregnant during the next month?   No   Have you received any vaccinations in the past 4 weeks?   No     Immunization questionnaire was positive for at least one answer.  Notified Dr. Perez.    I have reviewed the following standing orders: This patient is due and qualifies for the Pneumococcal vaccine.    Click here for Pneumococcal (Adult) Standing Order    I have reviewed the vaccines inclusion and exclusion criteria;No concerns regarding eligibility.         This patient is due and qualifies for a TDAP vaccine.    Click here for Tdap Standing  Order    I have reviewed the vaccines inclusion and exclusion criteria; No concerns regarding eligibility.         Injection of Tdap and Prevnar 20 given by Kendra Walsh MA. Patient instructed to remain in clinic for 15 minutes afterwards, and to report any adverse reactions.   Patient verified  Screening performed by Kendra Walsh MA on 4/13/2023 at 1:56 PM.

## 2023-04-18 DIAGNOSIS — L40.50 PSORIATIC ARTHRITIS (H): ICD-10-CM

## 2023-04-18 DIAGNOSIS — L40.9 PSORIASIS: ICD-10-CM

## 2023-04-20 RX ORDER — ADALIMUMAB 40MG/0.4ML
40 KIT SUBCUTANEOUS
Qty: 2 EACH | Refills: 2 | Status: SHIPPED | OUTPATIENT
Start: 2023-04-20 | End: 2023-07-10

## 2023-04-20 NOTE — TELEPHONE ENCOUNTER
HUMIRA *CF* 40MG/0.4ML SYR      Last Written Prescription Date:  11/7/22  Last Fill Quantity: 2,   # refills: 6  Last Office Visit : 4/14/22  Future Office visit:  6/28/23      90 day refill sent to the pharmacy -to get to appointment scheduled 6/28/23

## 2023-04-25 ENCOUNTER — ANCILLARY PROCEDURE (OUTPATIENT)
Dept: BONE DENSITY | Facility: CLINIC | Age: 68
End: 2023-04-25
Attending: INTERNAL MEDICINE
Payer: COMMERCIAL

## 2023-04-25 ENCOUNTER — HOSPITAL ENCOUNTER (OUTPATIENT)
Dept: MAMMOGRAPHY | Facility: CLINIC | Age: 68
Discharge: HOME | End: 2023-04-25
Attending: INTERNAL MEDICINE | Admitting: INTERNAL MEDICINE
Payer: COMMERCIAL

## 2023-04-25 DIAGNOSIS — Z12.31 ENCOUNTER FOR SCREENING MAMMOGRAM FOR BREAST CANCER: ICD-10-CM

## 2023-04-25 DIAGNOSIS — Z78.0 POST-MENOPAUSAL: ICD-10-CM

## 2023-04-25 DIAGNOSIS — M85.80 OSTEOPENIA, UNSPECIFIED LOCATION: ICD-10-CM

## 2023-04-25 DIAGNOSIS — Z00.00 ROUTINE GENERAL MEDICAL EXAMINATION AT A HEALTH CARE FACILITY: ICD-10-CM

## 2023-04-25 PROCEDURE — 77080 DXA BONE DENSITY AXIAL: CPT | Mod: TC | Performed by: RADIOLOGY

## 2023-04-25 PROCEDURE — 77067 SCR MAMMO BI INCL CAD: CPT

## 2023-04-26 ENCOUNTER — MYC MEDICAL ADVICE (OUTPATIENT)
Dept: FAMILY MEDICINE | Facility: CLINIC | Age: 68
End: 2023-04-26
Payer: COMMERCIAL

## 2023-04-26 DIAGNOSIS — M85.80 OSTEOPENIA, UNSPECIFIED LOCATION: Primary | ICD-10-CM

## 2023-04-27 NOTE — TELEPHONE ENCOUNTER
PCP- please see mychart:    Patient responding to recent result note from PCP:        Patient's preferred pharmacy: [facility] - [place], [place] - [address]  [facility]

## 2023-05-01 RX ORDER — ALENDRONATE SODIUM 70 MG/1
70 TABLET ORAL
Qty: 12 TABLET | Refills: 3 | Status: SHIPPED | OUTPATIENT
Start: 2023-05-01 | End: 2024-03-11

## 2023-05-05 DIAGNOSIS — L40.50 PSORIATIC ARTHRITIS (H): ICD-10-CM

## 2023-05-05 DIAGNOSIS — L40.9 PSORIASIS: ICD-10-CM

## 2023-05-05 DIAGNOSIS — R29.898 HAND DYSFUNCTION: ICD-10-CM

## 2023-05-09 ENCOUNTER — OFFICE VISIT (OUTPATIENT)
Dept: FAMILY MEDICINE | Facility: CLINIC | Age: 68
End: 2023-05-09
Payer: COMMERCIAL

## 2023-05-09 VITALS
WEIGHT: 163 LBS | OXYGEN SATURATION: 96 % | SYSTOLIC BLOOD PRESSURE: 109 MMHG | TEMPERATURE: 97.7 F | RESPIRATION RATE: 16 BRPM | BODY MASS INDEX: 27.16 KG/M2 | HEIGHT: 65 IN | HEART RATE: 76 BPM | DIASTOLIC BLOOD PRESSURE: 74 MMHG

## 2023-05-09 DIAGNOSIS — M06.4 INFLAMMATORY POLYARTHROPATHY (H): ICD-10-CM

## 2023-05-09 DIAGNOSIS — H25.9 AGE-RELATED CATARACT OF BOTH EYES, UNSPECIFIED AGE-RELATED CATARACT TYPE: ICD-10-CM

## 2023-05-09 DIAGNOSIS — E03.9 HYPOTHYROIDISM, UNSPECIFIED TYPE: ICD-10-CM

## 2023-05-09 DIAGNOSIS — Z01.818 PREOP GENERAL PHYSICAL EXAM: Primary | ICD-10-CM

## 2023-05-09 DIAGNOSIS — G89.29 OTHER CHRONIC PAIN: ICD-10-CM

## 2023-05-09 PROCEDURE — 93000 ELECTROCARDIOGRAM COMPLETE: CPT | Performed by: INTERNAL MEDICINE

## 2023-05-09 PROCEDURE — 99214 OFFICE O/P EST MOD 30 MIN: CPT | Performed by: INTERNAL MEDICINE

## 2023-05-09 RX ORDER — METHOTREXATE 25 MG/ML
INJECTION, SOLUTION INTRA-ARTERIAL; INTRAMUSCULAR; INTRAVENOUS
Qty: 5 ML | Refills: 2 | Status: SHIPPED | OUTPATIENT
Start: 2023-05-09

## 2023-05-09 ASSESSMENT — PAIN SCALES - GENERAL: PAINLEVEL: MILD PAIN (3)

## 2023-05-09 NOTE — TELEPHONE ENCOUNTER
methotrexate 50 MG/2ML injection 5 mL 2 2/16/2023         Last Written Prescription Date:  2-  Last Fill Quantity: 5ML,   # refills: 2  Last Office Visit : 4-  Future Office visit:  6-      CBC RESULTS: Recent Labs   Lab Test 04/10/23  1035   WBC 4.4   RBC 4.29   HGB 13.3   HCT 39.6   MCV 92   MCH 31.0   MCHC 33.6   RDW 14.0          Creatinine   Date Value Ref Range Status   04/10/2023 0.77 0.51 - 0.95 mg/dL Final   06/22/2021 0.72 0.57 - 100 mg/dL Final   ]    Liver Function Studies -   Recent Labs   Lab Test 04/10/23  1035 11/08/21  1038 06/22/21  1138   PROTTOTAL 7.5  --   --    ALBUMIN 4.4   < > 4.2   BILITOTAL 0.3  --  <0.2   ALKPHOS 65  --   --    AST 42*   < >  --    ALT 50*   < >  --    BILIDIRECT  --   --  0.08    < > = values in this interval not displayed.         Routing refill request to provider for review/approval because:  Not on protocol.

## 2023-05-09 NOTE — PROGRESS NOTES
[address], [address]  [facility] [address]  Phone: [phone]  Primary Provider: Tray Perez  Pre-op Performing Provider: TRAY PEREZ    PREOPERATIVE EVALUATION:  Today's date: 5/9/2023    Krissy Weldon is a 67 year old female who presents for a preoperative evaluation.    Surgical Information:  Surgery/Procedure: Cataract Removal  Surgery Location:  [facility]  Surgeon: Dr Reich  Surgery Date: 5/22/23 & 6/6/23  Time of Surgery: TBD  Where patient plans to recover: At home with family  Fax number for surgical facility: [phone]        Subjective     HPI related to upcoming procedure: cataract          5/9/2023     8:46 AM   Preop Questions   1. Have you ever had a heart attack or stroke? No   2. Have you ever had surgery on your heart or blood vessels, such as a stent placement, a coronary artery bypass, or surgery on an artery in your head, neck, heart, or legs? No   3. Do you have chest pain with activity? No   4. Do you have a history of  heart failure? No   5. Do you currently have a cold, bronchitis or symptoms of other infection? No   6. Do you have a cough, shortness of breath, or wheezing? No   7. Do you or anyone in your family have previous history of blood clots? YES - mother - pulmonary embolism    8. Do you or does anyone in your family have a serious bleeding problem such as prolonged bleeding following surgeries or cuts? No   9. Have you ever had problems with anemia or been told to take iron pills? YES - not currently   10. Have you had any abnormal blood loss such as black, tarry or bloody stools, or abnormal vaginal bleeding? No   11. Have you ever had a blood transfusion? No   12. Are you willing to have a blood transfusion if it is medically needed before, during, or after your surgery? Yes   13. Have you or any of your relatives ever had problems with anesthesia? No   14. Do you have sleep apnea,  excessive snoring or daytime drowsiness? No   15. Do you have any artifical heart valves or other implanted medical devices like a pacemaker, defibrillator, or continuous glucose monitor? No   16. Do you have artificial joints? YES - right hip total hip arthroplasty and right ankle    17. Are you allergic to latex? No     Health Care Directive:  Patient does not have a Health Care Directive or Living Will: Discussed advance care planning with patient; information given to patient to review.    Preoperative Review of :   reviewed - no record of controlled substances prescribed.      Review of Systems  CONSTITUTIONAL: NEGATIVE for fever, chills, change in weight  ENT/MOUTH: NEGATIVE for ear, mouth and throat problems  RESP: NEGATIVE for significant cough or SOB  CV: NEGATIVE for chest pain, palpitations or peripheral edema    Patient Active Problem List    Diagnosis Date Noted     Anemia, iron deficiency 01/28/2022     Priority: Medium     Methotrexate, long term, current use 10/20/2021     Priority: Medium     Adalimumab (Humira) long-term use 10/20/2021     Priority: Medium     Pain in joint, pelvic region and thigh, unspecified laterality 10/20/2021     Priority: Medium     Neck pain, chronic 03/25/2019     Priority: Medium     Inflammatory spondylopathy of multiple sites in spine (H) 02/28/2018     Priority: Medium     Other chronic pain 09/10/2017     Priority: Medium     Psoriatic arthritis (HCC) 02/09/2016     Priority: Medium     Dr. Samayoa       Small bowel obstruction (H) 09/23/2015     Priority: Medium     Nausea with vomiting 06/02/2014     Priority: Medium     RUQ abdominal pain 04/11/2014     Priority: Medium     Elevated LFTs 04/11/2014     Priority: Medium     RUQ pain 04/11/2014     Priority: Medium     CMC DJD(carpometacarpal degenerative joint disease), localized primary 10/18/2013     Priority: Medium     Costochondritis, acute 07/25/2012     Priority: Medium     High risk medications (not  anticoagulants) long-term use 10/31/2011     Priority: Medium     Hypothyroid 09/07/2011     Priority: Medium      Past Medical History:   Diagnosis Date     Acute meniscal tear of knee 1/2014    with chrondromalacia per MRI      Depression      DJD (degenerative joint disease), lumbar 7/2012    L4-5, L5-S1 per MRI      Episcleritis 12/14/2011     Hamstring tendonitis at origin 7/2012    Bilaterally with partial tear right, sacroilitis per MRI     Hypothyroid 9/7/2011     Hypothyroidism      PONV (postoperative nausea and vomiting)      Psoriatic arthritis (H) 9/7/2011     Psoriatic arthritis (H) 2/9/2016     Strep throat 04/2017     Past Surgical History:   Procedure Laterality Date     APPENDECTOMY       ARTHROPLASTY CARPOMETACARPAL (THUMB JOINT)  11/8/2013    Procedure: ARTHROPLASTY CARPOMETACARPAL (THUMB JOINT);  Left First Carpometacarpal Trapezium Resection, Tendon Interposition  ;  Surgeon: Luiza Farrar MD;  Location:  OR     ARTHROPLASTY CARPOMETACARPAL (THUMB JOINT)  12/20/2013    Procedure: ARTHROPLASTY CARPOMETACARPAL (THUMB JOINT);  Right Thumb Trapezium Resection With Flexor Carpi Radialis Tendon Reconstruction       BLEPHAROPLASTY BILATERAL Bilateral 8/4/2020    Procedure: BILATERAL UPPER LID BLEPHAROPLASTY AND;  Surgeon: Xuan Back MD;  Location:  OR     CHOLECYSTECTOMY       COLONOSCOPY  5/6/2014    Procedure: COLONOSCOPY;  Surgeon: Adria San MD;  Location:  GI     COLONOSCOPY N/A 7/13/2022    Procedure: COLONOSCOPY crsal;  Surgeon: Dean Bro MD;  Location:  GI     ENDOSCOPIC RETROGRADE CHOLANGIOPANCREATOGRAM  6/13/2014    Procedure: ENDOSCOPIC RETROGRADE CHOLANGIOPANCREATOGRAM;  Surgeon: Lianna Wheeler MD;  Location: [facility] OR     GALLBLADDER SURGERY       GYN SURGERY      hysterectomy and oophorectomy     [facility]GI ENDOSCOPY W EUS Left 6/10/2014    Procedure: COMBINED ENDOSCOPIC ULTRASOUND, ESOPHAGOSCOPY, GASTROSCOPY, DUODENOSCOPY (EGD);   "Surgeon: Lianna Wheeler MD;  Location:  GI     HYSTERECTOMY       REPAIR PTOSIS BROW BILATERAL Bilateral 8/4/2020    Procedure: BILATERAL BROW PTOSIS;  Surgeon: Xuan Back MD;  Location: [facility] OR     Right thumb surgery  7/2015     XR SACROILIAC THERAPEUTIC INJECTION BILATERAL  12/2011     Current Outpatient Medications   Medication Sig Dispense Refill     adalimumab (HUMIRA *CF*) 40 MG/0.4ML prefilled syringe kit Inject 0.4 mLs (40 mg) Subcutaneous every 14 days For more refills,please keep scheduled appointment on 6/28/23 2 each 2     alendronate (FOSAMAX) 70 MG tablet Take 1 tablet (70 mg) by mouth every 7 days 12 tablet 3     BELBUCA 600 MCG FILM buccal film        buPROPion (WELLBUTRIN XL) 300 MG 24 hr tablet        FLUoxetine (PROZAC) 40 MG capsule Take 40 mg by mouth daily.       folic acid (FOLVITE) 1 MG tablet Take 4 tablets (4 mg) by mouth daily 360 tablet 3     levothyroxine (SYNTHROID/LEVOTHROID) 112 MCG tablet Take 1 tablet (112 mcg) by mouth daily 90 tablet 3     methotrexate 50 MG/2ML injection INJECT 0.7 ML UNDER THE SKIN EVERY 7 DAYS 5 mL 2     multivitamin, therapeutic (THERA-VIT) TABS Take 1 tablet by mouth daily       Needle, Disp, (BD DISP NEEDLES) 25G X 5/8\" MISC USE to administer  METHOTREXATE UNDER THE SKIN EVERY 7 DAYS 50 each 0     oxyCODONE (ROXICODONE) 5 MG tablet Take 5 mg by mouth every 4 hours as needed       sennosides (SENOKOT) 8.6 MG tablet Take 1 tablet by mouth daily  1 tablet 0     syringe/needle, sisp, 25G X 5/8\" 1 ML MISC Inject 1 Syringe Subcutaneous every 7 days Use with Methotrexate 24 each 1       No Known Allergies     Social History     Tobacco Use     Smoking status: Never     Smokeless tobacco: Never   Vaping Use     Vaping status: Never Used   Substance Use Topics     Alcohol use: No     Family History   Problem Relation Age of Onset     Arthritis Mother         RA     Cancer Mother         Endometrial     Arthritis Father         Psoriatic, " "psoriasis, lymphoma, colon and prostate (prostate primary site)     Cancer Father         Prostate     Arthritis Sister         Rheumatoid     Arthritis Sister         OA, crohns     Alpha-1 antitrypsin deficiency Sister      Celiac Disease Sister      Arthritis Maternal Grandmother         RA     History   Drug Use No         Objective     /74 (BP Location: Right arm, Patient Position: Sitting, Cuff Size: Adult Regular)   Pulse 76   Temp 97.7  F (36.5  C)   Resp 16   Ht 1.651 m (5' 5\")   Wt 73.9 kg (163 lb)   LMP 03/06/2012   SpO2 96%   BMI 27.12 kg/m      Physical Exam    GENERAL APPEARANCE: healthy, alert and no distress     EYES: EOMI,        HENT: ear canals and TM's normal and nose and mouth without ulcers or lesions     NECK: no adenopathy, no asymmetry, masses,       RESP: lungs clear to auscultation - no rales, rhonchi or wheezes     CV: regular rates and rhythm, normal S1 S2, no S3 or S4 and no murmur,      ABDOMEN:  soft, nontender, no HSM or masses and bowel sounds normal     MS: extremities normal- no gross deformities noted      SKIN: no suspicious lesions or rashes     NEURO: Normal strength and tone, sensory exam grossly normal      PSYCH: mentation appears normal. and affect normal/bright     LYMPHATICS: No cervical adenopathy    Recent Labs   Lab Test 04/10/23  1035 01/09/23  1449 11/08/21  1038 06/22/21  1138   HGB 13.3 13.0   < >  --     299   < >  --      --   --  138   POTASSIUM 4.4  --   --  5   CR 0.77 0.95   < > 0.72    < > = values in this interval not displayed.        Diagnostics:  Recent Results (from the past 720 hour(s))   TSH with free T4 reflex    Collection Time: 04/10/23 10:35 AM   Result Value Ref Range    TSH 0.20 (L) 0.30 - 4.20 uIU/mL   Lipid panel reflex to direct LDL Fasting    Collection Time: 04/10/23 10:35 AM   Result Value Ref Range    Cholesterol 194 <200 mg/dL    Triglycerides 92 <150 mg/dL    Direct Measure HDL 71 >=50 mg/dL    LDL " Cholesterol Calculated 105 (H) <=100 mg/dL    Non HDL Cholesterol 123 <130 mg/dL   Comprehensive metabolic panel    Collection Time: 04/10/23 10:35 AM   Result Value Ref Range    Sodium 140 136 - 145 mmol/L    Potassium 4.4 3.4 - 5.3 mmol/L    Chloride 103 98 - 107 mmol/L    Carbon Dioxide (CO2) 26 22 - 29 mmol/L    Anion Gap 11 7 - 15 mmol/L    Urea Nitrogen 15.7 8.0 - 23.0 mg/dL    Creatinine 0.77 0.51 - 0.95 mg/dL    Calcium 10.0 8.8 - 10.2 mg/dL    Glucose 88 70 - 99 mg/dL    Alkaline Phosphatase 65 35 - 104 U/L    AST 42 (H) 10 - 35 U/L    ALT 50 (H) 10 - 35 U/L    Protein Total 7.5 6.4 - 8.3 g/dL    Albumin 4.4 3.5 - 5.2 g/dL    Bilirubin Total 0.3 <=1.2 mg/dL    GFR Estimate 85 >60 mL/min/1.73m2   CBC with platelets    Collection Time: 04/10/23 10:35 AM   Result Value Ref Range    WBC Count 4.4 4.0 - 11.0 10e3/uL    RBC Count 4.29 3.80 - 5.20 10e6/uL    Hemoglobin 13.3 11.7 - 15.7 g/dL    Hematocrit 39.6 35.0 - 47.0 %    MCV 92 78 - 100 fL    MCH 31.0 26.5 - 33.0 pg    MCHC 33.6 31.5 - 36.5 g/dL    RDW 14.0 10.0 - 15.0 %    Platelet Count 221 150 - 450 10e3/uL   Vitamin D Deficiency    Collection Time: 04/10/23 10:35 AM   Result Value Ref Range    Vitamin D, Total (25-Hydroxy) 53 20 - 75 ug/L   T4 free    Collection Time: 04/10/23 10:35 AM   Result Value Ref Range    Free T4 1.26 0.90 - 1.70 ng/dL   Extra Green Top (Lithium Heparin) Tube    Collection Time: 04/10/23 10:38 AM   Result Value Ref Range    Hold Specimen JI    Ferritin    Collection Time: 04/10/23 10:38 AM   Result Value Ref Range    Ferritin 270 11 - 328 ng/mL   Iron & Iron Binding Capacity    Collection Time: 04/10/23 10:38 AM   Result Value Ref Range    Iron 92 37 - 145 ug/dL    Iron Binding Capacity 283 240 - 430 ug/dL    Iron Sat Index 33 15 - 46 %      EKG: appears normal, NSR, normal axis, normal intervals, no acute ST/T changes c/w ischemia, no LVH by voltage criteria, unchanged from previous tracings    Revised Cardiac Risk Index  (RCRI):  The patient has the following serious cardiovascular risks for perioperative complications:   - No serious cardiac risks = 0 points     RCRI Interpretation: 0 points: Class I (very low risk - 0.4% complication rate)    Patient Instructions   (Z01.818) Preop general physical exam  (primary encounter diagnosis)  Comment: you are medically optimized for your upcoming surgery.  Labs reviewed and stable.  Continue current medications. As you would normally take them.  Note that you are taking daily buprenorphine and if any opiates are needed this should be considered   Plan: EKG 12-lead complete w/read - Clinics            (E03.9) Hypothyroidism, unspecified type  Comment: Continue current dose of levothyroxine  Plan:     (H25.9) Age-related cataract of both eyes, unspecified age-related cataract type  Comment: plan for surgery  Plan:     (G89.29) Other chronic pain  Comment: as above - noted recent addition of buprenorphine - doing great  Plan:     (M06.4) Inflammatory polyarthropathy (H)  Comment: Continuation of methotrexate and adalimumab   Plan:              Signed Electronically by: Thomas Perez MD, MD  Copy of this evaluation report is provided to requesting physician.

## 2023-05-09 NOTE — PATIENT INSTRUCTIONS
(Z01.818) Preop general physical exam  (primary encounter diagnosis)  Comment: you are medically optimized for your upcoming surgery.  Labs reviewed and stable.  Continue current medications. As you would normally take them.  Note that you are taking daily buprenorphine and if any opiates are needed this should be considered   Plan: EKG 12-lead complete w/read - Clinics            (E03.9) Hypothyroidism, unspecified type  Comment: Continue current dose of levothyroxine  Plan:     (H25.9) Age-related cataract of both eyes, unspecified age-related cataract type  Comment: plan for surgery  Plan:     (G89.29) Other chronic pain  Comment: as above - noted recent addition of buprenorphine - doing great  Plan:     (M06.4) Inflammatory polyarthropathy (H)  Comment: Continuation of methotrexate and adalimumab   Plan:

## 2023-05-22 DIAGNOSIS — L40.50 PSORIATIC ARTHRITIS (H): ICD-10-CM

## 2023-05-22 DIAGNOSIS — Z79.899 HIGH RISK MEDICATIONS (NOT ANTICOAGULANTS) LONG-TERM USE: ICD-10-CM

## 2023-05-25 RX ORDER — NEEDLES, SAFETY 22GX1 1/2"
NEEDLE, DISPOSABLE MISCELLANEOUS
Qty: 24 EACH | Refills: 1 | Status: SHIPPED | OUTPATIENT
Start: 2023-05-25

## 2023-05-25 NOTE — TELEPHONE ENCOUNTER
"syringe/needle, sisp, 25G X 5/8\" 1 ML [facility]    4/14/2022  [facility]     Jensen Samayoa MD  Rheumatology         "

## 2023-06-12 ENCOUNTER — MYC REFILL (OUTPATIENT)
Dept: FAMILY MEDICINE | Facility: CLINIC | Age: 68
End: 2023-06-12
Payer: COMMERCIAL

## 2023-06-12 DIAGNOSIS — F41.1 GAD (GENERALIZED ANXIETY DISORDER): Primary | ICD-10-CM

## 2023-06-12 RX ORDER — FLUOXETINE 40 MG/1
40 CAPSULE ORAL DAILY
Qty: 90 CAPSULE | Refills: 3 | Status: SHIPPED | OUTPATIENT
Start: 2023-06-12 | End: 2024-07-29

## 2023-07-05 DIAGNOSIS — L40.9 PSORIASIS: ICD-10-CM

## 2023-07-05 DIAGNOSIS — L40.50 PSORIATIC ARTHRITIS (H): ICD-10-CM

## 2023-07-10 NOTE — TELEPHONE ENCOUNTER
adalimumab (HUMIRA *CF*) 40 MG/0.4ML prefilled syringe kit    Last Written Prescription Date:    Last Fill Quantity: 2,   # refills: 2  Last Office Visit: 4-  Future Office visit:  none    Please call to schedule.    Thanks    I do not need any follow up on the scheduling of this appointment unless the patient will no longer be receiving care in our clinic.

## 2023-07-11 RX ORDER — ADALIMUMAB 40MG/0.4ML
KIT SUBCUTANEOUS
Qty: 3 EACH | Refills: 0 | Status: SHIPPED | OUTPATIENT
Start: 2023-07-11 | End: 2024-04-16

## 2023-07-11 NOTE — TELEPHONE ENCOUNTER
I am sorry but she needs to get scheduled with a new provider. I do not have any reasonable way to see her with my schedule as it is. . She lives in [place], so possibly one of our providers who goes to [facility].     Once she has an appt., I will renew for long enough until she is seen.    Ok to order as 60tabs    Dr Reyes

## 2023-07-11 NOTE — TELEPHONE ENCOUNTER
Spoke to pt, she reports she has an appt scheduled sometime next month (wasn't sure of the exact date) with a new rheumatologist at [facility]-Dr. Dodd.   She reports that if Dr. Samayoa were to send in a one-month supply of her Humira, that should be enough to get her by until her appt with her new doctor.

## 2023-08-03 DIAGNOSIS — R29.898 HAND DYSFUNCTION: ICD-10-CM

## 2023-08-03 DIAGNOSIS — L40.9 PSORIASIS: ICD-10-CM

## 2023-08-03 DIAGNOSIS — L40.50 PSORIATIC ARTHRITIS (H): ICD-10-CM

## 2023-08-03 DIAGNOSIS — Z79.899 HIGH RISK MEDICATIONS (NOT ANTICOAGULANTS) LONG-TERM USE: ICD-10-CM

## 2023-08-08 RX ORDER — FOLIC ACID 1 MG/1
4000 TABLET ORAL DAILY
Qty: 360 TABLET | Refills: 0 | Status: SHIPPED | OUTPATIENT
Start: 2023-08-08

## 2023-08-08 NOTE — TELEPHONE ENCOUNTER
FOLIC ACID 1MG TABS   Last Written Prescription Date:   4/14/2022  Last Fill Quantity: 360,   # refills: 3  Last Office Visit :  4/14/2022  Future Office visit:  None    Routing refill request to provider for review/approval because:  Office visit due  90 day anna sent to pharm  Scheduling notified     Kayley Go RN  Central Triage Red Flags/Med Refills

## 2023-09-15 ENCOUNTER — OFFICE VISIT (OUTPATIENT)
Dept: FAMILY MEDICINE | Facility: CLINIC | Age: 68
End: 2023-09-15
Payer: COMMERCIAL

## 2023-09-15 VITALS
SYSTOLIC BLOOD PRESSURE: 106 MMHG | HEART RATE: 83 BPM | TEMPERATURE: 98.1 F | RESPIRATION RATE: 16 BRPM | WEIGHT: 159 LBS | BODY MASS INDEX: 26.46 KG/M2 | OXYGEN SATURATION: 96 % | DIASTOLIC BLOOD PRESSURE: 65 MMHG

## 2023-09-15 DIAGNOSIS — E03.9 HYPOTHYROIDISM, UNSPECIFIED TYPE: ICD-10-CM

## 2023-09-15 DIAGNOSIS — D50.8 IRON DEFICIENCY ANEMIA SECONDARY TO INADEQUATE DIETARY IRON INTAKE: ICD-10-CM

## 2023-09-15 DIAGNOSIS — Z01.818 PRE-OP EXAM: Primary | ICD-10-CM

## 2023-09-15 DIAGNOSIS — Z79.620 ADALIMUMAB (HUMIRA) LONG-TERM USE: ICD-10-CM

## 2023-09-15 DIAGNOSIS — L40.50 PSORIATIC ARTHRITIS (H): ICD-10-CM

## 2023-09-15 DIAGNOSIS — M85.80 OSTEOPENIA, UNSPECIFIED LOCATION: ICD-10-CM

## 2023-09-15 DIAGNOSIS — Z79.631 METHOTREXATE, LONG TERM, CURRENT USE: ICD-10-CM

## 2023-09-15 DIAGNOSIS — G89.29 OTHER CHRONIC PAIN: ICD-10-CM

## 2023-09-15 PROCEDURE — 99214 OFFICE O/P EST MOD 30 MIN: CPT | Performed by: INTERNAL MEDICINE

## 2023-09-15 ASSESSMENT — PAIN SCALES - GENERAL: PAINLEVEL: SEVERE PAIN (7)

## 2023-09-15 NOTE — PROGRESS NOTES
[address], [address]  [facility] [address]  Phone: [phone]  Primary Provider: Thomas Perez  Pre-op Performing Provider: KIMBERLY GONZALES    PREOPERATIVE EVALUATION:  Today's date: 9/15/2023    Krissy Weldon is a 68 year old female who presents for a preoperative evaluation.    Surgical Information:  Surgery/Procedure: hip replacement (left)   Surgery Location: [facility]   Surgeon:    Surgery Date: 9/22/23  Time of Surgery: TBD  Where patient plans to recover: At home with family  Fax number for surgical facility: [phone]    Assessment & Plan     The proposed surgical procedure is considered INTERMEDIATE risk.    Pre-op exam  Other chronic pain  Psoriatic arthritis (HCC)  Methotrexate, long term, current use  Adalimumab (Humira) long-term use  Iron deficiency anemia secondary to inadequate dietary iron intake  Hypothyroidism, unspecified type  Osteopenia, unspecified location  Recent labs reviewed (She had through [place] rheumatologist and shows me through her phone portal):  8/31/23:  Normal CBC (hemaglobin 13.1)  Cr: 0.77  GFR 84  AST WNL  ALT WNL    EKG is UTD 5/2023  Chronic conditions are stable  She has been holding methotrexate and Humira at the recommendation of her rheum 1 month prior and 1 month after procedure.   She has already made her pain specialist and surgeon aware of her Belbuca/pain regimen and states she was instructed to continue Belbuca.   Surgeon and pain specialist to coordinate her pain management perioperatively.  No NSAIDs day of procedure and 7 days prior to procedure (she does not take any)  Hold fosamax day of procedure     Patient is optimized for surgery    RECOMMENDATION:  APPROVAL GIVEN to proceed with proposed procedure      Subjective       HPI related to upcoming procedure:     PCP: Chris  Rheum: Lorena Boyce (Rheumatolgy with [facility])  Pain specialist: [facility] pain clinic (  [place])    She states she had labs done with her rheum recently and shows me on her phone through her portal:  8/31 labs from rheum:  Normal CBC (hemaglobin 13.1)  Cr: 0.77  GFR 84  AST WNL  ALT WNL    K wnl 4/10/23    Denies sob/cp with rest or exertion. Walks regularly, able to walk up at least two flights of stairs without issue. No dysuria, no cough, no fevers, no chills. Feeling baseline    Has had other hip done in the past, tolerated well.    Has been coordinating and communicating her surgery plans to her rheum and pain specialist and states her surgeon is aware of her Belbuca rx    No hx of PE/MI/CVA        9/15/2023     9:12 AM   Preop Questions   1. Have you ever had a heart attack or stroke? No   2. Have you ever had surgery on your heart or blood vessels, such as a stent placement, a coronary artery bypass, or surgery on an artery in your head, neck, heart, or legs? No   3. Do you have chest pain with activity? No   4. Do you have a history of  heart failure? No   5. Do you currently have a cold, bronchitis or symptoms of other infection? No   6. Do you have a cough, shortness of breath, or wheezing? No   7. Do you or anyone in your family have previous history of blood clots? YES - mother, not self   8. Do you or does anyone in your family have a serious bleeding problem such as prolonged bleeding following surgeries or cuts? No   9. Have you ever had problems with anemia or been told to take iron pills? YES - historical, not current   10. Have you had any abnormal blood loss such as black, tarry or bloody stools, or abnormal vaginal bleeding? No   11. Have you ever had a blood transfusion? No   12. Are you willing to have a blood transfusion if it is medically needed before, during, or after your surgery? Yes   13. Have you or any of your relatives ever had problems with anesthesia? No   14. Do you have sleep apnea, excessive snoring or daytime drowsiness? No   15. Do you have any artifical heart  valves or other implanted medical devices like a pacemaker, defibrillator, or continuous glucose monitor? No   16. Do you have artificial joints? YES - right hip and right ankle   17. Are you allergic to latex? No       Review of Systems  Constitutional, neuro, ENT, endocrine, pulmonary, cardiac, gastrointestinal, genitourinary, musculoskeletal, integument and psychiatric systems are negative, except as otherwise noted.    Patient Active Problem List    Diagnosis Date Noted    JUAN LUIS (generalized anxiety disorder) 06/12/2023     Priority: Medium    Anemia, iron deficiency 01/28/2022     Priority: Medium    Methotrexate, long term, current use 10/20/2021     Priority: Medium    Adalimumab (Humira) long-term use 10/20/2021     Priority: Medium    Pain in joint, pelvic region and thigh, unspecified laterality 10/20/2021     Priority: Medium    Neck pain, chronic 03/25/2019     Priority: Medium    Inflammatory spondylopathy of multiple sites in spine (H) 02/28/2018     Priority: Medium    Other chronic pain 09/10/2017     Priority: Medium    Psoriatic arthritis (HCC) 02/09/2016     Priority: Medium     Dr. Samayoa      Small bowel obstruction (H) 09/23/2015     Priority: Medium    Nausea with vomiting 06/02/2014     Priority: Medium    RUQ abdominal pain 04/11/2014     Priority: Medium    Elevated LFTs 04/11/2014     Priority: Medium    RUQ pain 04/11/2014     Priority: Medium    CMC DJD(carpometacarpal degenerative joint disease), localized primary 10/18/2013     Priority: Medium    Costochondritis, acute 07/25/2012     Priority: Medium    High risk medications (not anticoagulants) long-term use 10/31/2011     Priority: Medium    Hypothyroid 09/07/2011     Priority: Medium      Past Medical History:   Diagnosis Date    Acute meniscal tear of knee 1/2014    with chrondromalacia per MRI     Depression     DJD (degenerative joint disease), lumbar 7/2012    L4-5, L5-S1 per MRI     Episcleritis 12/14/2011    Hamstring  tendonitis at origin 7/2012    Bilaterally with partial tear right, sacroilitis per MRI    Hypothyroid 9/7/2011    Hypothyroidism     PONV (postoperative nausea and vomiting)     Psoriatic arthritis (H) 9/7/2011    Psoriatic arthritis (H) 2/9/2016    Strep throat 04/2017     Past Surgical History:   Procedure Laterality Date    APPENDECTOMY      ARTHROPLASTY CARPOMETACARPAL (THUMB JOINT)  11/8/2013    Procedure: ARTHROPLASTY CARPOMETACARPAL (THUMB JOINT);  Left First Carpometacarpal Trapezium Resection, Tendon Interposition  ;  Surgeon: Luiza Farrar MD;  [facility]    ARTHROPLASTY CARPOMETACARPAL (THUMB JOINT)  12/20/2013    Procedure: ARTHROPLASTY CARPOMETACARPAL (THUMB JOINT);  Right Thumb Trapezium Resection With Flexor Carpi Radialis Tendon Reconstruction      BLEPHAROPLASTY BILATERAL Bilateral 8/4/2020    Procedure: BILATERAL UPPER LID BLEPHAROPLASTY AND;  Surgeon: Xuan Back MD;  Location:  OR    CHOLECYSTECTOMY      COLONOSCOPY  5/6/2014    Procedure: COLONOSCOPY;  Surgeon: Adria San MD;  Location:  GI    COLONOSCOPY N/A 7/13/2022    Procedure: COLONOSCOPY crsal;  Surgeon: Dean Bro MD;  Location:  GI    ENDOSCOPIC RETROGRADE CHOLANGIOPANCREATOGRAM  6/13/2014    Procedure: ENDOSCOPIC RETROGRADE CHOLANGIOPANCREATOGRAM;  Surgeon: Lianna Wheeler MD;  Location:  OR    GALLBLADDER SURGERY      GYN SURGERY      hysterectomy and oophorectomy    [facility] UGI ENDOSCOPY W EUS Left 6/10/2014    Procedure: COMBINED ENDOSCOPIC ULTRASOUND, ESOPHAGOSCOPY, GASTROSCOPY, DUODENOSCOPY (EGD);  Surgeon: Lianna Wheeler MD;  Location:  GI    HYSTERECTOMY      REPAIR PTOSIS BROW BILATERAL Bilateral 8/4/2020    Procedure: BILATERAL BROW PTOSIS;  Surgeon: Xuan Back MD;  Location:  OR    Right thumb surgery  7/2015    XR SACROILIAC THERAPEUTIC INJECTION BILATERAL  12/2011     Current Outpatient Medications   Medication Sig Dispense Refill    adalimumab  "(HUMIRA *CF*) 40 MG/0.4ML prefilled syringe kit Inject 0.4 mLs (40 mg) Subcutaneous every 14 days For more refills,please keep scheduled appointment on 6/28/23 - Subcutaneous 3 each 0    alendronate (FOSAMAX) 70 MG tablet Take 1 tablet (70 mg) by mouth every 7 days 12 tablet 3    BELBUCA 600 MCG FILM buccal film       buPROPion (WELLBUTRIN XL) 300 MG 24 hr tablet       FLUoxetine (PROZAC) 40 MG capsule Take 1 capsule (40 mg) by mouth daily 90 capsule 3    folic acid (FOLVITE) 1 MG tablet Take 4 tablets (4,000 mcg) by mouth daily 360 tablet 0    levothyroxine (SYNTHROID/LEVOTHROID) 112 MCG tablet Take 1 tablet (112 mcg) by mouth daily 90 tablet 3    methotrexate 50 MG/2ML injection INJECT 0.7 ML UNDER THE SKIN EVERY 7 DAYS 5 mL 2    multivitamin, therapeutic (THERA-VIT) TABS Take 1 tablet by mouth daily      Needle, Disp, (BD DISP NEEDLES) 25G X 5/8\" MISC USE to administer  METHOTREXATE UNDER THE SKIN EVERY 7 DAYS 50 each 0    oxyCODONE (ROXICODONE) 5 MG tablet Take 5 mg by mouth every 4 hours as needed      sennosides (SENOKOT) 8.6 MG tablet Take 1 tablet by mouth daily  1 tablet 0    syringe/needle, sisp, (B-D SYRINGE/NEEDLE) 25G X 5/8\" 1 ML MISC Inject 1 Syringe Subcutaneous every 7 days Use with Methotrexate - Subcutaneous 24 each 1       No Known Allergies     Social History     Tobacco Use    Smoking status: Never    Smokeless tobacco: Never   Substance Use Topics    Alcohol use: No       History   Drug Use No         Objective     /65 (BP Location: Left arm, Patient Position: Sitting, Cuff Size: Adult Regular)   Pulse 83   Temp 98.1  F (36.7  C) (Oral)   Resp 16   Wt 72.1 kg (159 lb)   LMP 03/06/2012   SpO2 96%   BMI 26.46 kg/m      Physical Exam    GENERAL APPEARANCE: AAOx3, no distress. Well developed.    RESP: Lungs CTA bilaterally. No w/r/r. No distress     CV: RRR, S1/S2 present. No m/r/c.     ABDOMEN:  soft, nontender, no distention. No rebound or guarding.     EXT: No c/c/e in lower " extremities b/l. No rashes or deformities noted.    PSYCH: appropriate mood and affect.         Recent Labs   Lab Test 04/10/23  1035 01/09/23  1449   HGB 13.3 13.0    299     --    POTASSIUM 4.4  --    CR 0.77 0.95        Diagnostics:  No labs were ordered during this visit.   No EKG this visit, completed in the last 6 months.    Revised Cardiac Risk Index (RCRI):  The patient has the following serious cardiovascular risks for perioperative complications:   - No serious cardiac risks = 0 points     RCRI Interpretation: 0 points: Class I (very low risk - 0.4% complication rate)         Signed Electronically by: Karlee Abbott DO  Copy of this evaluation report is provided to requesting physician.

## 2023-10-30 ENCOUNTER — MYC REFILL (OUTPATIENT)
Dept: FAMILY MEDICINE | Facility: CLINIC | Age: 68
End: 2023-10-30
Payer: COMMERCIAL

## 2023-10-30 DIAGNOSIS — F41.1 GAD (GENERALIZED ANXIETY DISORDER): Primary | ICD-10-CM

## 2023-10-30 RX ORDER — BUPROPION HYDROCHLORIDE 300 MG/1
300 TABLET ORAL EVERY MORNING
Qty: 90 TABLET | Refills: 3 | Status: SHIPPED | OUTPATIENT
Start: 2023-10-30

## 2024-01-14 DIAGNOSIS — E03.9 HYPOTHYROIDISM, UNSPECIFIED TYPE: ICD-10-CM

## 2024-01-15 RX ORDER — LEVOTHYROXINE SODIUM 112 UG/1
112 TABLET ORAL DAILY
Qty: 90 TABLET | Refills: 3 | Status: SHIPPED | OUTPATIENT
Start: 2024-01-15 | End: 2024-07-17

## 2024-03-11 ENCOUNTER — PATIENT OUTREACH (OUTPATIENT)
Dept: CARE COORDINATION | Facility: CLINIC | Age: 69
End: 2024-03-11
Payer: COMMERCIAL

## 2024-03-11 DIAGNOSIS — M85.80 OSTEOPENIA, UNSPECIFIED LOCATION: ICD-10-CM

## 2024-03-11 RX ORDER — ALENDRONATE SODIUM 70 MG/1
70 TABLET ORAL
Qty: 12 TABLET | Refills: 0 | Status: SHIPPED | OUTPATIENT
Start: 2024-03-11 | End: 2024-06-01

## 2024-03-25 ENCOUNTER — PATIENT OUTREACH (OUTPATIENT)
Dept: CARE COORDINATION | Facility: CLINIC | Age: 69
End: 2024-03-25
Payer: COMMERCIAL

## 2024-04-16 ENCOUNTER — ANCILLARY PROCEDURE (OUTPATIENT)
Dept: GENERAL RADIOLOGY | Facility: CLINIC | Age: 69
End: 2024-04-16
Attending: INTERNAL MEDICINE
Payer: COMMERCIAL

## 2024-04-16 ENCOUNTER — OFFICE VISIT (OUTPATIENT)
Dept: FAMILY MEDICINE | Facility: CLINIC | Age: 69
End: 2024-04-16
Payer: COMMERCIAL

## 2024-04-16 VITALS
OXYGEN SATURATION: 98 % | BODY MASS INDEX: 27.88 KG/M2 | HEART RATE: 79 BPM | DIASTOLIC BLOOD PRESSURE: 69 MMHG | WEIGHT: 163.3 LBS | TEMPERATURE: 97.8 F | HEIGHT: 64 IN | RESPIRATION RATE: 16 BRPM | SYSTOLIC BLOOD PRESSURE: 107 MMHG

## 2024-04-16 DIAGNOSIS — R06.2 WHEEZING: ICD-10-CM

## 2024-04-16 DIAGNOSIS — R61 NIGHT SWEATS: ICD-10-CM

## 2024-04-16 DIAGNOSIS — F41.1 GAD (GENERALIZED ANXIETY DISORDER): ICD-10-CM

## 2024-04-16 DIAGNOSIS — J45.901 BRONCHITIS, ALLERGIC, UNSPECIFIED ASTHMA SEVERITY, WITH ACUTE EXACERBATION: ICD-10-CM

## 2024-04-16 DIAGNOSIS — L40.50 PSORIATIC ARTHRITIS (H): ICD-10-CM

## 2024-04-16 DIAGNOSIS — J45.901 BRONCHITIS, ALLERGIC, UNSPECIFIED ASTHMA SEVERITY, WITH ACUTE EXACERBATION: Primary | ICD-10-CM

## 2024-04-16 PROBLEM — Z79.620 ADALIMUMAB (HUMIRA) LONG-TERM USE: Status: RESOLVED | Noted: 2021-10-20 | Resolved: 2024-04-16

## 2024-04-16 PROCEDURE — 71046 X-RAY EXAM CHEST 2 VIEWS: CPT | Mod: TC | Performed by: RADIOLOGY

## 2024-04-16 PROCEDURE — 99214 OFFICE O/P EST MOD 30 MIN: CPT | Mod: 25 | Performed by: INTERNAL MEDICINE

## 2024-04-16 PROCEDURE — 96372 THER/PROPH/DIAG INJ SC/IM: CPT | Performed by: INTERNAL MEDICINE

## 2024-04-16 RX ORDER — BUPRENORPHINE HYDROCHLORIDE 900 UG/1
FILM, SOLUBLE BUCCAL
COMMUNITY
Start: 2024-04-03

## 2024-04-16 RX ORDER — ALBUTEROL SULFATE 90 UG/1
1-2 AEROSOL, METERED RESPIRATORY (INHALATION)
COMMUNITY
Start: 2024-04-07

## 2024-04-16 RX ORDER — PRAZOSIN HYDROCHLORIDE 1 MG/1
CAPSULE ORAL
COMMUNITY
Start: 2023-11-24

## 2024-04-16 RX ORDER — PREDNISONE 20 MG/1
40 TABLET ORAL DAILY
Qty: 10 TABLET | Refills: 0 | Status: SHIPPED | OUTPATIENT
Start: 2024-04-16 | End: 2024-10-07

## 2024-04-16 RX ORDER — QUETIAPINE FUMARATE 25 MG/1
25 TABLET, FILM COATED ORAL
COMMUNITY
End: 2024-06-01

## 2024-04-16 RX ORDER — IPRATROPIUM BROMIDE AND ALBUTEROL SULFATE 2.5; .5 MG/3ML; MG/3ML
1 SOLUTION RESPIRATORY (INHALATION) EVERY 6 HOURS PRN
Qty: 90 ML | Refills: 1 | Status: SHIPPED | OUTPATIENT
Start: 2024-04-16

## 2024-04-16 RX ORDER — ETANERCEPT 50 MG/ML
50 SOLUTION SUBCUTANEOUS
COMMUNITY
Start: 2024-01-31

## 2024-04-16 RX ORDER — ACETAMINOPHEN 325 MG/1
1000 TABLET ORAL 3 TIMES DAILY
COMMUNITY

## 2024-04-16 RX ORDER — AZITHROMYCIN 250 MG/1
TABLET, FILM COATED ORAL
Qty: 6 TABLET | Refills: 0 | Status: SHIPPED | OUTPATIENT
Start: 2024-04-16 | End: 2024-05-08

## 2024-04-16 RX ORDER — CEFTRIAXONE SODIUM 1 G
1 VIAL (EA) INJECTION ONCE
Status: COMPLETED | OUTPATIENT
Start: 2024-04-16 | End: 2024-04-16

## 2024-04-16 RX ORDER — AMOXICILLIN 500 MG/1
CAPSULE ORAL
COMMUNITY
Start: 2024-02-20

## 2024-04-16 RX ADMIN — Medication 1 G: at 15:58

## 2024-04-16 ASSESSMENT — PAIN SCALES - GENERAL: PAINLEVEL: MODERATE PAIN (4)

## 2024-04-16 NOTE — PROGRESS NOTES
Assessment and Plan  1. Bronchitis, allergic, unspecified asthma severity, with acute exacerbation  2. Wheezing  3. Night sweats    Ongoing problem, uncontrolled.  Recent telemedicine  visit on 4/7/2024 with productive cough and wheezing for which he was given Doxycycline and Albuterol as needed.  Patient states that her last Doxycycline of 7 days course on 4/14/2024 .     - Does have risk factors of Psoriatic arthritis on Enbrel injections , Humira in the past but not currently.     -Physical exam positive for diffuse bilateral wheezing, posttussive Rales heard in bibasilar regions.  Shared decision for checking x-ray chest to exclude any underlying pneumonia given the night sweats symptoms as well, will give a short course of azithromycin along with prednisone which patient initially was reluctant but later after explaining the importance of this on her current acute exacerbation of possible underlying asthma cannot be excluded as it was never diagnosed for her.  Patient understood and agreed with the plan on including nebulizations to avoid hospitalizations, ER precautions given.    -Shared decision to get one-time ceftriaxone in the office today for improvement on any underlying infection causing the severity of symptoms.    - XR Chest 2 Views; Future  - ipratropium - albuterol 0.5 mg/2.5 mg/3 mL (DUONEB) 0.5-2.5 (3) MG/3ML neb solution; Take 1 vial (3 mLs) by nebulization every 6 hours as needed for shortness of breath, wheezing or cough  Dispense: 90 mL; Refill: 1  - azithromycin (ZITHROMAX) 250 MG tablet; Two tablets first day, then one tablet daily for four days.  Dispense: 6 tablet; Refill: 0  - predniSONE (DELTASONE) 20 MG tablet; Take 2 tablets (40 mg) by mouth daily  Dispense: 10 tablet; Refill: 0  - cefTRIAXone (ROCEPHIN) in NS for IM administration 1 g    4. Psoriatic arthritis (H)  Chronic problem, on immunomodulator therapies as mentioned in the medication list below.  Risk factors for the above  for which we will consider making sure there is no opportunistic infections including any pneumonia which x-ray was not checked given the telemedicine appointment of recent urgent care visit which patient had.     5. JUAN LUIS (generalized anxiety disorder)  Chronic problem, well-controlled.  Patient follows Dr. Izaguirre -Psychiatrist who is managing her current multiple psychiatric medications which may have interaction sometimes with the above medications have discussed with the patient and have avoided any major interaction related antibiotics.      Please note that this note consists of symbols derived from keyboarding, dictation and/or voice recognition software. As a result, there may be errors in the script that have gone undetected. Please consider this when interpreting information found in this chart.    Patient Instructions   As discussed , given your ongoing cough will make sure no pneumonia and give a short course of Azithromycin as well along with prednisone for 5 days.     Please do Nebulizer as needed every 4 hours to get better on the bronchospasm.     ======================================      Return in about 10 days (around 4/26/2024), or if symptoms worsen or fail to improve, for If symptoms persist, Follow up of last visit.    MD JIMMY Eubanks [facility] Rey is a 68 year old, presenting for the following health issues:  Cough and Follow Up        4/16/2024     3:05 PM   Additional Questions   Roomed by Karlie MARQUEZ     History of Present Illness       Reason for visit:  Productive cough and night sweats  Symptom onset:  1-2 weeks ago  Symptoms include:  Cough  Symptom intensity:  Moderate  Symptom progression:  Worsening  Had these symptoms before:  No  What makes it worse:  No  What makes it better:  Albuteral    She eats 4 or more servings of fruits and vegetables daily.She exercises with enough effort to increase her heart rate 30 to 60 minutes per day.   She exercises with enough effort to increase her heart rate 5 days per week.   She is taking medications regularly.     Pt declined AWV add on    Pt is new to me, recent telemedicine UC visit on 4/7/2024 with productive cough and wheezing for which he was given Doxycycline and Albuterol as needed.      No Known Allergies     Past Medical History:   Diagnosis Date    Acute meniscal tear of knee 1/2014    with chrondromalacia per MRI     Depression     DJD (degenerative joint disease), lumbar 7/2012    L4-5, L5-S1 per MRI     Episcleritis 12/14/2011    Hamstring tendonitis at origin 7/2012    Bilaterally with partial tear right, sacroilitis per MRI    Hypothyroid 9/7/2011    Hypothyroidism     PONV (postoperative nausea and vomiting)     Psoriatic arthritis (H) 9/7/2011    Psoriatic arthritis (H) 2/9/2016    Strep throat 04/2017       Past Surgical History:   Procedure Laterality Date    APPENDECTOMY      ARTHROPLASTY CARPOMETACARPAL (THUMB JOINT)  11/8/2013    Procedure: ARTHROPLASTY CARPOMETACARPAL (THUMB JOINT);  Left First Carpometacarpal Trapezium Resection, Tendon Interposition  ;  Surgeon: Luiza Farrar MD;  [facility]    ARTHROPLASTY CARPOMETACARPAL (THUMB JOINT)  12/20/2013    Procedure: ARTHROPLASTY CARPOMETACARPAL (THUMB JOINT);  Right Thumb Trapezium Resection With Flexor Carpi Radialis Tendon Reconstruction      BLEPHAROPLASTY BILATERAL Bilateral 8/4/2020    Procedure: BILATERAL UPPER LID BLEPHAROPLASTY AND;  Surgeon: Xuan Back MD;  Location:  OR    CHOLECYSTECTOMY      COLONOSCOPY  5/6/2014    Procedure: COLONOSCOPY;  Surgeon: Adria San MD;  Location:  GI    COLONOSCOPY N/A 7/13/2022    Procedure: COLONOSCOPY crsal;  Surgeon: Dean Bro MD;  Location:  GI    ENDOSCOPIC RETROGRADE CHOLANGIOPANCREATOGRAM  6/13/2014    Procedure: ENDOSCOPIC RETROGRADE CHOLANGIOPANCREATOGRAM;  Surgeon: Lianna Wheeler MD;  [facility]    GALLBLADDER SURGERY       GYN SURGERY      hysterectomy and oophorectomy    [facility] UGI ENDOSCOPY W EUS Left 6/10/2014    Procedure: COMBINED ENDOSCOPIC ULTRASOUND, ESOPHAGOSCOPY, GASTROSCOPY, DUODENOSCOPY (EGD);  Surgeon: Lianna Wheeler MD;  Location:  GI    HYSTERECTOMY      REPAIR PTOSIS BROW BILATERAL Bilateral 8/4/2020    Procedure: BILATERAL BROW PTOSIS;  Surgeon: Xuan Back MD;  Location: [facility]    Right thumb surgery  7/2015    XR SACROILIAC THERAPEUTIC INJECTION BILATERAL  12/2011       Family History   Problem Relation Age of Onset    Arthritis Mother         RA    Cancer Mother         Endometrial    Arthritis Father         Psoriatic, psoriasis, lymphoma, colon and prostate (prostate primary site)    Cancer Father         Prostate    Arthritis Sister         Rheumatoid    Arthritis Sister         OA, crohns    Alpha-1 antitrypsin deficiency Sister     Celiac Disease Sister     Arthritis Maternal Grandmother         RA       Social History     Tobacco Use    Smoking status: Never    Smokeless tobacco: Never   Substance Use Topics    Alcohol use: No        Current Outpatient Medications   Medication Sig Dispense Refill    acetaminophen (TYLENOL) 325 MG tablet Take 1,000 mg by mouth 3 times daily      albuterol (PROAIR HFA/PROVENTIL HFA/VENTOLIN HFA) 108 (90 Base) MCG/ACT inhaler Inhale 1-2 puffs into the lungs      alendronate (FOSAMAX) 70 MG tablet TAKE 1 TABLET (70 MG) BY MOUTH EVERY 7 DAYS 12 tablet 0    azithromycin (ZITHROMAX) 250 MG tablet Two tablets first day, then one tablet daily for four days. 6 tablet 0    BELBUCA 900 MCG FILM buccal film       buPROPion (WELLBUTRIN XL) 300 MG 24 hr tablet Take 1 tablet (300 mg) by mouth every morning 90 tablet 3    ENBREL 50 MG/ML injection Inject 50 mg Subcutaneous      FLUoxetine (PROZAC) 40 MG capsule Take 1 capsule (40 mg) by mouth daily 90 capsule 3    folic acid (FOLVITE) 1 MG tablet Take 4 tablets (4,000 mcg) by mouth daily 360 tablet 0    ipratropium -  "albuterol 0.5 mg/2.5 mg/3 mL (DUONEB) 0.5-2.5 (3) MG/3ML neb solution Take 1 vial (3 mLs) by nebulization every 6 hours as needed for shortness of breath, wheezing or cough 90 mL 1    levothyroxine (SYNTHROID/LEVOTHROID) 112 MCG tablet Take 1 tablet (112 mcg) by mouth daily 90 tablet 3    methotrexate 50 MG/2ML injection INJECT 0.7 ML UNDER THE SKIN EVERY 7 DAYS 5 mL 2    multivitamin, therapeutic (THERA-VIT) TABS Take 1 tablet by mouth daily      Needle, Disp, (BD DISP NEEDLES) 25G X 5/8\" MISC USE to administer  METHOTREXATE UNDER THE SKIN EVERY 7 DAYS 50 each 0    oxyCODONE (ROXICODONE) 5 MG tablet Take 5 mg by mouth every 4 hours as needed      prazosin (MINIPRESS) 1 MG capsule       predniSONE (DELTASONE) 20 MG tablet Take 2 tablets (40 mg) by mouth daily 10 tablet 0    QUEtiapine (SEROQUEL) 25 MG tablet Take 25 mg by mouth      sennosides (SENOKOT) 8.6 MG tablet Take 1 tablet by mouth daily  1 tablet 0    syringe/needle, sisp, (B-D SYRINGE/NEEDLE) 25G X 5/8\" 1 ML MISC Inject 1 Syringe Subcutaneous every 7 days Use with Methotrexate - Subcutaneous 24 each 1    tiZANidine (ZANAFLEX) 4 MG tablet Take 4 mg by mouth      amoxicillin (AMOXIL) 500 MG capsule Take 4 capsules (2,000 mg) by mouth once for 1 dose 1 hour before dental procedure* (Patient not taking: Reported on 4/16/2024)      naloxone (NARCAN) 4 MG/0.1ML nasal spray Spray 1 spray in nostril (Patient not taking: Reported on 4/16/2024)       No current facility-administered medications for this visit.          Review of Systems  Constitutional, HEENT, cardiovascular, pulmonary, GI, , musculoskeletal, neuro, skin, endocrine and psych systems are negative, except as otherwise noted.      Objective    /69 (BP Location: Left arm, Patient Position: Sitting, Cuff Size: Adult Large)   Pulse 79   Temp 97.8  F (36.6  C) (Tympanic)   Resp 16   Ht 1.635 m (5' 4.37\")   Wt 74.1 kg (163 lb 4.8 oz)   LMP 03/06/2012   SpO2 98%   BMI 27.71 kg/m    Body mass " index is 27.71 kg/m .  Physical Exam   GENERAL: alert and no distress  ENT Exam : NO pharyngeal erythema, Cervical lymphadenopathy or Sinus tenderness on palpation.  NECK: no adenopathy, no asymmetry, masses, or scars  RESP:  Positive for diffuse bilateral wheezing, posttussive Rales heard in bibasilar regions.   CV: regular rate and rhythm, normal S1 S2, no S3 or S4, no murmur, click or rub, no peripheral edema  ABDOMEN: soft, nontender, no hepatosplenomegaly, no masses and bowel sounds normal  MS: no gross musculoskeletal defects noted, no edema    Signed Electronically by: Sania Monroe MD

## 2024-04-16 NOTE — PATIENT INSTRUCTIONS
As discussed , given your ongoing cough will make sure no pneumonia and give a short course of Azithromycin as well along with prednisone for 5 days.     Please do Nebulizer as needed every 4 hours to get better on the bronchospasm.     ======================================

## 2024-04-17 NOTE — RESULT ENCOUNTER NOTE
Your X ray is negative for any acute concerns, past left lung base scarring seen per radiology review.    Please let me know if you have any questions.  Sania Monroe MD on 4/17/2024

## 2024-05-05 ENCOUNTER — MYC MEDICAL ADVICE (OUTPATIENT)
Dept: FAMILY MEDICINE | Facility: CLINIC | Age: 69
End: 2024-05-05
Payer: COMMERCIAL

## 2024-05-08 ENCOUNTER — OFFICE VISIT (OUTPATIENT)
Dept: FAMILY MEDICINE | Facility: CLINIC | Age: 69
End: 2024-05-08
Payer: COMMERCIAL

## 2024-05-08 VITALS
HEART RATE: 85 BPM | OXYGEN SATURATION: 96 % | TEMPERATURE: 97.1 F | RESPIRATION RATE: 18 BRPM | HEIGHT: 64 IN | DIASTOLIC BLOOD PRESSURE: 66 MMHG | BODY MASS INDEX: 28.41 KG/M2 | SYSTOLIC BLOOD PRESSURE: 109 MMHG | WEIGHT: 166.4 LBS

## 2024-05-08 DIAGNOSIS — E03.9 HYPOTHYROIDISM, UNSPECIFIED TYPE: Primary | ICD-10-CM

## 2024-05-08 DIAGNOSIS — J45.901 BRONCHITIS, ALLERGIC, UNSPECIFIED ASTHMA SEVERITY, WITH ACUTE EXACERBATION: ICD-10-CM

## 2024-05-08 PROCEDURE — 99213 OFFICE O/P EST LOW 20 MIN: CPT | Performed by: INTERNAL MEDICINE

## 2024-05-08 RX ORDER — FLUTICASONE PROPIONATE AND SALMETEROL 250; 50 UG/1; UG/1
1 POWDER RESPIRATORY (INHALATION) EVERY 12 HOURS
Qty: 60 EACH | Refills: 3 | Status: SHIPPED | OUTPATIENT
Start: 2024-05-08

## 2024-05-08 ASSESSMENT — ASTHMA QUESTIONNAIRES
QUESTION_2 LAST FOUR WEEKS HOW OFTEN HAVE YOU HAD SHORTNESS OF BREATH: MORE THAN ONCE A DAY
QUESTION_1 LAST FOUR WEEKS HOW MUCH OF THE TIME DID YOUR ASTHMA KEEP YOU FROM GETTING AS MUCH DONE AT WORK, SCHOOL OR AT HOME: SOME OF THE TIME
ACT_TOTALSCORE: 8
ACT_TOTALSCORE: 8
QUESTION_4 LAST FOUR WEEKS HOW OFTEN HAVE YOU USED YOUR RESCUE INHALER OR NEBULIZER MEDICATION (SUCH AS ALBUTEROL): THREE OR MORE TIMES PER DAY
QUESTION_3 LAST FOUR WEEKS HOW OFTEN DID YOUR ASTHMA SYMPTOMS (WHEEZING, COUGHING, SHORTNESS OF BREATH, CHEST TIGHTNESS OR PAIN) WAKE YOU UP AT NIGHT OR EARLIER THAN USUAL IN THE MORNING: FOUR OR MORE NIGHTS A WEEK
QUESTION_5 LAST FOUR WEEKS HOW WOULD YOU RATE YOUR ASTHMA CONTROL: POORLY CONTROLLED

## 2024-05-08 ASSESSMENT — PAIN SCALES - GENERAL: PAINLEVEL: NO PAIN (0)

## 2024-05-08 NOTE — PATIENT INSTRUCTIONS
(J45.901) Bronchitis, allergic, unspecified asthma severity, with acute exacerbation  Comment: We will treat empircally with advair twice per day and continued use of Duonebs and as needed albuterol.  Hold off on antibiotics for now.  Check sputum culture and treat pending results.  Consider a two week trial of omeprazole   Plan: Respiratory Aerobic Bacterial Culture,         fluticasone-salmeterol (ADVAIR) 250-50 MCG/ACT         inhaler

## 2024-05-08 NOTE — PROGRESS NOTES
"Pawel Rey is a 68 year old, presenting for the following health issues:  Follow Up (Cough for a few weeks)    History of Present Illness     Asthma:  She presents for follow up of asthma.  She has some cough, some wheezing, and some shortness of breath.  She is using a relief medication every 4 hours. She does not miss any doses of her controller medication throughout the week. Patient is aware of the following triggers: upper respiratory infections. The patient has not had a visit to the Emergency Room, Urgent Care or Hospital due to asthma since the last clinic visit.     Reason for visit:  Cough with wheezing    She eats 2-3 servings of fruits and vegetables daily.She consumes 0 sweetened beverage(s) daily.She exercises with enough effort to increase her heart rate 20 to 29 minutes per day.  She exercises with enough effort to increase her heart rate 5 days per week.   She is taking medications regularly.     Hypothyroid  Acute Bronchitis   Krissy Weldon has continued to use nebulizers and is still having productive cough through the day.  Associated chest tightness and wheezing with shortness of breath.  She did have some success with azithromycin and prednisone.  She is not taking albuterol.    Review of Systems  Constitutional, HEENT, cardiovascular, pulmonary, GI, , musculoskeletal, neuro, skin, endocrine and psych systems are negative, except as otherwise noted.      Objective    /66 (BP Location: Right arm, Patient Position: Sitting, Cuff Size: Adult Regular)   Pulse 85   Temp 97.1  F (36.2  C) (Tympanic)   Resp 18   Ht 1.626 m (5' 4\")   Wt 75.5 kg (166 lb 6.4 oz)   LMP 03/06/2012   SpO2 96%   BMI 28.56 kg/m    Body mass index is 28.56 kg/m .  Physical Exam   GENERAL: alert and no distress  EYES: Eyes grossly normal to inspection,   HENT: ear canals and TM's norm   NECK: no adenopathy, no asymmetry, masses, or scars  RESP: lungs clear to auscultation - no wheezes  CV: regular rate " and rhythm,   ABDOMEN: soft, nontender,   MS: no gross musculoskeletal defects noted, no edema  SKIN: no suspicious lesions or rashes  NEURO: Normal strength and tone   PSYCH: mentation appears normal, affect normal/bright    No results found for any visits on 05/08/24.     Patient Instructions   (J45.901) Bronchitis, allergic, unspecified asthma severity, with acute exacerbation  Comment: We will treat empircally with advair twice per day and continued use of Duonebs and as needed albuterol.  Hold off on antibiotics for now.  Check sputum culture and treat pending results.  Consider a two week trial of omeprazole   Plan: Respiratory Aerobic Bacterial Culture,         fluticasone-salmeterol (ADVAIR) 250-50 MCG/ACT         inhaler                    Signed Electronically by: Thomas Perez MD, MD

## 2024-06-01 ENCOUNTER — MYC REFILL (OUTPATIENT)
Dept: FAMILY MEDICINE | Facility: CLINIC | Age: 69
End: 2024-06-01
Payer: COMMERCIAL

## 2024-06-01 DIAGNOSIS — G47.00 INSOMNIA, UNSPECIFIED TYPE: Primary | ICD-10-CM

## 2024-06-01 DIAGNOSIS — M85.80 OSTEOPENIA, UNSPECIFIED LOCATION: ICD-10-CM

## 2024-06-02 ENCOUNTER — HEALTH MAINTENANCE LETTER (OUTPATIENT)
Age: 69
End: 2024-06-02

## 2024-06-04 RX ORDER — ALENDRONATE SODIUM 70 MG/1
70 TABLET ORAL
Qty: 12 TABLET | Refills: 3 | Status: SHIPPED | OUTPATIENT
Start: 2024-06-04

## 2024-06-04 NOTE — TELEPHONE ENCOUNTER
I have not previously prescribed quetiapine.  Is this a medication that she previously was prescribed by another physician?

## 2024-06-12 NOTE — TELEPHONE ENCOUNTER
Can we call Krissy Weldon and ask her if she intended for me to refill her quetiapine or if she was getting this from another provider?

## 2024-06-12 NOTE — TELEPHONE ENCOUNTER
I phoned pt.      Pt said last year you two talked about Dr. Perez taking over prescribing the  Quetiapine (Seroquel) when psychiatrist Dr. Lorene Nolan retired.      Pt said happy to come in for a visit if need.  Takes a 25 mg at bedtime.     Rebecca EDGE MA

## 2024-06-13 RX ORDER — QUETIAPINE FUMARATE 25 MG/1
25 TABLET, FILM COATED ORAL AT BEDTIME
Qty: 90 TABLET | Refills: 3 | Status: SHIPPED | OUTPATIENT
Start: 2024-06-13 | End: 2024-07-17

## 2024-07-17 ENCOUNTER — MYC REFILL (OUTPATIENT)
Dept: FAMILY MEDICINE | Facility: CLINIC | Age: 69
End: 2024-07-17
Payer: COMMERCIAL

## 2024-07-17 DIAGNOSIS — G47.00 INSOMNIA, UNSPECIFIED TYPE: ICD-10-CM

## 2024-07-17 DIAGNOSIS — E03.9 HYPOTHYROIDISM, UNSPECIFIED TYPE: ICD-10-CM

## 2024-07-18 RX ORDER — QUETIAPINE FUMARATE 25 MG/1
25 TABLET, FILM COATED ORAL AT BEDTIME
Qty: 90 TABLET | Refills: 3 | Status: SHIPPED | OUTPATIENT
Start: 2024-07-18

## 2024-07-18 RX ORDER — LEVOTHYROXINE SODIUM 112 UG/1
112 TABLET ORAL DAILY
Qty: 90 TABLET | Refills: 3 | Status: SHIPPED | OUTPATIENT
Start: 2024-07-18

## 2024-07-29 ENCOUNTER — MYC REFILL (OUTPATIENT)
Dept: FAMILY MEDICINE | Facility: CLINIC | Age: 69
End: 2024-07-29
Payer: COMMERCIAL

## 2024-07-29 DIAGNOSIS — F41.1 GAD (GENERALIZED ANXIETY DISORDER): ICD-10-CM

## 2024-07-30 RX ORDER — FLUOXETINE 40 MG/1
40 CAPSULE ORAL DAILY
Qty: 90 CAPSULE | Refills: 3 | Status: SHIPPED | OUTPATIENT
Start: 2024-07-30

## 2024-09-05 ENCOUNTER — TRANSFERRED RECORDS (OUTPATIENT)
Dept: HEALTH INFORMATION MANAGEMENT | Facility: CLINIC | Age: 69
End: 2024-09-05
Payer: COMMERCIAL

## 2024-09-09 ENCOUNTER — MYC REFILL (OUTPATIENT)
Dept: PULMONOLOGY | Facility: CLINIC | Age: 69
End: 2024-09-09
Payer: COMMERCIAL

## 2024-09-09 DIAGNOSIS — L40.9 PSORIASIS: ICD-10-CM

## 2024-09-09 DIAGNOSIS — L40.50 PSORIATIC ARTHRITIS (H): ICD-10-CM

## 2024-09-09 DIAGNOSIS — R29.898 HAND DYSFUNCTION: ICD-10-CM

## 2024-09-09 DIAGNOSIS — Z79.899 HIGH RISK MEDICATIONS (NOT ANTICOAGULANTS) LONG-TERM USE: ICD-10-CM

## 2024-09-09 RX ORDER — FOLIC ACID 1 MG/1
4000 TABLET ORAL DAILY
Qty: 360 TABLET | Refills: 0 | OUTPATIENT
Start: 2024-09-09

## 2024-09-09 NOTE — TELEPHONE ENCOUNTER
Last Written Prescription Date:  8/8/23  Last Fill Quantity: 360,  # refills: 0   Last office visit: Visit date not found ; last virtual visit: Visit date not found with prescribing provider:  Danita   Future Office Visit:  none scheduled    Chart review reveals patient follows Dr Boyce at [facility].  Request will be cancelled.     Jennyfer Steiner RN

## 2024-10-07 ENCOUNTER — PATIENT OUTREACH (OUTPATIENT)
Dept: CARE COORDINATION | Facility: CLINIC | Age: 69
End: 2024-10-07

## 2024-10-07 ENCOUNTER — VIRTUAL VISIT (OUTPATIENT)
Dept: FAMILY MEDICINE | Facility: CLINIC | Age: 69
End: 2024-10-07
Payer: COMMERCIAL

## 2024-10-07 DIAGNOSIS — R68.81 EARLY SATIETY: ICD-10-CM

## 2024-10-07 DIAGNOSIS — E03.9 HYPOTHYROIDISM, UNSPECIFIED TYPE: Primary | ICD-10-CM

## 2024-10-07 DIAGNOSIS — R29.898 HAND DYSFUNCTION: ICD-10-CM

## 2024-10-07 DIAGNOSIS — L40.9 PSORIASIS: ICD-10-CM

## 2024-10-07 DIAGNOSIS — Z79.899 HIGH RISK MEDICATIONS (NOT ANTICOAGULANTS) LONG-TERM USE: ICD-10-CM

## 2024-10-07 DIAGNOSIS — D50.8 IRON DEFICIENCY ANEMIA SECONDARY TO INADEQUATE DIETARY IRON INTAKE: ICD-10-CM

## 2024-10-07 DIAGNOSIS — Z13.6 CARDIOVASCULAR SCREENING; LDL GOAL LESS THAN 160: ICD-10-CM

## 2024-10-07 DIAGNOSIS — E55.9 VITAMIN D DEFICIENCY: ICD-10-CM

## 2024-10-07 DIAGNOSIS — L40.50 PSORIATIC ARTHRITIS (H): ICD-10-CM

## 2024-10-07 PROCEDURE — G2211 COMPLEX E/M VISIT ADD ON: HCPCS | Mod: 95 | Performed by: INTERNAL MEDICINE

## 2024-10-07 PROCEDURE — 99213 OFFICE O/P EST LOW 20 MIN: CPT | Mod: 95 | Performed by: INTERNAL MEDICINE

## 2024-10-07 RX ORDER — FOLIC ACID 1 MG/1
3000 TABLET ORAL DAILY
Qty: 270 TABLET | Refills: 3 | Status: SHIPPED | OUTPATIENT
Start: 2024-10-07

## 2024-10-07 ASSESSMENT — ENCOUNTER SYMPTOMS: FATIGUE: 1

## 2024-10-07 ASSESSMENT — ASTHMA QUESTIONNAIRES
QUESTION_1 LAST FOUR WEEKS HOW MUCH OF THE TIME DID YOUR ASTHMA KEEP YOU FROM GETTING AS MUCH DONE AT WORK, SCHOOL OR AT HOME: NONE OF THE TIME
ACT_TOTALSCORE: 24
QUESTION_4 LAST FOUR WEEKS HOW OFTEN HAVE YOU USED YOUR RESCUE INHALER OR NEBULIZER MEDICATION (SUCH AS ALBUTEROL): NOT AT ALL
ACT_TOTALSCORE: 24
QUESTION_2 LAST FOUR WEEKS HOW OFTEN HAVE YOU HAD SHORTNESS OF BREATH: NOT AT ALL
QUESTION_5 LAST FOUR WEEKS HOW WOULD YOU RATE YOUR ASTHMA CONTROL: WELL CONTROLLED
QUESTION_3 LAST FOUR WEEKS HOW OFTEN DID YOUR ASTHMA SYMPTOMS (WHEEZING, COUGHING, SHORTNESS OF BREATH, CHEST TIGHTNESS OR PAIN) WAKE YOU UP AT NIGHT OR EARLIER THAN USUAL IN THE MORNING: NOT AT ALL

## 2024-10-07 NOTE — PROGRESS NOTES
Krissy is a 69 year old who is being evaluated via a billable video visit.    How would you like to obtain your AVS? MyChart  If the video visit is dropped, the invitation should be resent by: Text to cell phone: [phone]  Will anyone else be joining your video visit? No          Subjective   Krissy is a 69 year old, presenting for the following health issues:  Recheck Medication and Fatigue      Video Start Time: 9:17 AM     Fatigue  Associated symptoms include fatigue.      Early Satiety  Fatigue   Krissy Weldon has noticed some symptoms of feeling full quickly.  She has no dififculty with swallowing, no abdominal pain.  No constipation or diarrhea.  Taking over the counter famotidine for assistance with gastroesophageal reflux on occasion.        Review of Systems  Constitutional, HEENT, cardiovascular, pulmonary - has had some improvement in chest tightness with advair, GI, , musculoskeletal, neuro, skin, endocrine and psych systems are negative, except as otherwise noted.      Objective           Vitals:  No vitals were obtained today due to virtual visit.    Physical Exam   GENERAL: alert and no distress  EYES: Eyes grossly normal to inspection.  No discharge or erythema, or obvious scleral/conjunctival abnormalities.  RESP: No audible wheeze, cough, or visible cyanosis.    SKIN: Visible skin clear. No significant rash, abnormal pigmentation or lesions.  NEURO: Cranial nerves grossly intact.  Mentation and speech appropriate for age.  PSYCH: Appropriate affect, tone, and pace of words    (E03.9) Hypothyroidism, unspecified type  (primary encounter diagnosis)  Comment: We will recheck thyroid function todsay  Plan: TSH with free T4 reflex            (R68.81) Early satiety  Comment: Recommend check comprehensive metabolic panel, complete blood counts, and consider workup with gastroenterology pending results  Plan: Comprehensive metabolic panel, CBC with         platelets            (L40.9) Psoriasis  Comment:  Continue follow up in rheumatology.  We will check labs today   Plan: folic acid (FOLVITE) 1 MG tablet            (L40.50) Psoriatic arthritis (HCC)  Comment: as above   Plan: Comprehensive metabolic panel, CBC with         platelets, folic acid (FOLVITE) 1 MG tablet            (R29.898) Hand dysfunction  Comment: as above   Plan: folic acid (FOLVITE) 1 MG tablet            (Z79.899) High risk medications (not anticoagulants) long-term use  Comment: medications reviewed.  Continue folate supplementation  Plan: folic acid (FOLVITE) 1 MG tablet            (Z13.6) CARDIOVASCULAR SCREENING; LDL GOAL LESS THAN 160  Comment: Check fasting lipid panel   Plan: Lipid panel reflex to direct LDL Non-fasting            (D50.8) Iron deficiency anemia secondary to inadequate dietary iron intake  Comment: check complete blood counts again as well as iron, vitamin b12 and folate   Plan: Vitamin B12, Ferritin, Folate, Iron & Iron         Binding Capacity            (E55.9) Vitamin D deficiency  Comment:   Plan: Vitamin D Deficiency           The longitudinal plan of care for the diagnosis(es)/condition(s) as documented were addressed during this visit. Due to the added complexity in care, I will continue to support Krissy in the subsequent management and with ongoing continuity of care.        Video-Visit Details    Type of service:  Video Visit   Video End Time:9:28 AM  Originating Location (pt. Location): Home    Distant Location (provider location):  On-site  Platform used for Video Visit: Tom  Signed Electronically by: Thomas Perez MD, MD

## 2024-10-08 ENCOUNTER — LAB (OUTPATIENT)
Dept: LAB | Facility: CLINIC | Age: 69
End: 2024-10-08
Payer: COMMERCIAL

## 2024-10-08 DIAGNOSIS — E55.9 VITAMIN D DEFICIENCY: ICD-10-CM

## 2024-10-08 DIAGNOSIS — E03.9 HYPOTHYROIDISM, UNSPECIFIED TYPE: ICD-10-CM

## 2024-10-08 DIAGNOSIS — R68.81 EARLY SATIETY: ICD-10-CM

## 2024-10-08 DIAGNOSIS — D50.8 IRON DEFICIENCY ANEMIA SECONDARY TO INADEQUATE DIETARY IRON INTAKE: ICD-10-CM

## 2024-10-08 DIAGNOSIS — L40.50 PSORIATIC ARTHRITIS (H): ICD-10-CM

## 2024-10-08 DIAGNOSIS — Z13.6 CARDIOVASCULAR SCREENING; LDL GOAL LESS THAN 160: ICD-10-CM

## 2024-10-08 LAB
ERYTHROCYTE [DISTWIDTH] IN BLOOD BY AUTOMATED COUNT: 15.6 % (ref 10–15)
FOLATE SERPL-MCNC: >40 NG/ML (ref 4.6–34.8)
HCT VFR BLD AUTO: 42.3 % (ref 35–47)
HGB BLD-MCNC: 13.5 G/DL (ref 11.7–15.7)
MCH RBC QN AUTO: 29.9 PG (ref 26.5–33)
MCHC RBC AUTO-ENTMCNC: 31.9 G/DL (ref 31.5–36.5)
MCV RBC AUTO: 94 FL (ref 78–100)
PLATELET # BLD AUTO: 237 10E3/UL (ref 150–450)
RBC # BLD AUTO: 4.52 10E6/UL (ref 3.8–5.2)
WBC # BLD AUTO: 5.6 10E3/UL (ref 4–11)

## 2024-10-08 PROCEDURE — 80053 COMPREHEN METABOLIC PANEL: CPT

## 2024-10-08 PROCEDURE — 83540 ASSAY OF IRON: CPT

## 2024-10-08 PROCEDURE — 85027 COMPLETE CBC AUTOMATED: CPT

## 2024-10-08 PROCEDURE — 84443 ASSAY THYROID STIM HORMONE: CPT

## 2024-10-08 PROCEDURE — 82306 VITAMIN D 25 HYDROXY: CPT

## 2024-10-08 PROCEDURE — 36415 COLL VENOUS BLD VENIPUNCTURE: CPT

## 2024-10-08 PROCEDURE — 82746 ASSAY OF FOLIC ACID SERUM: CPT

## 2024-10-08 PROCEDURE — 83550 IRON BINDING TEST: CPT

## 2024-10-08 PROCEDURE — 82728 ASSAY OF FERRITIN: CPT

## 2024-10-08 PROCEDURE — 80061 LIPID PANEL: CPT

## 2024-10-09 LAB
ALBUMIN SERPL BCG-MCNC: 4.4 G/DL (ref 3.5–5.2)
ALP SERPL-CCNC: 67 U/L (ref 40–150)
ALT SERPL W P-5'-P-CCNC: 30 U/L (ref 0–50)
ANION GAP SERPL CALCULATED.3IONS-SCNC: 10 MMOL/L (ref 7–15)
AST SERPL W P-5'-P-CCNC: 27 U/L (ref 0–45)
BILIRUB SERPL-MCNC: 0.2 MG/DL
BUN SERPL-MCNC: 16.5 MG/DL (ref 8–23)
CALCIUM SERPL-MCNC: 9.3 MG/DL (ref 8.8–10.4)
CHLORIDE SERPL-SCNC: 103 MMOL/L (ref 98–107)
CHOLEST SERPL-MCNC: 198 MG/DL
CREAT SERPL-MCNC: 0.87 MG/DL (ref 0.51–0.95)
EGFRCR SERPLBLD CKD-EPI 2021: 72 ML/MIN/1.73M2
FASTING STATUS PATIENT QL REPORTED: NO
FASTING STATUS PATIENT QL REPORTED: NO
FERRITIN SERPL-MCNC: 105 NG/ML (ref 11–328)
GLUCOSE SERPL-MCNC: 71 MG/DL (ref 70–99)
HCO3 SERPL-SCNC: 27 MMOL/L (ref 22–29)
HDLC SERPL-MCNC: 75 MG/DL
IRON BINDING CAPACITY (ROCHE): 304 UG/DL (ref 240–430)
IRON SATN MFR SERPL: 30 % (ref 15–46)
IRON SERPL-MCNC: 91 UG/DL (ref 37–145)
LDLC SERPL CALC-MCNC: 90 MG/DL
NONHDLC SERPL-MCNC: 123 MG/DL
POTASSIUM SERPL-SCNC: 4.5 MMOL/L (ref 3.4–5.3)
PROT SERPL-MCNC: 7.4 G/DL (ref 6.4–8.3)
SODIUM SERPL-SCNC: 140 MMOL/L (ref 135–145)
TRIGL SERPL-MCNC: 164 MG/DL
TSH SERPL DL<=0.005 MIU/L-ACNC: 1.18 UIU/ML (ref 0.3–4.2)
VIT D+METAB SERPL-MCNC: 43 NG/ML (ref 20–50)

## 2024-10-11 NOTE — RESULT ENCOUNTER NOTE
Sheldon Rey,    I had the opportunity to review your recent labs and a summary of your labs reads as follows:    Your complete blood counts show no sign of anemia, normal white blood cell count and platelets.  Your iron studies, and folic acid level also returned stable  Your comprehensive metabolic panel showed normal renal function, normal liver function, and normal fasting blood glucose indicating no evidence of diabetes mellitus.  Your fasting lipid panel show  - normal HDL (good) cholesterol -as your goal is greater than 40  - low LDL (bad) cholesterol as your goal is less than 100  - stable triglyceride levels     Sincerely,  Thomas Perez MD

## 2024-10-14 ENCOUNTER — HOSPITAL ENCOUNTER (OUTPATIENT)
Dept: MAMMOGRAPHY | Facility: CLINIC | Age: 69
Discharge: HOME | End: 2024-10-14
Attending: INTERNAL MEDICINE | Admitting: INTERNAL MEDICINE
Payer: COMMERCIAL

## 2024-10-14 DIAGNOSIS — Z12.31 VISIT FOR SCREENING MAMMOGRAM: ICD-10-CM

## 2024-10-14 PROCEDURE — 77063 BREAST TOMOSYNTHESIS BI: CPT

## 2024-11-03 DIAGNOSIS — F41.1 GAD (GENERALIZED ANXIETY DISORDER): ICD-10-CM

## 2024-11-04 RX ORDER — BUPROPION HYDROCHLORIDE 300 MG/1
300 TABLET ORAL EVERY MORNING
Qty: 90 TABLET | Refills: 3 | Status: SHIPPED | OUTPATIENT
Start: 2024-11-04

## 2024-11-27 ENCOUNTER — ANCILLARY PROCEDURE (OUTPATIENT)
Dept: GENERAL RADIOLOGY | Facility: CLINIC | Age: 69
End: 2024-11-27
Attending: INTERNAL MEDICINE
Payer: COMMERCIAL

## 2024-11-27 ENCOUNTER — OFFICE VISIT (OUTPATIENT)
Dept: PEDIATRICS | Facility: CLINIC | Age: 69
End: 2024-11-27
Payer: COMMERCIAL

## 2024-11-27 ENCOUNTER — NURSE TRIAGE (OUTPATIENT)
Dept: FAMILY MEDICINE | Facility: CLINIC | Age: 69
End: 2024-11-27

## 2024-11-27 VITALS
HEIGHT: 64 IN | OXYGEN SATURATION: 97 % | RESPIRATION RATE: 16 BRPM | WEIGHT: 162.8 LBS | SYSTOLIC BLOOD PRESSURE: 137 MMHG | DIASTOLIC BLOOD PRESSURE: 69 MMHG | BODY MASS INDEX: 27.79 KG/M2 | TEMPERATURE: 98.4 F | HEART RATE: 91 BPM

## 2024-11-27 DIAGNOSIS — R10.32 GROIN PAIN, LEFT: Primary | ICD-10-CM

## 2024-11-27 DIAGNOSIS — R10.32 GROIN PAIN, LEFT: ICD-10-CM

## 2024-11-27 LAB
ALBUMIN UR-MCNC: NEGATIVE MG/DL
APPEARANCE UR: CLEAR
BACTERIA #/AREA URNS HPF: ABNORMAL /HPF
BASOPHILS # BLD AUTO: 0 10E3/UL (ref 0–0.2)
BASOPHILS NFR BLD AUTO: 1 %
BILIRUB UR QL STRIP: NEGATIVE
COLOR UR AUTO: YELLOW
CRP SERPL-MCNC: 10.48 MG/L
EOSINOPHIL # BLD AUTO: 0.1 10E3/UL (ref 0–0.7)
EOSINOPHIL NFR BLD AUTO: 1 %
ERYTHROCYTE [DISTWIDTH] IN BLOOD BY AUTOMATED COUNT: 13.8 % (ref 10–15)
ERYTHROCYTE [SEDIMENTATION RATE] IN BLOOD BY WESTERGREN METHOD: 11 MM/HR (ref 0–30)
GLUCOSE UR STRIP-MCNC: NEGATIVE MG/DL
HCT VFR BLD AUTO: 41.7 % (ref 35–47)
HGB BLD-MCNC: 13.4 G/DL (ref 11.7–15.7)
HGB UR QL STRIP: ABNORMAL
IMM GRANULOCYTES # BLD: 0 10E3/UL
IMM GRANULOCYTES NFR BLD: 0 %
KETONES UR STRIP-MCNC: NEGATIVE MG/DL
LEUKOCYTE ESTERASE UR QL STRIP: NEGATIVE
LYMPHOCYTES # BLD AUTO: 1.4 10E3/UL (ref 0.8–5.3)
LYMPHOCYTES NFR BLD AUTO: 19 %
MCH RBC QN AUTO: 29.6 PG (ref 26.5–33)
MCHC RBC AUTO-ENTMCNC: 32.1 G/DL (ref 31.5–36.5)
MCV RBC AUTO: 92 FL (ref 78–100)
MONOCYTES # BLD AUTO: 0.6 10E3/UL (ref 0–1.3)
MONOCYTES NFR BLD AUTO: 7 %
NEUTROPHILS # BLD AUTO: 5.5 10E3/UL (ref 1.6–8.3)
NEUTROPHILS NFR BLD AUTO: 72 %
NITRATE UR QL: NEGATIVE
PH UR STRIP: 7 [PH] (ref 5–7)
PLATELET # BLD AUTO: 215 10E3/UL (ref 150–450)
RBC # BLD AUTO: 4.53 10E6/UL (ref 3.8–5.2)
RBC #/AREA URNS AUTO: ABNORMAL /HPF
SP GR UR STRIP: 1.01 (ref 1–1.03)
UROBILINOGEN UR STRIP-ACNC: 0.2 E.U./DL
WBC # BLD AUTO: 7.6 10E3/UL (ref 4–11)
WBC #/AREA URNS AUTO: ABNORMAL /HPF

## 2024-11-27 RX ORDER — DICLOFENAC SODIUM 75 MG/1
75 TABLET, DELAYED RELEASE ORAL 2 TIMES DAILY
Qty: 20 TABLET | Refills: 0 | Status: SHIPPED | OUTPATIENT
Start: 2024-11-27 | End: 2024-12-07

## 2024-11-27 RX ORDER — KETOROLAC TROMETHAMINE 30 MG/ML
60 INJECTION, SOLUTION INTRAMUSCULAR; INTRAVENOUS ONCE
Status: COMPLETED | OUTPATIENT
Start: 2024-11-27 | End: 2024-11-27

## 2024-11-27 RX ADMIN — KETOROLAC TROMETHAMINE 60 MG: 30 INJECTION, SOLUTION INTRAMUSCULAR; INTRAVENOUS at 12:57

## 2024-11-27 NOTE — PROGRESS NOTES
"Acute and Diagnostic Services Clinic Visit    Assessment & Plan     Groin pain, left    Presentation is most consistent with a muscle strain, possibly sartorius muscle.  The patient reports 50-70% improvement with Toradol.   Given her medical history we did obtain labs.  ESR normal.  CBC stable. Xray stable.  Will Rx Diclofenac.  Risks and SE discussed.  Moist heat.    See AVS.    - CBC with platelets differential  - CRP inflammation  - Erythrocyte sedimentation rate auto  - XR Hip Left 2-3 Views  - UA with Microscopic reflex to Culture  - CBC with platelets differential  - CRP inflammation  - Erythrocyte sedimentation rate auto  - UA with Microscopic reflex to Culture  - ketorolac (TORADOL) injection 60 mg  - UA Microscopic with Reflex to Culture  - diclofenac (VOLTAREN) 75 MG EC tablet  Dispense: 20 tablet; Refill: 0      45 minutes were spent doing chart review, history and exam, documentation and further activities per the note.       BMI  Estimated body mass index is 27.94 kg/m  as calculated from the following:    Height as of this encounter: 1.626 m (5' 4\").    Weight as of this encounter: 73.8 kg (162 lb 12.8 oz).       No follow-ups on file.    Pawel Rey is a 69 year old, presenting for the following health issues:  Leg Pain    Pt awoke with Left leg and groin pain early this AM.   This was associated with a fever to 101 and feeling cold.    Went to bed feeling her usual self last night.  Did lift some boxes getting ready for Thanksgiving yesterday.      Pain is described as a hot poker in the L groin.    Worse with lateral rotation, weight bearing and lifting the leg.  Pain is also described as a pulling sensation.      She has tried heat, cold and voltaren gel.      History is significant for psoriasis with psoriatic arthritis.  Secondary osteoarthritis chronic pain.  Patient is followed close by rheumatology.  She is currently on Enbrel.  Had been on methotrexate until the end of October but " "experienced fatigue with this and this was subsequently switched to leflunomide at 20 mg once a day.    1:25:  Patient reassessed.  Ambulated back from x-ray.  She notes 50 to 70% improvement in pain from IM Toradol.    HPI     Musculoskeletal problem/pain  Onset/Duration: this morning at 3 am  Description:       Location: Left leg into the groin       Joint swelling: no        Redness: no        Pain: moderate, severe       Warmth: no   Progression of symptoms same  Accompanying signs and symptoms:       Fevers: YES       Numbness/tingling/weakness: no   History        Trauma to the area: no         Recent illness: no         Previous similar problem: no         Previous evaluation: no   Aggravating factors include: walking  Therapies tried and outcome: Oxycodone and Belbuca  Have you had any surgeries on your arteries of veins: No            Objective    /69 (BP Location: Left arm, Patient Position: Sitting, Cuff Size: Adult Regular)   Pulse 91   Temp 98.4  F (36.9  C)   Resp 16   Ht 1.626 m (5' 4\")   Wt 73.8 kg (162 lb 12.8 oz)   LMP 03/06/2012   SpO2 97%   BMI 27.94 kg/m    Body mass index is 27.94 kg/m .  Physical Exam   GEN in pain.  ENT MMM  MS:  L groin pain with weight bearing, passive adduction, external rotation.  LLE:  no c/c/e.  Pedal pulses intact  GROIN: no rash.  Inguinal pulse intact  PSYCH pleasant and cooperative.                  Signed Electronically by: Oni Anderson MD    "

## 2024-11-27 NOTE — PATIENT INSTRUCTIONS
START:    DICLOFENAC:  Take it twice a day WITH food.  It is a non steroidal anti inflammatory (NSAID).  While taking this you should not take over the counter medications containing NSAIDs (such as Ibuprofen or Naproxen).    If you develop an upset stomach or other concerning side effects please stop the medication and call us.      You CAN change to Aleve instead if it's easier on the stomach.    I recommend taking either with FAMOTIDINE (AKA PEPCID) 20mg twice a day (OTC).

## 2024-11-27 NOTE — TELEPHONE ENCOUNTER
Nurse Triage SBAR    Is this a 2nd Level Triage? YES    Situation: This morning at 3am, patient woke up with left leg pain/groin pain and fever of 101    Background:   left hip arthroplasty 2022?  Psoriatic arthritis  -states that she is     Assessment:   rest 7/10, walking 10/10  Deep ache at site  Denies presence of Swelling, redness, numbness, facial drooping, facial pain, chest pain, difficulty breathing, sore throat, congestion, injury, rash      Protocol Recommended Disposition:   Go To ED/UCC Now (Or To Office With PCP Approval)    Recommendation:  Writer huddled with ADS provider. ADS provider agreed that patient is appropriate for ADS. Patient agreed to schedule appointment.     Routed to provider    Does the patient meet one of the following criteria for ADS visit consideration? 16+ years old, with an [facility] PCP     TIP  Providers, please consider if this condition is appropriate for management at one of our Acute and Diagnostic Services sites.     If patient is a good candidate, please use dotphrase <dot>triageresponse and select Refer to ADS to document.    Referral to Acute and Diagnostic Services    [phone] ([facility] [address] [place] [address], [address], [place] [address]    Transition to Acute & Diagnostic Services Clinic has been discussed with patient, and she agrees with next level of care.   Patient understands that evaluation/treatment at [facility] typically takes significantly longer than in clinic/urgent care (>2 hours).  The [facility] Acute [facility] Diagnostics Services Clinic has been contacted by provider/staff to confirm patient acceptance.         Special issues:      None                     The following provider has assessed this patient for intervention at [facility], and directed the patient for referral: Thomas Perez MD, MD             Reason for Disposition   Thigh or calf pain in only one leg and present > 1 hour    Additional Information   Negative: Looks like a broken bone or  dislocated joint (e.g., crooked or deformed)   Negative: Sounds like a life-threatening emergency to the triager   Negative: Followed a hip injury   Negative: Followed a knee injury   Negative: Followed an ankle injury   Negative: Back pain radiating (shooting) into leg(s)   Negative: Foot pain is main symptom   Negative: Ankle pain is main symptom   Negative: Knee pain is main symptom   Negative: Leg swelling is main symptom   Negative: Chest pain   Negative: Difficulty breathing   Negative: Entire foot is cool or blue in comparison to other side   Negative: Unable to walk   Negative: Fever and red area (or area very tender to touch)   Negative: Swollen joint and fever    Protocols used: Leg Pain-A-[place]

## 2024-12-18 ENCOUNTER — OFFICE VISIT (OUTPATIENT)
Dept: FAMILY MEDICINE | Facility: CLINIC | Age: 69
End: 2024-12-18
Payer: COMMERCIAL

## 2024-12-18 VITALS
RESPIRATION RATE: 13 BRPM | HEART RATE: 80 BPM | BODY MASS INDEX: 28 KG/M2 | SYSTOLIC BLOOD PRESSURE: 136 MMHG | DIASTOLIC BLOOD PRESSURE: 62 MMHG | HEIGHT: 64 IN | WEIGHT: 164 LBS | OXYGEN SATURATION: 99 % | TEMPERATURE: 97 F

## 2024-12-18 DIAGNOSIS — M25.552 HIP PAIN, LEFT: Primary | ICD-10-CM

## 2024-12-18 DIAGNOSIS — L40.50 PSORIATIC ARTHRITIS (H): ICD-10-CM

## 2024-12-18 DIAGNOSIS — G47.00 INSOMNIA, UNSPECIFIED TYPE: ICD-10-CM

## 2024-12-18 DIAGNOSIS — E03.9 HYPOTHYROIDISM, UNSPECIFIED TYPE: ICD-10-CM

## 2024-12-18 PROCEDURE — 99213 OFFICE O/P EST LOW 20 MIN: CPT | Performed by: INTERNAL MEDICINE

## 2024-12-18 RX ORDER — LEFLUNOMIDE 20 MG/1
20 TABLET ORAL DAILY
COMMUNITY
Start: 2024-10-24

## 2024-12-18 RX ORDER — LEVOTHYROXINE SODIUM 125 UG/1
125 TABLET ORAL DAILY
Qty: 90 TABLET | Refills: 3 | Status: SHIPPED | OUTPATIENT
Start: 2024-12-18

## 2024-12-18 ASSESSMENT — PAIN SCALES - GENERAL: PAINLEVEL_OUTOF10: NO PAIN (0)

## 2024-12-18 NOTE — PATIENT INSTRUCTIONS
(M25.802) Hip pain, left  (primary encounter diagnosis)  Comment: X-ray reviewed showing 2 mm lucency at the proximal aspect of the femoral stem component bone hardware interface, which may be within normal limits, however recommend correlation with prior radiographs  if available to assess for interval stability.  Continue acetaminophen as needed and would recommend follow up in orthopedics  Plan:      (L40.50) Psoriatic arthritis (HCC)  Comment: Recent follow up in rheumatology.  Methotrexate discontinued. Continue on enbrel and laflunamide,  Labs reviewed   Plan:     Hypothyroidism  Comment; OK to increase dose of levothyroxine and follow up in 6 weeks to recheck.  Also, recommend check coritsol AM levels at next lab check.

## 2024-12-18 NOTE — PROGRESS NOTES
"Pawel Rey is a 69 year old, presenting for the following health issues:  Follow Up    History of Present Illness       Hypothyroidism:     Since last visit, patient describes the following symptoms::  Fatigue    She eats 4 or more servings of fruits and vegetables daily.She consumes 0 sweetened beverage(s) daily.She exercises with enough effort to increase her heart rate 30 to 60 minutes per day.  She exercises with enough effort to increase her heart rate 5 days per week.   She is taking medications regularly.     Fatigue  Psoriatic arthritis (ESTEBAN)   Krissy Weldon has recently followed up in rheumatology and has had further labs checked.  Labs were within normal limits including hemoglobin and thyroid function tests.  Did increase dose of levothyroxine which has helped.  She note that the fatigue has become more noticeable and has caused her more symptoms of depression.  She continues on fluoxetine bupropion as well as quetiapine for sleep.  Shingles left hip   She is completed course of Valtrex for management of her shingles.  She is following with pain clinic also for chronic pain continued on Belbuca as well as oxycodone as needed.  She did have an x-ray in the emergency room for initial evaluation of her left hip which did show a 2 mm lucency in the proximal aspect of the left femur.  She does have plan to follow with orthopedics to review this.      Review of Systems  Constitutional, HEENT, cardiovascular - no palpitaions, pulmonary, GI - lefluonomide causing some cnstipation, , musculoskeletal - as above + recent shingles in left hip, neuro, skin, endocrine and psych systems are negative, except as otherwise noted.      Objective    /62 (BP Location: Right arm, Patient Position: Sitting, Cuff Size: Adult Regular)   Pulse 80   Temp 97  F (36.1  C) (Tympanic)   Resp 13   Ht 1.626 m (5' 4\")   Wt 74.4 kg (164 lb)   LMP 03/06/2012   SpO2 99%   BMI 28.15 kg/m    Body mass index is 28.15 " kg/m .  Physical Exam   GENERAL: alert and no distress  EYES: Eyes grossly normal to inspection,   HENT: ear canals and TM's normal,   NECK: no adenopathy, no asymmetry, masses, or scars  RESP: lungs clear to auscultation - no rales, rhonchi or wheezes  CV: regular rate and rhythm, normal S1 S2, no S3 or S4, no murmur,   ABDOMEN: soft, nontender, no hepatosplenomegaly  MS: no gross musculoskeletal defects noted, no edema  SKIN: no suspicious lesions or rashes  NEURO: Normal strength and tone, mentation intact and speech normal  PSYCH: mentation appears normal, affect normal/bright    No results found for any visits on 12/18/24.         Patient Instructions   (M25.552) Hip pain, left  (primary encounter diagnosis)  Comment: X-ray reviewed showing 2 mm lucency at the proximal aspect of the femoral stem component bone hardware interface, which may be within normal limits, however recommend correlation with prior radiographs  if available to assess for interval stability.  Continue acetaminophen as needed and would recommend follow up in orthopedics  Plan:      (L40.50) Psoriatic arthritis (HCC)  Comment: Recent follow up in rheumatology.  Methotrexate discontinued. Continue on enbrel and laflunamide,  Labs reviewed   Plan:     Hypothyroidism  Comment; OK to increase dose of levothyroxine and follow up in 6 weeks to recheck.  Also, recommend check coritsol AM levels at next lab check.              Signed Electronically by: Thomas Perez MD, MD

## 2025-01-23 ENCOUNTER — TRANSFERRED RECORDS (OUTPATIENT)
Dept: HEALTH INFORMATION MANAGEMENT | Facility: CLINIC | Age: 70
End: 2025-01-23
Payer: COMMERCIAL

## 2025-01-28 ENCOUNTER — TRANSFERRED RECORDS (OUTPATIENT)
Dept: HEALTH INFORMATION MANAGEMENT | Facility: CLINIC | Age: 70
End: 2025-01-28
Payer: COMMERCIAL

## 2025-02-13 ENCOUNTER — OFFICE VISIT (OUTPATIENT)
Dept: FAMILY MEDICINE | Facility: CLINIC | Age: 70
End: 2025-02-13
Payer: COMMERCIAL

## 2025-02-13 VITALS
WEIGHT: 167.5 LBS | TEMPERATURE: 97.1 F | HEIGHT: 66 IN | DIASTOLIC BLOOD PRESSURE: 70 MMHG | RESPIRATION RATE: 18 BRPM | HEART RATE: 74 BPM | BODY MASS INDEX: 26.92 KG/M2 | OXYGEN SATURATION: 96 % | SYSTOLIC BLOOD PRESSURE: 115 MMHG

## 2025-02-13 DIAGNOSIS — G89.29 OTHER CHRONIC PAIN: ICD-10-CM

## 2025-02-13 DIAGNOSIS — E03.9 HYPOTHYROIDISM, UNSPECIFIED TYPE: ICD-10-CM

## 2025-02-13 DIAGNOSIS — Z01.818 PRE-OP EXAM: Primary | ICD-10-CM

## 2025-02-13 DIAGNOSIS — L40.50 PSORIATIC ARTHRITIS (H): ICD-10-CM

## 2025-02-13 DIAGNOSIS — D50.8 IRON DEFICIENCY ANEMIA SECONDARY TO INADEQUATE DIETARY IRON INTAKE: ICD-10-CM

## 2025-02-13 DIAGNOSIS — M79.644 PAIN OF RIGHT THUMB: ICD-10-CM

## 2025-02-13 LAB
ANION GAP SERPL CALCULATED.3IONS-SCNC: 12 MMOL/L (ref 7–15)
BASOPHILS # BLD AUTO: 0 10E3/UL (ref 0–0.2)
BASOPHILS NFR BLD AUTO: 1 %
BUN SERPL-MCNC: 20 MG/DL (ref 8–23)
CALCIUM SERPL-MCNC: 9.4 MG/DL (ref 8.8–10.4)
CHLORIDE SERPL-SCNC: 106 MMOL/L (ref 98–107)
CORTIS SERPL-MCNC: 9.4 UG/DL
CREAT SERPL-MCNC: 0.75 MG/DL (ref 0.51–0.95)
EGFRCR SERPLBLD CKD-EPI 2021: 86 ML/MIN/1.73M2
EOSINOPHIL # BLD AUTO: 0.1 10E3/UL (ref 0–0.7)
EOSINOPHIL NFR BLD AUTO: 2 %
ERYTHROCYTE [DISTWIDTH] IN BLOOD BY AUTOMATED COUNT: 13.3 % (ref 10–15)
GLUCOSE SERPL-MCNC: 90 MG/DL (ref 70–99)
HCO3 SERPL-SCNC: 24 MMOL/L (ref 22–29)
HCT VFR BLD AUTO: 43 % (ref 35–47)
HGB BLD-MCNC: 13.6 G/DL (ref 11.7–15.7)
IMM GRANULOCYTES # BLD: 0 10E3/UL
IMM GRANULOCYTES NFR BLD: 0 %
LYMPHOCYTES # BLD AUTO: 2.3 10E3/UL (ref 0.8–5.3)
LYMPHOCYTES NFR BLD AUTO: 39 %
MCH RBC QN AUTO: 29.1 PG (ref 26.5–33)
MCHC RBC AUTO-ENTMCNC: 31.6 G/DL (ref 31.5–36.5)
MCV RBC AUTO: 92 FL (ref 78–100)
MONOCYTES # BLD AUTO: 0.5 10E3/UL (ref 0–1.3)
MONOCYTES NFR BLD AUTO: 8 %
NEUTROPHILS # BLD AUTO: 2.9 10E3/UL (ref 1.6–8.3)
NEUTROPHILS NFR BLD AUTO: 50 %
PLATELET # BLD AUTO: 256 10E3/UL (ref 150–450)
POTASSIUM SERPL-SCNC: 4.1 MMOL/L (ref 3.4–5.3)
RBC # BLD AUTO: 4.68 10E6/UL (ref 3.8–5.2)
SODIUM SERPL-SCNC: 142 MMOL/L (ref 135–145)
T4 FREE SERPL-MCNC: 1.31 NG/DL (ref 0.9–1.7)
TSH SERPL DL<=0.005 MIU/L-ACNC: 0.19 UIU/ML (ref 0.3–4.2)
VIT B12 SERPL-MCNC: 527 PG/ML (ref 232–1245)
WBC # BLD AUTO: 5.8 10E3/UL (ref 4–11)

## 2025-02-13 RX ORDER — SERTRALINE HYDROCHLORIDE 100 MG/1
TABLET, FILM COATED ORAL
COMMUNITY
Start: 2025-01-28

## 2025-02-13 ASSESSMENT — PAIN SCALES - GENERAL: PAINLEVEL_OUTOF10: MODERATE PAIN (4)

## 2025-02-13 NOTE — PATIENT INSTRUCTIONS
-1 week prior to the procedure hold vitamins/supplements + NSAIDs (ibuprofen, aleve, advil, aspirin). Tylenol/acetaminophen is safe     -Do not eat anything after midnight  (esequiel of the surgery) and nothing the morning of the surgery.     -Follow all instructions given by the surgery team. They usually give out a packet. Read it and please follow it precisely. This helps surgical experience and outcomes.     -If you have any questions do not hesitate to call me or the surgeon/surgical team.

## 2025-02-13 NOTE — PROGRESS NOTES
Preoperative Evaluation  [place] [id number] [facility], [address]  [facility] [address]  Phone: [phone]  Primary Provider: Thomas Perez MD, MD  Pre-op Performing Provider: Brett Koehler PA-C  Feb 13, 2025 2/13/2025   Surgical Information   What procedure is being done? right thumb surgery    Facility or Hospital where procedure/surgery will be performed: [facility]    Who is doing the procedure / surgery? Dr. Melton   Date of surgery / procedure: feb 26 2025   Time of surgery / procedure: tbd   Where do you plan to recover after surgery? at home with family     Fax number for surgical facility: [phone]    Assessment & Plan     The proposed surgical procedure is considered INTERMEDIATE risk.    Pre-op exam  Pain of right thumb  Other chronic pain  Psoriatic arthritis (HCC)  Hypothyroidism, unspecified type    Optimized for procedure pending labs.  Check CBC and BMP today.  TSH/cortisol ordered by PCP.  She will hold NSAIDs/vitamins/supplements 1 week prior to procedure. Hold Fosamax a.m. of procedure.  Discussed with rheumatologist who recommended continuing Enbrel through the procedure.  She gets weekly on Fridays.  Additionally, discussed with pain who recommended continuation of the Belbuca. I agree.  Will send H&P and labs to surgical team when labs resolved.  She is comfortable with this plan.    - CBC with platelets and differential  - Basic metabolic panel  (Ca, Cl, CO2, Creat, Gluc, K, Na, BUN)      20 minutes spent by me on the date of the encounter doing chart review, review of test results, interpretation of tests, patient visit, and documentation      - No identified additional risk factors other than previously addressed      Recommendation  Approval given to proceed with proposed procedure, without further diagnostic evaluation.    Pawel Rey is a very pleasant 69 year old, presenting for the following:  Pre-Op Exam    She  is here today for preoperative clearance for upcoming procedure scheduled for 2/26/2025 w/ Dr. Melton.  Overall concerns about her ability to have this procedure.    No problems in the past with anesthesia.  No history of MI, CVA/TIA, DVT/PE.  Denies chest pain, shortness of breath, dyspnea on exertion, heart racing, or new leg swelling.    She has a history of psoriatic arthritis (On Enbrel), hypothyroidism, chronic pain (on Bebluca)    HPI related to upcoming procedure:         2/13/2025   Pre-Op Questionnaire   Have you ever had a heart attack or stroke? No   Have you ever had surgery on your heart or blood vessels, such as a stent placement, a coronary artery bypass, or surgery on an artery in your head, neck, heart, or legs? No   Do you have chest pain with activity? No   Do you have a history of heart failure? No   Do you currently have a cold, bronchitis or symptoms of other infection? No   Do you have a cough, shortness of breath, or wheezing? No   Do you or anyone in your family have previous history of blood clots? (!) YES - mom w/hx of blood clots.    Do you or does anyone in your family have a serious bleeding problem such as prolonged bleeding following surgeries or cuts? No   Have you ever had problems with anemia or been told to take iron pills? No   Have you had any abnormal blood loss such as black, tarry or bloody stools, or abnormal vaginal bleeding? No   Have you ever had a blood transfusion? No   Are you willing to have a blood transfusion if it is medically needed before, during, or after your surgery? Yes   Have you or any of your relatives ever had problems with anesthesia? No   Do you have sleep apnea, excessive snoring or daytime drowsiness? No   Do you have any artifical heart valves or other implanted medical devices like a pacemaker, defibrillator, or continuous glucose monitor? No   Do you have artificial joints? (!) YES   Are you allergic to latex? No     Health Care Directive  Patient  does not have a Health Care Directive:     Preoperative Review of    reviewed - controlled substances reflected in medication list.      Patient Active Problem List    Diagnosis Date Noted    JUAN LUIS (generalized anxiety disorder) 06/12/2023     Priority: Medium    Anemia, iron deficiency 01/28/2022     Priority: Medium    Methotrexate, long term, current use 10/20/2021     Priority: Medium    Pain in joint, pelvic region and thigh, unspecified laterality 10/20/2021     Priority: Medium    Neck pain, chronic 03/25/2019     Priority: Medium    Inflammatory spondylopathy of multiple sites in spine 02/28/2018     Priority: Medium    Other chronic pain 09/10/2017     Priority: Medium    Psoriatic arthritis (HCC) 02/09/2016     Priority: Medium     Dr. Samayoa      Small bowel obstruction (H) 09/23/2015     Priority: Medium    Nausea with vomiting 06/02/2014     Priority: Medium    RUQ abdominal pain 04/11/2014     Priority: Medium    Elevated LFTs 04/11/2014     Priority: Medium    RUQ pain 04/11/2014     Priority: Medium    CMC DJD(carpometacarpal degenerative joint disease), localized primary 10/18/2013     Priority: Medium    Costochondritis, acute 07/25/2012     Priority: Medium    High risk medications (not anticoagulants) long-term use 10/31/2011     Priority: Medium    Hypothyroid 09/07/2011     Priority: Medium      Past Medical History:   Diagnosis Date    Acute meniscal tear of knee 1/2014    with chrondromalacia per MRI     Depression     DJD (degenerative joint disease), lumbar 7/2012    L4-5, L5-S1 per MRI     Episcleritis 12/14/2011    Hamstring tendonitis at origin 7/2012    Bilaterally with partial tear right, sacroilitis per MRI    Hypothyroid 9/7/2011    Hypothyroidism     PONV (postoperative nausea and vomiting)     Psoriatic arthritis (H) 9/7/2011    Psoriatic arthritis (H) 2/9/2016    Strep throat 04/2017     Past Surgical History:   Procedure Laterality Date    APPENDECTOMY      ARTHROPLASTY  CARPOMETACARPAL (THUMB JOINT)  11/8/2013    Procedure: ARTHROPLASTY CARPOMETACARPAL (THUMB JOINT);  Left First Carpometacarpal Trapezium Resection, Tendon Interposition  ;  Surgeon: Luiza Farrar MD;  [facility]    ARTHROPLASTY CARPOMETACARPAL (THUMB JOINT)  12/20/2013    Procedure: ARTHROPLASTY CARPOMETACARPAL (THUMB JOINT);  Right Thumb Trapezium Resection With Flexor Carpi Radialis Tendon Reconstruction      BLEPHAROPLASTY BILATERAL Bilateral 8/4/2020    Procedure: BILATERAL UPPER LID BLEPHAROPLASTY AND;  Surgeon: Xuan Back MD;  Location:  OR    CHOLECYSTECTOMY      COLONOSCOPY  5/6/2014    Procedure: COLONOSCOPY;  Surgeon: Adria San MD;  Location:  GI    COLONOSCOPY N/A 7/13/2022    Procedure: COLONOSCOPY crsal;  Surgeon: Dean Bro MD;  Location:  GI    ENDOSCOPIC RETROGRADE CHOLANGIOPANCREATOGRAM  6/13/2014    Procedure: ENDOSCOPIC RETROGRADE CHOLANGIOPANCREATOGRAM;  Surgeon: Lianna Wheeler MD;  Location:  OR    GALLBLADDER SURGERY      GYN SURGERY      hysterectomy and oophorectomy    [facility] ENDOSCOPY W EUS Left 6/10/2014    Procedure: COMBINED ENDOSCOPIC ULTRASOUND, ESOPHAGOSCOPY, GASTROSCOPY, DUODENOSCOPY (EGD);  Surgeon: Lianna Wheeler MD;  Location: [facility]    HYSTERECTOMY      REPAIR PTOSIS BROW BILATERAL Bilateral 8/4/2020    Procedure: BILATERAL BROW PTOSIS;  Surgeon: Xuan Back MD;  Location:  OR    Right thumb surgery  7/2015    XR SACROILIAC THERAPEUTIC INJECTION BILATERAL  12/2011     Current Outpatient Medications   Medication Sig Dispense Refill    acetaminophen (TYLENOL) 325 MG tablet Take 1,000 mg by mouth 3 times daily      albuterol (PROAIR HFA/PROVENTIL HFA/VENTOLIN HFA) 108 (90 Base) MCG/ACT inhaler Inhale 1-2 puffs into the lungs.      alendronate (FOSAMAX) 70 MG tablet Take 1 tablet (70 mg) by mouth every 7 days 12 tablet 3    amoxicillin (AMOXIL) 500 MG capsule       BELBUCA 900 MCG FILM buccal film        buPROPion (WELLBUTRIN XL) 300 MG 24 hr tablet Take 1 tablet (300 mg) by mouth every morning 90 tablet 3    ENBREL 50 MG/ML injection Inject 50 mg Subcutaneous      fluticasone-salmeterol (ADVAIR) 250-50 MCG/ACT inhaler Inhale 1 puff into the lungs every 12 hours 60 each 3    folic acid (FOLVITE) 1 MG tablet Take 3 tablets (3,000 mcg) by mouth daily. 270 tablet 3    ipratropium - albuterol 0.5 mg/2.5 mg/3 mL (DUONEB) 0.5-2.5 (3) MG/3ML neb solution Take 1 vial (3 mLs) by nebulization every 6 hours as needed for shortness of breath, wheezing or cough 90 mL 1    leflunomide (ARAVA) 20 MG tablet Take 20 mg by mouth daily.      levothyroxine (SYNTHROID/LEVOTHROID) 125 MCG tablet Take 1 tablet (125 mcg) by mouth daily. 90 tablet 3    multivitamin, therapeutic (THERA-VIT) TABS Take 1 tablet by mouth daily      naloxone (NARCAN) 4 MG/0.1ML nasal spray Spray 1 spray in nostril      oxyCODONE (ROXICODONE) 5 MG tablet Take 5 mg by mouth every 4 hours as needed      QUEtiapine (SEROQUEL) 25 MG tablet Take 1 tablet (25 mg) by mouth at bedtime 90 tablet 3    sennosides (SENOKOT) 8.6 MG tablet Take 1 tablet by mouth daily  1 tablet 0    sertraline (ZOLOFT) 100 MG tablet TAKE HALF TABLET BY MOUTH AT BEDTIME for 1 week then 1 tablet at bedtime*      tiZANidine (ZANAFLEX) 4 MG tablet Take 1 tablet (4 mg) by mouth 3 times daily as needed for muscle spasms. 60 tablet 11    FLUoxetine (PROZAC) 40 MG capsule Take 1 capsule (40 mg) by mouth daily (Patient not taking: Reported on 2/13/2025) 90 capsule 3       No Known Allergies     Social History     Tobacco Use    Smoking status: Never    Smokeless tobacco: Never   Substance Use Topics    Alcohol use: No     Family History   Problem Relation Age of Onset    Arthritis Mother         RA    Cancer Mother         Endometrial    Arthritis Father         Psoriatic, psoriasis, lymphoma, colon and prostate (prostate primary site)    Cancer Father         Prostate    Arthritis Sister          "Rheumatoid    Arthritis Sister         OA, crohns    Alpha-1 antitrypsin deficiency Sister     Celiac Disease Sister     Arthritis Maternal Grandmother         RA     History   Drug Use No             Review of Systems  Constitutional, neuro, ENT, endocrine, pulmonary, cardiac, gastrointestinal, genitourinary, musculoskeletal, integument and psychiatric systems are negative, except as otherwise noted.    Objective    /70 (BP Location: Left arm, Patient Position: Sitting, Cuff Size: Adult Regular)   Pulse 74   Temp 97.1  F (36.2  C) (Temporal)   Resp 18   Ht 1.664 m (5' 5.5\")   Wt 76 kg (167 lb 8 oz)   LMP 03/06/2012   SpO2 96%   BMI 27.45 kg/m     Estimated body mass index is 27.45 kg/m  as calculated from the following:    Height as of this encounter: 1.664 m (5' 5.5\").    Weight as of this encounter: 76 kg (167 lb 8 oz).  Physical Exam  GENERAL: alert and no distress  EYES: Eyes grossly normal to inspection, PERRL and conjunctivae and sclerae normal  NECK: no adenopathy, no asymmetry, masses, or scars  RESP: lungs clear to auscultation - no rales, rhonchi or wheezes  CV: regular rate and rhythm, normal S1 S2, no S3 or S4, no murmur, click or rub, no peripheral edema  MS: no gross musculoskeletal defects noted, no edema  SKIN: no suspicious lesions or rashes  NEURO: Normal strength and tone, mentation intact and speech normal  PSYCH: mentation appears normal, affect normal/bright    Recent Labs   Lab Test 11/27/24  1240 10/08/24  1346   HGB 13.4 13.5    237   NA  --  140   POTASSIUM  --  4.5   CR  --  0.87        Diagnostics  Labs pending at this time.  Results will be reviewed when available.   No EKG required for low risk surgery (cataract, skin procedure, breast biopsy, etc).  No EKG required, no history of coronary heart disease, significant arrhythmia, peripheral arterial disease or other structural heart disease.    Revised Cardiac Risk Index (RCRI)  The patient has the following serious " cardiovascular risks for perioperative complications:   - No serious cardiac risks = 0 points     RCRI Interpretation: 0 points: Class I (very low risk - 0.4% complication rate)    Signed Electronically by: Brett Koehler PA-C  A copy of this evaluation report is provided to the requesting physician.

## 2025-03-07 ENCOUNTER — TRANSFERRED RECORDS (OUTPATIENT)
Dept: HEALTH INFORMATION MANAGEMENT | Facility: CLINIC | Age: 70
End: 2025-03-07
Payer: COMMERCIAL

## 2025-03-11 ENCOUNTER — TRANSFERRED RECORDS (OUTPATIENT)
Dept: HEALTH INFORMATION MANAGEMENT | Facility: CLINIC | Age: 70
End: 2025-03-11
Payer: COMMERCIAL

## 2025-03-27 ENCOUNTER — TRANSFERRED RECORDS (OUTPATIENT)
Dept: HEALTH INFORMATION MANAGEMENT | Facility: CLINIC | Age: 70
End: 2025-03-27
Payer: COMMERCIAL

## 2025-04-22 DIAGNOSIS — M85.80 OSTEOPENIA, UNSPECIFIED LOCATION: ICD-10-CM

## 2025-04-22 RX ORDER — ALENDRONATE SODIUM 70 MG/1
70 TABLET ORAL
Qty: 12 TABLET | Refills: 2 | Status: SHIPPED | OUTPATIENT
Start: 2025-04-22

## 2025-05-08 ENCOUNTER — TRANSFERRED RECORDS (OUTPATIENT)
Dept: HEALTH INFORMATION MANAGEMENT | Facility: CLINIC | Age: 70
End: 2025-05-08
Payer: COMMERCIAL

## 2025-05-13 ENCOUNTER — TRANSFERRED RECORDS (OUTPATIENT)
Dept: HEALTH INFORMATION MANAGEMENT | Facility: CLINIC | Age: 70
End: 2025-05-13
Payer: COMMERCIAL

## 2025-06-07 ENCOUNTER — OFFICE VISIT (OUTPATIENT)
Dept: URGENT CARE | Facility: URGENT CARE | Age: 70
End: 2025-06-07
Payer: COMMERCIAL

## 2025-06-07 VITALS
WEIGHT: 171.1 LBS | SYSTOLIC BLOOD PRESSURE: 117 MMHG | BODY MASS INDEX: 29.21 KG/M2 | TEMPERATURE: 98.3 F | HEIGHT: 64 IN | RESPIRATION RATE: 16 BRPM | OXYGEN SATURATION: 97 % | DIASTOLIC BLOOD PRESSURE: 73 MMHG | HEART RATE: 81 BPM

## 2025-06-07 DIAGNOSIS — H10.33 ACUTE CONJUNCTIVITIS OF BOTH EYES, UNSPECIFIED ACUTE CONJUNCTIVITIS TYPE: Primary | ICD-10-CM

## 2025-06-07 PROCEDURE — 3078F DIAST BP <80 MM HG: CPT | Performed by: FAMILY MEDICINE

## 2025-06-07 PROCEDURE — 3074F SYST BP LT 130 MM HG: CPT | Performed by: FAMILY MEDICINE

## 2025-06-07 PROCEDURE — 99213 OFFICE O/P EST LOW 20 MIN: CPT | Performed by: FAMILY MEDICINE

## 2025-06-07 RX ORDER — USTEKINUMAB 45 MG/.5ML
45 INJECTION, SOLUTION SUBCUTANEOUS
COMMUNITY
Start: 2025-03-19 | End: 2025-07-24

## 2025-06-07 RX ORDER — TOBRAMYCIN 3 MG/ML
2 SOLUTION/ DROPS OPHTHALMIC 4 TIMES DAILY
Qty: 5 ML | Refills: 0 | Status: SHIPPED | OUTPATIENT
Start: 2025-06-07 | End: 2025-06-14

## 2025-06-07 NOTE — PROGRESS NOTES
Urgent Care Clinic Visit    Chief Complaint   Patient presents with    Eye Problem     Possible bilateral conjunctivitis of the eyes for the last three days.                    6/7/2025    12:24 PM   Additional Questions   Roomed by Dominic Ford MA   Accompanied by Self

## 2025-06-07 NOTE — PROGRESS NOTES
"SUBJECTIVE:Chief Complaint:   Chief Complaint   Patient presents with    Eye Problem     Possible bilateral conjunctivitis of the eyes for the last three days.       History of Present Illness: Krissy Weldon is a 70 year old female who presents complaining of both eyes mattering and redness for 1-2 days.  Onset/timing: gradual.   Contact wearer : Yes     Past Medical History:   Diagnosis Date    Acute meniscal tear of knee 1/2014    with chrondromalacia per MRI     Depression     DJD (degenerative joint disease), lumbar 7/2012    L4-5, L5-S1 per MRI     Episcleritis 12/14/2011    Hamstring tendonitis at origin 7/2012    Bilaterally with partial tear right, sacroilitis per MRI    Hypothyroid 9/7/2011    Hypothyroidism     PONV (postoperative nausea and vomiting)     Psoriatic arthritis (H) 9/7/2011    Psoriatic arthritis (H) 2/9/2016    Strep throat 04/2017     No Known Allergies  Social History     Tobacco Use    Smoking status: Never    Smokeless tobacco: Never   Substance Use Topics    Alcohol use: No       ROS:  negative for photophobia, pain, vision change    OBJECTIVE:  /73 (BP Location: Right arm, Patient Position: Sitting, Cuff Size: Adult Regular)   Pulse 81   Temp 98.3  F (36.8  C) (Oral)   Resp 16   Ht 1.627 m (5' 4.05\")   Wt 77.6 kg (171 lb 1.6 oz)   LMP 03/06/2012   SpO2 97%   BMI 29.32 kg/m     General: no acute distress  Eye exam: right eye abnormal findings: conjunctivitis with erythema, discharge and matting noted, left eye abnormal findings: conjunctivitis with erythema, discharge and matting noted.  MINERVA, EOMI, fundi normal, corneas normal, no foreign bodies, visual acuity normal both eyes, no periorbital cellulitis      ICD-10-CM    1. Acute conjunctivitis of both eyes, unspecified acute conjunctivitis type  H10.33 tobramycin (TOBREX) 0.3 % ophthalmic solution          Warm packs for comfort.    "

## 2025-06-15 ENCOUNTER — HEALTH MAINTENANCE LETTER (OUTPATIENT)
Age: 70
End: 2025-06-15

## 2025-07-02 ENCOUNTER — TRANSFERRED RECORDS (OUTPATIENT)
Dept: HEALTH INFORMATION MANAGEMENT | Facility: CLINIC | Age: 70
End: 2025-07-02
Payer: COMMERCIAL

## 2025-08-12 ENCOUNTER — TRANSFERRED RECORDS (OUTPATIENT)
Dept: HEALTH INFORMATION MANAGEMENT | Facility: CLINIC | Age: 70
End: 2025-08-12
Payer: COMMERCIAL

## (undated) DEVICE — PEN MARKING SKIN FINE 31145942

## (undated) DEVICE — EYE PREP BETADINE 5% SOLUTION 30ML 0065-0411-30

## (undated) DEVICE — LINEN TOWEL PACK X5 5464

## (undated) DEVICE — SOL WATER IRRIG 1000ML BOTTLE 2F7114

## (undated) DEVICE — SU PROLENE 5-0 P-3 18" 8698G

## (undated) DEVICE — ESU PENCIL W/SMOKE EVAC CVPLP2000

## (undated) DEVICE — KIT ENDO TURNOVER/PROCEDURE W/CLEAN A SCOPE LINERS 103888

## (undated) DEVICE — SU VICRYL 4-0 P-3 18" UND  J494H

## (undated) DEVICE — TUBING SUCTION 12"X1/4" N612

## (undated) DEVICE — SOL NACL 0.9% IRRIG 1000ML BOTTLE 2F7124

## (undated) DEVICE — SU PROLENE 6-0 P-1 18" 8697G

## (undated) DEVICE — ESU GROUND PAD UNIVERSAL W/O CORD

## (undated) DEVICE — GLOVE PROTEXIS MICRO 6.5  2D73PM65

## (undated) DEVICE — PACK OCULOPLATIC SEN15OCFSD

## (undated) DEVICE — ESU NDL COLORADO MICRO 3CM STR N103A

## (undated) DEVICE — ENDO TRAP POLYP QUICK CATCH 710201

## (undated) RX ORDER — PROPOFOL 10 MG/ML
INJECTION, EMULSION INTRAVENOUS
Status: DISPENSED
Start: 2020-08-04

## (undated) RX ORDER — FENTANYL CITRATE 50 UG/ML
INJECTION, SOLUTION INTRAMUSCULAR; INTRAVENOUS
Status: DISPENSED
Start: 2020-08-04

## (undated) RX ORDER — FENTANYL CITRATE 0.05 MG/ML
INJECTION, SOLUTION INTRAMUSCULAR; INTRAVENOUS
Status: DISPENSED
Start: 2022-07-13

## (undated) RX ORDER — SIMETHICONE 40MG/0.6ML
SUSPENSION, DROPS(FINAL DOSAGE FORM)(ML) ORAL
Status: DISPENSED
Start: 2022-07-13